# Patient Record
Sex: FEMALE | Race: WHITE | NOT HISPANIC OR LATINO | Employment: OTHER | URBAN - METROPOLITAN AREA
[De-identification: names, ages, dates, MRNs, and addresses within clinical notes are randomized per-mention and may not be internally consistent; named-entity substitution may affect disease eponyms.]

---

## 2017-04-03 ENCOUNTER — HOSPITAL ENCOUNTER (OUTPATIENT)
Dept: LAB | Facility: MEDICAL CENTER | Age: 77
End: 2017-04-03
Attending: INTERNAL MEDICINE
Payer: MEDICARE

## 2017-04-03 LAB
ALBUMIN SERPL BCP-MCNC: 4.5 G/DL (ref 3.2–4.9)
ALBUMIN/GLOB SERPL: 1.5 G/DL
ALP SERPL-CCNC: 82 U/L (ref 30–99)
ALT SERPL-CCNC: 19 U/L (ref 2–50)
ANION GAP SERPL CALC-SCNC: 9 MMOL/L (ref 0–11.9)
AST SERPL-CCNC: 16 U/L (ref 12–45)
BASOPHILS # BLD AUTO: 1.1 % (ref 0–1.8)
BASOPHILS # BLD: 0.1 K/UL (ref 0–0.12)
BILIRUB SERPL-MCNC: 0.7 MG/DL (ref 0.1–1.5)
BUN SERPL-MCNC: 19 MG/DL (ref 8–22)
CALCIUM SERPL-MCNC: 10 MG/DL (ref 8.5–10.5)
CHLORIDE SERPL-SCNC: 102 MMOL/L (ref 96–112)
CHOLEST SERPL-MCNC: 205 MG/DL (ref 100–199)
CO2 SERPL-SCNC: 27 MMOL/L (ref 20–33)
CREAT SERPL-MCNC: 0.81 MG/DL (ref 0.5–1.4)
CREAT UR-MCNC: 194.6 MG/DL
EOSINOPHIL # BLD AUTO: 0.38 K/UL (ref 0–0.51)
EOSINOPHIL NFR BLD: 4.2 % (ref 0–6.9)
ERYTHROCYTE [DISTWIDTH] IN BLOOD BY AUTOMATED COUNT: 44.4 FL (ref 35.9–50)
EST. AVERAGE GLUCOSE BLD GHB EST-MCNC: 148 MG/DL
GFR SERPL CREATININE-BSD FRML MDRD: >60 ML/MIN/1.73 M 2
GLOBULIN SER CALC-MCNC: 3.1 G/DL (ref 1.9–3.5)
GLUCOSE SERPL-MCNC: 139 MG/DL (ref 65–99)
HBA1C MFR BLD: 6.8 % (ref 0–5.6)
HCT VFR BLD AUTO: 48 % (ref 37–47)
HDLC SERPL-MCNC: 54 MG/DL
HGB BLD-MCNC: 15.4 G/DL (ref 12–16)
IMM GRANULOCYTES # BLD AUTO: 0.05 K/UL (ref 0–0.11)
IMM GRANULOCYTES NFR BLD AUTO: 0.6 % (ref 0–0.9)
LDLC SERPL CALC-MCNC: 104 MG/DL
LYMPHOCYTES # BLD AUTO: 1.9 K/UL (ref 1–4.8)
LYMPHOCYTES NFR BLD: 21.1 % (ref 22–41)
MCH RBC QN AUTO: 27.4 PG (ref 27–33)
MCHC RBC AUTO-ENTMCNC: 32.1 G/DL (ref 33.6–35)
MCV RBC AUTO: 85.4 FL (ref 81.4–97.8)
MICROALBUMIN UR-MCNC: 5.7 MG/DL
MICROALBUMIN/CREAT UR: 29 MG/G (ref 0–30)
MONOCYTES # BLD AUTO: 0.71 K/UL (ref 0–0.85)
MONOCYTES NFR BLD AUTO: 7.9 % (ref 0–13.4)
NEUTROPHILS # BLD AUTO: 5.85 K/UL (ref 2–7.15)
NEUTROPHILS NFR BLD: 65.1 % (ref 44–72)
NRBC # BLD AUTO: 0 K/UL
NRBC BLD AUTO-RTO: 0 /100 WBC
PLATELET # BLD AUTO: 328 K/UL (ref 164–446)
PMV BLD AUTO: 11.2 FL (ref 9–12.9)
POTASSIUM SERPL-SCNC: 4.6 MMOL/L (ref 3.6–5.5)
PROT SERPL-MCNC: 7.6 G/DL (ref 6–8.2)
RBC # BLD AUTO: 5.62 M/UL (ref 4.2–5.4)
SODIUM SERPL-SCNC: 138 MMOL/L (ref 135–145)
TRIGL SERPL-MCNC: 235 MG/DL (ref 0–149)
WBC # BLD AUTO: 9 K/UL (ref 4.8–10.8)

## 2017-04-03 PROCEDURE — 85025 COMPLETE CBC W/AUTO DIFF WBC: CPT

## 2017-04-03 PROCEDURE — 82043 UR ALBUMIN QUANTITATIVE: CPT

## 2017-04-03 PROCEDURE — 83036 HEMOGLOBIN GLYCOSYLATED A1C: CPT

## 2017-04-03 PROCEDURE — 80053 COMPREHEN METABOLIC PANEL: CPT

## 2017-04-03 PROCEDURE — 36415 COLL VENOUS BLD VENIPUNCTURE: CPT

## 2017-04-03 PROCEDURE — 82570 ASSAY OF URINE CREATININE: CPT

## 2017-04-03 PROCEDURE — 80061 LIPID PANEL: CPT

## 2017-04-06 ENCOUNTER — OFFICE VISIT (OUTPATIENT)
Dept: MEDICAL GROUP | Age: 77
End: 2017-04-06
Payer: MEDICARE

## 2017-04-06 VITALS
TEMPERATURE: 97.2 F | HEIGHT: 67 IN | SYSTOLIC BLOOD PRESSURE: 138 MMHG | DIASTOLIC BLOOD PRESSURE: 92 MMHG | WEIGHT: 178 LBS | HEART RATE: 77 BPM | BODY MASS INDEX: 27.94 KG/M2 | OXYGEN SATURATION: 94 %

## 2017-04-06 DIAGNOSIS — I48.20 CHRONIC ATRIAL FIBRILLATION (HCC): ICD-10-CM

## 2017-04-06 DIAGNOSIS — J45.40 ASTHMA, MODERATE PERSISTENT, WELL-CONTROLLED: ICD-10-CM

## 2017-04-06 DIAGNOSIS — E78.2 MIXED HYPERLIPIDEMIA: ICD-10-CM

## 2017-04-06 PROCEDURE — 99215 OFFICE O/P EST HI 40 MIN: CPT | Performed by: INTERNAL MEDICINE

## 2017-04-06 RX ORDER — ATORVASTATIN CALCIUM 20 MG/1
20 TABLET, FILM COATED ORAL DAILY
Qty: 90 TAB | Refills: 4 | Status: SHIPPED | OUTPATIENT
Start: 2017-04-06 | End: 2017-09-13

## 2017-04-06 ASSESSMENT — ENCOUNTER SYMPTOMS
EYES NEGATIVE: 1
GASTROINTESTINAL NEGATIVE: 1
CARDIOVASCULAR NEGATIVE: 1
CONSTITUTIONAL NEGATIVE: 1
MUSCULOSKELETAL NEGATIVE: 1
NEUROLOGICAL NEGATIVE: 1
PSYCHIATRIC NEGATIVE: 1
RESPIRATORY NEGATIVE: 1

## 2017-04-06 NOTE — MR AVS SNAPSHOT
"        Jessica Broussardin   2017 8:20 AM   Office Visit   MRN: [id number]    Department:  [address] [place] [address]   Dept Phone:  [phone]    Description:  Female : 1940   Provider:  Avery Cruz M.D.           Reason for Visit     Hyperlipidemia Lab Review      Allergies as of 2017     No Known Allergies      You were diagnosed with     Uncontrolled type 2 diabetes mellitus with complication, without long-term current use of insulin (CMS-HCC)   [4019744]       Mixed hyperlipidemia   [272.2.ICD-9-CM]       Chronic atrial fibrillation (CMS-HCC)   [122792]       Asthma, moderate persistent, well-controlled   [166953]         Vital Signs     Blood Pressure Pulse Temperature Height Weight Body Mass Index    138/92 mmHg 77 36.2 °C (97.2 °F) 1.695 m (5' 6.73\") 80.74 kg (178 lb) 28.10 kg/m2    Oxygen Saturation Smoking Status                94% Never Smoker           Basic Information     Date Of Birth Sex Race Ethnicity Preferred Language    1940 Female White Non- English      Your appointments     Sep 06, 2017  8:20 AM   Established Patient with Avery Cruz M.D.   [address] [address] [phone]           You will be receiving a confirmation call a few days before your appointment from our automated call confirmation system.              Problem List              ICD-10-CM Priority Class Noted - Resolved    MVP (mitral valve prolapse) I34.1   2013 - Present    Chronic atrial fibrillation- dr dawson I48.2   2015 - Present    Asthma, moderate persistent, well-controlled J45.40   2015 - Present    Uncontrolled type 2 diabetes mellitus with complication, without long-term current use of insulin (CMS-HCC) E11.8, E11.65   12/15/2016 - Present    Anticoagulant long-term use- dr eunice collado; for a fib Z79.01   12/15/2016 - Present    Mammogram reminder not needed forever Z53.8   12/15/2016 - Present    Mixed " hyperlipidemia E78.2   12/15/2016 - Present      Health Maintenance        Date Due Completion Dates    IMM DTaP/Tdap/Td Vaccine (1 - Tdap) 6/8/1958 ---    PAP SMEAR 6/8/1960 ---    IMM ZOSTER VACCINE 6/8/1999 ---    A1C SCREENING 10/3/2017 4/3/2017, 12/12/2016, 5/18/2007    IMM PNEUMOCOCCAL 65+ (ADULT) LOW/MEDIUM RISK SERIES (2 of 2 - PPSV23) 12/15/2017 12/15/2016    DIABETES MONOFILAMENT / LE EXAM 12/15/2017 12/15/2016    RETINAL SCREENING 12/29/2017 12/29/2016    FASTING LIPID PROFILE 4/3/2018 4/3/2017, 12/12/2016, 8/22/2016, 6/14/2016, 6/5/2015, 4/22/2013, 3/23/2011, 5/6/2010, 5/18/2007    URINE ACR / MICROALBUMIN 4/3/2018 4/3/2017, 12/15/2016    SERUM CREATININE 4/3/2018 4/3/2017, 12/12/2016, 8/22/2016, 8/21/2016, 6/14/2016, 6/5/2015, 4/22/2013, 5/23/2007, 5/20/2007, 5/19/2007, 5/17/2007    BONE DENSITY 4/23/2019 4/23/2014 (Declined)    Override on 4/23/2014: Patient Declined            Current Immunizations     13-VALENT PCV PREVNAR 12/15/2016    Influenza Vaccine Adult HD 12/15/2016      Below and/or attached are the medications your provider expects you to take. Review all of your home medications and newly ordered medications with your provider and/or pharmacist. Follow medication instructions as directed by your provider and/or pharmacist. Please keep your medication list with you and share with your provider. Update the information when medications are discontinued, doses are changed, or new medications (including over-the-counter products) are added; and carry medication information at all times in the event of emergency situations     Allergies:  No Known Allergies          Medications  Valid as of: April 06, 2017 -  9:00 AM    Generic Name Brand Name Tablet Size Instructions for use    Albuterol Sulfate (Aero Soln) albuterol 108 (90 BASE) MCG/ACT Inhale 1-2 Puffs by mouth every four hours as needed for Shortness of Breath.        Apixaban (Tab) ELIQUIS 5mg Take 5 mg by mouth 2 Times a Day.         Ascorbic Acid (Tab) VITAMIN C 1000 MG Take 1,000 mg by mouth every day.        Atorvastatin Calcium (Tab) LIPITOR 20 MG Take 1 Tab by mouth every day.        Digoxin (Tab) LANOXIN 250 MCG Take 250 mcg by mouth every day.        DiltiaZEM HCl Coated Beads (CAPSULE SR 24 HR) CARDIZEM  MG Take 1 Cap by mouth 2 Times a Day.        Fluticasone-Salmeterol (AEROSOL POWDER, BREATH ACTIVATED) ADVAIR 100-50 MCG/DOSE Inhale 1 Puff by mouth 2 times a day.        Multiple Vitamin (Tab) THERAGRAN  Take 1 Tab by mouth every day.        Mupirocin Calcium (Cream) BACTROBAN 2 % Apply 1 Application to affected area(s) 2 times a day.        Probiotic Product   Take 1 Tab by mouth every day.        .                 Medicines prescribed today were sent to:     [facility] - [place], [place] - [address]    [address] [place] [address]    Phone: [phone] Fax: [phone]    Open 24 Hours?: No      Medication refill instructions:       If your prescription bottle indicates you have medication refills left, it is not necessary to call your provider’s office. Please contact your pharmacy and they will refill your medication.    If your prescription bottle indicates you do not have any refills left, you may request refills at any time through one of the following ways: The online [employer] system (except Urgent Care), by calling your provider’s office, or by asking your pharmacy to contact your provider’s office with a refill request. Medication refills are processed only during regular business hours and may not be available until the next business day. Your provider may request additional information or to have a follow-up visit with you prior to refilling your medication.   *Please Note: Medication refills are assigned a new Rx number when refilled electronically. Your pharmacy may indicate that no refills were authorized even though a new prescription for the same medication is available at the pharmacy. Please request the  medicine by name with the pharmacy before contacting your provider for a refill.        Your To Do List     Future Labs/Procedures Complete By Expires    CBC WITH DIFFERENTIAL  As directed 4/6/2018    COMP METABOLIC PANEL  As directed 4/6/2018    HEMOGLOBIN A1C  As directed 4/6/2018    LIPID PROFILE  As directed 4/6/2018    MICROALBUMIN CREAT RATIO URINE  As directed 4/6/2018    TSH  As directed 4/6/2018         MyChart Status: Patient Declined

## 2017-04-07 NOTE — PROGRESS NOTES
Subjective:      Jessica Black is a 76 y.o. female who presents with Hyperlipidemia  The patient is here for followup of chronic medical problems listed below. The patient is compliant with medications and having no side effects from them. Denies chest pain, abdominal pain, dyspnea, myalgias, or cough.   No Known Allergies  Outpatient Prescriptions Prior to Visit   Medication Sig Dispense Refill   • fluticasone-salmeterol (ADVAIR) 100-50 MCG/DOSE AEROSOL POWDER, BREATH ACTIVATED Inhale 1 Puff by mouth 2 times a day. 3 Inhaler 4   • diltiazem CD (DILTIAZEM CD) 180 MG CAPSULE SR 24 HR Take 1 Cap by mouth 2 Times a Day. 60 Cap 1   • digoxin (LANOXIN) 250 MCG Tab Take 250 mcg by mouth every day.     • multivitamin (THERAGRAN) Tab Take 1 Tab by mouth every day.     • ascorbic acid (C 1000) 1000 MG tablet Take 1,000 mg by mouth every day.     • Probiotic Product (PROBIOTIC DAILY PO) Take 1 Tab by mouth every day.     • apixaban (ELIQUIS) 5mg Tab Take 5 mg by mouth 2 Times a Day.     • mupirocin calcium (BACTROBAN) 2 % Cream Apply 1 Application to affected area(s) 2 times a day. 1 Tube 1   • amoxicillin-clavulanate (AUGMENTIN) 875-125 MG Tab Take 1 Tab by mouth 2 times a day. 30 Tab 1   • atorvastatin (LIPITOR) 10 MG Tab Take 1 Tab by mouth every day. 90 Tab 4   • albuterol (PROAIR HFA) 108 (90 BASE) MCG/ACT Aero Soln inhalation aerosol Inhale 1-2 Puffs by mouth every four hours as needed for Shortness of Breath. 1 Inhaler 3     No facility-administered medications prior to visit.     .          HPI    Review of Systems   Constitutional: Negative.    HENT: Negative.    Eyes: Negative.    Respiratory: Negative.    Cardiovascular: Negative.    Gastrointestinal: Negative.    Genitourinary: Negative.    Musculoskeletal: Negative.    Skin: Negative.    Neurological: Negative.    Endo/Heme/Allergies: Negative.    Psychiatric/Behavioral: Negative.           Objective:     /92 mmHg  Pulse 77  Temp(Src) 36.2 °C (97.2 °F)   "Ht 1.695 m (5' 6.73\")  Wt 80.74 kg (178 lb)  BMI 28.10 kg/m2  SpO2 94%     Physical Exam   Constitutional: She is oriented to person, place, and time. She appears well-developed and well-nourished.   HENT:   Head: Normocephalic and atraumatic.   Right Ear: External ear normal.   Left Ear: External ear normal.   Nose: Nose normal.   Mouth/Throat: Oropharynx is clear and moist.   Eyes: Conjunctivae and EOM are normal. Pupils are equal, round, and reactive to light. Right eye exhibits no discharge. Left eye exhibits no discharge. No scleral icterus.   Neck: Normal range of motion. Neck supple. No JVD present. No tracheal deviation present. No thyromegaly present.   Cardiovascular: Normal rate, regular rhythm, normal heart sounds and intact distal pulses.  Exam reveals no gallop and no friction rub.    Pulmonary/Chest: Effort normal and breath sounds normal. No stridor. No respiratory distress. She has no wheezes. She has no rales. She exhibits no tenderness.   Abdominal: Soft. Bowel sounds are normal. She exhibits no distension and no mass. There is no tenderness. There is no rebound and no guarding. No hernia.   Musculoskeletal: Normal range of motion. She exhibits no edema or tenderness.   Lymphadenopathy:     She has no cervical adenopathy.   Neurological: She is alert and oriented to person, place, and time. She has normal reflexes. She displays normal reflexes. No cranial nerve deficit. Coordination normal.   Skin: Skin is warm and dry. No rash noted. No erythema. No pallor.   Psychiatric: She has a normal mood and affect. Her behavior is normal. Judgment and thought content normal.   Nursing note and vitals reviewed.    Hospital Outpatient Visit on 04/03/2017   Component Date Value   • Sodium 04/03/2017 138    • Potassium 04/03/2017 4.6    • Chloride 04/03/2017 102    • Co2 04/03/2017 27    • Anion Gap 04/03/2017 9.0    • Glucose 04/03/2017 139*   • Bun 04/03/2017 19    • Creatinine 04/03/2017 0.81    • " Calcium 04/03/2017 10.0    • AST(SGOT) 04/03/2017 16    • ALT(SGPT) 04/03/2017 19    • Alkaline Phosphatase 04/03/2017 82    • Total Bilirubin 04/03/2017 0.7    • Albumin 04/03/2017 4.5    • Total Protein 04/03/2017 7.6    • Globulin 04/03/2017 3.1    • A-G Ratio 04/03/2017 1.5    • Cholesterol,Tot 04/03/2017 205*   • Triglycerides 04/03/2017 235*   • HDL 04/03/2017 54    • LDL 04/03/2017 104*   • WBC 04/03/2017 9.0    • RBC 04/03/2017 5.62*   • Hemoglobin 04/03/2017 15.4    • Hematocrit 04/03/2017 48.0*   • MCV 04/03/2017 85.4    • MCH 04/03/2017 27.4    • MCHC 04/03/2017 32.1*   • RDW 04/03/2017 44.4    • Platelet Count 04/03/2017 328    • MPV 04/03/2017 11.2    • Neutrophils-Polys 04/03/2017 65.10    • Lymphocytes 04/03/2017 21.10*   • Monocytes 04/03/2017 7.90    • Eosinophils 04/03/2017 4.20    • Basophils 04/03/2017 1.10    • Immature Granulocytes 04/03/2017 0.60    • Nucleated RBC 04/03/2017 0.00    • Neutrophils (Absolute) 04/03/2017 5.85    • Lymphs (Absolute) 04/03/2017 1.90    • Monos (Absolute) 04/03/2017 0.71    • Eos (Absolute) 04/03/2017 0.38    • Baso (Absolute) 04/03/2017 0.10    • Immature Granulocytes (a* 04/03/2017 0.05    • NRBC (Absolute) 04/03/2017 0.00    • Glycohemoglobin 04/03/2017 6.8*   • Est Avg Glucose 04/03/2017 148    • Creatinine, Urine 04/03/2017 194.60    • Microalbumin, Urine Rand* 04/03/2017 5.7    • Micro Alb Creat Ratio 04/03/2017 29    • GFR If  04/03/2017 >60    • GFR If Non  Ameri* 04/03/2017 >60       Lab Results   Component Value Date/Time    GLYCOHEMOGLOBIN 6.8* 04/03/2017 10:01 AM    GLYCOHEMOGLOBIN 6.8* 12/12/2016 09:50 AM     Lab Results   Component Value Date/Time    SODIUM 138 04/03/2017 10:01 AM    POTASSIUM 4.6 04/03/2017 10:01 AM    CHLORIDE 102 04/03/2017 10:01 AM    CO2 27 04/03/2017 10:01 AM    GLUCOSE 139* 04/03/2017 10:01 AM    BUN 19 04/03/2017 10:01 AM    CREATININE 0.81 04/03/2017 10:01 AM    BUN-CREATININE RATIO 13 03/23/2011 12:00  AM    GLOM FILT RATE, EST 59* 03/23/2011 12:00 AM    ALKALINE PHOSPHATASE 82 04/03/2017 10:01 AM    AST(SGOT) 16 04/03/2017 10:01 AM    ALT(SGPT) 19 04/03/2017 10:01 AM    TOTAL BILIRUBIN 0.7 04/03/2017 10:01 AM     Lab Results   Component Value Date/Time    INR 1.33* 08/21/2016 12:14 PM    INR 1.84* 05/23/2007 03:42 AM    INR 1.53* 05/22/2007 05:25 AM     Lab Results   Component Value Date/Time    CHOLESTEROL,* 04/03/2017 10:01 AM    * 04/03/2017 10:01 AM    HDL 54 04/03/2017 10:01 AM    TRIGLYCERIDES 235* 04/03/2017 10:01 AM       No results found for: TESTOSTERONE  Lab Results   Component Value Date/Time    TSH 2.310 03/23/2011 12:00 AM    TSH 1.790 05/06/2010 09:46 AM     No results found for: FREET4  No results found for: URICACID  No components found for: VITB12  Lab Results   Component Value Date/Time    25-HYDROXY   VITAMIN D 25 26* 12/12/2016 09:50 AM                  Assessment/Plan:     1. Uncontrolled type 2 diabetes mellitus with complication, without long-term current use of insulin (CMS-HCC)  Refuses meds; cont diet and exercise      - COMP METABOLIC PANEL; Future  - LIPID PROFILE; Future  - HEMOGLOBIN A1C; Future  - MICROALBUMIN CREAT RATIO URINE; Future    2. Mixed hyperlipidemiaUnder good control. Continue same regimen.     - atorvastatin (LIPITOR) 20 MG Tab; Take 1 Tab by mouth every day.  Dispense: 90 Tab; Refill: 4  - COMP METABOLIC PANEL; Future  - LIPID PROFILE; Future  - CBC WITH DIFFERENTIAL; Future  - TSH; Future    3. Chronic atrial fibrillation- dr Nixon good control. Continue same regimen.       4. Asthma, moderate persistent, well-controlledUnder good control. Continue same regimen.       40 minute face-to-face encounter took place today.  More than half of this time was spent in the coordination of care of the above problems, as well as counseling.

## 2017-06-08 ENCOUNTER — PATIENT OUTREACH (OUTPATIENT)
Dept: HEALTH INFORMATION MANAGEMENT | Facility: OTHER | Age: 77
End: 2017-06-08

## 2017-06-08 NOTE — PROGRESS NOTES
6/8/17  -  Outcome:No answer    WebIZ Checked & Epic Updated:  yes    [facility] Verified: yes    Attempt # 1

## 2017-06-26 NOTE — PROGRESS NOTES
Attempt #:4    WebIZ Checked & Epic Updated: yes  [facility] Verified: yes  Verify PCP: yes    Communication Preference Obtained: yes     Review Care Team: yes    Annual Wellness Visit Scheduling  1. Scheduling Status:Not Scheduled. Patient states they are not interested     Care Gap Scheduling      Health Maintenance Due   Topic Date Due   • IMM DTaP/Tdap/Td Vaccine (1 - Tdap) DECLINED SCHEDULING AT THIS TIME, HM NOT CHANGED   • PAP SMEAR  MESSAGE SENT TO PROVIDER   • IMM ZOSTER VACCINE  DECLINED SCHEDULING AT THIS TIME, HM NOT CHANGED   • Annual Wellness Visit  DECLINED, POSTPONED ON HM         MyChart Activation: declined  MyChart Papito: no  Virtual Visits: no  Opt In to Text Messages: no

## 2017-07-21 ENCOUNTER — APPOINTMENT (OUTPATIENT)
Dept: RADIOLOGY | Facility: IMAGING CENTER | Age: 77
End: 2017-07-21
Attending: FAMILY MEDICINE
Payer: MEDICARE

## 2017-07-21 ENCOUNTER — OFFICE VISIT (OUTPATIENT)
Dept: URGENT CARE | Facility: CLINIC | Age: 77
End: 2017-07-21
Payer: MEDICARE

## 2017-07-21 VITALS
HEIGHT: 67 IN | DIASTOLIC BLOOD PRESSURE: 84 MMHG | TEMPERATURE: 97.9 F | HEART RATE: 86 BPM | SYSTOLIC BLOOD PRESSURE: 132 MMHG | RESPIRATION RATE: 18 BRPM | WEIGHT: 174.2 LBS | OXYGEN SATURATION: 91 % | BODY MASS INDEX: 27.34 KG/M2

## 2017-07-21 DIAGNOSIS — S49.91XA ARM INJURIES, RIGHT, INITIAL ENCOUNTER: ICD-10-CM

## 2017-07-21 DIAGNOSIS — S52.124A CLOSED NONDISPLACED FRACTURE OF HEAD OF RIGHT RADIUS, INITIAL ENCOUNTER: ICD-10-CM

## 2017-07-21 PROCEDURE — 73130 X-RAY EXAM OF HAND: CPT | Mod: TC,RT | Performed by: FAMILY MEDICINE

## 2017-07-21 PROCEDURE — 73090 X-RAY EXAM OF FOREARM: CPT | Mod: TC,RT | Performed by: FAMILY MEDICINE

## 2017-07-21 PROCEDURE — 99999 PR NO CHARGE: CPT | Performed by: FAMILY MEDICINE

## 2017-07-21 ASSESSMENT — ENCOUNTER SYMPTOMS
NAUSEA: 0
TINGLING: 0
NUMBNESS: 0
VOMITING: 0

## 2017-07-21 NOTE — MR AVS SNAPSHOT
"        Jessica Black   2017 12:45 PM   Office Visit   MRN: [id number]    Department:  [facility]   Dept Phone:  [phone]    Description:  Female : 1940   Provider:  Andrew Snider M.D.           Reason for Visit     Fall x yesterday had a fall and hit her R hand and R arm, bruising and severe swelling in R hand, no pain, arm pain       Allergies as of 2017     No Known Allergies      You were diagnosed with     Arm injuries, right, initial encounter   [[id number]]         Vital Signs     Blood Pressure Pulse Temperature Respirations Height Weight    132/84 mmHg 86 36.6 °C (97.9 °F) 18 1.702 m (5' 7\") 79.017 kg (174 lb 3.2 oz)    Body Mass Index Oxygen Saturation Smoking Status             27.28 kg/m2 91% Never Smoker          Basic Information     Date Of Birth Sex Race Ethnicity Preferred Language    1940 Female White Non- English      Your appointments     2017  9:00 AM   NEW TO YOU with Romulo Davila M.D.   [facility] ([facility])    [address] [id number] [phone]            Sep 06, 2017  8:20 AM   Established Patient with Avery Cruz M.D.   [address]    [address] [facility] [id number] [phone]           You will be receiving a confirmation call a few days before your appointment from our automated call confirmation system.              Problem List              ICD-10-CM Priority Class Noted - Resolved    MVP (mitral valve prolapse) I34.1   2013 - Present    Chronic atrial fibrillation- dr dawson I48.2   2015 - Present    Asthma, moderate persistent, well-controlled J45.40   2015 - Present    Uncontrolled type 2 diabetes mellitus with complication, without long-term current use of insulin (CMS-HCC) E11.8, E11.65   12/15/2016 - Present    Anticoagulant long-term use- dr eunice collado; for a fib Z79.01   12/15/2016 - Present    Mammogram reminder " not needed forever Z53.8   12/15/2016 - Present    Mixed hyperlipidemia E78.2   12/15/2016 - Present      Health Maintenance        Date Due Completion Dates    IMM DTaP/Tdap/Td Vaccine (1 - Tdap) 6/8/1958 ---    PAP SMEAR 6/8/1960 ---    IMM ZOSTER VACCINE 6/8/1999 ---    IMM INFLUENZA (1) 9/1/2017 12/15/2016    A1C SCREENING 10/3/2017 4/3/2017, 12/12/2016, 5/18/2007    IMM PNEUMOCOCCAL 65+ (ADULT) LOW/MEDIUM RISK SERIES (2 of 2 - PPSV23) 12/15/2017 12/15/2016    DIABETES MONOFILAMENT / LE EXAM 12/15/2017 12/15/2016    RETINAL SCREENING 12/29/2017 12/29/2016    FASTING LIPID PROFILE 4/3/2018 4/3/2017, 12/12/2016, 8/22/2016, 6/14/2016, 6/5/2015, 4/22/2013, 3/23/2011, 5/6/2010, 5/18/2007    URINE ACR / MICROALBUMIN 4/3/2018 4/3/2017, 12/15/2016    SERUM CREATININE 4/3/2018 4/3/2017, 12/12/2016, 8/22/2016, 8/21/2016, 6/14/2016, 6/5/2015, 4/22/2013, 5/23/2007, 5/20/2007, 5/19/2007, 5/17/2007    BONE DENSITY 4/23/2019 4/23/2014 (Declined)    Override on 4/23/2014: Patient Declined            Current Immunizations     13-VALENT PCV PREVNAR 12/15/2016    Influenza Vaccine Adult HD 12/15/2016      Below and/or attached are the medications your provider expects you to take. Review all of your home medications and newly ordered medications with your provider and/or pharmacist. Follow medication instructions as directed by your provider and/or pharmacist. Please keep your medication list with you and share with your provider. Update the information when medications are discontinued, doses are changed, or new medications (including over-the-counter products) are added; and carry medication information at all times in the event of emergency situations     Allergies:  No Known Allergies          Medications  Valid as of: July 21, 2017 -  1:42 PM    Generic Name Brand Name Tablet Size Instructions for use    Albuterol Sulfate (Aero Soln) albuterol 108 (90 BASE) MCG/ACT Inhale 1-2 Puffs by mouth every four hours as needed for  Shortness of Breath.        Apixaban (Tab) ELIQUIS 5mg Take 5 mg by mouth 2 Times a Day.        Ascorbic Acid (Tab) VITAMIN C 1000 MG Take 1,000 mg by mouth every day.        Atorvastatin Calcium (Tab) LIPITOR 20 MG Take 1 Tab by mouth every day.        Digoxin (Tab) LANOXIN 250 MCG Take 250 mcg by mouth every day.        DilTIAZem HCl Coated Beads (CAPSULE SR 24 HR) CARDIZEM  MG Take 1 Cap by mouth 2 Times a Day.        Fluticasone-Salmeterol (AEROSOL POWDER, BREATH ACTIVATED) ADVAIR 100-50 MCG/DOSE Inhale 1 Puff by mouth 2 times a day.        Multiple Vitamin (Tab) THERAGRAN  Take 1 Tab by mouth every day.        Mupirocin Calcium (Cream) BACTROBAN 2 % Apply 1 Application to affected area(s) 2 times a day.        Probiotic Product   Take 1 Tab by mouth every day.        .                 Medicines prescribed today were sent to:     [facility] - [place], [place] - [address]    [facility] [place] [address]    Phone: [phone] Fax: [phone]    Open 24 Hours?: No      Medication refill instructions:       If your prescription bottle indicates you have medication refills left, it is not necessary to call your provider’s office. Please contact your pharmacy and they will refill your medication.    If your prescription bottle indicates you do not have any refills left, you may request refills at any time through one of the following ways: The online [employer] system (except Urgent Care), by calling your provider’s office, or by asking your pharmacy to contact your provider’s office with a refill request. Medication refills are processed only during regular business hours and may not be available until the next business day. Your provider may request additional information or to have a follow-up visit with you prior to refilling your medication.   *Please Note: Medication refills are assigned a new Rx number when refilled electronically. Your pharmacy may indicate that no refills were authorized even though a  new prescription for the same medication is available at the pharmacy. Please request the medicine by name with the pharmacy before contacting your provider for a refill.        Your To Do List     Future Labs/Procedures Complete By Expires    DX-FOREARM RIGHT  As directed 7/21/2018    DX-HAND 3+ RIGHT  As directed 7/21/2018         MyChart Status: Patient Declined

## 2017-07-22 NOTE — PROGRESS NOTES
"Subjective:      Jessica Black is a 77 y.o. female who presents with Fall            Fall  The accident occurred 12 to 24 hours ago. The fall occurred while walking. She landed on carpet. There was no blood loss. The pain is present in the right wrist. The pain is moderate. Pertinent negatives include no nausea, numbness, tingling or vomiting.       Review of Systems   Gastrointestinal: Negative for nausea and vomiting.   Neurological: Negative for tingling and numbness.     No Known Allergies      Objective:     /84 mmHg  Pulse 86  Temp(Src) 36.6 °C (97.9 °F)  Resp 18  Ht 1.702 m (5' 7\")  Wt 79.017 kg (174 lb 3.2 oz)  BMI 27.28 kg/m2  SpO2 91%     Physical Exam   Constitutional: She is oriented to person, place, and time. She appears well-developed and well-nourished. No distress.   HENT:   Head: Normocephalic and atraumatic.   Eyes: Conjunctivae and EOM are normal. Pupils are equal, round, and reactive to light.   Cardiovascular: Normal rate and regular rhythm.    No murmur heard.  Pulmonary/Chest: Effort normal and breath sounds normal. No respiratory distress.   Abdominal: Soft. She exhibits no distension. There is no tenderness.   Musculoskeletal:        Right wrist: She exhibits decreased range of motion, tenderness and swelling.   Neurological: She is alert and oriented to person, place, and time. She has normal reflexes. No sensory deficit.   Skin: Skin is warm and dry.   Psychiatric: She has a normal mood and affect.               Assessment/Plan:     1. Arm injuries, right, initial encounter  Acute radial fracture on my read.   - DX-FOREARM RIGHT; Future  - DX-HAND 3+ RIGHT; Future        "

## 2017-07-25 ENCOUNTER — OFFICE VISIT (OUTPATIENT)
Dept: MEDICAL GROUP | Facility: CLINIC | Age: 77
End: 2017-07-25
Payer: MEDICARE

## 2017-07-25 VITALS
HEART RATE: 88 BPM | WEIGHT: 174 LBS | TEMPERATURE: 98.2 F | OXYGEN SATURATION: 92 % | HEIGHT: 67 IN | RESPIRATION RATE: 18 BRPM | BODY MASS INDEX: 27.31 KG/M2 | DIASTOLIC BLOOD PRESSURE: 76 MMHG | SYSTOLIC BLOOD PRESSURE: 122 MMHG

## 2017-07-25 DIAGNOSIS — S52.514A CLOSED NONDISPLACED FRACTURE OF STYLOID PROCESS OF RIGHT RADIUS, INITIAL ENCOUNTER: ICD-10-CM

## 2017-07-25 PROCEDURE — 99202 OFFICE O/P NEW SF 15 MIN: CPT | Mod: 25 | Performed by: FAMILY MEDICINE

## 2017-07-25 PROCEDURE — 25600 CLTX DST RDL FX/EPHYS SEP WO: CPT | Mod: RT | Performed by: FAMILY MEDICINE

## 2017-07-25 ASSESSMENT — ENCOUNTER SYMPTOMS
NAUSEA: 0
CHILLS: 0
FEVER: 0
VOMITING: 0
SHORTNESS OF BREATH: 0

## 2017-07-25 NOTE — PROGRESS NOTES
Subjective:      Jessica Black is a 77 y.o. female who presents with Hand Injury      Referred by Andrew Snider M.D. for evaluation of right wrist pain after fall    Hand Injury  This is a new problem. Pertinent negatives include no chest pain, chills, fever, nausea or vomiting.   DOI  7/20/17, fall at home  RIGHT WRIST pain   Lifting couch to look under  Putting back down and lost balance hitting forearm on another piece furniture with forearm on the way down landing on her right side  Achy pain with rotating and movement of there wrist  Radial side, no radiation  improved with rest  Swelling has gone down some  Mild tingling of the finger tips due to swelling  Has taken aleve with some improvement, but has not needed any more since immobilization at   No prior injuries or issues (right handed)    Review of Systems   Constitutional: Negative for fever and chills.   Respiratory: Negative for shortness of breath.    Cardiovascular: Negative for chest pain.   Gastrointestinal: Negative for nausea and vomiting.     PMH:  has a past medical history of A-fib (CMS-HCC) (2008); Asthma; Hypertension; and Hyperlipidemia.  MEDS:   Current outpatient prescriptions:   •  atorvastatin (LIPITOR) 20 MG Tab, Take 1 Tab by mouth every day., Disp: 90 Tab, Rfl: 4  •  mupirocin calcium (BACTROBAN) 2 % Cream, Apply 1 Application to affected area(s) 2 times a day., Disp: 1 Tube, Rfl: 1  •  fluticasone-salmeterol (ADVAIR) 100-50 MCG/DOSE AEROSOL POWDER, BREATH ACTIVATED, Inhale 1 Puff by mouth 2 times a day., Disp: 3 Inhaler, Rfl: 4  •  diltiazem CD (DILTIAZEM CD) 180 MG CAPSULE SR 24 HR, Take 1 Cap by mouth 2 Times a Day., Disp: 60 Cap, Rfl: 1  •  digoxin (LANOXIN) 250 MCG Tab, Take 250 mcg by mouth every day., Disp: , Rfl:   •  multivitamin (THERAGRAN) Tab, Take 1 Tab by mouth every day., Disp: , Rfl:   •  ascorbic acid (C 1000) 1000 MG tablet, Take 1,000 mg by mouth every day., Disp: , Rfl:   •  Probiotic Product (PROBIOTIC DAILY  "PO), Take 1 Tab by mouth every day., Disp: , Rfl:   •  apixaban (ELIQUIS) 5mg Tab, Take 5 mg by mouth 2 Times a Day., Disp: , Rfl:   •  albuterol (PROAIR HFA) 108 (90 BASE) MCG/ACT Aero Soln inhalation aerosol, Inhale 1-2 Puffs by mouth every four hours as needed for Shortness of Breath., Disp: 1 Inhaler, Rfl: 3  ALLERGIES: No Known Allergies  SURGHX: History reviewed. No pertinent past surgical history.  SOCHX:  reports that she has never smoked. She has never used smokeless tobacco. She reports that she does not drink alcohol or use illicit drugs.  FH: Family history was reviewed, no pertinent findings to report       Objective:     /76 mmHg  Pulse 88  Temp(Src) 36.8 °C (98.2 °F)  Resp 18  Ht 1.702 m (5' 7\")  Wt 78.926 kg (174 lb)  BMI 27.25 kg/m2  SpO2 92%     Physical Exam      NAD  Alert and oriented    BILATERAL ELBOW exam  Range of motion intact  No swelling  No tenderness the medial or lateral epicondyle  Melara test NEGATIVE    BILATERAL WRIST exam  POSITIVE swelling in the right compared to the left   Range of motion LIMITED on the right compared to left  No tenderness along scaphoid, TFCC insertion or distal ulna  POSITIVE tenderness at the distal radius on the radial side on the right compared to left  Tinel's testing is NEGATIVE  The hands are otherwise neurovascularly intact         Assessment/Plan:     1. Closed nondisplaced fracture of styloid process of right radius, initial encounter       Fracture stabilizedin a short arm cast in the office today  Return in about 2 weeks (around 8/8/2017).  and we will transition her at that time into a removable wrist splint for an additional 2 weeks for immobilization until on or after (August 22, 2017)                Results for orders placed in visit on 07/21/17   DX-FOREARM RIGHT    Impression Distal right radial fracture.        Results for orders placed in visit on 07/21/17   DX-HAND 3+ RIGHT    Impression 1.  Intra-articular fracture of the " distal RIGHT radius, involving the radial styloid.  2.  Marked dorsal soft tissue swelling of wrist and hand.  3.  Osteoarthritis.                                                            Films interpreted in the office today with the patient  Fractures of the distal radius with intra-articular involvement that is less than 20% of the articular surface, also minimally displaced    Thank you Andrew Snider M.D. for allowing me to participate in caring for your patient.

## 2017-07-25 NOTE — MR AVS SNAPSHOT
"        Jessica Sofia   2017 9:00 AM   Office Visit   MRN: [id number]    Department:  [facility]   Dept Phone:  [phone]    Description:  Female : 1940   Provider:  Romulo Davila M.D.           Reason for Visit     Hand Injury Referral from / R hand fracture       Allergies as of 2017     No Known Allergies      You were diagnosed with     Closed nondisplaced fracture of styloid process of right radius, initial encounter   [[id number]]         Vital Signs     Blood Pressure Pulse Temperature Respirations Height Weight    122/76 mmHg 88 36.8 °C (98.2 °F) 18 1.702 m (5' 7\") 78.926 kg (174 lb)    Body Mass Index Oxygen Saturation Smoking Status             27.25 kg/m2 92% Never Smoker          Basic Information     Date Of Birth Sex Race Ethnicity Preferred Language    1940 Female White Non- English      Your appointments     Aug 08, 2017  9:00 AM   Established Patient with Romulo Davila M.D.   [facility] ([facility]    [address] [id number] [phone]           You will be receiving a confirmation call a few days before your appointment from our automated call confirmation system.            Sep 06, 2017  8:20 AM   Established Patient with Avery Cruz M.D.   [address]    [address] [facility] [id number] [phone]           You will be receiving a confirmation call a few days before your appointment from our automated call confirmation system.              Problem List              ICD-10-CM Priority Class Noted - Resolved    MVP (mitral valve prolapse) I34.1   2013 - Present    Chronic atrial fibrillation- dr dawson I48.2   2015 - Present    Asthma, moderate persistent, well-controlled J45.40   2015 - Present    Uncontrolled type 2 diabetes mellitus with complication, without long-term current use of insulin (CMS-HCC) E11.8, E11.65   12/15/2016 - Present   " Anticoagulant long-term use- dr eunice collado; for a fib Z79.01   12/15/2016 - Present    Mammogram reminder not needed forever Z53.8   12/15/2016 - Present    Mixed hyperlipidemia E78.2   12/15/2016 - Present      Health Maintenance        Date Due Completion Dates    IMM DTaP/Tdap/Td Vaccine (1 - Tdap) 6/8/1958 ---    PAP SMEAR 6/8/1960 ---    IMM ZOSTER VACCINE 6/8/1999 ---    IMM INFLUENZA (1) 9/1/2017 12/15/2016    A1C SCREENING 10/3/2017 4/3/2017, 12/12/2016, 5/18/2007    IMM PNEUMOCOCCAL 65+ (ADULT) LOW/MEDIUM RISK SERIES (2 of 2 - PPSV23) 12/15/2017 12/15/2016    DIABETES MONOFILAMENT / LE EXAM 12/15/2017 12/15/2016    RETINAL SCREENING 12/29/2017 12/29/2016    FASTING LIPID PROFILE 4/3/2018 4/3/2017, 12/12/2016, 8/22/2016, 6/14/2016, 6/5/2015, 4/22/2013, 3/23/2011, 5/6/2010, 5/18/2007    URINE ACR / MICROALBUMIN 4/3/2018 4/3/2017, 12/15/2016    SERUM CREATININE 4/3/2018 4/3/2017, 12/12/2016, 8/22/2016, 8/21/2016, 6/14/2016, 6/5/2015, 4/22/2013, 5/23/2007, 5/20/2007, 5/19/2007, 5/17/2007    BONE DENSITY 4/23/2019 4/23/2014 (Declined)    Override on 4/23/2014: Patient Declined            Current Immunizations     13-VALENT PCV PREVNAR 12/15/2016    Influenza Vaccine Adult HD 12/15/2016      Below and/or attached are the medications your provider expects you to take. Review all of your home medications and newly ordered medications with your provider and/or pharmacist. Follow medication instructions as directed by your provider and/or pharmacist. Please keep your medication list with you and share with your provider. Update the information when medications are discontinued, doses are changed, or new medications (including over-the-counter products) are added; and carry medication information at all times in the event of emergency situations     Allergies:  No Known Allergies          Medications  Valid as of: July 25, 2017 - 10:36 AM    Generic Name Brand Name Tablet Size Instructions for use    Albuterol  Sulfate (Aero Soln) albuterol 108 (90 BASE) MCG/ACT Inhale 1-2 Puffs by mouth every four hours as needed for Shortness of Breath.        Apixaban (Tab) ELIQUIS 5mg Take 5 mg by mouth 2 Times a Day.        Ascorbic Acid (Tab) VITAMIN C 1000 MG Take 1,000 mg by mouth every day.        Atorvastatin Calcium (Tab) LIPITOR 20 MG Take 1 Tab by mouth every day.        Digoxin (Tab) LANOXIN 250 MCG Take 250 mcg by mouth every day.        DilTIAZem HCl Coated Beads (CAPSULE SR 24 HR) CARDIZEM  MG Take 1 Cap by mouth 2 Times a Day.        Fluticasone-Salmeterol (AEROSOL POWDER, BREATH ACTIVATED) ADVAIR 100-50 MCG/DOSE Inhale 1 Puff by mouth 2 times a day.        Multiple Vitamin (Tab) THERAGRAN  Take 1 Tab by mouth every day.        Mupirocin Calcium (Cream) BACTROBAN 2 % Apply 1 Application to affected area(s) 2 times a day.        Probiotic Product   Take 1 Tab by mouth every day.        .                 Medicines prescribed today were sent to:     [facility] [place] [address]    [facility] [place] [address]    Phone: [phone] Fax: [phone]    Open 24 Hours?: No      Medication refill instructions:       If your prescription bottle indicates you have medication refills left, it is not necessary to call your provider’s office. Please contact your pharmacy and they will refill your medication.    If your prescription bottle indicates you do not have any refills left, you may request refills at any time through one of the following ways: The online [employer] system (except Urgent Care), by calling your provider’s office, or by asking your pharmacy to contact your provider’s office with a refill request. Medication refills are processed only during regular business hours and may not be available until the next business day. Your provider may request additional information or to have a follow-up visit with you prior to refilling your medication.   *Please Note: Medication refills are assigned a new Rx  number when refilled electronically. Your pharmacy may indicate that no refills were authorized even though a new prescription for the same medication is available at the pharmacy. Please request the medicine by name with the pharmacy before contacting your provider for a refill.           MyChart Status: Patient Declined

## 2017-08-08 ENCOUNTER — OFFICE VISIT (OUTPATIENT)
Dept: MEDICAL GROUP | Facility: CLINIC | Age: 77
End: 2017-08-08
Payer: MEDICARE

## 2017-08-08 VITALS
HEART RATE: 81 BPM | DIASTOLIC BLOOD PRESSURE: 74 MMHG | RESPIRATION RATE: 18 BRPM | SYSTOLIC BLOOD PRESSURE: 116 MMHG | HEIGHT: 67 IN | BODY MASS INDEX: 27.31 KG/M2 | WEIGHT: 174 LBS | TEMPERATURE: 97.5 F | OXYGEN SATURATION: 93 %

## 2017-08-08 DIAGNOSIS — S52.514A CLOSED NONDISPLACED FRACTURE OF STYLOID PROCESS OF RIGHT RADIUS, INITIAL ENCOUNTER: ICD-10-CM

## 2017-08-08 NOTE — PROGRESS NOTES
"Subjective:      Jessica Black is a 77 y.o. female who presents with Follow-Up      Referred by Andrew Snider M.D. for evaluation of right wrist pain after fall    Hand Injury  This is a new problem. Pertinent negatives include no chest pain, chills, fever, nausea or vomiting.   DOI  7/20/17, fall at home  RIGHT WRIST pain   Lifting couch to look under  Putting back down and lost balance hitting forearm on another piece furniture with forearm on the way down landing on her right side  Achy pain with rotating and movement of there wrist  Radial side, no radiation  improved with rest  Swelling has gone down some  Mild tingling of the finger tips due to swelling  Has taken aleve with some improvement, but has not needed any more since immobilization at   No prior injuries or issues (right handed)     Objective:     /74 mmHg  Pulse 81  Temp(Src) 36.4 °C (97.5 °F)  Resp 18  Ht 1.702 m (5' 7\")  Wt 78.926 kg (174 lb)  BMI 27.25 kg/m2  SpO2 93%     Physical Exam      NAD  Alert and oriented    BILATERAL WRIST exam  POSITIVE swelling in the right compared to the left   Range of motion LIMITED on the right compared to left  No tenderness along scaphoid, TFCC insertion or distal ulna  NO tenderness at the distal radius on the radial side on the right compared to left  Tinel's testing is NEGATIVE  The hands are otherwise neurovascularly intact     Assessment/Plan:     1. Closed nondisplaced fracture of styloid process of right radius, initial encounter  REFERRAL TO OCCUPATIONAL THERAPY Reason for Therapy: Eval/Treat/Report     Fracture stabilizedin a short arm cast in the office today    Return in about 2 weeks (around 8/22/2017).      OT order placed (schedule OT to start in 1-2 weeks)  Cast removed today  transitioned for stabilization into a removable wrist splint for an additional 2 weeks (until August 22, 2017)                Results for orders placed in visit on 07/21/17   DX-FOREARM RIGHT    Impression " Distal right radial fracture.        Results for orders placed in visit on 07/21/17   DX-HAND 3+ RIGHT    Impression 1.  Intra-articular fracture of the distal RIGHT radius, involving the radial styloid.  2.  Marked dorsal soft tissue swelling of wrist and hand.  3.  Osteoarthritis.                                                            Fractures of the distal radius with intra-articular involvement that is less than 20% of the articular surface, also minimally displaced    Thank you Andrew Snider M.D. for allowing me to participate in caring for your patient.

## 2017-08-08 NOTE — MR AVS SNAPSHOT
"        Jessicarosangela Black   2017 9:00 AM   Office Visit   MRN: [id number]    Department:  [facility]   Dept Phone:  [phone]    Description:  Female : 1940   Provider:  Romulo Davila M.D.           Reason for Visit     Follow-Up F/V Cast removal       Allergies as of 2017     No Known Allergies      You were diagnosed with     Closed nondisplaced fracture of styloid process of right radius, initial encounter   [[id number]]         Vital Signs     Blood Pressure Pulse Temperature Respirations Height Weight    116/74 mmHg 81 36.4 °C (97.5 °F) 18 1.702 m (5' 7\") 78.926 kg (174 lb)    Body Mass Index Oxygen Saturation Smoking Status             27.25 kg/m2 93% Never Smoker          Basic Information     Date Of Birth Sex Race Ethnicity Preferred Language    1940 Female White Non- English      Your appointments     Aug 24, 2017  3:00 PM   Established Patient with Romulo Davila M.D.   [facility]note [facility] ([facility])    [address] [id number] [phone]           You will be receiving a confirmation call a few days before your appointment from our automated call confirmation system.            Sep 06, 2017  8:20 AM   Established Patient with Avery Cruz M.D.   [address]    [address] [facility] [id number] [phone]           You will be receiving a confirmation call a few days before your appointment from our automated call confirmation system.              Problem List              ICD-10-CM Priority Class Noted - Resolved    MVP (mitral valve prolapse) I34.1   2013 - Present    Chronic atrial fibrillation- dr dawson I48.2   2015 - Present    Asthma, moderate persistent, well-controlled J45.40   2015 - Present    Uncontrolled type 2 diabetes mellitus with complication, without long-term current use of insulin (CMS-HCC) E11.8, E11.65   12/15/2016 - Present    Anticoagulant long-term " use- dr eunice collado; for a fib Z79.01   12/15/2016 - Present    Mammogram reminder not needed forever Z53.8   12/15/2016 - Present    Mixed hyperlipidemia E78.2   12/15/2016 - Present      Health Maintenance        Date Due Completion Dates    IMM DTaP/Tdap/Td Vaccine (1 - Tdap) 6/8/1958 ---    PAP SMEAR 6/8/1960 ---    IMM ZOSTER VACCINE 6/8/1999 ---    IMM INFLUENZA (1) 9/1/2017 12/15/2016    A1C SCREENING 10/3/2017 4/3/2017, 12/12/2016, 5/18/2007    IMM PNEUMOCOCCAL 65+ (ADULT) LOW/MEDIUM RISK SERIES (2 of 2 - PPSV23) 12/15/2017 12/15/2016    DIABETES MONOFILAMENT / LE EXAM 12/15/2017 12/15/2016    RETINAL SCREENING 12/29/2017 12/29/2016    FASTING LIPID PROFILE 4/3/2018 4/3/2017, 12/12/2016, 8/22/2016, 6/14/2016, 6/5/2015, 4/22/2013, 3/23/2011, 5/6/2010, 5/18/2007    URINE ACR / MICROALBUMIN 4/3/2018 4/3/2017, 12/15/2016    SERUM CREATININE 4/3/2018 4/3/2017, 12/12/2016, 8/22/2016, 8/21/2016, 6/14/2016, 6/5/2015, 4/22/2013, 5/23/2007, 5/20/2007, 5/19/2007, 5/17/2007    BONE DENSITY 4/23/2019 4/23/2014 (Declined)    Override on 4/23/2014: Patient Declined            Current Immunizations     13-VALENT PCV PREVNAR 12/15/2016    Influenza Vaccine Adult HD 12/15/2016      Below and/or attached are the medications your provider expects you to take. Review all of your home medications and newly ordered medications with your provider and/or pharmacist. Follow medication instructions as directed by your provider and/or pharmacist. Please keep your medication list with you and share with your provider. Update the information when medications are discontinued, doses are changed, or new medications (including over-the-counter products) are added; and carry medication information at all times in the event of emergency situations     Allergies:  No Known Allergies          Medications  Valid as of: August 08, 2017 -  9:25 AM    Generic Name Brand Name Tablet Size Instructions for use    Albuterol Sulfate (Aero Soln)  albuterol 108 (90 BASE) MCG/ACT Inhale 1-2 Puffs by mouth every four hours as needed for Shortness of Breath.        Apixaban (Tab) ELIQUIS 5mg Take 5 mg by mouth 2 Times a Day.        Ascorbic Acid (Tab) VITAMIN C 1000 MG Take 1,000 mg by mouth every day.        Atorvastatin Calcium (Tab) LIPITOR 20 MG Take 1 Tab by mouth every day.        Digoxin (Tab) LANOXIN 250 MCG Take 250 mcg by mouth every day.        DilTIAZem HCl Coated Beads (CAPSULE SR 24 HR) CARDIZEM  MG Take 1 Cap by mouth 2 Times a Day.        Fluticasone-Salmeterol (AEROSOL POWDER, BREATH ACTIVATED) ADVAIR 100-50 MCG/DOSE Inhale 1 Puff by mouth 2 times a day.        Multiple Vitamin (Tab) THERAGRAN  Take 1 Tab by mouth every day.        Mupirocin Calcium (Cream) BACTROBAN 2 % Apply 1 Application to affected area(s) 2 times a day.        Probiotic Product   Take 1 Tab by mouth every day.        .                 Medicines prescribed today were sent to:     [facility] - [address]    [facility] [place] [address]    Phone: [phone] Fax: [phone]    Open 24 Hours?: No      Medication refill instructions:       If your prescription bottle indicates you have medication refills left, it is not necessary to call your provider’s office. Please contact your pharmacy and they will refill your medication.    If your prescription bottle indicates you do not have any refills left, you may request refills at any time through one of the following ways: The online [employer] system (except Urgent Care), by calling your provider’s office, or by asking your pharmacy to contact your provider’s office with a refill request. Medication refills are processed only during regular business hours and may not be available until the next business day. Your provider may request additional information or to have a follow-up visit with you prior to refilling your medication.   *Please Note: Medication refills are assigned a new Rx number when refilled  electronically. Your pharmacy may indicate that no refills were authorized even though a new prescription for the same medication is available at the pharmacy. Please request the medicine by name with the pharmacy before contacting your provider for a refill.        Referral     A referral request has been sent to our patient care coordination department. Please allow 3-5 business days for us to process this request and contact you either by phone or mail. If you do not hear from us by the 5th business day, please call us at [phone].           MyChart Status: Patient Declined

## 2017-08-24 ENCOUNTER — OFFICE VISIT (OUTPATIENT)
Dept: MEDICAL GROUP | Facility: CLINIC | Age: 77
End: 2017-08-24
Payer: MEDICARE

## 2017-08-24 VITALS
SYSTOLIC BLOOD PRESSURE: 118 MMHG | HEIGHT: 67 IN | HEART RATE: 73 BPM | RESPIRATION RATE: 16 BRPM | OXYGEN SATURATION: 93 % | TEMPERATURE: 98.6 F | BODY MASS INDEX: 27.31 KG/M2 | WEIGHT: 174 LBS | DIASTOLIC BLOOD PRESSURE: 76 MMHG

## 2017-08-24 DIAGNOSIS — S52.514A CLOSED NONDISPLACED FRACTURE OF STYLOID PROCESS OF RIGHT RADIUS, INITIAL ENCOUNTER: ICD-10-CM

## 2017-08-24 NOTE — PROGRESS NOTES
"Subjective:      Jessica Black is a 77 y.o. female who presents with Follow-Up      Follow up for right wrist fracture after fall    Hand Injury  DOI  7/20/17, fall at home  NO pain   Putting back down and lost balance hitting forearm on another piece furniture with forearm on the way down landing on her right side  Swelling has gone down,  Some mild swelling  No prior injuries or issues (right handed)     Objective:     /76 mmHg  Pulse 73  Temp(Src) 37 °C (98.6 °F)  Resp 16  Ht 1.702 m (5' 7\")  Wt 78.926 kg (174 lb)  BMI 27.25 kg/m2  SpO2 93%     Physical Exam      NAD  Alert and oriented    RIGHT WRIST exam  POSITIVE minimal swelling  Range of motion LIMITED on the right compared to left  No tenderness along scaphoid, TFCC insertion or distal ulna  NO tenderness at the distal radius on the radial side on the right compared to left  The hands are otherwise neurovascularly intact     Assessment/Plan:     1. Closed nondisplaced fracture of styloid process of right radius, initial encounter  DS-BONE DENSITY STUDY (DEXA)     D/c wrist splint  Starts OT tomorrow  DEXA scan ordered today    Return in about 4 weeks (around 9/21/2017).                Results for orders placed in visit on 07/21/17   DX-FOREARM RIGHT    Impression Distal right radial fracture.        Results for orders placed in visit on 07/21/17   DX-HAND 3+ RIGHT    Impression 1.  Intra-articular fracture of the distal RIGHT radius, involving the radial styloid.  2.  Marked dorsal soft tissue swelling of wrist and hand.  3.  Osteoarthritis.                                                            Fractures of the distal radius with intra-articular involvement that is less than 20% of the articular surface, also minimally displaced    Thank you Andrew Snider M.D. for allowing me to participate in caring for your patient.   "

## 2017-08-24 NOTE — MR AVS SNAPSHOT
"        Jessica Broussardin   2017 3:00 PM   Office Visit   MRN: [id number]    Department:  [facility]   Dept Phone:  [phone]    Description:  Female : 1940   Provider:  Romulo Davila M.D.           Reason for Visit     Follow-Up F/V Wrist injury       Allergies as of 2017     No Known Allergies      You were diagnosed with     Closed nondisplaced fracture of styloid process of right radius, initial encounter   [[id number]]         Vital Signs     Blood Pressure Pulse Temperature Respirations Height Weight    118/76 mmHg 73 37 °C (98.6 °F) 16 1.702 m (5' 7\") 78.926 kg (174 lb)    Body Mass Index Oxygen Saturation Smoking Status             27.25 kg/m2 93% Never Smoker          Basic Information     Date Of Birth Sex Race Ethnicity Preferred Language    1940 Female White Non- English      Your appointments     Sep 13, 2017  2:00 PM   Established Patient with Avery Cruz M.D.   [address]    [address] [facility] [id number] [phone]           You will be receiving a confirmation call a few days before your appointment from our automated call confirmation system.            Sep 28, 2017  3:00 PM   Established Patient with Romulo Davila M.D.   [facility] ([facility])    [address] [facility] [address] [phone]           You will be receiving a confirmation call a few days before your appointment from our automated call confirmation system.              Problem List              ICD-10-CM Priority Class Noted - Resolved    MVP (mitral valve prolapse) I34.1   2013 - Present    Chronic atrial fibrillation- dr dawson I48.2   2015 - Present    Asthma, moderate persistent, well-controlled J45.40   2015 - Present    Uncontrolled type 2 diabetes mellitus with complication, without long-term current use of insulin (CMS-HCC) E11.8, E11.65   12/15/2016 - Present    Anticoagulant long-term " use- dr eunice collado; for a fib Z79.01   12/15/2016 - Present    Mammogram reminder not needed forever Z53.8   12/15/2016 - Present    Mixed hyperlipidemia E78.2   12/15/2016 - Present      Health Maintenance        Date Due Completion Dates    IMM DTaP/Tdap/Td Vaccine (1 - Tdap) 6/8/1958 ---    PAP SMEAR 6/8/1960 ---    IMM ZOSTER VACCINE 6/8/1999 ---    IMM INFLUENZA (1) 9/1/2017 12/15/2016    A1C SCREENING 10/3/2017 4/3/2017, 12/12/2016, 5/18/2007    IMM PNEUMOCOCCAL 65+ (ADULT) LOW/MEDIUM RISK SERIES (2 of 2 - PPSV23) 12/15/2017 12/15/2016    DIABETES MONOFILAMENT / LE EXAM 12/15/2017 12/15/2016    RETINAL SCREENING 12/29/2017 12/29/2016    FASTING LIPID PROFILE 4/3/2018 4/3/2017, 12/12/2016, 8/22/2016, 6/14/2016, 6/5/2015, 4/22/2013, 3/23/2011, 5/6/2010, 5/18/2007    URINE ACR / MICROALBUMIN 4/3/2018 4/3/2017, 12/15/2016    SERUM CREATININE 4/3/2018 4/3/2017, 12/12/2016, 8/22/2016, 8/21/2016, 6/14/2016, 6/5/2015, 4/22/2013, 5/23/2007, 5/20/2007, 5/19/2007, 5/17/2007    BONE DENSITY 4/23/2019 4/23/2014 (Declined)    Override on 4/23/2014: Patient Declined            Current Immunizations     13-VALENT PCV PREVNAR 12/15/2016    Influenza Vaccine Adult HD 12/15/2016      Below and/or attached are the medications your provider expects you to take. Review all of your home medications and newly ordered medications with your provider and/or pharmacist. Follow medication instructions as directed by your provider and/or pharmacist. Please keep your medication list with you and share with your provider. Update the information when medications are discontinued, doses are changed, or new medications (including over-the-counter products) are added; and carry medication information at all times in the event of emergency situations     Allergies:  No Known Allergies          Medications  Valid as of: August 24, 2017 -  3:39 PM    Generic Name Brand Name Tablet Size Instructions for use    Albuterol Sulfate (Aero Soln)  albuterol 108 (90 Base) MCG/ACT Inhale 1-2 Puffs by mouth every four hours as needed for Shortness of Breath.        Apixaban (Tab) ELIQUIS 5mg Take 5 mg by mouth 2 Times a Day.        Ascorbic Acid (Tab) VITAMIN C 1000 MG Take 1,000 mg by mouth every day.        Atorvastatin Calcium (Tab) LIPITOR 20 MG Take 1 Tab by mouth every day.        Digoxin (Tab) LANOXIN 250 MCG Take 250 mcg by mouth every day.        DilTIAZem HCl Coated Beads (CAPSULE SR 24 HR) CARDIZEM  MG Take 1 Cap by mouth 2 Times a Day.        Fluticasone-Salmeterol (AEROSOL POWDER, BREATH ACTIVATED) ADVAIR 100-50 MCG/DOSE Inhale 1 Puff by mouth 2 times a day.        Multiple Vitamin (Tab) THERAGRAN  Take 1 Tab by mouth every day.        Mupirocin Calcium (Cream) BACTROBAN 2 % Apply 1 Application to affected area(s) 2 times a day.        Probiotic Product   Take 1 Tab by mouth every day.        .                 Medicines prescribed today were sent to:     [facility] - [id number]    [facility] [place] [address]    Phone: [phone] Fax: [phone]    Open 24 Hours?: No      Medication refill instructions:       If your prescription bottle indicates you have medication refills left, it is not necessary to call your provider’s office. Please contact your pharmacy and they will refill your medication.    If your prescription bottle indicates you do not have any refills left, you may request refills at any time through one of the following ways: The online [employer] system (except Urgent Care), by calling your provider’s office, or by asking your pharmacy to contact your provider’s office with a refill request. Medication refills are processed only during regular business hours and may not be available until the next business day. Your provider may request additional information or to have a follow-up visit with you prior to refilling your medication.   *Please Note: Medication refills are assigned a new Rx number when refilled  electronically. Your pharmacy may indicate that no refills were authorized even though a new prescription for the same medication is available at the pharmacy. Please request the medicine by name with the pharmacy before contacting your provider for a refill.        Your To Do List     Future Labs/Procedures Complete By Expires    DS-BONE DENSITY STUDY (DEXA)  As directed 8/24/2018         MyChart Status: Patient Declined

## 2017-09-11 ENCOUNTER — HOSPITAL ENCOUNTER (OUTPATIENT)
Dept: LAB | Facility: MEDICAL CENTER | Age: 77
End: 2017-09-11
Attending: INTERNAL MEDICINE
Payer: MEDICARE

## 2017-09-11 DIAGNOSIS — E78.2 MIXED HYPERLIPIDEMIA: ICD-10-CM

## 2017-09-11 LAB
BASOPHILS # BLD AUTO: 1 % (ref 0–1.8)
BASOPHILS # BLD: 0.08 K/UL (ref 0–0.12)
CREAT UR-MCNC: 147 MG/DL
EOSINOPHIL # BLD AUTO: 0.31 K/UL (ref 0–0.51)
EOSINOPHIL NFR BLD: 3.8 % (ref 0–6.9)
ERYTHROCYTE [DISTWIDTH] IN BLOOD BY AUTOMATED COUNT: 42.8 FL (ref 35.9–50)
EST. AVERAGE GLUCOSE BLD GHB EST-MCNC: 151 MG/DL
HBA1C MFR BLD: 6.9 % (ref 0–5.6)
HCT VFR BLD AUTO: 48.3 % (ref 37–47)
HGB BLD-MCNC: 15.6 G/DL (ref 12–16)
IMM GRANULOCYTES # BLD AUTO: 0.05 K/UL (ref 0–0.11)
IMM GRANULOCYTES NFR BLD AUTO: 0.6 % (ref 0–0.9)
LYMPHOCYTES # BLD AUTO: 1.69 K/UL (ref 1–4.8)
LYMPHOCYTES NFR BLD: 20.6 % (ref 22–41)
MCH RBC QN AUTO: 27.3 PG (ref 27–33)
MCHC RBC AUTO-ENTMCNC: 32.3 G/DL (ref 33.6–35)
MCV RBC AUTO: 84.6 FL (ref 81.4–97.8)
MICROALBUMIN UR-MCNC: 3.5 MG/DL
MICROALBUMIN/CREAT UR: 24 MG/G (ref 0–30)
MONOCYTES # BLD AUTO: 0.67 K/UL (ref 0–0.85)
MONOCYTES NFR BLD AUTO: 8.2 % (ref 0–13.4)
NEUTROPHILS # BLD AUTO: 5.39 K/UL (ref 2–7.15)
NEUTROPHILS NFR BLD: 65.8 % (ref 44–72)
NRBC # BLD AUTO: 0 K/UL
NRBC BLD AUTO-RTO: 0 /100 WBC
PLATELET # BLD AUTO: 318 K/UL (ref 164–446)
PMV BLD AUTO: 12 FL (ref 9–12.9)
RBC # BLD AUTO: 5.71 M/UL (ref 4.2–5.4)
TSH SERPL DL<=0.005 MIU/L-ACNC: 1.53 UIU/ML (ref 0.3–3.7)
WBC # BLD AUTO: 8.2 K/UL (ref 4.8–10.8)

## 2017-09-11 PROCEDURE — 85025 COMPLETE CBC W/AUTO DIFF WBC: CPT

## 2017-09-11 PROCEDURE — 80053 COMPREHEN METABOLIC PANEL: CPT

## 2017-09-11 PROCEDURE — 84443 ASSAY THYROID STIM HORMONE: CPT

## 2017-09-11 PROCEDURE — 83036 HEMOGLOBIN GLYCOSYLATED A1C: CPT

## 2017-09-11 PROCEDURE — 36415 COLL VENOUS BLD VENIPUNCTURE: CPT

## 2017-09-11 PROCEDURE — 82043 UR ALBUMIN QUANTITATIVE: CPT

## 2017-09-11 PROCEDURE — 82570 ASSAY OF URINE CREATININE: CPT

## 2017-09-11 PROCEDURE — 80061 LIPID PANEL: CPT

## 2017-09-12 ENCOUNTER — TELEPHONE (OUTPATIENT)
Dept: MEDICAL GROUP | Age: 77
End: 2017-09-12

## 2017-09-12 LAB
ALBUMIN SERPL BCP-MCNC: 4.3 G/DL (ref 3.2–4.9)
ALBUMIN/GLOB SERPL: 1.5 G/DL
ALP SERPL-CCNC: 70 U/L (ref 30–99)
ALT SERPL-CCNC: 20 U/L (ref 2–50)
ANION GAP SERPL CALC-SCNC: 11 MMOL/L (ref 0–11.9)
AST SERPL-CCNC: 18 U/L (ref 12–45)
BILIRUB SERPL-MCNC: 0.8 MG/DL (ref 0.1–1.5)
BUN SERPL-MCNC: 20 MG/DL (ref 8–22)
CALCIUM SERPL-MCNC: 10.1 MG/DL (ref 8.5–10.5)
CHLORIDE SERPL-SCNC: 102 MMOL/L (ref 96–112)
CHOLEST SERPL-MCNC: 179 MG/DL (ref 100–199)
CO2 SERPL-SCNC: 27 MMOL/L (ref 20–33)
CREAT SERPL-MCNC: 0.69 MG/DL (ref 0.5–1.4)
GFR SERPL CREATININE-BSD FRML MDRD: >60 ML/MIN/1.73 M 2
GLOBULIN SER CALC-MCNC: 2.8 G/DL (ref 1.9–3.5)
GLUCOSE SERPL-MCNC: 110 MG/DL (ref 65–99)
HDLC SERPL-MCNC: 49 MG/DL
LDLC SERPL CALC-MCNC: 98 MG/DL
POTASSIUM SERPL-SCNC: 4.5 MMOL/L (ref 3.6–5.5)
PROT SERPL-MCNC: 7.1 G/DL (ref 6–8.2)
SODIUM SERPL-SCNC: 140 MMOL/L (ref 135–145)
TRIGL SERPL-MCNC: 159 MG/DL (ref 0–149)

## 2017-09-12 NOTE — TELEPHONE ENCOUNTER
ESTABLISHED PATIENT PRE-VISIT PLANNING     Note: Patient will not be contacted if there is no indication to call.     1.  Reviewed notes from the last few office visits within the medical group: Yes    2.  If any orders were placed at last visit or intended to be done for this visit (i.e. 6 mos follow-up), do we have Results/Consult Notes?        •  Labs - Labs ordered, completed and results are in chart.       •  Imaging - Imaging was not ordered at last office visit.       •  Referrals - No referrals were ordered at last office visit.    3. Is this appointment scheduled as a Hospital Follow-Up? No    4.  Immunizations were updated in [employer] using WebIZ?: Yes       •  Web [employer] Recommendations: FLU, TDAP and ZOSTAVAX (Shingles)    5.  Patient is due for the following Health Maintenance Topics:   Health Maintenance Due   Topic Date Due   • IMM DTaP/Tdap/Td Vaccine (1 - Tdap) 06/08/1958   • PAP SMEAR  06/08/1960   • IMM ZOSTER VACCINE  06/08/1999   • IMM INFLUENZA (1) 09/01/2017       - Patient has completed PREVNAR (PCV13)  Immunization(s) per WebIZ. Chart has been updated.      6.  Patient was NOT informed to arrive 15 min prior to their scheduled appointment and bring in their medication bottles.

## 2017-09-13 ENCOUNTER — OFFICE VISIT (OUTPATIENT)
Dept: MEDICAL GROUP | Age: 77
End: 2017-09-13
Payer: MEDICARE

## 2017-09-13 VITALS
HEART RATE: 86 BPM | HEIGHT: 67 IN | OXYGEN SATURATION: 92 % | WEIGHT: 172 LBS | DIASTOLIC BLOOD PRESSURE: 62 MMHG | SYSTOLIC BLOOD PRESSURE: 120 MMHG | BODY MASS INDEX: 27 KG/M2 | TEMPERATURE: 97.9 F

## 2017-09-13 DIAGNOSIS — Z79.01 ANTICOAGULANT LONG-TERM USE: ICD-10-CM

## 2017-09-13 DIAGNOSIS — M81.0 MENOPAUSAL OSTEOPOROSIS: ICD-10-CM

## 2017-09-13 DIAGNOSIS — I48.20 CHRONIC ATRIAL FIBRILLATION (HCC): ICD-10-CM

## 2017-09-13 DIAGNOSIS — J45.40 ASTHMA, MODERATE PERSISTENT, WELL-CONTROLLED: ICD-10-CM

## 2017-09-13 DIAGNOSIS — E78.2 MIXED HYPERLIPIDEMIA: ICD-10-CM

## 2017-09-13 PROCEDURE — 99214 OFFICE O/P EST MOD 30 MIN: CPT | Performed by: INTERNAL MEDICINE

## 2017-09-13 RX ORDER — ATORVASTATIN CALCIUM 10 MG/1
10 TABLET, FILM COATED ORAL DAILY
Qty: 90 TAB | Refills: 4 | Status: SHIPPED | OUTPATIENT
Start: 2017-09-13 | End: 2018-12-04 | Stop reason: SDUPTHER

## 2017-09-13 ASSESSMENT — ENCOUNTER SYMPTOMS
NEUROLOGICAL NEGATIVE: 1
RESPIRATORY NEGATIVE: 1
CARDIOVASCULAR NEGATIVE: 1
EYES NEGATIVE: 1
GASTROINTESTINAL NEGATIVE: 1
MUSCULOSKELETAL NEGATIVE: 1
CONSTITUTIONAL NEGATIVE: 1
PSYCHIATRIC NEGATIVE: 1

## 2017-09-13 ASSESSMENT — PATIENT HEALTH QUESTIONNAIRE - PHQ9: CLINICAL INTERPRETATION OF PHQ2 SCORE: 0

## 2017-09-14 NOTE — PROGRESS NOTES
Subjective:      Jessica Black is a 77 y.o. female who presents with Hyperlipidemia (lab review)  The patient is here for followup of chronic medical problems listed below. The patient is compliant with medications and having no side effects from them. Denies chest pain, abdominal pain, dyspnea, myalgias, or cough.     Since the patient's last visit, she never went up to the 20 mg of Lipitor. She instead work on diet and exercise weight reduction and has had a good response to this with a drop in her LDL and triglycerides that were quite significant. Her LDL is dropped to 98 and triglycerides to 159. She refuses to go to the 20 mg dose.      Patient Active Problem List    Diagnosis Date Noted   • Menopausal osteoporosis 09/13/2017   • Uncontrolled type 2 diabetes mellitus with complication, without long-term current use of insulin (CMS-formerly [facility]) 12/15/2016   • Anticoagulant long-term use- dr eunice collado; for a fib 12/15/2016   • Mammogram reminder not needed forever 12/15/2016   • Mixed hyperlipidemia 12/15/2016   • Chronic atrial fibrillation- dr dawson 11/18/2015   • Asthma, moderate persistent, well-controlled 11/18/2015   • MVP (mitral valve prolapse) 04/22/2013       No Known Allergies  Outpatient Medications Prior to Visit   Medication Sig Dispense Refill   • fluticasone-salmeterol (ADVAIR) 100-50 MCG/DOSE AEROSOL POWDER, BREATH ACTIVATED Inhale 1 Puff by mouth 2 times a day. 3 Inhaler 4   • diltiazem CD (DILTIAZEM CD) 180 MG CAPSULE SR 24 HR Take 1 Cap by mouth 2 Times a Day. 60 Cap 1   • digoxin (LANOXIN) 250 MCG Tab Take 250 mcg by mouth every day.     • multivitamin (THERAGRAN) Tab Take 1 Tab by mouth every day.     • ascorbic acid (C 1000) 1000 MG tablet Take 1,000 mg by mouth every day.     • Probiotic Product (PROBIOTIC DAILY PO) Take 1 Tab by mouth every day.     • apixaban (ELIQUIS) 5mg Tab Take 5 mg by mouth 2 Times a Day.     • atorvastatin (LIPITOR) 20 MG Tab Take 1 Tab by mouth every day. 90 Tab 4   •  "mupirocin calcium (BACTROBAN) 2 % Cream Apply 1 Application to affected area(s) 2 times a day. 1 Tube 1   • albuterol (PROAIR HFA) 108 (90 BASE) MCG/ACT Aero Soln inhalation aerosol Inhale 1-2 Puffs by mouth every four hours as needed for Shortness of Breath. 1 Inhaler 3     No facility-administered medications prior to visit.        /a          HPI    Review of Systems   Constitutional: Negative.    HENT: Negative.    Eyes: Negative.    Respiratory: Negative.    Cardiovascular: Negative.    Gastrointestinal: Negative.    Genitourinary: Negative.    Musculoskeletal: Negative.    Skin: Negative.    Neurological: Negative.    Endo/Heme/Allergies: Negative.    Psychiatric/Behavioral: Negative.           Objective:     /62   Pulse 86   Temp 36.6 °C (97.9 °F)   Ht 1.702 m (5' 7\")   Wt 78 kg (172 lb)   SpO2 92%   BMI 26.94 kg/m²      Physical Exam   Constitutional: She is oriented to person, place, and time. She appears well-developed and well-nourished. No distress.   HENT:   Head: Normocephalic and atraumatic.   Right Ear: External ear normal.   Left Ear: External ear normal.   Nose: Nose normal.   Mouth/Throat: Oropharynx is clear and moist. No oropharyngeal exudate.   Eyes: Conjunctivae and EOM are normal. Pupils are equal, round, and reactive to light. Right eye exhibits no discharge. Left eye exhibits no discharge. No scleral icterus.   Neck: Normal range of motion. Neck supple. No JVD present. No tracheal deviation present. No thyromegaly present.   Cardiovascular: Normal rate, regular rhythm, normal heart sounds and intact distal pulses.  Exam reveals no gallop and no friction rub.    No murmur heard.  Pulmonary/Chest: Effort normal and breath sounds normal. No stridor. No respiratory distress. She has no wheezes. She has no rales. She exhibits no tenderness.   Abdominal: Soft. Bowel sounds are normal. She exhibits no distension and no mass. There is no tenderness. There is no rebound and no guarding. " No hernia.   Musculoskeletal: Normal range of motion. She exhibits no edema or tenderness.   Lymphadenopathy:     She has no cervical adenopathy.   Neurological: She is alert and oriented to person, place, and time. She has normal reflexes. She displays normal reflexes. No cranial nerve deficit. She exhibits normal muscle tone. Coordination normal.   Skin: Skin is warm and dry. No rash noted. She is not diaphoretic. No erythema. No pallor.   Psychiatric: She has a normal mood and affect. Her behavior is normal. Judgment and thought content normal.   Nursing note and vitals reviewed.    Hospital Outpatient Visit on 09/11/2017   Component Date Value   • Sodium 09/12/2017 140    • Potassium 09/12/2017 4.5    • Chloride 09/12/2017 102    • Co2 09/12/2017 27    • Anion Gap 09/12/2017 11.0    • Glucose 09/12/2017 110*   • Bun 09/12/2017 20    • Creatinine 09/12/2017 0.69    • Calcium 09/12/2017 10.1    • AST(SGOT) 09/12/2017 18    • ALT(SGPT) 09/12/2017 20    • Alkaline Phosphatase 09/12/2017 70    • Total Bilirubin 09/12/2017 0.8    • Albumin 09/12/2017 4.3    • Total Protein 09/12/2017 7.1    • Globulin 09/12/2017 2.8    • A-G Ratio 09/12/2017 1.5    • Cholesterol,Tot 09/12/2017 179    • Triglycerides 09/12/2017 159*   • HDL 09/12/2017 49    • LDL 09/12/2017 98    • WBC 09/11/2017 8.2    • RBC 09/11/2017 5.71*   • Hemoglobin 09/11/2017 15.6    • Hematocrit 09/11/2017 48.3*   • MCV 09/11/2017 84.6    • MCH 09/11/2017 27.3    • MCHC 09/11/2017 32.3*   • RDW 09/11/2017 42.8    • Platelet Count 09/11/2017 318    • MPV 09/11/2017 12.0    • Neutrophils-Polys 09/11/2017 65.80    • Lymphocytes 09/11/2017 20.60*   • Monocytes 09/11/2017 8.20    • Eosinophils 09/11/2017 3.80    • Basophils 09/11/2017 1.00    • Immature Granulocytes 09/11/2017 0.60    • Nucleated RBC 09/11/2017 0.00    • Neutrophils (Absolute) 09/11/2017 5.39    • Lymphs (Absolute) 09/11/2017 1.69    • Monos (Absolute) 09/11/2017 0.67    • Eos (Absolute)  09/11/2017 0.31    • Baso (Absolute) 09/11/2017 0.08    • Immature Granulocytes (a* 09/11/2017 0.05    • NRBC (Absolute) 09/11/2017 0.00    • Glycohemoglobin 09/11/2017 6.9*   • Est Avg Glucose 09/11/2017 151    • Creatinine, Urine 09/11/2017 147.00    • Microalbumin, Urine Rand* 09/11/2017 3.5    • Micro Alb Creat Ratio 09/11/2017 24    • TSH 09/11/2017 1.530    • GFR If  09/12/2017 >60    • GFR If Non  Ameri* 09/12/2017 >60       Lab Results   Component Value Date/Time    HBA1C 6.9 (H) 09/11/2017 09:56 AM    HBA1C 6.8 (H) 04/03/2017 10:01 AM     Lab Results   Component Value Date/Time    SODIUM 140 09/11/2017 09:56 AM    POTASSIUM 4.5 09/11/2017 09:56 AM    CHLORIDE 102 09/11/2017 09:56 AM    CO2 27 09/11/2017 09:56 AM    GLUCOSE 110 (H) 09/11/2017 09:56 AM    BUN 20 09/11/2017 09:56 AM    CREATININE 0.69 09/11/2017 09:56 AM    CREATININE 0.94 03/23/2011 12:00 AM    BUNCREATRAT 13 03/23/2011 12:00 AM    GLOMRATE 59 (L) 03/23/2011 12:00 AM    ALKPHOSPHAT 70 09/11/2017 09:56 AM    ASTSGOT 18 09/11/2017 09:56 AM    ALTSGPT 20 09/11/2017 09:56 AM    TBILIRUBIN 0.8 09/11/2017 09:56 AM     Lab Results   Component Value Date/Time    INR 1.33 (H) 08/21/2016 12:14 PM    INR 1.84 (H) 05/23/2007 03:42 AM    INR 1.53 (H) 05/22/2007 05:25 AM     Lab Results   Component Value Date/Time    CHOLSTRLTOT 179 09/11/2017 09:56 AM    LDL 98 09/11/2017 09:56 AM    HDL 49 09/11/2017 09:56 AM    TRIGLYCERIDE 159 (H) 09/11/2017 09:56 AM       No results found for: TESTOSTERONE  Lab Results   Component Value Date/Time    TSH 2.310 03/23/2011 12:00 AM    TSH 1.790 05/06/2010 09:46 AM     No results found for: FREET4  No results found for: URICACID  No components found for: VITB12  Lab Results   Component Value Date/Time    25HYDROXY 26 (L) 12/12/2016 09:50 AM                 Assessment/Plan:     1. Menopausal osteoporosis     - DS-BONE DENSITY STUDY (DEXA); Future    2. Uncontrolled type 2 diabetes mellitus with  complication, without long-term current use of insulin (CMS-HCC)   Under good control. Continue same regimen.  - COMP METABOLIC PANEL; Future  - LIPID PROFILE; Future  - CBC WITH DIFFERENTIAL; Future  - HEMOGLOBIN A1C; Future  - MICROALBUMIN CREAT RATIO URINE; Future    3. Mixed hyperlipidemia Under good control. Continue same regimen.    - atorvastatin (LIPITOR) 10 MG Tab; Take 1 Tab by mouth every day.  Dispense: 90 Tab; Refill: 4          4. Asthma, moderate persistent, well-controlled Under good control. Continue same regimen.       5. Chronic atrial fibrillation- dr dawson Under good control. Continue same regimen.     6. Anticoagulant long-term use- eliquis, dr ho; for a fib Under good control. Continue same regimen.             40 minute face-to-face encounter took place today.  More than half of this time was spent in the coordination of care of the above problems, as well as counseling.

## 2017-09-15 ENCOUNTER — APPOINTMENT (OUTPATIENT)
Dept: RADIOLOGY | Facility: MEDICAL CENTER | Age: 77
End: 2017-09-15
Attending: INTERNAL MEDICINE
Payer: MEDICARE

## 2017-09-28 ENCOUNTER — OFFICE VISIT (OUTPATIENT)
Dept: MEDICAL GROUP | Facility: CLINIC | Age: 77
End: 2017-09-28

## 2017-09-28 VITALS
OXYGEN SATURATION: 92 % | DIASTOLIC BLOOD PRESSURE: 76 MMHG | TEMPERATURE: 97.7 F | SYSTOLIC BLOOD PRESSURE: 118 MMHG | RESPIRATION RATE: 16 BRPM | HEART RATE: 84 BPM | BODY MASS INDEX: 27 KG/M2 | WEIGHT: 172 LBS | HEIGHT: 67 IN

## 2017-09-28 DIAGNOSIS — S52.514A CLOSED NONDISPLACED FRACTURE OF STYLOID PROCESS OF RIGHT RADIUS, INITIAL ENCOUNTER: ICD-10-CM

## 2017-09-28 PROCEDURE — 99024 POSTOP FOLLOW-UP VISIT: CPT | Performed by: FAMILY MEDICINE

## 2017-09-28 NOTE — PROGRESS NOTES
"Subjective:      Jessica Black is a 77 y.o. female who presents with Follow-Up (F/V R wrist injury )      Follow up for right wrist fracture after fall    Hand Injury  DOI  7/20/17, fall at home  NO pain, markedly improved ROM  NO Swelling   Attending OT     Objective:     /76   Pulse 84   Temp 36.5 °C (97.7 °F)   Resp 16   Ht 1.702 m (5' 7\")   Wt 78 kg (172 lb)   SpO2 92%   BMI 26.94 kg/m²      Physical Exam      NAD  Alert and oriented    RIGHT WRIST exam  NO swelling  Range of motion 90% in all planes on the right compared to left  No tenderness along scaphoid, TFCC insertion or distal ulna  NO tenderness at the distal radius on the radial side on the right compared to left  The hands are otherwise neurovascularly intact     Assessment/Plan:     1. Closed nondisplaced fracture of styloid process of right radius, initial encounter         D/c wrist splint  Starts OT tomorrow  DEXA scan ordered today    No Follow-up on file.                Results for orders placed in visit on 07/21/17   DX-FOREARM RIGHT    Impression Distal right radial fracture.        Results for orders placed in visit on 07/21/17   DX-HAND 3+ RIGHT    Impression 1.  Intra-articular fracture of the distal RIGHT radius, involving the radial styloid.  2.  Marked dorsal soft tissue swelling of wrist and hand.  3.  Osteoarthritis.                                                            Fractures of the distal radius with intra-articular involvement that is less than 20% of the articular surface, also minimally displaced    Thank you Andrew Sniedr M.D. for allowing me to participate in caring for your patient.   "

## 2017-12-04 ENCOUNTER — HOSPITAL ENCOUNTER (OUTPATIENT)
Dept: LAB | Facility: MEDICAL CENTER | Age: 77
End: 2017-12-04
Attending: INTERNAL MEDICINE
Payer: MEDICARE

## 2017-12-04 LAB
ALBUMIN SERPL BCP-MCNC: 4.2 G/DL (ref 3.2–4.9)
ALBUMIN/GLOB SERPL: 1.6 G/DL
ALP SERPL-CCNC: 69 U/L (ref 30–99)
ALT SERPL-CCNC: 20 U/L (ref 2–50)
ANION GAP SERPL CALC-SCNC: 7 MMOL/L (ref 0–11.9)
AST SERPL-CCNC: 15 U/L (ref 12–45)
BASOPHILS # BLD AUTO: 1.4 % (ref 0–1.8)
BASOPHILS # BLD: 0.11 K/UL (ref 0–0.12)
BILIRUB SERPL-MCNC: 0.8 MG/DL (ref 0.1–1.5)
BUN SERPL-MCNC: 19 MG/DL (ref 8–22)
CALCIUM SERPL-MCNC: 10.1 MG/DL (ref 8.5–10.5)
CHLORIDE SERPL-SCNC: 101 MMOL/L (ref 96–112)
CHOLEST SERPL-MCNC: 176 MG/DL (ref 100–199)
CO2 SERPL-SCNC: 30 MMOL/L (ref 20–33)
CREAT SERPL-MCNC: 0.72 MG/DL (ref 0.5–1.4)
CREAT UR-MCNC: 38.7 MG/DL
EOSINOPHIL # BLD AUTO: 0.42 K/UL (ref 0–0.51)
EOSINOPHIL NFR BLD: 5.4 % (ref 0–6.9)
ERYTHROCYTE [DISTWIDTH] IN BLOOD BY AUTOMATED COUNT: 45.3 FL (ref 35.9–50)
GFR SERPL CREATININE-BSD FRML MDRD: >60 ML/MIN/1.73 M 2
GLOBULIN SER CALC-MCNC: 2.7 G/DL (ref 1.9–3.5)
GLUCOSE SERPL-MCNC: 101 MG/DL (ref 65–99)
HCT VFR BLD AUTO: 47.7 % (ref 37–47)
HDLC SERPL-MCNC: 58 MG/DL
HGB BLD-MCNC: 15.3 G/DL (ref 12–16)
IMM GRANULOCYTES # BLD AUTO: 0.03 K/UL (ref 0–0.11)
IMM GRANULOCYTES NFR BLD AUTO: 0.4 % (ref 0–0.9)
LDLC SERPL CALC-MCNC: 89 MG/DL
LYMPHOCYTES # BLD AUTO: 1.55 K/UL (ref 1–4.8)
LYMPHOCYTES NFR BLD: 19.9 % (ref 22–41)
MCH RBC QN AUTO: 27.4 PG (ref 27–33)
MCHC RBC AUTO-ENTMCNC: 32.1 G/DL (ref 33.6–35)
MCV RBC AUTO: 85.3 FL (ref 81.4–97.8)
MICROALBUMIN UR-MCNC: 1.6 MG/DL
MICROALBUMIN/CREAT UR: 41 MG/G (ref 0–30)
MONOCYTES # BLD AUTO: 0.66 K/UL (ref 0–0.85)
MONOCYTES NFR BLD AUTO: 8.5 % (ref 0–13.4)
NEUTROPHILS # BLD AUTO: 5.02 K/UL (ref 2–7.15)
NEUTROPHILS NFR BLD: 64.4 % (ref 44–72)
NRBC # BLD AUTO: 0 K/UL
NRBC BLD AUTO-RTO: 0 /100 WBC
PLATELET # BLD AUTO: 265 K/UL (ref 164–446)
PMV BLD AUTO: 11.7 FL (ref 9–12.9)
POTASSIUM SERPL-SCNC: 4.3 MMOL/L (ref 3.6–5.5)
PROT SERPL-MCNC: 6.9 G/DL (ref 6–8.2)
RBC # BLD AUTO: 5.59 M/UL (ref 4.2–5.4)
SODIUM SERPL-SCNC: 138 MMOL/L (ref 135–145)
TRIGL SERPL-MCNC: 145 MG/DL (ref 0–149)
WBC # BLD AUTO: 7.8 K/UL (ref 4.8–10.8)

## 2017-12-04 PROCEDURE — 82570 ASSAY OF URINE CREATININE: CPT

## 2017-12-04 PROCEDURE — 80053 COMPREHEN METABOLIC PANEL: CPT

## 2017-12-04 PROCEDURE — 85025 COMPLETE CBC W/AUTO DIFF WBC: CPT

## 2017-12-04 PROCEDURE — 83036 HEMOGLOBIN GLYCOSYLATED A1C: CPT

## 2017-12-04 PROCEDURE — 36415 COLL VENOUS BLD VENIPUNCTURE: CPT

## 2017-12-04 PROCEDURE — 80061 LIPID PANEL: CPT

## 2017-12-04 PROCEDURE — 82043 UR ALBUMIN QUANTITATIVE: CPT

## 2017-12-05 LAB
EST. AVERAGE GLUCOSE BLD GHB EST-MCNC: 128 MG/DL
HBA1C MFR BLD: 6.1 % (ref 0–5.6)

## 2017-12-14 ENCOUNTER — OFFICE VISIT (OUTPATIENT)
Dept: MEDICAL GROUP | Age: 77
End: 2017-12-14
Payer: MEDICARE

## 2017-12-14 VITALS
BODY MASS INDEX: 25.58 KG/M2 | SYSTOLIC BLOOD PRESSURE: 130 MMHG | WEIGHT: 163 LBS | HEART RATE: 82 BPM | OXYGEN SATURATION: 95 % | DIASTOLIC BLOOD PRESSURE: 70 MMHG | TEMPERATURE: 97.7 F | HEIGHT: 67 IN

## 2017-12-14 DIAGNOSIS — I48.20 CHRONIC ATRIAL FIBRILLATION (HCC): ICD-10-CM

## 2017-12-14 DIAGNOSIS — Z23 NEED FOR INFLUENZA VACCINATION: ICD-10-CM

## 2017-12-14 DIAGNOSIS — M81.0 MENOPAUSAL OSTEOPOROSIS: ICD-10-CM

## 2017-12-14 DIAGNOSIS — I34.1 MVP (MITRAL VALVE PROLAPSE): ICD-10-CM

## 2017-12-14 DIAGNOSIS — J45.40 ASTHMA, MODERATE PERSISTENT, WELL-CONTROLLED: ICD-10-CM

## 2017-12-14 DIAGNOSIS — E78.2 MIXED HYPERLIPIDEMIA: ICD-10-CM

## 2017-12-14 DIAGNOSIS — R73.01 IFG (IMPAIRED FASTING GLUCOSE): ICD-10-CM

## 2017-12-14 DIAGNOSIS — Z79.01 ANTICOAGULANT LONG-TERM USE: ICD-10-CM

## 2017-12-14 PROCEDURE — G0008 ADMIN INFLUENZA VIRUS VAC: HCPCS | Performed by: INTERNAL MEDICINE

## 2017-12-14 PROCEDURE — 90662 IIV NO PRSV INCREASED AG IM: CPT | Performed by: INTERNAL MEDICINE

## 2017-12-14 PROCEDURE — 99214 OFFICE O/P EST MOD 30 MIN: CPT | Mod: 25 | Performed by: INTERNAL MEDICINE

## 2017-12-14 RX ORDER — DIGOXIN 250 MCG
125 TABLET ORAL DAILY
Qty: 45 TAB | Refills: 4 | Status: SHIPPED | DISCHARGE
Start: 2017-12-14 | End: 2019-07-22

## 2017-12-14 ASSESSMENT — ENCOUNTER SYMPTOMS
CONSTITUTIONAL NEGATIVE: 1
CARDIOVASCULAR NEGATIVE: 1
NEUROLOGICAL NEGATIVE: 1
PSYCHIATRIC NEGATIVE: 1
GASTROINTESTINAL NEGATIVE: 1
MUSCULOSKELETAL NEGATIVE: 1
EYES NEGATIVE: 1
RESPIRATORY NEGATIVE: 1

## 2017-12-14 NOTE — PROGRESS NOTES
Subjective:      Jessica Black is a 77 y.o. female who presents with Hyperlipidemia (evaluation on blood work results)  The patient is here for followup of chronic medical problems listed below. The patient is compliant with medications and having no side effects from them. Denies chest pain, abdominal pain, dyspnea, myalgias, or cough.   Patient Active Problem List    Diagnosis Date Noted   • IFG (impaired fasting glucose) 12/14/2017   • Menopausal osteoporosis 09/13/2017   • Anticoagulant long-term use- dr eunice collado; for a fib 12/15/2016   • Mammogram reminder not needed forever- pt decision 12/15/2016   • Mixed hyperlipidemia 12/15/2016   • Chronic atrial fibrillation- dr dawson 11/18/2015   • Asthma, moderate persistent, well-controlled 11/18/2015   • MVP (mitral valve prolapse) 04/22/2013     No Known Allergies  Outpatient Medications Prior to Visit   Medication Sig Dispense Refill   • atorvastatin (LIPITOR) 10 MG Tab Take 1 Tab by mouth every day. 90 Tab 4   • fluticasone-salmeterol (ADVAIR) 100-50 MCG/DOSE AEROSOL POWDER, BREATH ACTIVATED Inhale 1 Puff by mouth 2 times a day. 3 Inhaler 4   • diltiazem CD (DILTIAZEM CD) 180 MG CAPSULE SR 24 HR Take 1 Cap by mouth 2 Times a Day. 60 Cap 1   • multivitamin (THERAGRAN) Tab Take 1 Tab by mouth every day.     • ascorbic acid (C 1000) 1000 MG tablet Take 1,000 mg by mouth every day.     • Probiotic Product (PROBIOTIC DAILY PO) Take 1 Tab by mouth every day.     • apixaban (ELIQUIS) 5mg Tab Take 5 mg by mouth 2 Times a Day.     • albuterol (PROAIR HFA) 108 (90 BASE) MCG/ACT Aero Soln inhalation aerosol Inhale 1-2 Puffs by mouth every four hours as needed for Shortness of Breath. 1 Inhaler 3   • mupirocin calcium (BACTROBAN) 2 % Cream Apply 1 Application to affected area(s) 2 times a day. (Patient not taking: Reported on 12/14/2017) 1 Tube 1   • digoxin (LANOXIN) 250 MCG Tab Take 250 mcg by mouth every day.       No facility-administered medications prior to visit.   "              HPI    Review of Systems   Constitutional: Negative.    HENT: Negative.    Eyes: Negative.    Respiratory: Negative.    Cardiovascular: Negative.    Gastrointestinal: Negative.    Genitourinary: Negative.    Musculoskeletal: Negative.    Skin: Negative.    Neurological: Negative.    Endo/Heme/Allergies: Negative.    Psychiatric/Behavioral: Negative.           Objective:     /70   Pulse 82   Temp 36.5 °C (97.7 °F)   Ht 1.702 m (5' 7.01\")   Wt 73.9 kg (163 lb)   SpO2 95%   BMI 25.52 kg/m²      Physical Exam   Constitutional: She is oriented to person, place, and time. She appears well-developed and well-nourished. No distress.   HENT:   Head: Normocephalic and atraumatic.   Right Ear: External ear normal.   Left Ear: External ear normal.   Nose: Nose normal.   Mouth/Throat: Oropharynx is clear and moist. No oropharyngeal exudate.   Eyes: Conjunctivae and EOM are normal. Pupils are equal, round, and reactive to light. Right eye exhibits no discharge. Left eye exhibits no discharge. No scleral icterus.   Neck: Normal range of motion. Neck supple. No JVD present. No tracheal deviation present. No thyromegaly present.   Cardiovascular: Normal rate, regular rhythm, normal heart sounds and intact distal pulses.  Exam reveals no gallop and no friction rub.    No murmur heard.  Pulmonary/Chest: Effort normal and breath sounds normal. No stridor. No respiratory distress. She has no wheezes. She has no rales. She exhibits no tenderness.   Abdominal: Soft. Bowel sounds are normal. She exhibits no distension and no mass. There is no tenderness. There is no rebound and no guarding.   Musculoskeletal: Normal range of motion. She exhibits no edema or tenderness.   Lymphadenopathy:     She has no cervical adenopathy.   Neurological: She is alert and oriented to person, place, and time. She has normal reflexes. She displays normal reflexes. No cranial nerve deficit. She exhibits normal muscle tone. " Coordination normal.   Skin: Skin is warm and dry. No rash noted. She is not diaphoretic. No erythema. No pallor.   Psychiatric: She has a normal mood and affect. Her behavior is normal. Judgment and thought content normal.   Vitals reviewed.    Hospital Outpatient Visit on 12/04/2017   Component Date Value   • Sodium 12/04/2017 138    • Potassium 12/04/2017 4.3    • Chloride 12/04/2017 101    • Co2 12/04/2017 30    • Anion Gap 12/04/2017 7.0    • Glucose 12/04/2017 101*   • Bun 12/04/2017 19    • Creatinine 12/04/2017 0.72    • Calcium 12/04/2017 10.1    • AST(SGOT) 12/04/2017 15    • ALT(SGPT) 12/04/2017 20    • Alkaline Phosphatase 12/04/2017 69    • Total Bilirubin 12/04/2017 0.8    • Albumin 12/04/2017 4.2    • Total Protein 12/04/2017 6.9    • Globulin 12/04/2017 2.7    • A-G Ratio 12/04/2017 1.6    • Cholesterol,Tot 12/04/2017 176    • Triglycerides 12/04/2017 145    • HDL 12/04/2017 58    • LDL 12/04/2017 89    • WBC 12/04/2017 7.8    • RBC 12/04/2017 5.59*   • Hemoglobin 12/04/2017 15.3    • Hematocrit 12/04/2017 47.7*   • MCV 12/04/2017 85.3    • MCH 12/04/2017 27.4    • MCHC 12/04/2017 32.1*   • RDW 12/04/2017 45.3    • Platelet Count 12/04/2017 265    • MPV 12/04/2017 11.7    • Neutrophils-Polys 12/04/2017 64.40    • Lymphocytes 12/04/2017 19.90*   • Monocytes 12/04/2017 8.50    • Eosinophils 12/04/2017 5.40    • Basophils 12/04/2017 1.40    • Immature Granulocytes 12/04/2017 0.40    • Nucleated RBC 12/04/2017 0.00    • Neutrophils (Absolute) 12/04/2017 5.02    • Lymphs (Absolute) 12/04/2017 1.55    • Monos (Absolute) 12/04/2017 0.66    • Eos (Absolute) 12/04/2017 0.42    • Baso (Absolute) 12/04/2017 0.11    • Immature Granulocytes (a* 12/04/2017 0.03    • NRBC (Absolute) 12/04/2017 0.00    • Glycohemoglobin 12/05/2017 6.1*   • Est Avg Glucose 12/05/2017 128    • Creatinine, Urine 12/04/2017 38.70    • Microalbumin, Urine Rand* 12/04/2017 1.6    • Micro Alb Creat Ratio 12/04/2017 41*   • GFR If   American 12/04/2017 >60    • GFR If Non  Ameri* 12/04/2017 >60       Lab Results   Component Value Date/Time    HBA1C 6.1 (H) 12/04/2017 10:19 AM    HBA1C 6.9 (H) 09/11/2017 09:56 AM     Lab Results   Component Value Date/Time    SODIUM 138 12/04/2017 10:19 AM    POTASSIUM 4.3 12/04/2017 10:19 AM    CHLORIDE 101 12/04/2017 10:19 AM    CO2 30 12/04/2017 10:19 AM    GLUCOSE 101 (H) 12/04/2017 10:19 AM    BUN 19 12/04/2017 10:19 AM    CREATININE 0.72 12/04/2017 10:19 AM    CREATININE 0.94 03/23/2011 12:00 AM    BUNCREATRAT 13 03/23/2011 12:00 AM    GLOMRATE 59 (L) 03/23/2011 12:00 AM    ALKPHOSPHAT 69 12/04/2017 10:19 AM    ASTSGOT 15 12/04/2017 10:19 AM    ALTSGPT 20 12/04/2017 10:19 AM    TBILIRUBIN 0.8 12/04/2017 10:19 AM     Lab Results   Component Value Date/Time    INR 1.33 (H) 08/21/2016 12:14 PM    INR 1.84 (H) 05/23/2007 03:42 AM    INR 1.53 (H) 05/22/2007 05:25 AM     Lab Results   Component Value Date/Time    CHOLSTRLTOT 176 12/04/2017 10:19 AM    LDL 89 12/04/2017 10:19 AM    HDL 58 12/04/2017 10:19 AM    TRIGLYCERIDE 145 12/04/2017 10:19 AM       No results found for: TESTOSTERONE  Lab Results   Component Value Date/Time    TSH 2.310 03/23/2011 12:00 AM    TSH 1.790 05/06/2010 09:46 AM     No results found for: FREET4  No results found for: URICACID  No components found for: VITB12  Lab Results   Component Value Date/Time    25HYDROXY 26 (L) 12/12/2016 09:50 AM                 Assessment/Plan:     1. Need for influenza vaccination       - INFLUENZA VACCINE, HIGH DOSE (65+ ONLY)    2. Chronic atrial fibrillation- dr dawson    Under good control. Continue same regimen.    - digoxin (LANOXIN) 250 MCG Tab; Take 0.5 Tabs by mouth every day.  Dispense: 45 Tab; Refill: 4    3. IFG (impaired fasting glucose)     Under good control. Continue same regimen.  - HEMOGLOBIN A1C; Future    4. Mixed hyperlipidemia     Under good control. Continue same regimen.- COMP METABOLIC PANEL; Future  - LIPID PROFILE; Future  -  CBC WITH DIFFERENTIAL; Future    5. Asthma, moderate persistent, well-controlled     Under good control. Continue same regimen.    6. Anticoagulant long-term use- eliquis, dr ho; for a fib     Under good control. Continue same regimen.    7. Menopausal osteoporosis     Under good control. Continue same regimen.    8. MVP (mitral valve prolapse)     Under good control. Continue same regimen.        30 minute face-to-face encounter took place today.  More than half of this time was spent in the coordination of care of the above problems, as well as counseling.

## 2018-01-08 DIAGNOSIS — M80.00XD AGE-RELATED OSTEOPOROSIS WITH CURRENT PATHOLOGICAL FRACTURE WITH ROUTINE HEALING: ICD-10-CM

## 2018-02-07 DIAGNOSIS — J45.40 MODERATE PERSISTENT ASTHMA WITHOUT COMPLICATION: ICD-10-CM

## 2018-04-25 ENCOUNTER — HOSPITAL ENCOUNTER (OUTPATIENT)
Dept: LAB | Facility: MEDICAL CENTER | Age: 78
End: 2018-04-25
Attending: PODIATRIST
Payer: MEDICARE

## 2018-04-25 LAB
BASOPHILS # BLD AUTO: 1.2 % (ref 0–1.8)
BASOPHILS # BLD: 0.09 K/UL (ref 0–0.12)
CRP SERPL HS-MCNC: 0.83 MG/DL (ref 0–0.75)
EOSINOPHIL # BLD AUTO: 0.43 K/UL (ref 0–0.51)
EOSINOPHIL NFR BLD: 5.7 % (ref 0–6.9)
ERYTHROCYTE [DISTWIDTH] IN BLOOD BY AUTOMATED COUNT: 48 FL (ref 35.9–50)
ERYTHROCYTE [SEDIMENTATION RATE] IN BLOOD BY WESTERGREN METHOD: 21 MM/HOUR (ref 0–30)
HCT VFR BLD AUTO: 45.6 % (ref 37–47)
HGB BLD-MCNC: 14.5 G/DL (ref 12–16)
IMM GRANULOCYTES # BLD AUTO: 0.04 K/UL (ref 0–0.11)
IMM GRANULOCYTES NFR BLD AUTO: 0.5 % (ref 0–0.9)
LYMPHOCYTES # BLD AUTO: 1.61 K/UL (ref 1–4.8)
LYMPHOCYTES NFR BLD: 21.5 % (ref 22–41)
MCH RBC QN AUTO: 27.4 PG (ref 27–33)
MCHC RBC AUTO-ENTMCNC: 31.8 G/DL (ref 33.6–35)
MCV RBC AUTO: 86.2 FL (ref 81.4–97.8)
MONOCYTES # BLD AUTO: 0.64 K/UL (ref 0–0.85)
MONOCYTES NFR BLD AUTO: 8.5 % (ref 0–13.4)
NEUTROPHILS # BLD AUTO: 4.68 K/UL (ref 2–7.15)
NEUTROPHILS NFR BLD: 62.6 % (ref 44–72)
NRBC # BLD AUTO: 0 K/UL
NRBC BLD-RTO: 0 /100 WBC
PLATELET # BLD AUTO: 266 K/UL (ref 164–446)
PMV BLD AUTO: 11.7 FL (ref 9–12.9)
RBC # BLD AUTO: 5.29 M/UL (ref 4.2–5.4)
WBC # BLD AUTO: 7.5 K/UL (ref 4.8–10.8)

## 2018-04-25 PROCEDURE — 85652 RBC SED RATE AUTOMATED: CPT

## 2018-04-25 PROCEDURE — 86140 C-REACTIVE PROTEIN: CPT

## 2018-04-25 PROCEDURE — 85025 COMPLETE CBC W/AUTO DIFF WBC: CPT

## 2018-04-25 PROCEDURE — 36415 COLL VENOUS BLD VENIPUNCTURE: CPT

## 2018-05-17 ENCOUNTER — PATIENT OUTREACH (OUTPATIENT)
Dept: HEALTH INFORMATION MANAGEMENT | Facility: OTHER | Age: 78
End: 2018-05-17

## 2018-05-17 NOTE — PROGRESS NOTES
Outcome: Not available    Please transfer to Patient Outreach Team at [phone] when patient returns call.    [facility] Verified: yes    Attempt # 1

## 2018-06-08 ENCOUNTER — OFFICE VISIT (OUTPATIENT)
Dept: URGENT CARE | Facility: CLINIC | Age: 78
End: 2018-06-08
Payer: MEDICARE

## 2018-06-08 VITALS
SYSTOLIC BLOOD PRESSURE: 140 MMHG | RESPIRATION RATE: 16 BRPM | OXYGEN SATURATION: 91 % | WEIGHT: 182.1 LBS | HEIGHT: 67 IN | TEMPERATURE: 98 F | DIASTOLIC BLOOD PRESSURE: 86 MMHG | HEART RATE: 100 BPM | BODY MASS INDEX: 28.58 KG/M2

## 2018-06-08 DIAGNOSIS — J22 LRTI (LOWER RESPIRATORY TRACT INFECTION): ICD-10-CM

## 2018-06-08 PROCEDURE — 99214 OFFICE O/P EST MOD 30 MIN: CPT | Performed by: FAMILY MEDICINE

## 2018-06-08 RX ORDER — ALBUTEROL SULFATE 90 UG/1
2 AEROSOL, METERED RESPIRATORY (INHALATION) EVERY 6 HOURS PRN
Qty: 1 INHALER | Refills: 0 | Status: SHIPPED | OUTPATIENT
Start: 2018-06-08 | End: 2020-03-01 | Stop reason: SDUPTHER

## 2018-06-08 RX ORDER — AMOXICILLIN AND CLAVULANATE POTASSIUM 875; 125 MG/1; MG/1
1 TABLET, FILM COATED ORAL 2 TIMES DAILY
Qty: 14 TAB | Refills: 0 | Status: SHIPPED | OUTPATIENT
Start: 2018-06-08 | End: 2018-06-15

## 2018-06-08 RX ORDER — BENZONATATE 200 MG/1
200 CAPSULE ORAL 3 TIMES DAILY PRN
Qty: 45 CAP | Refills: 0 | Status: SHIPPED | OUTPATIENT
Start: 2018-06-08 | End: 2019-07-22

## 2018-06-09 NOTE — PROGRESS NOTES
Chief Complaint   Patient presents with   • Cough     x 1 wk, productive cough,wheezing and little runny nose              Cough  This is a new problem. The current episode started 1 week ago. The problem has been gradually worsening. The problem occurs constantly. The cough is productive of yellow sputum. Associated symptoms include wheezing,  and nasal congestion. Pertinent negatives include no fever, headaches, sweats, weight loss. Nothing aggravates the symptoms.  Patient has tried nothing for the symptoms.      Social History   Substance Use Topics   • Smoking status: Never Smoker   • Smokeless tobacco: Never Used   • Alcohol use No         Current Outpatient Prescriptions on File Prior to Visit   Medication Sig Dispense Refill   • digoxin (LANOXIN) 250 MCG Tab Take 0.5 Tabs by mouth every day. 45 Tab 4   • atorvastatin (LIPITOR) 10 MG Tab Take 1 Tab by mouth every day. 90 Tab 4   • diltiazem CD (DILTIAZEM CD) 180 MG CAPSULE SR 24 HR Take 1 Cap by mouth 2 Times a Day. 60 Cap 1   • apixaban (ELIQUIS) 5mg Tab Take 5 mg by mouth 2 Times a Day.     • fluticasone-salmeterol (ADVAIR) 100-50 MCG/DOSE AEROSOL POWDER, BREATH ACTIVATED Inhale 1 Puff by mouth 2 times a day. 3 Inhaler 4   • multivitamin (THERAGRAN) Tab Take 1 Tab by mouth every day.     • ascorbic acid (C 1000) 1000 MG tablet Take 1,000 mg by mouth every day.     • Probiotic Product (PROBIOTIC DAILY PO) Take 1 Tab by mouth every day.     • albuterol (PROAIR HFA) 108 (90 BASE) MCG/ACT Aero Soln inhalation aerosol Inhale 1-2 Puffs by mouth every four hours as needed for Shortness of Breath. 1 Inhaler 3     No current facility-administered medications on file prior to visit.             Past Medical History:   Diagnosis Date   • A-fib ([facility]) 2008   • Asthma    • Hyperlipidemia    • Hypertension            Review of Systems   Constitutional: Negative for fever and weight loss.   HENT: negative for ear pain.    Cardiovascular - denies chest pain or  "dyspnea  Respiratory: Positive for cough.  Cough is productive.    Neurological: Negative for headaches.   GI - denies nausea, vomiting or diarrhea  Neuro - denies numbness or tingling.            Objective:     Blood pressure 140/86, pulse 100, temperature 36.7 °C (98 °F), resp. rate 16, height 1.702 m (5' 7.01\"), weight 82.6 kg (182 lb 1.6 oz), SpO2 91 %.    Physical Exam   Constitutional: patient is oriented to person, place, and time. Patient appears well-developed and well-nourished. No distress.   HENT:   Head: Normocephalic and atraumatic.   Right Ear: External ear normal.   Left Ear: External ear normal.   Nose: Mucosal edema and rhinorrhea present. Right sinus exhibits no maxillary sinus tenderness. Left sinus exhibits no maxillary sinus tenderness.   Mouth/Throat: Mucous membranes are normal. No oral lesions. Posterior oropharyngeal erythema present. No oropharyngeal exudate or posterior oropharyngeal edema.   Eyes: Conjunctivae and EOM are normal. Pupils are equal, round, and reactive to light. Right eye exhibits no discharge. Left eye exhibits no discharge. No scleral icterus.   Neck: Normal range of motion. Neck supple. No tracheal deviation present.   Cardiovascular: Normal rate, regular rhythm and normal heart sounds.  Exam reveals no friction rub.    Pulmonary/Chest: Effort normal. No respiratory distress. Patient has no wheezes. Patient has rhonchi. Patient has no rales.    Musculoskeletal:  exhibits no edema.   Lymphadenopathy: no cervical LAD  Neurological: patient is alert and oriented to person, place, and time.   Skin: Skin is warm and dry. No rash noted. No erythema.   Psychiatric: patient  has a normal mood and affect.  behavior is normal.   Nursing note and vitals reviewed.              Assessment/Plan:         1. LRTI (lower respiratory tract infection)    -O2 91%, but this is her baseline    - amoxicillin-clavulanate (AUGMENTIN) 875-125 MG Tab; Take 1 Tab by mouth 2 times a day for 7 days. "  Dispense: 14 Tab; Refill: 0  - benzonatate (TESSALON) 200 MG capsule; Take 1 Cap by mouth 3 times a day as needed for Cough.  Dispense: 45 Cap; Refill: 0  - albuterol 108 (90 Base) MCG/ACT Aero Soln inhalation aerosol; Inhale 2 Puffs by mouth every 6 hours as needed for Shortness of Breath.  Dispense: 1 Inhaler; Refill: 0      Supportive care, differential diagnoses, and indications for immediate follow-up discussed with patient.   Pathogenesis of diagnosis discussed including typical length and natural progression.   Instructed to return to clinic or nearest emergency department for any change in condition, further concerns, or worsening of symptoms.  Patient states understanding of the plan of care and discharge instructions.

## 2018-06-12 NOTE — PROGRESS NOTES
Attempt #Final  Verify PCP: yes  Annual Wellness Visit Scheduling  1. Scheduling Status:Not scheduled/ Pt prefers to schedule appt later on, because for now is too busy.      Care Gap Scheduling (Attempt to Schedule EACH Overdue Care Gap!)  Health Maintenance Due   Topic Date Due   • IMM DTaP/Tdap/Td Vaccine (1 - Tdap) 06/08/1959   • IMM PNEUMOCOCCAL 65+ (ADULT) LOW/MEDIUM RISK SERIES (2 of 2 - PPSV23) 12/15/2017     MyChart Activation: Not able to address/ pt was in a rush.

## 2018-08-15 ENCOUNTER — PATIENT OUTREACH (OUTPATIENT)
Dept: HEALTH INFORMATION MANAGEMENT | Facility: OTHER | Age: 78
End: 2018-08-15

## 2018-08-15 NOTE — PROGRESS NOTES
Outcome: Not available    Please transfer to Patient Outreach Team at [phone] when patient returns call.    WebIZ Checked & Epic Updated:  yes    [facility] Verified: yes    Attempt # 1

## 2018-08-28 ENCOUNTER — HOSPITAL ENCOUNTER (OUTPATIENT)
Dept: LAB | Facility: MEDICAL CENTER | Age: 78
End: 2018-08-28
Attending: INTERNAL MEDICINE
Payer: MEDICARE

## 2018-08-28 DIAGNOSIS — R73.01 IFG (IMPAIRED FASTING GLUCOSE): ICD-10-CM

## 2018-08-28 DIAGNOSIS — E78.2 MIXED HYPERLIPIDEMIA: ICD-10-CM

## 2018-08-28 LAB
ALBUMIN SERPL BCP-MCNC: 4.6 G/DL (ref 3.2–4.9)
ALBUMIN/GLOB SERPL: 1.6 G/DL
ALP SERPL-CCNC: 80 U/L (ref 30–99)
ALT SERPL-CCNC: 18 U/L (ref 2–50)
ANION GAP SERPL CALC-SCNC: 9 MMOL/L (ref 0–11.9)
AST SERPL-CCNC: 15 U/L (ref 12–45)
BASOPHILS # BLD AUTO: 1.1 % (ref 0–1.8)
BASOPHILS # BLD: 0.08 K/UL (ref 0–0.12)
BILIRUB SERPL-MCNC: 0.8 MG/DL (ref 0.1–1.5)
BUN SERPL-MCNC: 19 MG/DL (ref 8–22)
CALCIUM SERPL-MCNC: 10.2 MG/DL (ref 8.5–10.5)
CHLORIDE SERPL-SCNC: 102 MMOL/L (ref 96–112)
CHOLEST SERPL-MCNC: 232 MG/DL (ref 100–199)
CO2 SERPL-SCNC: 30 MMOL/L (ref 20–33)
CREAT SERPL-MCNC: 0.89 MG/DL (ref 0.5–1.4)
EOSINOPHIL # BLD AUTO: 0.46 K/UL (ref 0–0.51)
EOSINOPHIL NFR BLD: 6.3 % (ref 0–6.9)
ERYTHROCYTE [DISTWIDTH] IN BLOOD BY AUTOMATED COUNT: 45.3 FL (ref 35.9–50)
GLOBULIN SER CALC-MCNC: 2.9 G/DL (ref 1.9–3.5)
GLUCOSE SERPL-MCNC: 120 MG/DL (ref 65–99)
HCT VFR BLD AUTO: 47.8 % (ref 37–47)
HDLC SERPL-MCNC: 73 MG/DL
HGB BLD-MCNC: 15.5 G/DL (ref 12–16)
IMM GRANULOCYTES # BLD AUTO: 0.04 K/UL (ref 0–0.11)
IMM GRANULOCYTES NFR BLD AUTO: 0.5 % (ref 0–0.9)
LDLC SERPL CALC-MCNC: 121 MG/DL
LYMPHOCYTES # BLD AUTO: 1.35 K/UL (ref 1–4.8)
LYMPHOCYTES NFR BLD: 18.4 % (ref 22–41)
MCH RBC QN AUTO: 27.3 PG (ref 27–33)
MCHC RBC AUTO-ENTMCNC: 32.4 G/DL (ref 33.6–35)
MCV RBC AUTO: 84.2 FL (ref 81.4–97.8)
MONOCYTES # BLD AUTO: 0.56 K/UL (ref 0–0.85)
MONOCYTES NFR BLD AUTO: 7.7 % (ref 0–13.4)
NEUTROPHILS # BLD AUTO: 4.83 K/UL (ref 2–7.15)
NEUTROPHILS NFR BLD: 66 % (ref 44–72)
NRBC # BLD AUTO: 0 K/UL
NRBC BLD-RTO: 0 /100 WBC
PLATELET # BLD AUTO: 281 K/UL (ref 164–446)
PMV BLD AUTO: 11.5 FL (ref 9–12.9)
POTASSIUM SERPL-SCNC: 4.6 MMOL/L (ref 3.6–5.5)
PROT SERPL-MCNC: 7.5 G/DL (ref 6–8.2)
RBC # BLD AUTO: 5.68 M/UL (ref 4.2–5.4)
SODIUM SERPL-SCNC: 141 MMOL/L (ref 135–145)
TRIGL SERPL-MCNC: 191 MG/DL (ref 0–149)
WBC # BLD AUTO: 7.3 K/UL (ref 4.8–10.8)

## 2018-08-28 PROCEDURE — 83036 HEMOGLOBIN GLYCOSYLATED A1C: CPT

## 2018-08-28 PROCEDURE — 80061 LIPID PANEL: CPT

## 2018-08-28 PROCEDURE — 80053 COMPREHEN METABOLIC PANEL: CPT

## 2018-08-28 PROCEDURE — 85025 COMPLETE CBC W/AUTO DIFF WBC: CPT

## 2018-08-28 PROCEDURE — 36415 COLL VENOUS BLD VENIPUNCTURE: CPT

## 2018-08-29 LAB
EST. AVERAGE GLUCOSE BLD GHB EST-MCNC: 140 MG/DL
HBA1C MFR BLD: 6.5 % (ref 0–5.6)

## 2018-09-04 ENCOUNTER — OFFICE VISIT (OUTPATIENT)
Dept: MEDICAL GROUP | Age: 78
End: 2018-09-04
Payer: MEDICARE

## 2018-09-04 ENCOUNTER — TELEPHONE (OUTPATIENT)
Dept: MEDICAL GROUP | Age: 78
End: 2018-09-04

## 2018-09-04 VITALS
TEMPERATURE: 97.7 F | HEIGHT: 67 IN | OXYGEN SATURATION: 98 % | DIASTOLIC BLOOD PRESSURE: 78 MMHG | SYSTOLIC BLOOD PRESSURE: 118 MMHG | BODY MASS INDEX: 28.41 KG/M2 | HEART RATE: 92 BPM | WEIGHT: 181 LBS

## 2018-09-04 DIAGNOSIS — J45.40 ASTHMA, MODERATE PERSISTENT, WELL-CONTROLLED: ICD-10-CM

## 2018-09-04 DIAGNOSIS — Z23 IMMUNIZATION DUE: ICD-10-CM

## 2018-09-04 DIAGNOSIS — I27.29 PULMONARY HYPERTENSION ASSOCIATED WITH UNCLEAR MULTI-FACTORIAL MECHANISMS (HCC): ICD-10-CM

## 2018-09-04 DIAGNOSIS — Z79.01 ANTICOAGULANT LONG-TERM USE: ICD-10-CM

## 2018-09-04 DIAGNOSIS — M80.00XD AGE-RELATED OSTEOPOROSIS WITH CURRENT PATHOLOGICAL FRACTURE WITH ROUTINE HEALING: ICD-10-CM

## 2018-09-04 DIAGNOSIS — M81.0 MENOPAUSAL OSTEOPOROSIS: ICD-10-CM

## 2018-09-04 DIAGNOSIS — R73.01 IFG (IMPAIRED FASTING GLUCOSE): ICD-10-CM

## 2018-09-04 DIAGNOSIS — I48.20 CHRONIC ATRIAL FIBRILLATION (HCC): ICD-10-CM

## 2018-09-04 DIAGNOSIS — E78.2 MIXED HYPERLIPIDEMIA: ICD-10-CM

## 2018-09-04 DIAGNOSIS — I34.1 MVP (MITRAL VALVE PROLAPSE): ICD-10-CM

## 2018-09-04 DIAGNOSIS — E78.5 HYPERLIPIDEMIA, UNSPECIFIED HYPERLIPIDEMIA TYPE: ICD-10-CM

## 2018-09-04 PROCEDURE — 99215 OFFICE O/P EST HI 40 MIN: CPT | Performed by: INTERNAL MEDICINE

## 2018-09-04 PROCEDURE — G0008 ADMIN INFLUENZA VIRUS VAC: HCPCS | Performed by: INTERNAL MEDICINE

## 2018-09-04 PROCEDURE — 90662 IIV NO PRSV INCREASED AG IM: CPT | Performed by: INTERNAL MEDICINE

## 2018-09-04 ASSESSMENT — ENCOUNTER SYMPTOMS
MUSCULOSKELETAL NEGATIVE: 1
GASTROINTESTINAL NEGATIVE: 1
PSYCHIATRIC NEGATIVE: 1
RESPIRATORY NEGATIVE: 1
EYES NEGATIVE: 1
CARDIOVASCULAR NEGATIVE: 1
CONSTITUTIONAL NEGATIVE: 1
NEUROLOGICAL NEGATIVE: 1

## 2018-09-04 ASSESSMENT — PATIENT HEALTH QUESTIONNAIRE - PHQ9: CLINICAL INTERPRETATION OF PHQ2 SCORE: 0

## 2018-09-04 NOTE — TELEPHONE ENCOUNTER
1. Caller Name: Jessica Black                                           Call Back Number: [phone] (home)       Patient approves a detailed voicemail message: yes    2. SPECIFIC Action To Be Taken: Orders pending, please sign.    3. Diagnosis/Clinical Reason for Request: Hyperlipidemia    4. Specialty & Provider Name/Lab/Imaging Location: [facility]    5. Is appointment scheduled for requested order/referral: yes - 3/5/19    Patient was informed they will receive a return phone call from the office ONLY if there are any questions before processing their request. Advised to call back if they haven't received a call from the referral department in 5 days.      Pt would like labs mailed to her home

## 2018-09-04 NOTE — PROGRESS NOTES
Subjective:      Jessica Black is a 78 y.o. female who presents with Follow-Up (lab review)  The patient is here for followup of chronic medical problems listed below. The patient is compliant with medications and having no side effects from them. Denies chest pain, abdominal pain, dyspnea, myalgias, or cough.     Patient is here for review of her chronic diagnoses for [facility] 4/2018 to verify or eliminate current list of diagnoses in her current medical problem list.  Patient Active Problem List    Diagnosis Date Noted   • Age-related osteoporosis with current pathological fracture with routine healing 01/08/2018   • IFG (impaired fasting glucose) 12/14/2017   • Menopausal osteoporosis 09/13/2017   • Anticoagulant long-term use- dr eunice collado [place]; for a fib 12/15/2016   • Pulmonary hypertension associated with unclear multi-factorial mechanisms (HCC)- mild RSVP=45 mmHg ECHO 2016; (asthma, valvular disease, diastolic dysfunction, severe dilation LA and RV) 12/15/2016   • Chronic atrial fibrillation- dr dawson [place] 11/18/2015   • Asthma, moderate persistent, well-controlled 11/18/2015   • MVP (mitral valve prolapse) 04/22/2013     No Known Allergies  Outpatient Medications Prior to Visit   Medication Sig Dispense Refill   • benzonatate (TESSALON) 200 MG capsule Take 1 Cap by mouth 3 times a day as needed for Cough. 45 Cap 0   • fluticasone-salmeterol (ADVAIR) 100-50 MCG/DOSE AEROSOL POWDER, BREATH ACTIVATED Inhale 1 Puff by mouth 2 times a day. 3 Inhaler 4   • digoxin (LANOXIN) 250 MCG Tab Take 0.5 Tabs by mouth every day. (Patient taking differently: Take 125 mcg by mouth every day.) 45 Tab 4   • atorvastatin (LIPITOR) 10 MG Tab Take 1 Tab by mouth every day. 90 Tab 4   • diltiazem CD (DILTIAZEM CD) 180 MG CAPSULE SR 24 HR Take 1 Cap by mouth 2 Times a Day. 60 Cap 1   • multivitamin (THERAGRAN) Tab Take 1 Tab by mouth every day.     • ascorbic acid (C 1000) 1000 MG tablet Take 1,000 mg by mouth every day.  "    • Probiotic Product (PROBIOTIC DAILY PO) Take 1 Tab by mouth every day.     • apixaban (ELIQUIS) 5mg Tab Take 5 mg by mouth 2 Times a Day.     • albuterol 108 (90 Base) MCG/ACT Aero Soln inhalation aerosol Inhale 2 Puffs by mouth every 6 hours as needed for Shortness of Breath. 1 Inhaler 0   • albuterol (PROAIR HFA) 108 (90 BASE) MCG/ACT Aero Soln inhalation aerosol Inhale 1-2 Puffs by mouth every four hours as needed for Shortness of Breath. 1 Inhaler 3     No facility-administered medications prior to visit.                HPI    Review of Systems   Constitutional: Negative.    HENT: Negative.    Eyes: Negative.    Respiratory: Negative.    Cardiovascular: Negative.    Gastrointestinal: Negative.    Genitourinary: Negative.    Musculoskeletal: Negative.    Skin: Negative.    Neurological: Negative.    Endo/Heme/Allergies: Negative.    Psychiatric/Behavioral: Negative.      No Known Allergies       Objective:     /78   Pulse 92   Temp 36.5 °C (97.7 °F)   Ht 1.702 m (5' 7.01\")   Wt 82.1 kg (181 lb)   SpO2 98%   BMI 28.34 kg/m²      Physical Exam   Constitutional: She is oriented to person, place, and time. She appears well-developed and well-nourished. No distress.   HENT:   Head: Normocephalic and atraumatic.   Right Ear: External ear normal.   Left Ear: External ear normal.   Nose: Nose normal.   Mouth/Throat: Oropharynx is clear and moist. No oropharyngeal exudate.   Eyes: Pupils are equal, round, and reactive to light. Conjunctivae and EOM are normal. Right eye exhibits no discharge. Left eye exhibits no discharge. No scleral icterus.   Neck: Normal range of motion. Neck supple. No JVD present. No tracheal deviation present. No thyromegaly present.   Cardiovascular: Normal rate, regular rhythm, normal heart sounds and intact distal pulses.  Exam reveals no gallop and no friction rub.    No murmur heard.  Pulmonary/Chest: Effort normal and breath sounds normal. No stridor. No respiratory distress. " She has no wheezes. She has no rales. She exhibits no tenderness.   Abdominal: Soft. Bowel sounds are normal. She exhibits no distension and no mass. There is no tenderness. There is no rebound and no guarding.   Musculoskeletal: Normal range of motion. She exhibits no edema or tenderness.   Lymphadenopathy:     She has no cervical adenopathy.   Neurological: She is alert and oriented to person, place, and time. She has normal reflexes. She displays normal reflexes. No cranial nerve deficit. She exhibits normal muscle tone. Coordination normal.   Skin: Skin is warm and dry. No rash noted. She is not diaphoretic. No erythema. No pallor.   Psychiatric: She has a normal mood and affect. Her behavior is normal. Judgment and thought content normal.   Vitals reviewed.         Hospital Outpatient Visit on 08/28/2018   Component Date Value   • Sodium 08/28/2018 141    • Potassium 08/28/2018 4.6    • Chloride 08/28/2018 102    • Co2 08/28/2018 30    • Anion Gap 08/28/2018 9.0    • Glucose 08/28/2018 120*   • Bun 08/28/2018 19    • Creatinine 08/28/2018 0.89    • Calcium 08/28/2018 10.2    • AST(SGOT) 08/28/2018 15    • ALT(SGPT) 08/28/2018 18    • Alkaline Phosphatase 08/28/2018 80    • Total Bilirubin 08/28/2018 0.8    • Albumin 08/28/2018 4.6    • Total Protein 08/28/2018 7.5    • Globulin 08/28/2018 2.9    • A-G Ratio 08/28/2018 1.6    • Cholesterol,Tot 08/28/2018 232*   • Triglycerides 08/28/2018 191*   • HDL 08/28/2018 73    • LDL 08/28/2018 121*   • WBC 08/28/2018 7.3    • RBC 08/28/2018 5.68*   • Hemoglobin 08/28/2018 15.5    • Hematocrit 08/28/2018 47.8*   • MCV 08/28/2018 84.2    • MCH 08/28/2018 27.3    • MCHC 08/28/2018 32.4*   • RDW 08/28/2018 45.3    • Platelet Count 08/28/2018 281    • MPV 08/28/2018 11.5    • Neutrophils-Polys 08/28/2018 66.00    • Lymphocytes 08/28/2018 18.40*   • Monocytes 08/28/2018 7.70    • Eosinophils 08/28/2018 6.30    • Basophils 08/28/2018 1.10    • Immature Granulocytes 08/28/2018  0.50    • Nucleated RBC 08/28/2018 0.00    • Neutrophils (Absolute) 08/28/2018 4.83    • Lymphs (Absolute) 08/28/2018 1.35    • Monos (Absolute) 08/28/2018 0.56    • Eos (Absolute) 08/28/2018 0.46    • Baso (Absolute) 08/28/2018 0.08    • Immature Granulocytes (a* 08/28/2018 0.04    • NRBC (Absolute) 08/28/2018 0.00    • Glycohemoglobin 08/28/2018 6.5*   • Est Avg Glucose 08/28/2018 140    • GFR If  08/28/2018 >60    • GFR If Non  [employer]* 08/28/2018 >60       Lab Results   Component Value Date/Time    HBA1C 6.5 (H) 08/28/2018 09:45 AM    HBA1C 6.1 (H) 12/04/2017 10:19 AM     Lab Results   Component Value Date/Time    SODIUM 141 08/28/2018 09:45 AM    POTASSIUM 4.6 08/28/2018 09:45 AM    CHLORIDE 102 08/28/2018 09:45 AM    CO2 30 08/28/2018 09:45 AM    GLUCOSE 120 (H) 08/28/2018 09:45 AM    BUN 19 08/28/2018 09:45 AM    CREATININE 0.89 08/28/2018 09:45 AM    CREATININE 0.94 03/23/2011 12:00 AM    BUNCREATRAT 13 03/23/2011 12:00 AM    GLOMRATE 59 (L) 03/23/2011 12:00 AM    ALKPHOSPHAT 80 08/28/2018 09:45 AM    ASTSGOT 15 08/28/2018 09:45 AM    ALTSGPT 18 08/28/2018 09:45 AM    TBILIRUBIN 0.8 08/28/2018 09:45 AM     Lab Results   Component Value Date/Time    INR 1.33 (H) 08/21/2016 12:14 PM    INR 1.84 (H) 05/23/2007 03:42 AM    INR 1.53 (H) 05/22/2007 05:25 AM     Lab Results   Component Value Date/Time    CHOLSTRLTOT 232 (H) 08/28/2018 09:45 AM     (H) 08/28/2018 09:45 AM    HDL 73 08/28/2018 09:45 AM    TRIGLYCERIDE 191 (H) 08/28/2018 09:45 AM       No results found for: TESTOSTERONE  Lab Results   Component Value Date/Time    TSH 2.310 03/23/2011 12:00 AM    TSH 1.790 05/06/2010 09:46 AM     No results found for: FREET4  No results found for: URICACID  No components found for: VITB12  Lab Results   Component Value Date/Time    25HYDROXY 26 (L) 12/12/2016 09:50 AM           Assessment/Plan:     1. Immunization due     - INFLUENZA VACCINE, HIGH DOSE (65+ ONLY)    2. Chronic atrial  fibrillation- dr dawson   Under good control. Continue same regimen.    3. Asthma, moderate persistent, well-controlled    Under good control. Continue same regimen.    4. Anticoagulant long-term use- eliquis, dr ho; for a fib      Under good control. Continue same regimen.    5. Mixed hyperlipidemia    Under good control. Continue same regimen.    6. Menopausal osteoporosis    Under good control. Continue same regimen.    7. IFG (impaired fasting glucose)    Under good control. Continue same regimen.     8. Age-related osteoporosis with current pathological fracture with routine healing    Under good control. Continue same regimen.    9. MVP (mitral valve prolapse)          Under good control. Continue same regimen.    10. Pulmonary hypertension associated with unclear multi-factorial mechanisms (HCC)- mild RSVP=45 mmHg ECHO 2016; (asthma, valvular disease, diastolic dysfunction, severe dilation LA and RV)-    After extensive review of the chart it appears that patient's pulmonary hypertension is not idiopathic or primary, but rather secondary   multifactorial, and presumably due to   those diagnoses as listed above, which are now well controlled and stable.  Her pulmonary hypertension is estimated to be mild at best.  And it has not progressed.  It does not appear to be primary.  However this will be left up to her cardiologist for monitoring to make sure it does not progress into a more severe form of point hypertension.    Therefore the ICD 10 code is been changed to reflect the secondary nature of her pulmonary hypertension, rather than being considered primary.      11.  Diastolic dysfunction    Under good control. Continue same regimen.      12.  Valvular heart disease  Under good control. Continue same regimen.             13.  The diagnosis of chronic ulcer of the skin of the right foot (L97.511) has resolved and has been removed from problem list.      40 minute face-to-face encounter took place today.  More  than half of this time was spent in the coordination of care of the above problems, as well as counseling.

## 2019-02-11 DIAGNOSIS — J45.40 MODERATE PERSISTENT ASTHMA WITHOUT COMPLICATION: ICD-10-CM

## 2019-02-15 ENCOUNTER — TELEPHONE (OUTPATIENT)
Dept: MEDICAL GROUP | Age: 79
End: 2019-02-15

## 2019-02-15 NOTE — TELEPHONE ENCOUNTER
ESTABLISHED PATIENT PRE-VISIT PLANNING     Patient was NOT contacted to complete PVP.     Note: Patient will not be contacted if there is no indication to call.     1.  Reviewed notes from the last few office visits within the medical group: Yes    2.  If any orders were placed at last visit or intended to be done for this visit (i.e. 6 mos follow-up), do we have Results/Consult Notes?        •  Labs - Labs ordered, but not to be completed until 3/5/19.   Note: If patient appointment is for lab review and patient did not complete labs, check with provider if OK to reschedule patient until labs completed.       •  Imaging - Imaging was not ordered at last office visit.       •  Referrals - No referrals were ordered at last office visit.    3. Is this appointment scheduled as a Hospital Follow-Up? No    4.  Immunizations were updated in Epic using WebIZ?: Epic matches WebIZ       •  Web Iz Recommendations: PNEUMOVAX (PPSV23), TDAP and SHINGRIX (Shingles)    5.  Patient is due for the following Health Maintenance Topics:   Health Maintenance Due   Topic Date Due   • IMM DTaP/Tdap/Td Vaccine (1 - Tdap) 06/08/1959   • IMM ZOSTER VACCINES (1 of 2) 06/08/1989   • Annual Wellness Visit  04/23/2014   • IMM PNEUMOCOCCAL 65+ (ADULT) LOW/MEDIUM RISK SERIES (2 of 2 - PPSV23) 12/15/2017       - Patient is up-to-date on all Health Maintenance topics. No records have been requested at this time.    6. Orders for overdue Health Maintenance topics pended in Pre-Charting? NO    7.  AHA (MDX) form printed for Provider? Patient has appt for 3/5/19 for AHA    8.  Patient was NOT informed to arrive 15 min prior to their scheduled appointment and bring in their medication bottles.

## 2019-02-19 ENCOUNTER — APPOINTMENT (OUTPATIENT)
Dept: MEDICAL GROUP | Age: 79
End: 2019-02-19
Payer: MEDICARE

## 2019-04-29 ENCOUNTER — TELEPHONE (OUTPATIENT)
Dept: MEDICAL GROUP | Age: 79
End: 2019-04-29

## 2019-04-29 NOTE — TELEPHONE ENCOUNTER
Phone Number Called: [phone] (Mobile)       Message: LMOM to call back regarding up cpming appt with Dr. Cruz, labs were ordered last visit.    Left Message for patient to call back: yes

## 2019-05-06 ENCOUNTER — PATIENT OUTREACH (OUTPATIENT)
Dept: HEALTH INFORMATION MANAGEMENT | Facility: OTHER | Age: 79
End: 2019-05-06

## 2019-05-06 NOTE — PROGRESS NOTES
Outcome: no answer    Please transfer to [facility]  [phone] when patient returns call.     WebIZ Checked & Epic Updated:  yes     [facility] Verified: yes     Attempt # 1

## 2019-07-17 ENCOUNTER — TELEPHONE (OUTPATIENT)
Dept: MEDICAL GROUP | Age: 79
End: 2019-07-17

## 2019-07-17 NOTE — TELEPHONE ENCOUNTER
ESTABLISHED PATIENT PRE-VISIT PLANNING     Patient was contacted to complete PVP.     Note: Patient will not be contacted if there is no indication to call.     1.  Reviewed notes from the last few office visits within the medical group: Yes    2.  If any orders were placed at last visit or intended to be done for this visit (i.e. 6 mos follow-up), do we have Results/Consult Notes?        •  Labs - Labs ordered, NOT completed. Patient advised to complete prior to next appointment.   Note: If patient appointment is for lab review and patient did not complete labs, check with provider if OK to reschedule patient until labs completed.       •  Imaging - Imaging was not ordered at last office visit.       •  Referrals - No referrals were ordered at last office visit.    3. Is this appointment scheduled as a Hospital Follow-Up? No    4.  Immunizations were updated in Epic using WebIZ?: Epic matches WebIZ       •  Web Iz Recommendations: DTaP , PNEUMOVAX (PPSV23) and SHINGRIX (Shingles)    5.  Patient is due for the following Health Maintenance Topics:   Health Maintenance Due   Topic Date Due   • IMM DTaP/Tdap/Td Vaccine (1 - Tdap) [birth year]/1958   • IMM ZOSTER VACCINES (1 of 2) 06/08/1989   • Annual Wellness Visit  04/23/2014   • IMM PNEUMOCOCCAL 65+ (ADULT) LOW/MEDIUM RISK SERIES (2 of 2 - PPSV23) 12/15/2017   • BONE DENSITY  04/23/2019           6. Orders for overdue Health Maintenance topics pended in Pre-Charting? N\A    7.  AHA (MDX) form printed for Provider? YES    8.  Patient was informed to arrive 15 min prior to their scheduled appointment and bring in their medication bottles.

## 2019-07-19 ENCOUNTER — HOSPITAL ENCOUNTER (OUTPATIENT)
Dept: LAB | Facility: MEDICAL CENTER | Age: 79
End: 2019-07-19
Attending: INTERNAL MEDICINE
Payer: MEDICARE

## 2019-07-19 DIAGNOSIS — E78.5 HYPERLIPIDEMIA, UNSPECIFIED HYPERLIPIDEMIA TYPE: ICD-10-CM

## 2019-07-19 DIAGNOSIS — R73.01 IFG (IMPAIRED FASTING GLUCOSE): ICD-10-CM

## 2019-07-19 LAB
ALBUMIN SERPL BCP-MCNC: 4.3 G/DL (ref 3.2–4.9)
ALBUMIN/GLOB SERPL: 1.7 G/DL
ALP SERPL-CCNC: 80 U/L (ref 30–99)
ALT SERPL-CCNC: 24 U/L (ref 2–50)
ANION GAP SERPL CALC-SCNC: 8 MMOL/L (ref 0–11.9)
AST SERPL-CCNC: 21 U/L (ref 12–45)
BASOPHILS # BLD AUTO: 1 % (ref 0–1.8)
BASOPHILS # BLD: 0.08 K/UL (ref 0–0.12)
BILIRUB SERPL-MCNC: 0.7 MG/DL (ref 0.1–1.5)
BUN SERPL-MCNC: 24 MG/DL (ref 8–22)
CALCIUM SERPL-MCNC: 10 MG/DL (ref 8.5–10.5)
CHLORIDE SERPL-SCNC: 104 MMOL/L (ref 96–112)
CHOLEST SERPL-MCNC: 188 MG/DL (ref 100–199)
CO2 SERPL-SCNC: 28 MMOL/L (ref 20–33)
CREAT SERPL-MCNC: 0.85 MG/DL (ref 0.5–1.4)
EOSINOPHIL # BLD AUTO: 0.42 K/UL (ref 0–0.51)
EOSINOPHIL NFR BLD: 5.5 % (ref 0–6.9)
ERYTHROCYTE [DISTWIDTH] IN BLOOD BY AUTOMATED COUNT: 49 FL (ref 35.9–50)
EST. AVERAGE GLUCOSE BLD GHB EST-MCNC: 128 MG/DL
FASTING STATUS PATIENT QL REPORTED: NORMAL
GLOBULIN SER CALC-MCNC: 2.6 G/DL (ref 1.9–3.5)
GLUCOSE SERPL-MCNC: 108 MG/DL (ref 65–99)
HBA1C MFR BLD: 6.1 % (ref 0–5.6)
HCT VFR BLD AUTO: 47.3 % (ref 37–47)
HDLC SERPL-MCNC: 78 MG/DL
HGB BLD-MCNC: 14.7 G/DL (ref 12–16)
IMM GRANULOCYTES # BLD AUTO: 0.01 K/UL (ref 0–0.11)
IMM GRANULOCYTES NFR BLD AUTO: 0.1 % (ref 0–0.9)
LDLC SERPL CALC-MCNC: 87 MG/DL
LYMPHOCYTES # BLD AUTO: 1.39 K/UL (ref 1–4.8)
LYMPHOCYTES NFR BLD: 18.2 % (ref 22–41)
MCH RBC QN AUTO: 27.1 PG (ref 27–33)
MCHC RBC AUTO-ENTMCNC: 31.1 G/DL (ref 33.6–35)
MCV RBC AUTO: 87.1 FL (ref 81.4–97.8)
MONOCYTES # BLD AUTO: 0.6 K/UL (ref 0–0.85)
MONOCYTES NFR BLD AUTO: 7.8 % (ref 0–13.4)
NEUTROPHILS # BLD AUTO: 5.15 K/UL (ref 2–7.15)
NEUTROPHILS NFR BLD: 67.4 % (ref 44–72)
NRBC # BLD AUTO: 0 K/UL
NRBC BLD-RTO: 0 /100 WBC
PLATELET # BLD AUTO: 274 K/UL (ref 164–446)
PMV BLD AUTO: 12 FL (ref 9–12.9)
POTASSIUM SERPL-SCNC: 4.4 MMOL/L (ref 3.6–5.5)
PROT SERPL-MCNC: 6.9 G/DL (ref 6–8.2)
RBC # BLD AUTO: 5.43 M/UL (ref 4.2–5.4)
SODIUM SERPL-SCNC: 140 MMOL/L (ref 135–145)
TRIGL SERPL-MCNC: 114 MG/DL (ref 0–149)
WBC # BLD AUTO: 7.7 K/UL (ref 4.8–10.8)

## 2019-07-19 PROCEDURE — 85025 COMPLETE CBC W/AUTO DIFF WBC: CPT

## 2019-07-19 PROCEDURE — 80061 LIPID PANEL: CPT

## 2019-07-19 PROCEDURE — 36415 COLL VENOUS BLD VENIPUNCTURE: CPT

## 2019-07-19 PROCEDURE — 80053 COMPREHEN METABOLIC PANEL: CPT

## 2019-07-19 PROCEDURE — 83036 HEMOGLOBIN GLYCOSYLATED A1C: CPT

## 2019-07-22 ENCOUNTER — OFFICE VISIT (OUTPATIENT)
Dept: MEDICAL GROUP | Age: 79
End: 2019-07-22
Payer: MEDICARE

## 2019-07-22 VITALS
SYSTOLIC BLOOD PRESSURE: 112 MMHG | OXYGEN SATURATION: 92 % | WEIGHT: 190.2 LBS | HEART RATE: 86 BPM | HEIGHT: 67 IN | DIASTOLIC BLOOD PRESSURE: 74 MMHG | TEMPERATURE: 97.3 F | BODY MASS INDEX: 29.85 KG/M2

## 2019-07-22 DIAGNOSIS — I48.20 CHRONIC ATRIAL FIBRILLATION (HCC): ICD-10-CM

## 2019-07-22 DIAGNOSIS — R73.01 IFG (IMPAIRED FASTING GLUCOSE): ICD-10-CM

## 2019-07-22 DIAGNOSIS — E78.2 MIXED HYPERLIPIDEMIA: ICD-10-CM

## 2019-07-22 DIAGNOSIS — M80.00XD AGE-RELATED OSTEOPOROSIS WITH CURRENT PATHOLOGICAL FRACTURE WITH ROUTINE HEALING: ICD-10-CM

## 2019-07-22 DIAGNOSIS — E55.9 VITAMIN D DEFICIENCY: ICD-10-CM

## 2019-07-22 DIAGNOSIS — I27.29 PULMONARY HYPERTENSION ASSOCIATED WITH UNCLEAR MULTI-FACTORIAL MECHANISMS (HCC): ICD-10-CM

## 2019-07-22 DIAGNOSIS — J45.40 ASTHMA, MODERATE PERSISTENT, WELL-CONTROLLED: ICD-10-CM

## 2019-07-22 DIAGNOSIS — Z79.01 ANTICOAGULANT LONG-TERM USE: ICD-10-CM

## 2019-07-22 PROCEDURE — 8041 PR SCP AHA: Performed by: INTERNAL MEDICINE

## 2019-07-22 PROCEDURE — 99214 OFFICE O/P EST MOD 30 MIN: CPT | Performed by: INTERNAL MEDICINE

## 2019-07-22 RX ORDER — ALBUTEROL SULFATE 90 UG/1
1-2 AEROSOL, METERED RESPIRATORY (INHALATION) EVERY 4 HOURS PRN
Qty: 1 INHALER | Refills: 3 | Status: SHIPPED | OUTPATIENT
Start: 2019-07-22 | End: 2020-03-03

## 2019-07-22 RX ORDER — MELATONIN
1000 DAILY
Qty: 100 TAB | Refills: 4 | Status: ON HOLD
Start: 2019-07-22 | End: 2021-06-10

## 2019-07-22 ASSESSMENT — ENCOUNTER SYMPTOMS
CONSTITUTIONAL NEGATIVE: 1
CARDIOVASCULAR NEGATIVE: 1
NEUROLOGICAL NEGATIVE: 1
RESPIRATORY NEGATIVE: 1
MUSCULOSKELETAL NEGATIVE: 1

## 2019-07-22 ASSESSMENT — PATIENT HEALTH QUESTIONNAIRE - PHQ9: CLINICAL INTERPRETATION OF PHQ2 SCORE: 0

## 2019-07-22 NOTE — PROGRESS NOTES
Subjective:      Jessica Black is a 79 y.o. female who presents with Follow-Up      HPI  The patient is here for followup of chronic medical problems listed below. The patient is compliant with medications and having no side effects from them. Denies chest pain, abdominal pain, dyspnea, myalgias, or cough.    Asthma, moderate persistent, well-controlled  Patient has a chronic history of asthma, currently treated with Advair at night as needed. Patient also using Proair HFA but does not require proair regularly. Patient is asymptomatic today.     Chronic atrial fibrillation- dr dawson, [place]  Anticoagulant long-term use- eliquis, dr dawson, [place]; for a fib  Chronic history of atrial fibrillation, currently taking Eliquis 5 mg twice daily. Patient is asymptomatic today. She is followed by Dr. Dawson in [place]. Patient denies any side effects to Eliquis. She denies any heart palpitations, chest pain, shortness of breath, or leg swelling.      IFG (impaired fasting glucose)  Patient has a history of impaired fasting glucose. Hemoglobin A1c on 7/19/19 was 6.1, improved from previous level of 6.5 on 8/28/18. Patient currently managing condition through self efforts. She states she is watching her diet somewhat. Patient exercises regularly.     Age-related osteoporosis with current pathological fracture with routine healing  Patient has a history of age related osteoporosis, currently managing condition through self efforts.     Vitamin D deficiency  History of low vitamin D. Patient not currently taking any Vitamin D supplements.     Mixed hyperlipidemia  Most recent lipid panel on 7/19/19 shows cholesterol is within normal limits. Patient is taking Atorvastatin 10 mg daily. She denies any side effects.     Pulmonary hypertension associated with unclear multi-factorial mechanisms (HCC)- mild RSVP=45 mmHg ECHO 2016; (asthma, valvular disease, diastolic dysfunction, severe dilation LA and RV)- dr dawosn  Patient has a  history of pulmonary hypertension, followed by Dr. Dawson. Patient denies any chest pain, shortness of breath, or leg swelling.       Patient Active Problem List   Diagnosis   • MVP (mitral valve prolapse)   • Mixed hyperlipidemia   • Chronic atrial fibrillation- dr dawson [place]   • Asthma, moderate persistent, well-controlled   • Anticoagulant long-term use- dr eunice collado [place]; for a fib   • Pulmonary hypertension associated with unclear multi-factorial mechanisms (HCC)- mild RSVP=45 mmHg ECHO 2016; (asthma, valvular disease, diastolic dysfunction, severe dilation LA and RV)- dr dawson   • Menopausal osteoporosis   • IFG (impaired fasting glucose)   • Age-related osteoporosis with current pathological fracture with routine healing   • Vitamin D deficiency       Outpatient Medications Prior to Visit   Medication Sig Dispense Refill   • fluticasone-salmeterol (ADVAIR) 100-50 MCG/DOSE AEROSOL POWDER, BREATH ACTIVATED Inhale 1 Puff by mouth 2 times a day. 3 Inhaler 4   • atorvastatin (LIPITOR) 10 MG Tab TAKE ONE TABLET BY MOUTH ONE TIME DAILY  90 Tab 4   • diltiazem CD (DILTIAZEM CD) 180 MG CAPSULE SR 24 HR Take 1 Cap by mouth 2 Times a Day. 60 Cap 1   • multivitamin (THERAGRAN) Tab Take 1 Tab by mouth every day.     • ascorbic acid (C 1000) 1000 MG tablet Take 1,000 mg by mouth every day.     • Probiotic Product (PROBIOTIC DAILY PO) Take 1 Tab by mouth every day.     • apixaban (ELIQUIS) 5mg Tab Take 5 mg by mouth 2 Times a Day.     • benzonatate (TESSALON) 200 MG capsule Take 1 Cap by mouth 3 times a day as needed for Cough. (Patient not taking: Reported on 7/22/2019) 45 Cap 0   • albuterol 108 (90 Base) MCG/ACT Aero Soln inhalation aerosol Inhale 2 Puffs by mouth every 6 hours as needed for Shortness of Breath. 1 Inhaler 0   • digoxin (LANOXIN) 250 MCG Tab Take 0.5 Tabs by mouth every day. (Patient not taking: Reported on 7/22/2019) 45 Tab 4   • albuterol (PROAIR HFA) 108 (90 BASE) MCG/ACT Aero Soln inhalation  "aerosol Inhale 1-2 Puffs by mouth every four hours as needed for Shortness of Breath. 1 Inhaler 3     No facility-administered medications prior to visit.         No Known Allergies    Review of Systems   Constitutional: Negative.    HENT: Negative.    Respiratory: Negative.    Cardiovascular: Negative.    Genitourinary: Negative.    Musculoskeletal: Negative.    Neurological: Negative.    All other systems reviewed and are negative.         Objective:     /74 (BP Location: Left arm, Patient Position: Sitting, BP Cuff Size: Adult)   Pulse 86   Temp 36.3 °C (97.3 °F) (Temporal)   Ht 1.702 m (5' 7\")   Wt 86.3 kg (190 lb 3.2 oz)   SpO2 92%   BMI 29.79 kg/m²     Physical Exam   Constitutional: Oriented to person, place, and time. Appears well-developed and well-nourished. No distress.   Head: Normocephalic and atraumatic.   Right Ear: External ear normal.   Left Ear: External ear normal.   Nose: Nose normal.   Mouth/Throat: Oropharynx is clear and moist. No oropharyngeal exudate.   Eyes: Pupils are equal, round, and reactive to light. Conjunctivae and EOM are normal. Right eye exhibits no discharge. Left eye exhibits no discharge. No scleral icterus.   Neck: Normal range of motion. Neck supple. No JVD present. No tracheal deviation present. No thyromegaly present.   Cardiovascular: Irregularly irregular rate and rhythm varying between 80-90 bpm. Normal heart sounds and intact distal pulses.  Exam reveals no gallop and no friction rub.    No murmur heard.  Pulmonary/Chest: Effort normal. No stridor. No respiratory distress. No wheezing or rales. No tenderness.   Abdominal: Soft. Bowel sounds are normal. No distension and no mass. There is no tenderness. There is no rebound and no guarding. No hernia.   Musculoskeletal: Normal range of motion No edema or tenderness.   Lymphadenopathy: No cervical adenopathy.   Neurological: Alert and oriented to person, place, and time. Normal reflexes. Normal reflexes. No " cranial nerve deficit. Normal muscle tone. Coordination normal.   Skin: Skin is warm and dry. No rash noted. Not diaphoretic. No erythema. No pallor.   Psychiatric: Normal mood and affect. Behavior is normal. Judgment and thought content normal.   Nursing note and vitals reviewed.      Lab Results   Component Value Date/Time    HBA1C 6.1 (H) 07/19/2019 09:13 AM    HBA1C 6.5 (H) 08/28/2018 09:45 AM     Lab Results   Component Value Date/Time    SODIUM 140 07/19/2019 09:13 AM    POTASSIUM 4.4 07/19/2019 09:13 AM    CHLORIDE 104 07/19/2019 09:13 AM    CO2 28 07/19/2019 09:13 AM    GLUCOSE 108 (H) 07/19/2019 09:13 AM    BUN 24 (H) 07/19/2019 09:13 AM    CREATININE 0.85 07/19/2019 09:13 AM    CREATININE 0.94 03/23/2011 12:00 AM    BUNCREATRAT 13 03/23/2011 12:00 AM    GLOMRATE 59 (L) 03/23/2011 12:00 AM    ALKPHOSPHAT 80 07/19/2019 09:13 AM    ASTSGOT 21 07/19/2019 09:13 AM    ALTSGPT 24 07/19/2019 09:13 AM    TBILIRUBIN 0.7 07/19/2019 09:13 AM     Lab Results   Component Value Date/Time    INR 1.33 (H) 08/21/2016 12:14 PM    INR 1.84 (H) 05/23/2007 03:42 AM    INR 1.53 (H) 05/22/2007 05:25 AM     Lab Results   Component Value Date/Time    CHOLSTRLTOT 188 07/19/2019 09:13 AM    LDL 87 07/19/2019 09:13 AM    HDL 78 07/19/2019 09:13 AM    TRIGLYCERIDE 114 07/19/2019 09:13 AM       No results found for: TESTOSTERONE  Lab Results   Component Value Date/Time    TSH 2.310 03/23/2011 12:00 AM    TSH 1.790 05/06/2010 09:46 AM     No results found for: FREET4  No results found for: URICACID  No components found for: VITB12  Lab Results   Component Value Date/Time    25HYDROXY 26 (L) 12/12/2016 09:50 AM          Assessment/Plan:         1. Asthma, moderate persistent, well-controlled  Chronic problem, under good control with Advair inhaler at night, Proair as needed. Continue with current regimen.   - albuterol (PROAIR HFA) 108 (90 Base) MCG/ACT Aero Soln inhalation aerosol; Inhale 1-2 Puffs by mouth every four hours as needed for  Shortness of Breath.  Dispense: 1 Inhaler; Refill: 3    2. Anticoagulant long-term use- eliquis, dr dawson chac[facility]; for a fib  Chronic, stable. Patient currenty on Eliquis 5 mg twice daily. Continue to follow up with Dr. Dawson.     3. IFG (impaired fasting glucose)  Chronic problem, under good control through self efforts. Hemoglobin A1c on 7/19/19 was 6.1, improved from previous level of 6.5 on 8/28/18. Continue with current regimen.   - diet/exercise/lose 15 lbs.; patient counseled   - HEMOGLOBIN A1C; Future    4. Age-related osteoporosis with current pathological fracture with routine healing  Chronic, Under good control. Patient currently managing condition through self efforts. Continue same regimen.     5. Chronic atrial fibrillation- chacorta velasquez [facility]  As above in #2.     6. Vitamin D deficiency  Patient was prescribed Cholecalciferol 1000 units daily for history of low vitamin D level.   - vitamin D3, cholecalciferol, 1000 UNIT Tab; Take 1 Tab by mouth every day.  Dispense: 100 Tab; Refill: 4    7. Mixed hyperlipidemia  Chronic, under good control with Atorvastatin 10 mg daily. Most recent lipid panel was within normal limits. Continue with current regimen.   - Comp Metabolic Panel; Future  - Lipid Profile; Future  - CBC WITH DIFFERENTIAL; Future  - VITAMIN D,25 HYDROXY; Future    8. Pulmonary hypertension associated with unclear multi-factorial mechanisms (HCC)- mild RSVP=45 mmHg ECHO 2016; (asthma, valvular disease, diastolic dysfunction, severe dilation LA and RV)- dr dawson  Chronic problem. Stable. Continue to follow up with Dr. Dawson.            40 minute face-to-face encounter took place today.  More than half of this time was spent in the coordination of care of the above problems, as well as counseling.     Yousuf GARRISON (Scribe), am scribing for, and in the presence of, Avery Cruz M.D..    Electronically signed by: Yousuf Brar (Sameera), 7/22/2019    Avrey GARRISON M.D., personally performed the  services described in this documentation, as scribed by Yousuf Brar in my presence, and it is both accurate and complete.

## 2019-11-19 ENCOUNTER — OFFICE VISIT (OUTPATIENT)
Dept: URGENT CARE | Facility: CLINIC | Age: 79
End: 2019-11-19
Payer: MEDICARE

## 2019-11-19 VITALS
HEART RATE: 95 BPM | OXYGEN SATURATION: 93 % | HEIGHT: 67 IN | RESPIRATION RATE: 14 BRPM | WEIGHT: 194.8 LBS | TEMPERATURE: 98.9 F | DIASTOLIC BLOOD PRESSURE: 86 MMHG | SYSTOLIC BLOOD PRESSURE: 140 MMHG | BODY MASS INDEX: 30.57 KG/M2

## 2019-11-19 DIAGNOSIS — J06.9 UPPER RESPIRATORY TRACT INFECTION, UNSPECIFIED TYPE: ICD-10-CM

## 2019-11-19 PROCEDURE — 99214 OFFICE O/P EST MOD 30 MIN: CPT | Performed by: PHYSICIAN ASSISTANT

## 2019-11-19 RX ORDER — ALBUTEROL SULFATE 90 UG/1
2 AEROSOL, METERED RESPIRATORY (INHALATION) EVERY 6 HOURS PRN
Qty: 8.5 G | Refills: 0 | Status: SHIPPED | OUTPATIENT
Start: 2019-11-19 | End: 2020-03-03

## 2019-11-19 RX ORDER — AZITHROMYCIN 250 MG/1
TABLET, FILM COATED ORAL
Qty: 6 TAB | Refills: 0 | Status: SHIPPED | OUTPATIENT
Start: 2019-11-19 | End: 2020-06-14

## 2019-11-19 ASSESSMENT — ENCOUNTER SYMPTOMS
SPUTUM PRODUCTION: 1
EYE PAIN: 0
FEVER: 0
SHORTNESS OF BREATH: 1
COUGH: 1
SINUS PAIN: 0
SORE THROAT: 0
EYE REDNESS: 0
ABDOMINAL PAIN: 0
EYE DISCHARGE: 0
CHILLS: 0
WHEEZING: 1

## 2019-11-19 NOTE — PROGRESS NOTES
Subjective:   Jessica Black is a 79 y.o. female who presents today with   Chief Complaint   Patient presents with   • Shortness of Breath     x3 days feels like she has a cold, ears plugged       Shortness of Breath   This is a new problem. Episode onset: 3 days. The problem occurs intermittently. The problem has been gradually improving. Associated symptoms include sputum production and wheezing. Pertinent negatives include no abdominal pain, chest pain, fever or sore throat. The patient has no known risk factors for DVT/PE. She has tried beta agonist inhalers for the symptoms. The treatment provided mild relief. Her past medical history is significant for asthma.     Patient reports that her shortness of breath has gotten better but she is still has congestion and sensation of her ears being plugged.  Patient is also asking for refill of her albuterol at this time.  PMH:  has a past medical history of A-fib ([facility]) (2008), Asthma, Hyperlipidemia, and Hypertension.  MEDS:   Current Outpatient Medications:   •  azithromycin (ZITHROMAX) 250 MG Tab, Take 2 tablets by mouth on day one. Take one tablet by mouth the remaining days until gone, Disp: 6 Tab, Rfl: 0  •  albuterol 108 (90 Base) MCG/ACT Aero Soln inhalation aerosol, Inhale 2 Puffs by mouth every 6 hours as needed for Shortness of Breath., Disp: 8.5 g, Rfl: 0  •  albuterol (PROAIR HFA) 108 (90 Base) MCG/ACT Aero Soln inhalation aerosol, Inhale 1-2 Puffs by mouth every four hours as needed for Shortness of Breath., Disp: 1 Inhaler, Rfl: 3  •  fluticasone-salmeterol (ADVAIR) 100-50 MCG/DOSE AEROSOL POWDER, BREATH ACTIVATED, Inhale 1 Puff by mouth 2 times a day., Disp: 3 Inhaler, Rfl: 4  •  atorvastatin (LIPITOR) 10 MG Tab, TAKE ONE TABLET BY MOUTH ONE TIME DAILY , Disp: 90 Tab, Rfl: 4  •  albuterol 108 (90 Base) MCG/ACT Aero Soln inhalation aerosol, Inhale 2 Puffs by mouth every 6 hours as needed for Shortness of Breath., Disp: 1 Inhaler, Rfl: 0  •  diltiazem CD  "(DILTIAZEM CD) 180 MG CAPSULE SR 24 HR, Take 1 Cap by mouth 2 Times a Day., Disp: 60 Cap, Rfl: 1  •  ascorbic acid (C 1000) 1000 MG tablet, Take 1,000 mg by mouth every day., Disp: , Rfl:   •  apixaban (ELIQUIS) 5mg Tab, Take 5 mg by mouth 2 Times a Day., Disp: , Rfl:   •  vitamin D3, cholecalciferol, 1000 UNIT Tab, Take 1 Tab by mouth every day., Disp: 100 Tab, Rfl: 4  •  multivitamin (THERAGRAN) Tab, Take 1 Tab by mouth every day., Disp: , Rfl:   •  Probiotic Product (PROBIOTIC DAILY PO), Take 1 Tab by mouth every day., Disp: , Rfl:   ALLERGIES: No Known Allergies  SURGHX: No past surgical history on file.  SOCHX:  reports that she has never smoked. She has never used smokeless tobacco. She reports that she does not drink alcohol or use drugs.  FH: Reviewed with patient, not pertinent to this visit.       Review of Systems   Constitutional: Negative for chills and fever.   HENT: Positive for congestion. Negative for sinus pain and sore throat.    Eyes: Negative for pain, discharge and redness.   Respiratory: Positive for cough, sputum production, shortness of breath and wheezing.    Cardiovascular: Negative for chest pain.   Gastrointestinal: Negative for abdominal pain.   All other systems reviewed and are negative.       Objective:   /86 (BP Location: Left arm, Patient Position: Sitting, BP Cuff Size: Adult)   Pulse 95   Temp 37.2 °C (98.9 °F)   Resp 14   Ht 1.702 m (5' 7\")   Wt 88.4 kg (194 lb 12.8 oz)   SpO2 93%   BMI 30.51 kg/m²   Physical Exam  Vitals signs and nursing note reviewed.   Constitutional:       General: She is not in acute distress.     Appearance: She is well-developed.   HENT:      Head: Normocephalic and atraumatic.      Right Ear: Hearing normal.      Left Ear: Hearing normal.      Ears:      Comments: Bilateral cerumen impaction.  Eyes:      Pupils: Pupils are equal, round, and reactive to light.   Cardiovascular:      Rate and Rhythm: Normal rate and regular rhythm.      Heart " sounds: Normal heart sounds.   Pulmonary:      Effort: Pulmonary effort is normal.      Breath sounds: Wheezing (diffuse inspiratory) present.   Musculoskeletal:      Comments: Normal movement in all 4 extremities   Skin:     General: Skin is warm and dry.   Neurological:      Mental Status: She is alert.      Coordination: Coordination normal.   Psychiatric:         Mood and Affect: Mood normal.     Following ear lavage patient had mild fluid noted behind her left TM.  No signs of infection.  Right ear canal did have some fluid left behind after ear lavage.  Assessment/Plan:   Assessment    1. Upper respiratory tract infection, unspecified type  - azithromycin (ZITHROMAX) 250 MG Tab; Take 2 tablets by mouth on day one. Take one tablet by mouth the remaining days until gone  Dispense: 6 Tab; Refill: 0  - albuterol 108 (90 Base) MCG/ACT Aero Soln inhalation aerosol; Inhale 2 Puffs by mouth every 6 hours as needed for Shortness of Breath.  Dispense: 8.5 g; Refill: 0  Patient will continue using Advair and albuterol as prescribed.  Given patient's history of asthma with developing symptoms we will treat her with an antibiotic at this time.  Differential diagnosis, natural history, supportive care, and indications for immediate follow-up discussed.   Patient given instructions and understanding of medications and treatment.    If not improving in 3-5 days, F/U with PCP or return to  if symptoms worsen.    Patient agreeable to plan.    Please note that this dictation was created using voice recognition software. I have made every reasonable attempt to correct obvious errors, but I expect that there are errors of grammar and possibly content that I did not discover before finalizing the note.    Eliu Hand PA-C

## 2020-01-20 DIAGNOSIS — E78.2 MIXED HYPERLIPIDEMIA: ICD-10-CM

## 2020-01-21 RX ORDER — ATORVASTATIN CALCIUM 10 MG/1
TABLET, FILM COATED ORAL
Qty: 90 TAB | Refills: 4 | Status: SHIPPED | OUTPATIENT
Start: 2020-01-21 | End: 2021-02-02

## 2020-01-23 ENCOUNTER — TELEPHONE (OUTPATIENT)
Dept: MEDICAL GROUP | Age: 80
End: 2020-01-23

## 2020-01-23 NOTE — TELEPHONE ENCOUNTER
ESTABLISHED PATIENT PRE-VISIT PLANNING     Patient was contacted to complete PVP.     Note: Patient will not be contacted if there is no indication to call.     1.  Reviewed notes from the last few office visits within the medical group: Yes    2.  If any orders were placed at last visit or intended to be done for this visit (i.e. 6 mos follow-up), do we have Results/Consult Notes?        •  Labs - Labs ordered, but not to be completed until may 2020 .   Note: If patient appointment is for lab review and patient did not complete labs, check with provider if OK to reschedule patient until labs completed.       •  Imaging - Imaging was not ordered at last office visit.       •  Referrals - No referrals were ordered at last office visit.    3. Is this appointment scheduled as a Hospital Follow-Up? No    4.  Immunizations were updated in Epic using WebIZ?: Epic matches WebIZ       •  Web Iz Recommendations: FLU, PNEUMOVAX (PPSV23), TDAP and SHINGRIX (Shingles)    5.  Patient is due for the following Health Maintenance Topics:   Health Maintenance Due   Topic Date Due   • IMM DTaP/Tdap/Td Vaccine (1 - Tdap) 06/08/1951   • IMM ZOSTER VACCINES (1 of 2) 06/08/1989   • Annual Wellness Visit  04/23/2014   • IMM PNEUMOCOCCAL VACCINE: 65+ Years (2 of 2 - PPSV23) 12/15/2017   • BONE DENSITY  04/23/2019   • IMM INFLUENZA (1) 09/01/2019           6. Orders for overdue Health Maintenance topics pended in Pre-Charting? N\A    7.  AHA (MDX) form printed for Provider? YES    8.  Patient was informed to arrive 15 min prior to their scheduled appointment and bring in their medication bottles.    Per patient she sees Dr. Cruz once a year and wanted appt changed until after April. Changed to 5/1/2020 at 8:00

## 2020-03-01 ENCOUNTER — OFFICE VISIT (OUTPATIENT)
Dept: URGENT CARE | Facility: CLINIC | Age: 80
End: 2020-03-01
Payer: MEDICARE

## 2020-03-01 ENCOUNTER — APPOINTMENT (OUTPATIENT)
Dept: RADIOLOGY | Facility: IMAGING CENTER | Age: 80
End: 2020-03-01
Attending: FAMILY MEDICINE
Payer: MEDICARE

## 2020-03-01 VITALS
HEIGHT: 67 IN | BODY MASS INDEX: 30.61 KG/M2 | RESPIRATION RATE: 20 BRPM | TEMPERATURE: 97.2 F | DIASTOLIC BLOOD PRESSURE: 70 MMHG | HEART RATE: 120 BPM | WEIGHT: 195 LBS | SYSTOLIC BLOOD PRESSURE: 122 MMHG | OXYGEN SATURATION: 90 %

## 2020-03-01 DIAGNOSIS — R06.00 DYSPNEA, UNSPECIFIED TYPE: ICD-10-CM

## 2020-03-01 DIAGNOSIS — J45.41 MODERATE PERSISTENT ASTHMA WITH EXACERBATION: ICD-10-CM

## 2020-03-01 DIAGNOSIS — J22 LRTI (LOWER RESPIRATORY TRACT INFECTION): ICD-10-CM

## 2020-03-01 DIAGNOSIS — J22 ACUTE RESPIRATORY INFECTION: ICD-10-CM

## 2020-03-01 DIAGNOSIS — J98.01 BRONCHOSPASM: ICD-10-CM

## 2020-03-01 DIAGNOSIS — R05.9 COUGH: ICD-10-CM

## 2020-03-01 LAB
FLUAV+FLUBV AG SPEC QL IA: NEGATIVE
INT CON NEG: NORMAL
INT CON POS: NORMAL

## 2020-03-01 PROCEDURE — 99214 OFFICE O/P EST MOD 30 MIN: CPT | Mod: 25 | Performed by: FAMILY MEDICINE

## 2020-03-01 PROCEDURE — 94640 AIRWAY INHALATION TREATMENT: CPT | Performed by: FAMILY MEDICINE

## 2020-03-01 PROCEDURE — 71046 X-RAY EXAM CHEST 2 VIEWS: CPT | Mod: TC | Performed by: FAMILY MEDICINE

## 2020-03-01 PROCEDURE — 87804 INFLUENZA ASSAY W/OPTIC: CPT | Performed by: FAMILY MEDICINE

## 2020-03-01 RX ORDER — ALBUTEROL SULFATE 2.5 MG/3ML
2.5 SOLUTION RESPIRATORY (INHALATION) ONCE
Status: COMPLETED | OUTPATIENT
Start: 2020-03-01 | End: 2020-03-01

## 2020-03-01 RX ORDER — DOXYCYCLINE HYCLATE 100 MG
100 TABLET ORAL 2 TIMES DAILY
Qty: 14 TAB | Refills: 0 | Status: SHIPPED | OUTPATIENT
Start: 2020-03-01 | End: 2020-03-08

## 2020-03-01 RX ORDER — PREDNISONE 10 MG/1
40 TABLET ORAL DAILY
Qty: 20 TAB | Refills: 0 | Status: SHIPPED | OUTPATIENT
Start: 2020-03-01 | End: 2020-03-06

## 2020-03-01 RX ORDER — ALBUTEROL SULFATE 90 UG/1
2 AEROSOL, METERED RESPIRATORY (INHALATION) EVERY 6 HOURS PRN
Qty: 1 INHALER | Refills: 0 | Status: SHIPPED | OUTPATIENT
Start: 2020-03-01 | End: 2020-06-17

## 2020-03-01 RX ADMIN — ALBUTEROL SULFATE 2.5 MG: 2.5 SOLUTION RESPIRATORY (INHALATION) at 10:48

## 2020-03-01 ASSESSMENT — ENCOUNTER SYMPTOMS
EYE REDNESS: 0
SPUTUM PRODUCTION: 1
NAUSEA: 0
SHORTNESS OF BREATH: 1
FEVER: 0
COUGH: 1
VOMITING: 0
WHEEZING: 1
CHILLS: 0
DIZZINESS: 0
SORE THROAT: 0
MYALGIAS: 0

## 2020-03-01 ASSESSMENT — FIBROSIS 4 INDEX: FIB4 SCORE: 1.25

## 2020-03-01 NOTE — PATIENT INSTRUCTIONS
Bronchitis  Bronchitis is a problem of the air tubes leading to your lungs. This problem makes it hard for air to get in and out of the lungs. You may cough a lot because your air tubes are narrow. Going without care can cause lasting (chronic) bronchitis.  HOME CARE   · Drink enough fluids to keep your pee (urine) clear or pale yellow.  · Use a cool mist humidifier.  · Quit smoking if you smoke. If you keep smoking, the bronchitis might not get better.  · Only take medicine as told by your doctor.  GET HELP RIGHT AWAY IF:   · Coughing keeps you awake.  · You start to wheeze.  · You become more sick or weak.  · You have a hard time breathing or get short of breath.  · You cough up blood.  · Coughing lasts more than 2 weeks.  · You have a fever.  · Your baby is older than 3 months with a rectal temperature of 102° F (38.9° C) or higher.  · Your baby is 3 months old or younger with a rectal temperature of 100.4° F (38° C) or higher.  MAKE SURE YOU:  · Understand these instructions.  · Will watch your condition.  · Will get help right away if you are not doing well or get worse.  Document Released: 06/05/2009 Document Revised: 03/11/2013 Document Reviewed: 11/19/2010  [employer]® Patient Information ©2014 [employer].

## 2020-03-01 NOTE — PROGRESS NOTES
Subjective:   Jessica Black is a 79 y.o. female who presents for Cough (x3days, cough, chest congestion)        79-year-old female with a history of asthma, pulmonary hypertension, atrial fibrillation presents to urgent care with a chief complaint of cough over the past 3 days.  Patient complains of intermittent dyspnea associated with the cough.  The patient has been compliant taking the prescribed fluticasone and salmeterol twice a day and has not recently taking albuterol despite intermittent wheezing and dyspnea.  Patient denies specific fevers chills or sweats.  The patient did not receive the seasonal influenza vaccination.    Cough   This is a new problem. Episode onset: 3 days. The cough is productive of sputum. Associated symptoms include shortness of breath and wheezing. Pertinent negatives include no chest pain, chills, eye redness, fever, myalgias, rash or sore throat.     PMH:  has a past medical history of A-fib ([facility]) (2008), Asthma, Hyperlipidemia, and Hypertension.  MEDS:   Current Outpatient Medications:   •  doxycycline (VIBRAMYCIN) 100 MG Tab, Take 1 Tab by mouth 2 times a day for 7 days., Disp: 14 Tab, Rfl: 0  •  predniSONE (DELTASONE) 10 MG Tab, Take 4 Tabs by mouth every day for 5 days., Disp: 20 Tab, Rfl: 0  •  atorvastatin (LIPITOR) 10 MG Tab, TAKE ONE TABLET BY MOUTH ONE TIME DAILY , Disp: 90 Tab, Rfl: 4  •  albuterol 108 (90 Base) MCG/ACT Aero Soln inhalation aerosol, Inhale 2 Puffs by mouth every 6 hours as needed for Shortness of Breath., Disp: 8.5 g, Rfl: 0  •  vitamin D3, cholecalciferol, 1000 UNIT Tab, Take 1 Tab by mouth every day., Disp: 100 Tab, Rfl: 4  •  albuterol (PROAIR HFA) 108 (90 Base) MCG/ACT Aero Soln inhalation aerosol, Inhale 1-2 Puffs by mouth every four hours as needed for Shortness of Breath., Disp: 1 Inhaler, Rfl: 3  •  fluticasone-salmeterol (ADVAIR) 100-50 MCG/DOSE AEROSOL POWDER, BREATH ACTIVATED, Inhale 1 Puff by mouth 2 times a day., Disp: 3 Inhaler, Rfl: 4  •   "albuterol 108 (90 Base) MCG/ACT Aero Soln inhalation aerosol, Inhale 2 Puffs by mouth every 6 hours as needed for Shortness of Breath., Disp: 1 Inhaler, Rfl: 0  •  diltiazem CD (DILTIAZEM CD) 180 MG CAPSULE SR 24 HR, Take 1 Cap by mouth 2 Times a Day., Disp: 60 Cap, Rfl: 1  •  multivitamin (THERAGRAN) Tab, Take 1 Tab by mouth every day., Disp: , Rfl:   •  ascorbic acid (C 1000) 1000 MG tablet, Take 1,000 mg by mouth every day., Disp: , Rfl:   •  Probiotic Product (PROBIOTIC DAILY PO), Take 1 Tab by mouth every day., Disp: , Rfl:   •  apixaban (ELIQUIS) 5mg Tab, Take 5 mg by mouth 2 Times a Day., Disp: , Rfl:   •  azithromycin (ZITHROMAX) 250 MG Tab, Take 2 tablets by mouth on day one. Take one tablet by mouth the remaining days until gone (Patient not taking: Reported on 3/1/2020), Disp: 6 Tab, Rfl: 0  ALLERGIES: No Known Allergies  SURGHX: No past surgical history on file.  SOCHX:  reports that she has never smoked. She has never used smokeless tobacco. She reports that she does not drink alcohol or use drugs.  FH: Family history was reviewed  Review of Systems   Constitutional: Negative for chills, fever and malaise/fatigue.   HENT: Negative for sore throat.    Eyes: Negative for redness.   Respiratory: Positive for cough, sputum production, shortness of breath and wheezing.    Cardiovascular: Negative for chest pain.   Gastrointestinal: Negative for nausea and vomiting.   Genitourinary: Negative for dysuria.   Musculoskeletal: Negative for myalgias.   Skin: Negative for rash.   Neurological: Negative for dizziness.        Objective:   /70 (BP Location: Left arm, Patient Position: Sitting, BP Cuff Size: Adult)   Pulse (!) 120   Temp 36.2 °C (97.2 °F) (Temporal)   Resp 20   Ht 1.702 m (5' 7\")   Wt 88.5 kg (195 lb)   SpO2 90%   BMI 30.54 kg/m²   Physical Exam  Vitals signs and nursing note reviewed.   Constitutional:       General: She is not in acute distress.     Appearance: She is well-developed.   "   HENT:      Head: Normocephalic and atraumatic.      Right Ear: External ear normal.      Left Ear: Tympanic membrane and external ear normal.      Nose: Nose normal.      Mouth/Throat:      Mouth: Mucous membranes are moist.   Eyes:      Conjunctiva/sclera: Conjunctivae normal.      Pupils: Pupils are equal, round, and reactive to light.   Cardiovascular:      Rate and Rhythm: Normal rate. Rhythm irregular.      Heart sounds: No murmur.   Pulmonary:      Effort: Pulmonary effort is normal. No respiratory distress.      Breath sounds: Wheezing and rhonchi present.   Abdominal:      General: There is no distension.      Palpations: Abdomen is soft.      Tenderness: There is no abdominal tenderness.   Musculoskeletal: Normal range of motion.   Skin:     General: Skin is warm and dry.   Neurological:      General: No focal deficit present.      Mental Status: She is alert and oriented to person, place, and time. Mental status is at baseline.      Gait: Gait (gait at baseline) normal.   Psychiatric:         Judgment: Judgment normal.     POCT influenza: negative     CXR:   DX-CHEST-2 VIEWS   Order: [id number]   Status:  Final result   Visible to patient:  No (Not Released) Next appt:  05/01/2020 at 08:00 AM in [facility] (Avery Cruz M.D.) Dx:  Cough   Details     Reading Physician Reading Date Result Priority   Prateek Oliveira M.D.  [phone] 3/1/2020 Urgent Care      Narrative & Impression        3/1/2020 10:31 AM     HISTORY/REASON FOR EXAM:  Cough        TECHNIQUE/EXAM DESCRIPTION AND NUMBER OF VIEWS:  Two views of the chest.     COMPARISON:  8/21/2016.     FINDINGS:     Mild diffuse interstitial prominence.  No pleural effusions, no pneumothorax are appreciated.  Stable enlarged cardiopericardial silhouette.     IMPRESSION:        Mild diffuse interstitial prominence could relate to mild edema.     Stable cardiomegaly.             Last Resulted: 03/01/20 10:44 AM           Medical  decision-making/course: The patient remained afebrile, hemodynamically and neurologically stable with no evidence of respiratory compromise throughout the urgent care course.  The patient confirms a previous history of atrial fibrillation which is consistent with the physical exam and heart rate.  The patient was given albuterol via hand-held nebulizer with decreased subjective dyspnea and improvement of the oxygen saturation to 95% on room air.  There was no immediate clinical indication for the necessity of emergency department evaluation or inpatient admission and the patient requested a trial of outpatient management.          Assessment/Plan:   1. Acute respiratory infection  - doxycycline (VIBRAMYCIN) 100 MG Tab; Take 1 Tab by mouth 2 times a day for 7 days.  Dispense: 14 Tab; Refill: 0    2. Moderate persistent asthma with exacerbation  - predniSONE (DELTASONE) 10 MG Tab; Take 4 Tabs by mouth every day for 5 days.  Dispense: 20 Tab; Refill: 0    3. Bronchospasm  - albuterol (PROVENTIL) 2.5mg/3ml nebulizer solution 2.5 mg  - predniSONE (DELTASONE) 10 MG Tab; Take 4 Tabs by mouth every day for 5 days.  Dispense: 20 Tab; Refill: 0    4. Dyspnea, unspecified type  - predniSONE (DELTASONE) 10 MG Tab; Take 4 Tabs by mouth every day for 5 days.  Dispense: 20 Tab; Refill: 0    Other orders  - POCT Influenza A/B  - DX-CHEST-2 VIEWS; Future      Discussed close monitoring, return precautions, and supportive measures including maintaining adequate fluid hydration and caloric intake, relative rest and OTC symptom management including acetaminophen as needed for pain and/or fever.    Differential diagnosis, natural history, supportive care, and indications for immediate follow-up discussed.     Advised the patient to follow-up with the primary care physician for recheck, reevaluation, and consideration of further management.

## 2020-03-03 ENCOUNTER — TELEPHONE (OUTPATIENT)
Dept: MEDICAL GROUP | Age: 80
End: 2020-03-03

## 2020-03-03 DIAGNOSIS — J45.901 ASTHMA EXACERBATION: ICD-10-CM

## 2020-03-03 RX ORDER — ALBUTEROL SULFATE 2.5 MG/3ML
2.5 SOLUTION RESPIRATORY (INHALATION) EVERY 4 HOURS PRN
Qty: 75 ML | Refills: 11 | Status: ON HOLD | OUTPATIENT
Start: 2020-03-03 | End: 2021-05-18

## 2020-03-03 NOTE — TELEPHONE ENCOUNTER
1. Caller Name: Jessica Black                        Call Back Number: [phone] (home)         How would the patient prefer to be contacted with a response: Phone call do NOT leave a detailed message    Patient called requesting albuterol for her nebulizer. Unable to pend med.     Please advise

## 2020-03-06 NOTE — TELEPHONE ENCOUNTER
Phone Number Called: [phone] (home)     Call outcome: Did not leave a detailed message. Requested patient to call back.    Message: LVM and told patient to call back.

## 2020-03-07 NOTE — TELEPHONE ENCOUNTER
Phone Number Called: [phone] (home)       Call outcome: Left detailed message for patient. Informed to call back with any additional questions.    Message: Left vm informing pt that rx for nebulizer was sent to the pharmacy.

## 2020-03-19 DIAGNOSIS — J45.40 MODERATE PERSISTENT ASTHMA WITHOUT COMPLICATION: ICD-10-CM

## 2020-06-14 ENCOUNTER — OFFICE VISIT (OUTPATIENT)
Dept: URGENT CARE | Facility: CLINIC | Age: 80
End: 2020-06-14
Payer: MEDICARE

## 2020-06-14 ENCOUNTER — APPOINTMENT (OUTPATIENT)
Dept: RADIOLOGY | Facility: IMAGING CENTER | Age: 80
End: 2020-06-14
Attending: FAMILY MEDICINE
Payer: MEDICARE

## 2020-06-14 VITALS
BODY MASS INDEX: 31.55 KG/M2 | HEART RATE: 89 BPM | WEIGHT: 201 LBS | RESPIRATION RATE: 20 BRPM | TEMPERATURE: 97.8 F | DIASTOLIC BLOOD PRESSURE: 78 MMHG | OXYGEN SATURATION: 95 % | SYSTOLIC BLOOD PRESSURE: 128 MMHG | HEIGHT: 67 IN

## 2020-06-14 DIAGNOSIS — J45.901 MODERATE ASTHMA WITH EXACERBATION, UNSPECIFIED WHETHER PERSISTENT: Primary | ICD-10-CM

## 2020-06-14 DIAGNOSIS — R06.02 SOB (SHORTNESS OF BREATH): ICD-10-CM

## 2020-06-14 PROCEDURE — 71046 X-RAY EXAM CHEST 2 VIEWS: CPT | Mod: TC | Performed by: FAMILY MEDICINE

## 2020-06-14 PROCEDURE — 99214 OFFICE O/P EST MOD 30 MIN: CPT | Performed by: FAMILY MEDICINE

## 2020-06-14 RX ORDER — AZITHROMYCIN 250 MG/1
TABLET, FILM COATED ORAL
Qty: 6 TAB | Refills: 0 | Status: SHIPPED | OUTPATIENT
Start: 2020-06-14 | End: 2020-06-17

## 2020-06-14 RX ORDER — PREDNISONE 20 MG/1
TABLET ORAL
Qty: 10 TAB | Refills: 0 | Status: ON HOLD | OUTPATIENT
Start: 2020-06-14 | End: 2021-05-18

## 2020-06-14 RX ORDER — ALBUTEROL SULFATE 90 UG/1
8 AEROSOL, METERED RESPIRATORY (INHALATION) ONCE
Status: COMPLETED | OUTPATIENT
Start: 2020-06-14 | End: 2020-06-14

## 2020-06-14 RX ADMIN — ALBUTEROL SULFATE 8 PUFF: 90 AEROSOL, METERED RESPIRATORY (INHALATION) at 13:37

## 2020-06-14 ASSESSMENT — FIBROSIS 4 INDEX: FIB4 SCORE: 1.27

## 2020-06-14 NOTE — PATIENT INSTRUCTIONS
Start oral antibiotic daily as directed for 5 days  Oral steroid daily for 5 days to help decrease inflammation in the small airways  Continue using her Advair  Use your bronchodilator, albuterol inhaler with the spacer I gave you 2 puffs every 4 hours as needed for wheezing  Close follow-up recommended early this week with your primary care doctor in the next couple of days  You have any sudden worsening progressive worsening please go to the nearest emergency department

## 2020-06-14 NOTE — PROGRESS NOTES
"Subjective:      Jessica Black is a 80 y.o. female who presents with Shortness of Breath (x 3 weeks - history of asthma)            This is a new problem.  80-year-old presenting for evaluation of increasing wheezing over the course of the past 3 weeks.  She does have history of asthma and usually on Advair but takes her albuterol only infrequently as needed.  Last use was yesterday.  No fever chills or any recent URI symptoms or travel history reported.  Denies any chest pain, dizziness or lightheadedness.  She is here with family member.  She does have history of A. fib      Review of Systems   All other systems reviewed and are negative.         Objective:     /78 (BP Location: Left arm, Patient Position: Sitting, BP Cuff Size: Adult)   Pulse 89   Temp 36.6 °C (97.8 °F) (Temporal)   Resp 20   Ht 1.702 m (5' 7\")   Wt 91.2 kg (201 lb)   SpO2 95%   BMI 31.48 kg/m²      Physical Exam  Constitutional:       General: She is not in acute distress.     Appearance: She is not ill-appearing or diaphoretic.   HENT:      Head: Normocephalic and atraumatic.      Right Ear: External ear normal.      Left Ear: External ear normal.      Nose: Nose normal.      Mouth/Throat:      Mouth: Mucous membranes are moist.      Pharynx: Oropharynx is clear. No oropharyngeal exudate or posterior oropharyngeal erythema.   Eyes:      Conjunctiva/sclera: Conjunctivae normal.   Neck:      Musculoskeletal: Neck supple.   Cardiovascular:      Rate and Rhythm: Normal rate. Rhythm irregular.      Heart sounds: No murmur. No friction rub. No gallop.    Pulmonary:      Effort: Pulmonary effort is normal. No respiratory distress.      Breath sounds: No stridor. Wheezing present. No rhonchi or rales.      Comments: Diffuse wheezing heard which significantly improved after albuterol MDI  Skin:     Coloration: Skin is not jaundiced or pale.      Findings: No erythema or rash.   Neurological:      Mental Status: She is alert.   Psychiatric: "         Mood and Affect: Mood normal.            Chest x-ray does not show any acute abnormalities, no infiltrate     Assessment/Plan:       1. Moderate asthma with exacerbation, unspecified whether persistent  - DX-CHEST-2 VIEWS; Future  - albuterol inhaler 8 Puff  - predniSONE (DELTASONE) 20 MG Tab; Take 40mg daily x 5 days  Dispense: 10 Tab; Refill: 0  - azithromycin (ZITHROMAX) 250 MG Tab; 500mg on day One, then 250mg daily until finished  Dispense: 6 Tab; Refill: 0  - Spacer/Aero-Holding Chambers (E-Z SPACER) Device; Use as directed  Dispense: 1 Device; Refill: 3    Significantly improved after albuterol MDI, will discuss frequent use of albuterol if needed but shortly using the spacer.  Oral steroid daily as directed for 5 days and close follow-up in the next few days, sooner if any worsening of symptoms  Plan per orders and instructions  .Warning signs reviewed

## 2020-06-16 ENCOUNTER — TELEPHONE (OUTPATIENT)
Dept: MEDICAL GROUP | Age: 80
End: 2020-06-16

## 2020-06-16 NOTE — TELEPHONE ENCOUNTER
ESTABLISHED PATIENT PRE-VISIT PLANNING     Patient was NOT contacted to complete PVP.     Note: Patient will not be contacted if there is no indication to call.     1.  Reviewed notes from the last few office visits within the medical group: Yes    2.  If any orders were placed at last visit or intended to be done for this visit (i.e. 6 mos follow-up), do we have Results/Consult Notes?        •  Labs - Labs ordered, but not to be completed until future.   Note: If patient appointment is for lab review and patient did not complete labs, check with provider if OK to reschedule patient until labs completed.       •  Imaging - Imaging was not ordered at last office visit.       •  Referrals - No referrals were ordered at last office visit.    3. Is this appointment scheduled as a Hospital Follow-Up? No    4.  Immunizations were updated in Epic using WebIZ?: Epic matches WebIZ       •  Web Iz Recommendations: PNEUMOVAX (PPSV23), TDAP and SHINGRIX (Shingles)    5.  Patient is due for the following Health Maintenance Topics:   Health Maintenance Due   Topic Date Due   • IMM DTaP/Tdap/Td Vaccine (1 - Tdap) 06/08/1958   • IMM ZOSTER VACCINES (1 of 2) 06/08/1989   • Annual Wellness Visit  04/23/2014   • IMM PNEUMOCOCCAL VACCINE: 65+ Years (2 of 2 - PPSV23) 12/15/2017   • BONE DENSITY  04/23/2019           6. Orders for overdue Health Maintenance topics pended in Pre-Charting? N\A    7.  AHA (MDX) form printed for Provider? YES    8.  Patient was NOT informed to arrive 15 min prior to their scheduled appointment and bring in their medication bottles.

## 2020-06-17 ENCOUNTER — OFFICE VISIT (OUTPATIENT)
Dept: MEDICAL GROUP | Age: 80
End: 2020-06-17
Payer: MEDICARE

## 2020-06-17 VITALS
TEMPERATURE: 98.1 F | SYSTOLIC BLOOD PRESSURE: 128 MMHG | HEIGHT: 67 IN | OXYGEN SATURATION: 95 % | HEART RATE: 90 BPM | DIASTOLIC BLOOD PRESSURE: 74 MMHG | BODY MASS INDEX: 31.83 KG/M2 | WEIGHT: 202.8 LBS

## 2020-06-17 DIAGNOSIS — J45.901 ACUTE EXACERBATION OF EXTRINSIC ASTHMA: ICD-10-CM

## 2020-06-17 DIAGNOSIS — M81.0 MENOPAUSAL OSTEOPOROSIS: ICD-10-CM

## 2020-06-17 DIAGNOSIS — I48.20 CHRONIC ATRIAL FIBRILLATION (HCC): ICD-10-CM

## 2020-06-17 DIAGNOSIS — E78.2 MIXED HYPERLIPIDEMIA: ICD-10-CM

## 2020-06-17 DIAGNOSIS — I27.29 PULMONARY HYPERTENSION ASSOCIATED WITH UNCLEAR MULTI-FACTORIAL MECHANISMS (HCC): ICD-10-CM

## 2020-06-17 PROCEDURE — 99214 OFFICE O/P EST MOD 30 MIN: CPT | Performed by: INTERNAL MEDICINE

## 2020-06-17 PROCEDURE — 8041 PR SCP AHA: Performed by: INTERNAL MEDICINE

## 2020-06-17 ASSESSMENT — ENCOUNTER SYMPTOMS
CONSTITUTIONAL NEGATIVE: 1
MUSCULOSKELETAL NEGATIVE: 1
CARDIOVASCULAR NEGATIVE: 1
EYES NEGATIVE: 1
PSYCHIATRIC NEGATIVE: 1
RESPIRATORY NEGATIVE: 1
NEUROLOGICAL NEGATIVE: 1
GASTROINTESTINAL NEGATIVE: 1

## 2020-06-17 ASSESSMENT — FIBROSIS 4 INDEX: FIB4 SCORE: 1.27

## 2020-06-17 ASSESSMENT — PATIENT HEALTH QUESTIONNAIRE - PHQ9: CLINICAL INTERPRETATION OF PHQ2 SCORE: 0

## 2020-06-17 NOTE — PROGRESS NOTES
Subjective:      Jessica Black is a 80 y.o. female who presents with Follow-Up (Urgent care 6/14/2020 )  The patient is here for followup of chronic medical problems listed below. The patient is compliant with medications and having no side effects from them. Denies chest pain, abdominal pain, dyspnea, myalgias, or cough.   Patient Active Problem List    Diagnosis Date Noted   • Vitamin D deficiency 07/22/2019   • Age-related osteoporosis with current pathological fracture with routine healing 01/08/2018   • IFG (impaired fasting glucose) 12/14/2017   • Menopausal osteoporosis 09/13/2017   • Anticoagulant long-term use- dr eunice collado, [place]; for a fib 12/15/2016   • Pulmonary hypertension associated with unclear multi-factorial mechanisms (HCC)- mild RSVP=45 mmHg ECHO 2016; (asthma, valvular disease, diastolic dysfunction, severe dilation LA and RV)- dr dawson 12/15/2016   • Chronic atrial fibrillation- dr dawson [place] 11/18/2015   • Asthma, moderate persistent, well-controlled 11/18/2015   • MVP (mitral valve prolapse) 04/22/2013   • Mixed hyperlipidemia 04/22/2013     No Known Allergies  Outpatient Medications Prior to Visit   Medication Sig Dispense Refill   • predniSONE (DELTASONE) 20 MG Tab Take 40mg daily x 5 days 10 Tab 0   • Spacer/Aero-Holding Chambers (E-Z SPACER) Device Use as directed 1 Device 3   • fluticasone-salmeterol (ADVAIR) 100-50 MCG/DOSE AEROSOL POWDER, BREATH ACTIVATED Inhale 1 Puff by mouth 2 times a day. 3 Inhaler 4   • albuterol (PROVENTIL) 2.5mg/3ml Nebu Soln solution for nebulization 3 mL by Nebulization route every four hours as needed for Shortness of Breath. 75 mL 11   • atorvastatin (LIPITOR) 10 MG Tab TAKE ONE TABLET BY MOUTH ONE TIME DAILY  90 Tab 4   • vitamin D3, cholecalciferol, 1000 UNIT Tab Take 1 Tab by mouth every day. 100 Tab 4   • diltiazem CD (DILTIAZEM CD) 180 MG CAPSULE SR 24 HR Take 1 Cap by mouth 2 Times a Day. 60 Cap 1   • multivitamin (THERAGRAN) Tab Take 1 Tab  by mouth every day.     • ascorbic acid (C 1000) 1000 MG tablet Take 1,000 mg by mouth every day.     • Probiotic Product (PROBIOTIC DAILY PO) Take 1 Tab by mouth every day.     • apixaban (ELIQUIS) 5mg Tab Take 5 mg by mouth 2 Times a Day.     • azithromycin (ZITHROMAX) 250 MG Tab 500mg on day One, then 250mg daily until finished (Patient not taking: Reported on 6/17/2020) 6 Tab 0   • albuterol 108 (90 Base) MCG/ACT Aero Soln inhalation aerosol Inhale 2 Puffs by mouth every 6 hours as needed for Shortness of Breath. 1 Inhaler 0     No facility-administered medications prior to visit.      No visits with results within 1 Month(s) from this visit.   Latest known visit with results is:   Office Visit on 03/01/2020   Component Date Value   • Rapid Influenza A-B 03/01/2020 Negative    • Internal Control Positive 03/01/2020 Valid    • Internal Control Negative 03/01/2020 Valid       Lab Results   Component Value Date/Time    HBA1C 6.1 (H) 07/19/2019 09:13 AM    HBA1C 6.5 (H) 08/28/2018 09:45 AM     Lab Results   Component Value Date/Time    SODIUM 140 07/19/2019 09:13 AM    POTASSIUM 4.4 07/19/2019 09:13 AM    CHLORIDE 104 07/19/2019 09:13 AM    CO2 28 07/19/2019 09:13 AM    GLUCOSE 108 (H) 07/19/2019 09:13 AM    BUN 24 (H) 07/19/2019 09:13 AM    CREATININE 0.85 07/19/2019 09:13 AM    CREATININE 0.94 03/23/2011 12:00 AM    BUNCREATRAT 13 03/23/2011 12:00 AM    GLOMRATE 59 (L) 03/23/2011 12:00 AM    ALKPHOSPHAT 80 07/19/2019 09:13 AM    ASTSGOT 21 07/19/2019 09:13 AM    ALTSGPT 24 07/19/2019 09:13 AM    TBILIRUBIN 0.7 07/19/2019 09:13 AM     Lab Results   Component Value Date/Time    INR 1.33 (H) 08/21/2016 12:14 PM    INR 1.84 (H) 05/23/2007 03:42 AM    INR 1.53 (H) 05/22/2007 05:25 AM     Lab Results   Component Value Date/Time    CHOLSTRLTOT 188 07/19/2019 09:13 AM    LDL 87 07/19/2019 09:13 AM    HDL 78 07/19/2019 09:13 AM    TRIGLYCERIDE 114 07/19/2019 09:13 AM       No results found for: TESTOSTERONE  Lab Results  "  Component Value Date/Time    TSH 2.310 03/23/2011 12:00 AM    TSH 1.790 05/06/2010 09:46 AM     No results found for: FREET4  No results found for: URICACID  No components found for: VITB12  Lab Results   Component Value Date/Time    25HYDROXY 26 (L) 12/12/2016 09:50 AM     '          HPI    Review of Systems   Constitutional: Negative.    HENT: Negative.    Eyes: Negative.    Respiratory: Negative.    Cardiovascular: Negative.    Gastrointestinal: Negative.    Genitourinary: Negative.    Musculoskeletal: Negative.    Skin: Negative.    Neurological: Negative.    Endo/Heme/Allergies: Negative.    Psychiatric/Behavioral: Negative.           Objective:     /74 (BP Location: Left arm, Patient Position: Sitting, BP Cuff Size: Adult)   Pulse 90   Temp 36.7 °C (98.1 °F) (Temporal)   Ht 1.702 m (5' 7\")   Wt 92 kg (202 lb 12.8 oz)   SpO2 95%   BMI 31.76 kg/m²      Physical Exam  Vitals signs reviewed.   Constitutional:       General: She is not in acute distress.     Appearance: She is well-developed. She is not diaphoretic.   HENT:      Head: Normocephalic and atraumatic.      Right Ear: External ear normal.      Left Ear: External ear normal.      Nose: Nose normal.      Mouth/Throat:      Pharynx: No oropharyngeal exudate.   Eyes:      General: No scleral icterus.        Right eye: No discharge.         Left eye: No discharge.      Conjunctiva/sclera: Conjunctivae normal.      Pupils: Pupils are equal, round, and reactive to light.   Neck:      Musculoskeletal: Normal range of motion and neck supple.      Thyroid: No thyromegaly.      Vascular: No JVD.      Trachea: No tracheal deviation.   Cardiovascular:      Rate and Rhythm: Normal rate and regular rhythm.      Heart sounds: Normal heart sounds. No murmur. No friction rub. No gallop.    Pulmonary:      Effort: Pulmonary effort is normal. No respiratory distress.      Breath sounds: Normal breath sounds. No stridor. No wheezing or rales.   Chest:      " Chest wall: No tenderness.   Abdominal:      General: Bowel sounds are normal. There is no distension.      Palpations: Abdomen is soft. There is no mass.      Tenderness: There is no abdominal tenderness. There is no guarding or rebound.   Musculoskeletal: Normal range of motion.         General: No tenderness.   Lymphadenopathy:      Cervical: No cervical adenopathy.   Skin:     General: Skin is warm and dry.      Coloration: Skin is not pale.      Findings: No erythema or rash.   Neurological:      Mental Status: She is alert and oriented to person, place, and time.      Cranial Nerves: No cranial nerve deficit.      Motor: No abnormal muscle tone.      Coordination: Coordination normal.      Deep Tendon Reflexes: Reflexes are normal and symmetric. Reflexes normal.   Psychiatric:         Behavior: Behavior normal.         Thought Content: Thought content normal.         Judgment: Judgment normal.                 Assessment/Plan:     1.  Acute exacerbation of asthma COPD.  Doing well on Advair 100/50, 1 inhalation twice daily, as well as albuterol inhaler every 2 hours as needed for rescue relief and NPPB with albuterol for same reason.  Doing much better now.  Only has faint wheezes.  Will recheck again in 2 to 4 weeks to see how she is doing with this.  If she still has wheeze at that time we will increase her Advair to 250/50 twice daily.    2 dyslipidemia-under good control.  Continue same regimen.       3.  Vitamin D deficiency under good control.  Continue same regimen.     4.  Osteoporosis-good control.  Good control.  Continue current treatment      5.  Chronic atrial fibrillation has good rate control on diltiazem.  Continue unchanged.  Follow-up with cardiology.  Continue anticoagulation.    6.Pulmonary hypertension associated with unclear multi-factorial mechanisms (HCC)- mild RSVP=45 mmHg ECHO 2016; (asthma, valvular disease, diastolic dysfunction, severe dilation LA and RV)- dr dawson    Under good  control.  Continue same regimen.

## 2020-06-23 ENCOUNTER — HOSPITAL ENCOUNTER (OUTPATIENT)
Dept: LAB | Facility: MEDICAL CENTER | Age: 80
End: 2020-06-23
Attending: INTERNAL MEDICINE
Payer: MEDICARE

## 2020-06-23 DIAGNOSIS — R73.01 IFG (IMPAIRED FASTING GLUCOSE): ICD-10-CM

## 2020-06-23 DIAGNOSIS — E78.2 MIXED HYPERLIPIDEMIA: ICD-10-CM

## 2020-06-23 LAB
25(OH)D3 SERPL-MCNC: 49 NG/ML (ref 30–100)
ALBUMIN SERPL BCP-MCNC: 4.5 G/DL (ref 3.2–4.9)
ALBUMIN/GLOB SERPL: 2 G/DL
ALP SERPL-CCNC: 88 U/L (ref 30–99)
ALT SERPL-CCNC: 24 U/L (ref 2–50)
ANION GAP SERPL CALC-SCNC: 13 MMOL/L (ref 7–16)
AST SERPL-CCNC: 16 U/L (ref 12–45)
BASOPHILS # BLD AUTO: 0.6 % (ref 0–1.8)
BASOPHILS # BLD: 0.05 K/UL (ref 0–0.12)
BILIRUB SERPL-MCNC: 0.7 MG/DL (ref 0.1–1.5)
BUN SERPL-MCNC: 18 MG/DL (ref 8–22)
CALCIUM SERPL-MCNC: 10 MG/DL (ref 8.5–10.5)
CHLORIDE SERPL-SCNC: 101 MMOL/L (ref 96–112)
CHOLEST SERPL-MCNC: 206 MG/DL (ref 100–199)
CO2 SERPL-SCNC: 27 MMOL/L (ref 20–33)
CREAT SERPL-MCNC: 0.69 MG/DL (ref 0.5–1.4)
EOSINOPHIL # BLD AUTO: 0.56 K/UL (ref 0–0.51)
EOSINOPHIL NFR BLD: 7 % (ref 0–6.9)
ERYTHROCYTE [DISTWIDTH] IN BLOOD BY AUTOMATED COUNT: 48.2 FL (ref 35.9–50)
EST. AVERAGE GLUCOSE BLD GHB EST-MCNC: 123 MG/DL
FASTING STATUS PATIENT QL REPORTED: NORMAL
GLOBULIN SER CALC-MCNC: 2.3 G/DL (ref 1.9–3.5)
GLUCOSE SERPL-MCNC: 100 MG/DL (ref 65–99)
HBA1C MFR BLD: 5.9 % (ref 0–5.6)
HCT VFR BLD AUTO: 47.1 % (ref 37–47)
HDLC SERPL-MCNC: 104 MG/DL
HGB BLD-MCNC: 15.5 G/DL (ref 12–16)
IMM GRANULOCYTES # BLD AUTO: 0.04 K/UL (ref 0–0.11)
IMM GRANULOCYTES NFR BLD AUTO: 0.5 % (ref 0–0.9)
LDLC SERPL CALC-MCNC: 72 MG/DL
LYMPHOCYTES # BLD AUTO: 1.49 K/UL (ref 1–4.8)
LYMPHOCYTES NFR BLD: 18.6 % (ref 22–41)
MCH RBC QN AUTO: 28.4 PG (ref 27–33)
MCHC RBC AUTO-ENTMCNC: 32.9 G/DL (ref 33.6–35)
MCV RBC AUTO: 86.3 FL (ref 81.4–97.8)
MONOCYTES # BLD AUTO: 0.65 K/UL (ref 0–0.85)
MONOCYTES NFR BLD AUTO: 8.1 % (ref 0–13.4)
NEUTROPHILS # BLD AUTO: 5.24 K/UL (ref 2–7.15)
NEUTROPHILS NFR BLD: 65.2 % (ref 44–72)
NRBC # BLD AUTO: 0 K/UL
NRBC BLD-RTO: 0 /100 WBC
PLATELET # BLD AUTO: 261 K/UL (ref 164–446)
PMV BLD AUTO: 10.5 FL (ref 9–12.9)
POTASSIUM SERPL-SCNC: 4.4 MMOL/L (ref 3.6–5.5)
PROT SERPL-MCNC: 6.8 G/DL (ref 6–8.2)
RBC # BLD AUTO: 5.46 M/UL (ref 4.2–5.4)
SODIUM SERPL-SCNC: 141 MMOL/L (ref 135–145)
TRIGL SERPL-MCNC: 151 MG/DL (ref 0–149)
WBC # BLD AUTO: 8 K/UL (ref 4.8–10.8)

## 2020-06-23 PROCEDURE — 85025 COMPLETE CBC W/AUTO DIFF WBC: CPT

## 2020-06-23 PROCEDURE — 82306 VITAMIN D 25 HYDROXY: CPT

## 2020-06-23 PROCEDURE — 80053 COMPREHEN METABOLIC PANEL: CPT

## 2020-06-23 PROCEDURE — 83036 HEMOGLOBIN GLYCOSYLATED A1C: CPT

## 2020-06-23 PROCEDURE — 36415 COLL VENOUS BLD VENIPUNCTURE: CPT

## 2020-06-23 PROCEDURE — 80061 LIPID PANEL: CPT

## 2020-06-30 ENCOUNTER — TELEPHONE (OUTPATIENT)
Dept: MEDICAL GROUP | Age: 80
End: 2020-06-30

## 2020-06-30 NOTE — TELEPHONE ENCOUNTER
ESTABLISHED PATIENT PRE-VISIT PLANNING     Patient was NOT contacted to complete PVP.     Note: Patient will not be contacted if there is no indication to call.     1.  Reviewed notes from the last few office visits within the medical group: Yes    2.  If any orders were placed at last visit or intended to be done for this visit (i.e. 6 mos follow-up), do we have Results/Consult Notes?        •  Labs - Labs ordered, completed on 6/23/2020 and results are in chart.   Note: If patient appointment is for lab review and patient did not complete labs, check with provider if OK to reschedule patient until labs completed.       •  Imaging - Imaging ordered, completed and results are in chart.       •  Referrals - No referrals were ordered at last office visit.    3. Is this appointment scheduled as a Hospital Follow-Up? No    4.  Immunizations were updated in Epic using WebIZ?: Epic matches WebIZ       •  Web Iz Recommendations: PNEUMOVAX (PPSV23), TDAP and SHINGRIX (Shingles)    5.  Patient is due for the following Health Maintenance Topics:   Health Maintenance Due   Topic Date Due   • IMM DTaP/Tdap/Td Vaccine (1 - Tdap) 06/08/1959   • IMM ZOSTER VACCINES (1 of 2) 06/08/1989   • Annual Wellness Visit  04/23/2014   • IMM PNEUMOCOCCAL VACCINE: 65+ Years (2 of 2 - PPSV23) 12/15/2017   • BONE DENSITY  04/23/2019           6. Orders for overdue Health Maintenance topics pended in Pre-Charting? N\A    7.  AHA (MDX) form printed for Provider? No, already completed    8.  Patient was NOT informed to arrive 15 min prior to their scheduled appointment and bring in their medication bottles.

## 2020-07-06 ENCOUNTER — OFFICE VISIT (OUTPATIENT)
Dept: MEDICAL GROUP | Age: 80
End: 2020-07-06
Payer: MEDICARE

## 2020-07-06 VITALS
HEART RATE: 68 BPM | SYSTOLIC BLOOD PRESSURE: 146 MMHG | WEIGHT: 207.4 LBS | BODY MASS INDEX: 32.55 KG/M2 | HEIGHT: 67 IN | TEMPERATURE: 97.8 F | DIASTOLIC BLOOD PRESSURE: 88 MMHG | OXYGEN SATURATION: 94 %

## 2020-07-06 DIAGNOSIS — R29.898 MUSCULAR DECONDITIONING: ICD-10-CM

## 2020-07-06 DIAGNOSIS — I48.20 CHRONIC ATRIAL FIBRILLATION (HCC): ICD-10-CM

## 2020-07-06 DIAGNOSIS — E78.2 MIXED HYPERLIPIDEMIA: ICD-10-CM

## 2020-07-06 DIAGNOSIS — E55.9 VITAMIN D DEFICIENCY: ICD-10-CM

## 2020-07-06 DIAGNOSIS — E53.8 VITAMIN B 12 DEFICIENCY: ICD-10-CM

## 2020-07-06 DIAGNOSIS — R73.01 IMPAIRED FASTING GLUCOSE: ICD-10-CM

## 2020-07-06 DIAGNOSIS — J45.40 MODERATE PERSISTENT ASTHMA WITHOUT COMPLICATION: ICD-10-CM

## 2020-07-06 DIAGNOSIS — Z91.81 RISK FOR FALLS: ICD-10-CM

## 2020-07-06 PROBLEM — J45.901 ACUTE EXACERBATION OF EXTRINSIC ASTHMA: Status: RESOLVED | Noted: 2020-06-17 | Resolved: 2020-07-06

## 2020-07-06 PROCEDURE — 99214 OFFICE O/P EST MOD 30 MIN: CPT | Performed by: INTERNAL MEDICINE

## 2020-07-06 RX ORDER — ALBUTEROL SULFATE 90 UG/1
2 AEROSOL, METERED RESPIRATORY (INHALATION) EVERY 6 HOURS PRN
Qty: 1 INHALER | Refills: 0 | Status: SHIPPED | OUTPATIENT
Start: 2020-07-06 | End: 2020-10-01

## 2020-07-06 ASSESSMENT — ENCOUNTER SYMPTOMS
HEMOPTYSIS: 0
NEUROLOGICAL NEGATIVE: 1
SPUTUM PRODUCTION: 0
COUGH: 0
WHEEZING: 1
PSYCHIATRIC NEGATIVE: 1
CARDIOVASCULAR NEGATIVE: 1
MUSCULOSKELETAL NEGATIVE: 1
SHORTNESS OF BREATH: 1
EYES NEGATIVE: 1
CONSTITUTIONAL NEGATIVE: 1
GASTROINTESTINAL NEGATIVE: 1

## 2020-07-06 ASSESSMENT — FIBROSIS 4 INDEX: FIB4 SCORE: 1.01

## 2020-07-06 NOTE — PROGRESS NOTES
Subjective:      Jessica Black is a 80 y.o. female who presents with Follow-Up and Lab Results  The patient is here with her concerned son for follow-up of her recent exacerbation of COPD and asthma she was steroids antibiotics, as well as for her son's concern about possible needing see a specialist with regard to her lung such as an allergist or pulmonologist, and her sons concerned about the patient's lack of mobility and steadiness of gait and risk for falling.    Otherwise she is here for lab review and medication management and follow-up of her impaired fasting glucose dyslipidemia and vitamin B12 deficiencies.    Since last visit she has been breathing a lot better and only has slight wheezing when she does mild exertion.  She wonders if she should not increase her Advair dosage as we discussed at last visit.    The patient feels she is walking just fine but she admits that she is a little bit slower with her gait and needs to work on her strengthening and deconditioning and weight control.    She is followed up with her cardiologist in [place] for her chronic atrial fib control with diltiazem and anticoagulated with Eliquis.  She is been following a high-fiber high-protein low-carb diet, and her LDL cholesterol has improved from 121 down to 72 and fasting glucose from 120 down to 100.  Her glycohemoglobin likewise improved from 6.5 (2 years ago) down to 6.1 (a year ago) and now down to 5.9.     .encd  Outpatient Medications Prior to Visit   Medication Sig Dispense Refill   • albuterol (PROVENTIL) 2.5mg/3ml Nebu Soln solution for nebulization 3 mL by Nebulization route every four hours as needed for Shortness of Breath. 75 mL 11   • atorvastatin (LIPITOR) 10 MG Tab TAKE ONE TABLET BY MOUTH ONE TIME DAILY  90 Tab 4   • vitamin D3, cholecalciferol, 1000 UNIT Tab Take 1 Tab by mouth every day. 100 Tab 4   • diltiazem CD (DILTIAZEM CD) 180 MG CAPSULE SR 24 HR Take 1 Cap by mouth 2 Times a Day. 60 Cap 1   •  multivitamin (THERAGRAN) Tab Take 1 Tab by mouth every day.     • ascorbic acid (C 1000) 1000 MG tablet Take 1,000 mg by mouth every day.     • Probiotic Product (PROBIOTIC DAILY PO) Take 1 Tab by mouth every day.     • apixaban (ELIQUIS) 5mg Tab Take 5 mg by mouth 2 Times a Day.     • predniSONE (DELTASONE) 20 MG Tab Take 40mg daily x 5 days (Patient not taking: Reported on 7/6/2020) 10 Tab 0   • Spacer/Aero-Holding Chambers (E-Z SPACER) Device Use as directed 1 Device 3   • fluticasone-salmeterol (ADVAIR) 100-50 MCG/DOSE AEROSOL POWDER, BREATH ACTIVATED Inhale 1 Puff by mouth 2 times a day. 3 Inhaler 4     No facility-administered medications prior to visit.      No Known Allergies  Hospital Outpatient Visit on 06/23/2020   Component Date Value   • Glycohemoglobin 06/23/2020 5.9*   • Est Avg Glucose 06/23/2020 123    • 25-Hydroxy   Vitamin D 25 06/23/2020 49    • WBC 06/23/2020 8.0    • RBC 06/23/2020 5.46*   • Hemoglobin 06/23/2020 15.5    • Hematocrit 06/23/2020 47.1*   • MCV 06/23/2020 86.3    • MCH 06/23/2020 28.4    • MCHC 06/23/2020 32.9*   • RDW 06/23/2020 48.2    • Platelet Count 06/23/2020 261    • MPV 06/23/2020 10.5    • Neutrophils-Polys 06/23/2020 65.20    • Lymphocytes 06/23/2020 18.60*   • Monocytes 06/23/2020 8.10    • Eosinophils 06/23/2020 7.00*   • Basophils 06/23/2020 0.60    • Immature Granulocytes 06/23/2020 0.50    • Nucleated RBC 06/23/2020 0.00    • Neutrophils (Absolute) 06/23/2020 5.24    • Lymphs (Absolute) 06/23/2020 1.49    • Monos (Absolute) 06/23/2020 0.65    • Eos (Absolute) 06/23/2020 0.56*   • Baso (Absolute) 06/23/2020 0.05    • Immature Granulocytes (a* 06/23/2020 0.04    • NRBC (Absolute) 06/23/2020 0.00    • Cholesterol,Tot 06/23/2020 206*   • Triglycerides 06/23/2020 151*   • HDL 06/23/2020 104    • LDL 06/23/2020 72    • Sodium 06/23/2020 141    • Potassium 06/23/2020 4.4    • Chloride 06/23/2020 101    • Co2 06/23/2020 27    • Anion Gap 06/23/2020 13.0    • Glucose  06/23/2020 100*   • Bun 06/23/2020 18    • Creatinine 06/23/2020 0.69    • Calcium 06/23/2020 10.0    • AST(SGOT) 06/23/2020 16    • ALT(SGPT) 06/23/2020 24    • Alkaline Phosphatase 06/23/2020 88    • Total Bilirubin 06/23/2020 0.7    • Albumin 06/23/2020 4.5    • Total Protein 06/23/2020 6.8    • Globulin 06/23/2020 2.3    • A-G Ratio 06/23/2020 2.0    • Fasting Status 06/23/2020 Fasting    • GFR If  06/23/2020 >60    • GFR If Non  [employer]* 06/23/2020 >60       Lab Results   Component Value Date/Time    HBA1C 5.9 (H) 06/23/2020 09:21 AM    HBA1C 6.1 (H) 07/19/2019 09:13 AM     Lab Results   Component Value Date/Time    SODIUM 141 06/23/2020 09:21 AM    POTASSIUM 4.4 06/23/2020 09:21 AM    CHLORIDE 101 06/23/2020 09:21 AM    CO2 27 06/23/2020 09:21 AM    GLUCOSE 100 (H) 06/23/2020 09:21 AM    BUN 18 06/23/2020 09:21 AM    CREATININE 0.69 06/23/2020 09:21 AM    CREATININE 0.94 03/23/2011 12:00 AM    BUNCREATRAT 13 03/23/2011 12:00 AM    GLOMRATE 59 (L) 03/23/2011 12:00 AM    ALKPHOSPHAT 88 06/23/2020 09:21 AM    ASTSGOT 16 06/23/2020 09:21 AM    ALTSGPT 24 06/23/2020 09:21 AM    TBILIRUBIN 0.7 06/23/2020 09:21 AM     Lab Results   Component Value Date/Time    INR 1.33 (H) 08/21/2016 12:14 PM    INR 1.84 (H) 05/23/2007 03:42 AM    INR 1.53 (H) 05/22/2007 05:25 AM     Lab Results   Component Value Date/Time    CHOLSTRLTOT 206 (H) 06/23/2020 09:21 AM    LDL 72 06/23/2020 09:21 AM     06/23/2020 09:21 AM    TRIGLYCERIDE 151 (H) 06/23/2020 09:21 AM       No results found for: TESTOSTERONE  Lab Results   Component Value Date/Time    TSH 2.310 03/23/2011 12:00 AM    TSH 1.790 05/06/2010 09:46 AM     No results found for: FREET4  No results found for: URICACID  No components found for: VITB12  Lab Results   Component Value Date/Time    25HYDROXY 49 06/23/2020 09:21 AM    25HYDROXY 26 (L) 12/12/2016 09:50 AM               HPI    Review of Systems   Constitutional: Negative.    HENT: Negative.   "  Eyes: Negative.    Respiratory: Positive for shortness of breath and wheezing. Negative for cough, hemoptysis and sputum production.    Cardiovascular: Negative.  Negative for chest pain and leg swelling.   Gastrointestinal: Negative.    Genitourinary: Negative.    Musculoskeletal: Negative.    Skin: Negative.    Neurological: Negative.    Endo/Heme/Allergies: Negative.    Psychiatric/Behavioral: Negative.           Objective:     /88 (BP Location: Right arm, Patient Position: Sitting, BP Cuff Size: Adult)   Pulse 68   Temp 36.6 °C (97.8 °F) (Temporal)   Ht 1.702 m (5' 7\")   Wt 94.1 kg (207 lb 6.4 oz)   SpO2 94%   BMI 32.48 kg/m²      Physical Exam  Vitals signs reviewed.   Constitutional:       General: She is not in acute distress.     Appearance: She is well-developed. She is not diaphoretic.   HENT:      Head: Normocephalic and atraumatic.      Right Ear: External ear normal.      Left Ear: External ear normal.      Nose: Nose normal.      Mouth/Throat:      Pharynx: No oropharyngeal exudate.   Eyes:      General: No scleral icterus.        Right eye: No discharge.         Left eye: No discharge.      Conjunctiva/sclera: Conjunctivae normal.      Pupils: Pupils are equal, round, and reactive to light.   Neck:      Musculoskeletal: Normal range of motion and neck supple.      Thyroid: No thyromegaly.      Vascular: No JVD.      Trachea: No tracheal deviation.   Cardiovascular:      Rate and Rhythm: Normal rate and regular rhythm.      Heart sounds: Normal heart sounds. No murmur. No friction rub. No gallop.    Pulmonary:      Effort: Pulmonary effort is normal. No respiratory distress.      Breath sounds: No stridor. Wheezing present. No rales.      Comments: There are faint wheezes heard on forced expiration at the end of expiration, but much improved from her exam from a few weeks ago..  Chest:      Chest wall: No tenderness.   Abdominal:      General: Bowel sounds are normal. There is no " distension.      Palpations: Abdomen is soft. There is no mass.      Tenderness: There is no abdominal tenderness. There is no guarding or rebound.   Musculoskeletal: Normal range of motion.         General: No tenderness.   Lymphadenopathy:      Cervical: No cervical adenopathy.   Skin:     General: Skin is warm and dry.      Coloration: Skin is not pale.      Findings: No erythema or rash.   Neurological:      Mental Status: She is alert and oriented to person, place, and time.      Cranial Nerves: No cranial nerve deficit.      Motor: No abnormal muscle tone.      Coordination: Coordination normal.      Deep Tendon Reflexes: Reflexes are normal and symmetric. Reflexes normal.   Psychiatric:         Behavior: Behavior normal.         Thought Content: Thought content normal.         Judgment: Judgment normal.                        Assessment/Plan:       1. Moderate persistent asthma without complication-much improved.  But still has some persistent wheezing.  Increase Advair to 250/50 twice daily.  Refer to pulmonary.      - fluticasone-salmeterol (ADVAIR) 250-50 MCG/DOSE AEROSOL POWDER, BREATH ACTIVATED; Inhale 1 Puff by mouth every 12 hours.  Dispense: 3 Inhaler; Refill: 3  - Spacer/Aero-Holding Chambers (E-Z SPACER) Device; Use as directed  Dispense: 1 Device; Refill: 3  - albuterol 108 (90 Base) MCG/ACT Aero Soln inhalation aerosol; Inhale 2 Puffs by mouth every 6 hours as needed for Shortness of Breath.  Dispense: 1 Inhaler; Refill: 0  - REFERRAL TO PULMONARY AND SLEEP MEDICINE General Pulmonary    2. Chronic atrial fibrillation- dr dawson, [place]  Under good control.  Continue current regimen with diltiazem and anticoagulation with Eliquis.    3. IFG (impaired fasting glucose)  Much improved.  Continue diet and exercise.  Patient advised to follow a high-protein low-carb diet and lose 15 pounds.  Recheck in 6 months.  - HEMOGLOBIN A1C; Future    4. Mixed hyperlipidemia  Under good control but could be  better with lifestyle changes.  Diet and exercise.      - Comp Metabolic Panel; Future  - CBC WITH DIFFERENTIAL; Future  - TSH; Future    5. Vitamin D deficiency  Under good control.  Continue current supplements.  - VITAMIN D,25 HYDROXY; Future    6. Vitamin B 12 deficiency  -This needs to be checked to rule out neurological issues contributing to unsteady gait.  - VITAMIN B12; Future    7. Muscular deconditioning  Refer to physical therapy.  Her gait appears normal to me today.  But son witnesses unsteadiness of gait.  Feels it is a tremendous risk for falling..    8. Risk for falls         - REFERRAL TO PHYSICAL THERAPY Reason for Therapy: Eval/Treat/Report

## 2020-08-17 ENCOUNTER — OFFICE VISIT (OUTPATIENT)
Dept: PULMONOLOGY | Facility: HOSPICE | Age: 80
End: 2020-08-17
Payer: MEDICARE

## 2020-08-17 VITALS
WEIGHT: 204 LBS | SYSTOLIC BLOOD PRESSURE: 126 MMHG | BODY MASS INDEX: 32.02 KG/M2 | HEART RATE: 88 BPM | OXYGEN SATURATION: 95 % | RESPIRATION RATE: 14 BRPM | DIASTOLIC BLOOD PRESSURE: 78 MMHG | HEIGHT: 67 IN

## 2020-08-17 DIAGNOSIS — E66.9 CLASS 1 OBESITY WITH BODY MASS INDEX (BMI) OF 31.0 TO 31.9 IN ADULT, UNSPECIFIED OBESITY TYPE, UNSPECIFIED WHETHER SERIOUS COMORBIDITY PRESENT: ICD-10-CM

## 2020-08-17 DIAGNOSIS — R06.02 SOB (SHORTNESS OF BREATH): ICD-10-CM

## 2020-08-17 DIAGNOSIS — I48.11 LONGSTANDING PERSISTENT ATRIAL FIBRILLATION (HCC): ICD-10-CM

## 2020-08-17 DIAGNOSIS — J45.40 MODERATE PERSISTENT ASTHMA WITHOUT COMPLICATION: ICD-10-CM

## 2020-08-17 PROCEDURE — 99204 OFFICE O/P NEW MOD 45 MIN: CPT | Mod: 25 | Performed by: INTERNAL MEDICINE

## 2020-08-17 PROCEDURE — 94761 N-INVAS EAR/PLS OXIMETRY MLT: CPT | Performed by: INTERNAL MEDICINE

## 2020-08-17 RX ORDER — DILTIAZEM HYDROCHLORIDE 180 MG/1
CAPSULE, EXTENDED RELEASE ORAL
COMMUNITY
Start: 2020-08-10 | End: 2020-08-17

## 2020-08-17 ASSESSMENT — ENCOUNTER SYMPTOMS
SHORTNESS OF BREATH: 1
WEIGHT LOSS: 0
MYALGIAS: 0
SINUS PAIN: 0
CHILLS: 0
HEARTBURN: 0
DEPRESSION: 0
HEMOPTYSIS: 0
WHEEZING: 1
DIZZINESS: 0
TREMORS: 0
EYE DISCHARGE: 0
DIARRHEA: 0
COUGH: 0
NECK PAIN: 0
SPEECH CHANGE: 0
PHOTOPHOBIA: 0
FEVER: 0
ABDOMINAL PAIN: 0
EYE REDNESS: 0
DIAPHORESIS: 0
ORTHOPNEA: 0
BLURRED VISION: 0
PND: 0
SPUTUM PRODUCTION: 0
BACK PAIN: 0
FOCAL WEAKNESS: 0
VOMITING: 0
HEADACHES: 0
NAUSEA: 0
EYE PAIN: 0
STRIDOR: 0
DOUBLE VISION: 0
FALLS: 0
WEAKNESS: 0
SORE THROAT: 0
PALPITATIONS: 0
CLAUDICATION: 0
CONSTIPATION: 0

## 2020-08-17 ASSESSMENT — FIBROSIS 4 INDEX: FIB4 SCORE: 1.01

## 2020-08-17 NOTE — LETTER
Cheryl James M.D.  [facility]   [address] [place] [address]  Phone: [phone] - Fax: [phone]           Encounter Date: 8/17/2020  Provider: Cheryl James M.D.  Location of Care: [facility] PULMONARY MEDICINE  [id number]      Patient:   Jessica Black   MR Number: [id number]   YOB: 1940     PROGRESS NOTE:  Chief Complaint   Patient presents with   • Asthma     Ref Dr. Cruz       HPI: This patient is a 80 y.o. female presenting for evaluation of asthma.  The patient's past medical history significant for hypertension currently well controlled on medication, dyslipidemia, chronic atrial fibrillation on anticoagulation and asthma.  The patient is a lifelong non-smoker.  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  She has no pets at home, no history of mold damage to the home or active use.  No family history of atopic or pulmonary disease.  She presents today for evaluation of shortness of breath.  Patient reports being diagnosed with reactive airways disease following a severe episode of bronchitis versus pneumonia roughly 23 years ago.  She did not require hospitalization at that time however since that time has had some exertional dyspnea as well as treatment for bronchitis 1-2 times per year.  [facility] the past as recently as 2007 and was on Advair at some point but this was stopped due to control of symptoms.  Pulmonary function testing from 2006 shows FEV1 of 1.59 L and FVC of 2.21 L both of which were mildly reduced however the ratio was 72.  Total lung capacity was 102% predicted and diffusion capacity was elevated at 153% predicted.  There was no bronchodilator response.  This was read as moderate airflow obstruction.  At the time the plan was to remain off bronchodilators unless symptoms worsened.  She tells me she has been on Advair 104 sometime  since then and in July of this year had an increase from Advair discus 100 to Diskus 250 with some improvement in her symptoms.  Her symptoms include dyspnea and wheezing particularly with exertion.  Patient can walk on a flat surface without difficulty at a slow pace but any incline or increase speed causes her to become severely dyspneic.  She does report associated wheezing but no chest pain, no cough.  Symptoms resolved with rest and she infrequently uses her rescue inhaler although she does like to increase in Advair improved her symptoms somewhat.  She does have have a chest x-ray from June of this year that shows cardiomegaly but no abnormal findings in the lung fields.  Her last echocardiogram from 2016 was notable for mildly dilated right ventricle, severely dilated right atrium and severely dilated left atrium.  Diastolic function was not assessed due to atrial fibrillation but LVEF was estimated at 65%.  RVSP was estimated at 45 mmHg.  Her last clinic visit with cardiology in [place] was in June and there were no changes made to her medications.    Past Medical History:   Diagnosis Date   • A-fib ([facility]) 2008   • Asthma    • Atrial fibrillation ([facility])    • Hyperlipidemia    • Hypertension        Social History     Socioeconomic History   • Marital status:      Spouse name: Not on file   • Number of children: Not on file   • Years of education: Not on file   • Highest education level: Not on file   Occupational History   • Not on file   Social Needs   • Financial resource strain: Not on file   • Food insecurity     Worry: Not on file     Inability: Not on file   • Transportation needs     Medical: Not on file     Non-medical: Not on file   Tobacco Use   • Smoking status: Never Smoker   • Smokeless tobacco: Never Used   Substance and Sexual Activity   • Alcohol use: No   • Drug use: No   • Sexual activity: Not Currently     Partners: Male   Lifestyle   • Physical activity     Days per week: Not on file        Minutes per session: Not on file   • Stress: Not on file   Relationships   • Social connections     Talks on phone: Not on file     Gets together: Not on file     Attends Baptism service: Not on file     Active member of club or organization: Not on file     Attends meetings of clubs or organizations: Not on file     Relationship status: Not on file   • Intimate partner violence     Fear of current or ex partner: Not on file     Emotionally abused: Not on file     Physically abused: Not on file     Forced sexual activity: Not on file   Other Topics Concern   • Not on file   Social History Narrative   • Not on file       No family history on file.    Current Outpatient Medications on File Prior to Visit   Medication Sig Dispense Refill   • fluticasone-salmeterol (ADVAIR) 250-50 MCG/DOSE AEROSOL POWDER, BREATH ACTIVATED Inhale 1 Puff by mouth every 12 hours. 3 Inhaler 3   • Spacer/Aero-Holding Chambers (E-Z SPACER) Device Use as directed 1 Device 3   • albuterol 108 (90 Base) MCG/ACT Aero Soln inhalation aerosol Inhale 2 Puffs by mouth every 6 hours as needed for Shortness of Breath. 1 Inhaler 0   • albuterol (PROVENTIL) 2.5mg/3ml Nebu Soln solution for nebulization 3 mL by Nebulization route every four hours as needed for Shortness of Breath. 75 mL 11   • atorvastatin (LIPITOR) 10 MG Tab TAKE ONE TABLET BY MOUTH ONE TIME DAILY  90 Tab 4   • vitamin D3, cholecalciferol, 1000 UNIT Tab Take 1 Tab by mouth every day. 100 Tab 4   • diltiazem CD (DILTIAZEM CD) 180 MG CAPSULE SR 24 HR Take 1 Cap by mouth 2 Times a Day. 60 Cap 1   • multivitamin (THERAGRAN) Tab Take 1 Tab by mouth every day.     • ascorbic acid (C 1000) 1000 MG tablet Take 1,000 mg by mouth every day.     • Probiotic Product (PROBIOTIC DAILY PO) Take 1 Tab by mouth every day.     • apixaban (ELIQUIS) 5mg Tab Take 5 mg by mouth 2 Times a Day.     • predniSONE (DELTASONE) 20 MG Tab Take 40mg daily x 5 days (Patient not taking: Reported on 7/6/2020) 10  "Tab 0     No current facility-administered medications on file prior to visit.        Allergies: Patient has no known allergies.    ROS:   Review of Systems   Constitutional: Negative for chills, diaphoresis, fever, malaise/fatigue and weight loss.   HENT: Negative for congestion, ear discharge, ear pain, hearing loss, nosebleeds, sinus pain, sore throat and tinnitus.    Eyes: Negative for blurred vision, double vision, photophobia, pain, discharge and redness.   Respiratory: Positive for shortness of breath and wheezing. Negative for cough, hemoptysis, sputum production and stridor.    Cardiovascular: Negative for chest pain, palpitations, orthopnea, claudication, leg swelling and PND.   Gastrointestinal: Negative for abdominal pain, constipation, diarrhea, heartburn, nausea and vomiting.   Genitourinary: Negative for dysuria and urgency.   Musculoskeletal: Negative for back pain, falls, joint pain, myalgias and neck pain.   Skin: Negative for itching and rash.   Neurological: Negative for dizziness, tremors, speech change, focal weakness, weakness and headaches.   Endo/Heme/Allergies: Negative for environmental allergies.   Psychiatric/Behavioral: Negative for depression.       /78 (BP Location: Left arm, Patient Position: Sitting)   Pulse 88   Resp 14   Ht 1.702 m (5' 7\")   Wt 92.5 kg (204 lb)   SpO2 95%     Physical Exam:  Physical Exam  Constitutional:       General: She is not in acute distress.     Appearance: Normal appearance. She is well-developed. She is obese.   HENT:      Head: Normocephalic and atraumatic.      Right Ear: External ear normal.      Left Ear: External ear normal.      Nose: Nose normal. No congestion.      Mouth/Throat:      Mouth: Mucous membranes are moist.      Pharynx: Oropharynx is clear. No oropharyngeal exudate.   Eyes:      General: No scleral icterus.     Extraocular Movements: Extraocular movements intact.      Conjunctiva/sclera: Conjunctivae normal.      Pupils: " Pupils are equal, round, and reactive to light.   Neck:      Musculoskeletal: Normal range of motion and neck supple.      Vascular: No JVD.      Trachea: No tracheal deviation.   Cardiovascular:      Rate and Rhythm: Normal rate. Rhythm irregular.      Heart sounds: Normal heart sounds. No murmur. No friction rub. No gallop.    Pulmonary:      Effort: Pulmonary effort is normal. No accessory muscle usage or respiratory distress.      Breath sounds: Normal breath sounds. No wheezing or rales.   Abdominal:      General: There is no distension.      Palpations: Abdomen is soft.      Tenderness: There is no abdominal tenderness.   Musculoskeletal: Normal range of motion.         General: No tenderness or deformity.      Right lower leg: No edema.      Left lower leg: No edema.   Lymphadenopathy:      Cervical: No cervical adenopathy.   Skin:     General: Skin is warm and dry.      Findings: No rash.      Nails: There is no clubbing.     Neurological:      Mental Status: She is alert and oriented to person, place, and time.      Cranial Nerves: No cranial nerve deficit.      Gait: Gait normal.   Psychiatric:         Mood and Affect: Mood normal.         Behavior: Behavior normal.         PFTs as reviewed by me personally: as per HPI    Imaging as reviewed by me personally: as per HPI    Assessment:  1. SOB (shortness of breath)  PULMONARY FUNCTION TESTS -Test requested: Complete Pulmonary Function Test    Exercise Test for Bronchospasm / 6-Minute Walk    Multiple Oximetry    EC-ECHOCARDIOGRAM COMPLETE W/O CONT   2. Moderate persistent asthma without complication     3. Longstanding persistent atrial fibrillation (HCC)     4. Class 1 obesity with body mass index (BMI) of 31.0 to 31.9 in adult, unspecified obesity type, unspecified whether serious comorbidity present         Plan:  1.  I am not sure if her symptoms are related to asthma or cardiac, specifically atrial fibrillation.  She did see an improvement in symptoms  with increase in ICS dose which suggest this is reactive airways disease.  I am not making any medication changes at this point but would like to obtain pulmonary function testing and 6-minute walk and echocardiogram.  We did do multi-ox in clinic today during which the patient did not desaturate however her heart rate when ambulating just in clinic did go up to 139.  I will CC Dr. Tompkins with cardiology as some of her symptoms may be related to uncontrolled A. fib with activity.  We will see her back in 6 possible to review these.  2.  In the past this is triggered mainly by viral infections and more recently mainly physical exertion.  As per above I am not sure if her symptoms are mostly pulmonary or mostly cardiac but certainly she has both cardiac and pulmonary reasons to have shortness of breath.  See work-up as per above.  We will continue her current regimen of Advair 250 and short acting bronchodilators as needed.  We can update vaccines at follow-up.  3.  See discussion above.  The patient did have significant increase in heart rate was simply ambulating around clinic up to 139 and I suspect may go higher with more exertion such as climbing stairs or walking an incline.  I do feel that A. fib may be contributing to her symptoms.  We are ordering follow-up echocardiogram and I will CC her cardiologist on my clinic note today..  4.  Mild does put patient at increased risk for obesity related pulmonary complications.  Encouraged lifestyle habits.  Return in about 10 weeks (around 10/26/2020) for as soon as possible with Dr. James, echo, PFT/6 mwt.          Electronically signed by Cheryl James M.D.  on 08/17/20    Pedro Pablo Tompkins D.O.  [address] [facility] [address]  Via Fax: [phone]     Avery Cruz M.D.  [address]  [facility] [address]  Via In Basket

## 2020-08-17 NOTE — PROGRESS NOTES
Chief Complaint   Patient presents with   • Asthma     Ref Dr. Cruz       HPI: This patient is a 80 y.o. female presenting for evaluation of asthma.  The patient's past medical history significant for hypertension currently well controlled on medication, dyslipidemia, chronic atrial fibrillation on anticoagulation and asthma.  The patient is a lifelong non-smoker.  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  She has no pets at home, no history of mold damage to the home or active use.  No family history of atopic or pulmonary disease.  She presents today for evaluation of shortness of breath.  Patient reports being diagnosed with reactive airways disease following a severe episode of bronchitis versus pneumonia roughly 23 years ago.  She did not require hospitalization at that time however since that time has had some exertional dyspnea as well as treatment for bronchitis 1-2 times per year.  [facility] in the past as recently as 2007 and was on Advair at some point but this was stopped due to control of symptoms.  Pulmonary function testing from 2006 shows FEV1 of 1.59 L and FVC of 2.21 L both of which were mildly reduced however the ratio was 72.  Total lung capacity was 102% predicted and diffusion capacity was elevated at 153% predicted.  There was no bronchodilator response.  This was read as moderate airflow obstruction.  At the time the plan was to remain off bronchodilators unless symptoms worsened.  She tells me she has been on Advair 104 sometime since then and in July of this year had an increase from Advair discus 100 to Diskus 250 with some improvement in her symptoms.  Her symptoms include dyspnea and wheezing particularly with exertion.  Patient can walk on a flat surface without difficulty at a slow pace but any incline or increase speed causes her to become severely dyspneic.  She does report associated wheezing but no chest pain, no  cough.  Symptoms resolved with rest and she infrequently uses her rescue inhaler although she does like to increase in Advair improved her symptoms somewhat.  She does have have a chest x-ray from June of this year that shows cardiomegaly but no abnormal findings in the lung fields.  Her last echocardiogram from 2016 was notable for mildly dilated right ventricle, severely dilated right atrium and severely dilated left atrium.  Diastolic function was not assessed due to atrial fibrillation but LVEF was estimated at 65%.  RVSP was estimated at 45 mmHg.  Her last clinic visit with cardiology in [place] was in June and there were no changes made to her medications.    Past Medical History:   Diagnosis Date   • A-fib ([facility]) 2008   • Asthma    • Atrial fibrillation ([facility])    • Hyperlipidemia    • Hypertension        Social History     Socioeconomic History   • Marital status:      Spouse name: Not on file   • Number of children: Not on file   • Years of education: Not on file   • Highest education level: Not on file   Occupational History   • Not on file   Social Needs   • Financial resource strain: Not on file   • Food insecurity     Worry: Not on file     Inability: Not on file   • Transportation needs     Medical: Not on file     Non-medical: Not on file   Tobacco Use   • Smoking status: Never Smoker   • Smokeless tobacco: Never Used   Substance and Sexual Activity   • Alcohol use: No   • Drug use: No   • Sexual activity: Not Currently     Partners: Male   Lifestyle   • Physical activity     Days per week: Not on file     Minutes per session: Not on file   • Stress: Not on file   Relationships   • Social connections     Talks on phone: Not on file     Gets together: Not on file     Attends Gnosticism service: Not on file     Active member of club or organization: Not on file     Attends meetings of clubs or organizations: Not on file     Relationship status: Not on file   • Intimate partner violence     Fear of  current or ex partner: Not on file     Emotionally abused: Not on file     Physically abused: Not on file     Forced sexual activity: Not on file   Other Topics Concern   • Not on file   Social History Narrative   • Not on file       No family history on file.    Current Outpatient Medications on File Prior to Visit   Medication Sig Dispense Refill   • fluticasone-salmeterol (ADVAIR) 250-50 MCG/DOSE AEROSOL POWDER, BREATH ACTIVATED Inhale 1 Puff by mouth every 12 hours. 3 Inhaler 3   • Spacer/Aero-Holding Chambers (E-Z SPACER) Device Use as directed 1 Device 3   • albuterol 108 (90 Base) MCG/ACT Aero Soln inhalation aerosol Inhale 2 Puffs by mouth every 6 hours as needed for Shortness of Breath. 1 Inhaler 0   • albuterol (PROVENTIL) 2.5mg/3ml Nebu Soln solution for nebulization 3 mL by Nebulization route every four hours as needed for Shortness of Breath. 75 mL 11   • atorvastatin (LIPITOR) 10 MG Tab TAKE ONE TABLET BY MOUTH ONE TIME DAILY  90 Tab 4   • vitamin D3, cholecalciferol, 1000 UNIT Tab Take 1 Tab by mouth every day. 100 Tab 4   • diltiazem CD (DILTIAZEM CD) 180 MG CAPSULE SR 24 HR Take 1 Cap by mouth 2 Times a Day. 60 Cap 1   • multivitamin (THERAGRAN) Tab Take 1 Tab by mouth every day.     • ascorbic acid (C 1000) 1000 MG tablet Take 1,000 mg by mouth every day.     • Probiotic Product (PROBIOTIC DAILY PO) Take 1 Tab by mouth every day.     • apixaban (ELIQUIS) 5mg Tab Take 5 mg by mouth 2 Times a Day.     • predniSONE (DELTASONE) 20 MG Tab Take 40mg daily x 5 days (Patient not taking: Reported on 7/6/2020) 10 Tab 0     No current facility-administered medications on file prior to visit.        Allergies: Patient has no known allergies.    ROS:   Review of Systems   Constitutional: Negative for chills, diaphoresis, fever, malaise/fatigue and weight loss.   HENT: Negative for congestion, ear discharge, ear pain, hearing loss, nosebleeds, sinus pain, sore throat and tinnitus.    Eyes: Negative for blurred  "vision, double vision, photophobia, pain, discharge and redness.   Respiratory: Positive for shortness of breath and wheezing. Negative for cough, hemoptysis, sputum production and stridor.    Cardiovascular: Negative for chest pain, palpitations, orthopnea, claudication, leg swelling and PND.   Gastrointestinal: Negative for abdominal pain, constipation, diarrhea, heartburn, nausea and vomiting.   Genitourinary: Negative for dysuria and urgency.   Musculoskeletal: Negative for back pain, falls, joint pain, myalgias and neck pain.   Skin: Negative for itching and rash.   Neurological: Negative for dizziness, tremors, speech change, focal weakness, weakness and headaches.   Endo/Heme/Allergies: Negative for environmental allergies.   Psychiatric/Behavioral: Negative for depression.       /78 (BP Location: Left arm, Patient Position: Sitting)   Pulse 88   Resp 14   Ht 1.702 m (5' 7\")   Wt 92.5 kg (204 lb)   SpO2 95%     Physical Exam:  Physical Exam  Constitutional:       General: She is not in acute distress.     Appearance: Normal appearance. She is well-developed. She is obese.   HENT:      Head: Normocephalic and atraumatic.      Right Ear: External ear normal.      Left Ear: External ear normal.      Nose: Nose normal. No congestion.      Mouth/Throat:      Mouth: Mucous membranes are moist.      Pharynx: Oropharynx is clear. No oropharyngeal exudate.   Eyes:      General: No scleral icterus.     Extraocular Movements: Extraocular movements intact.      Conjunctiva/sclera: Conjunctivae normal.      Pupils: Pupils are equal, round, and reactive to light.   Neck:      Musculoskeletal: Normal range of motion and neck supple.      Vascular: No JVD.      Trachea: No tracheal deviation.   Cardiovascular:      Rate and Rhythm: Normal rate. Rhythm irregular.      Heart sounds: Normal heart sounds. No murmur. No friction rub. No gallop.    Pulmonary:      Effort: Pulmonary effort is normal. No accessory muscle " usage or respiratory distress.      Breath sounds: Normal breath sounds. No wheezing or rales.   Abdominal:      General: There is no distension.      Palpations: Abdomen is soft.      Tenderness: There is no abdominal tenderness.   Musculoskeletal: Normal range of motion.         General: No tenderness or deformity.      Right lower leg: No edema.      Left lower leg: No edema.   Lymphadenopathy:      Cervical: No cervical adenopathy.   Skin:     General: Skin is warm and dry.      Findings: No rash.      Nails: There is no clubbing.     Neurological:      Mental Status: She is alert and oriented to person, place, and time.      Cranial Nerves: No cranial nerve deficit.      Gait: Gait normal.   Psychiatric:         Mood and Affect: Mood normal.         Behavior: Behavior normal.         PFTs as reviewed by me personally: as per HPI    Imaging as reviewed by me personally: as per HPI    Assessment:  1. SOB (shortness of breath)  PULMONARY FUNCTION TESTS -Test requested: Complete Pulmonary Function Test    Exercise Test for Bronchospasm / 6-Minute Walk    Multiple Oximetry    EC-ECHOCARDIOGRAM COMPLETE W/O CONT   2. Moderate persistent asthma without complication     3. Longstanding persistent atrial fibrillation (HCC)     4. Class 1 obesity with body mass index (BMI) of 31.0 to 31.9 in adult, unspecified obesity type, unspecified whether serious comorbidity present         Plan:  1.  I am not sure if her symptoms are related to asthma or cardiac, specifically atrial fibrillation.  She did see an improvement in symptoms with increase in ICS dose which suggest this is reactive airways disease.  I am not making any medication changes at this point but would like to obtain pulmonary function testing and 6-minute walk and echocardiogram.  We did do multi-ox in clinic today during which the patient did not desaturate however her heart rate when ambulating just in clinic did go up to 139.  I will CC Dr. Tompkins with cardiology  as some of her symptoms may be related to uncontrolled A. fib with activity.  We will see her back in 6 possible to review these.  2.  In the past this is triggered mainly by viral infections and more recently mainly physical exertion.  As per above I am not sure if her symptoms are mostly pulmonary or mostly cardiac but certainly she has both cardiac and pulmonary reasons to have shortness of breath.  See work-up as per above.  We will continue her current regimen of Advair 250 and short acting bronchodilators as needed.  We can update vaccines at follow-up.  3.  See discussion above.  The patient did have significant increase in heart rate was simply ambulating around clinic up to 139 and I suspect may go higher with more exertion such as climbing stairs or walking an incline.  I do feel that A. fib may be contributing to her symptoms.  We are ordering follow-up echocardiogram and I will CC her cardiologist on my clinic note today..  4.  Mild does put patient at increased risk for obesity related pulmonary complications.  Encouraged lifestyle habits.  Return in about 10 weeks (around 10/26/2020) for as soon as possible with Dr. James, echo, PFT/6 mwt.

## 2020-08-17 NOTE — PROCEDURES
Multi-Ox Readings  Multi Ox #1 Room air   O2 sat % at rest 96   O2 sat % on exertion 89   O2 sat average on exertion 92   Multi Ox #2     O2 sat % at rest     O2 sat % on exertion     O2 sat average on exertion       Oxygen Use     Oxygen Frequency     Duration of need     Is the patient mobile within the home?     CPAP Use?     BIPAP Use?     Servo Titration

## 2020-08-18 ENCOUNTER — PHYSICAL THERAPY (OUTPATIENT)
Dept: PHYSICAL THERAPY | Facility: REHABILITATION | Age: 80
End: 2020-08-18
Attending: INTERNAL MEDICINE
Payer: MEDICARE

## 2020-08-18 DIAGNOSIS — Z91.81 RISK FOR FALLS: ICD-10-CM

## 2020-08-18 PROCEDURE — 97162 PT EVAL MOD COMPLEX 30 MIN: CPT

## 2020-08-18 PROCEDURE — 97110 THERAPEUTIC EXERCISES: CPT

## 2020-08-18 PROCEDURE — 97535 SELF CARE MNGMENT TRAINING: CPT

## 2020-08-18 ASSESSMENT — BALANCE ASSESSMENTS
STANDING UNSUPPORTED ONE FOOT IN FRONT: 4
SITTING UNSUPPORTED: 4
REACHING FORWARD WITH OUTSTRETCHED ARM WHILE STANDING: 4
PLACE ALTERNATE FOOT ON STEP OR STOOL WHILE STANDING UNSUPPORTED: 4
LONG VERSION TOTAL SCORE (MAX 56): 53
STANDING TO SITTING: 4
TURN 360 DEGREES: 4
PICK UP OBJECT FROM THE FLOOR FROM A STANDING POSITION: 4
STANDING ON ONE LEG: 2
SITTING TO STANDING: 3
STANDING UNSUPPORTED WITH EYES CLOSED: 4
LOOK OVER LEFT AND RIGHT SHOULDERS WHILE STANDING: 4
STANDING UNSUPPORTED: 4
TRANSFERS: 4
LONG VERSION TOTAL SCORE (MAX 56): 53
STANDING UNSUPPORTED WITH FEET TOGETHER: 4

## 2020-08-18 ASSESSMENT — ACTIVITIES OF DAILY LIVING (ADL)
CURB_LEVEL_OF_ASSITANCE: INDEPENDENT
AMBULATION_WITH_ASSISTIVE_DEVICE: INDEPENDENT

## 2020-08-18 ASSESSMENT — ENCOUNTER SYMPTOMS
PAIN SCALE AT HIGHEST: 0
PAIN SCALE AT LOWEST: 0
PAIN SCALE: 0

## 2020-08-18 NOTE — OP THERAPY EVALUATION
"  Outpatient Physical Therapy  INITIAL EVALUATION    [facility] [address] [facility], [address]  [place] [address]  Phone:  [phone]    Date of Evaluation: 2020    Patient: Jessica Black  YOB: 1940  MRN: [id number]     Referring Provider: [address]  [place] [address]   Referring Diagnosis Risk for falls [Z91.81]     Time Calculation    Start time: 1030  Stop time: 1125 Time Calculation (min): 55 minutes         Chief Complaint: No chief complaint on file.    Visit Diagnoses     ICD-10-CM   1. Risk for falls  Z91.81         Subjective:   History of Present Illness:     Mechanism of injury:  Denies having any recent falls within past year. Reporting a fall over a year ago with wrist fracture. Reporting some R. Knee dysfunction/\"pulled muscle\" lasting 4-5 months that is not an issue now but was bothersome one year ago. Denies any orthopedic surgery. Denies any pain or injured body. parts. Reporting difficulty breathing secondary to asthma. I have exertional asthma, SOB with going up and down stairs, longer walks, uphill walking. Having more difficulty with wearing mask. Denies noteworthy changes in breathing with recent poor air quality.   Pain:     Current pain ratin    At best pain ratin    At worst pain ratin    Progression:  Improving    Pain Comments::  Feeling stronger in my legs, no favoring a side (was limiting use of R. Knee).       Past Medical History:   Diagnosis Date   • A-fib (Formerly [facility])    • Asthma    • Atrial fibrillation (HCC)    • Hyperlipidemia    • Hypertension      No past surgical history on file.  Social History     Tobacco Use   • Smoking status: Never Smoker   • Smokeless tobacco: Never Used   Substance Use Topics   • Alcohol use: No     Family and Occupational History     Socioeconomic History   • Marital status:      Spouse name: Not on file   • Number of children: Not on file   • Years of " education: Not on file   • Highest education level: Not on file   Occupational History   • Not on file       Objective     Strength:      Left Hip   Planes of Motion   Flexion: 4-  Extension: 4-  Abduction: 3-  Adduction: 4-  External rotation: 4-  Internal rotation: 4-    Right Hip   Planes of Motion   Flexion: 4-  Extension: 4-  Abduction: 3-  Adduction: 4-  External rotation: 4-  Internal rotation: 4-  Ambulation     Ambulation: Level Surfaces   Ambulation with assistive device: independent    Ambulation: Stairs   Ascend stairs: independent  Pattern: reciprocal  Railings: without rails  Descend stairs: independent  Pattern: reciprocal  Railings: without rails  Curbs: independent    Observational Gait   Gait: within functional limits   Walking speed, stride length, left stance time, right stance time, left swing time, right swing time, left step length and right step length within functional limits. Stride width: WFL.    Left foot contact pattern: heel to toe  Right foot contact pattern: heel to toe  Left arm swing: within functional limits  Right arm swing: within functional limits        Therapeutic Exercises (CPT 02926):     1. Clams, 2 x 10 x 3'' hold    2. Rev clams, white 2 x 10 x 3''hold    3. Sit to stand    4. Standing hip flexion , hip instability and hip check with single leg stance      Time-based treatments/modalities:    Physical Therapy Timed Treatment Charges  Functional training, self care minutes (CPT 86127): 8 minutes  Neuromusc re-ed, balance, coor, post minutes (CPT 69815): 5 minutes  Therapeutic exercise minutes (CPT 20179): 10 minutes      Assessment, Response and Plan:   Assessment details:  Mrs. Black is attending PT with referring diagnosis of fall risk. In her balance assessment today she performed well and demonstrated a low fall risk. She had chief complaint of decreased functional strength and endurance. Patient can benefit from PT for functional leg strength and endurance, and  balance training.   Prognosis: good    Goals:   Short Term Goals:   Demonstrate ability to perform balance exercises independently and safely  4/5 hip strength  Short term goal time span:  2-4 weeks      Long Term Goals:    No further goals at this time    Plan:   Planned therapy interventions:  E Stim Unattended (CPT 03792), Functional Training, Self Care (CPT 04411), Hot or Cold Pack Therapy (CPT 28297), Manual Therapy (CPT 69453), Neuromuscular Re-education (CPT 69272), Self Care ADL Training (CPT 36755), Therapeutic Activities (CPT 62390) and Therapeutic Exercise (CPT 77874)  Frequency:  1x week  Duration in weeks:  4  Discussed with:  Patient      Functional Assessment Used  Sotomayor Balance Total Score (0-56): 53     Referring provider co-signature:  I have reviewed this plan of care and my co-signature certifies the need for services.    Certification Period: 08/18/2020 to  09/22/20    Physician Signature: ________________________________ Date: ______________

## 2020-08-25 ENCOUNTER — PHYSICAL THERAPY (OUTPATIENT)
Dept: PHYSICAL THERAPY | Facility: REHABILITATION | Age: 80
End: 2020-08-25
Attending: INTERNAL MEDICINE
Payer: MEDICARE

## 2020-08-25 DIAGNOSIS — Z91.81 RISK FOR FALLS: ICD-10-CM

## 2020-08-25 PROCEDURE — 97535 SELF CARE MNGMENT TRAINING: CPT

## 2020-08-25 PROCEDURE — 97110 THERAPEUTIC EXERCISES: CPT

## 2020-08-25 NOTE — OP THERAPY DAILY TREATMENT
Outpatient Physical Therapy  DAILY TREATMENT     [facility] [address], [address]  [place] [address]  Phone:  [phone]    Date: 08/25/2020    Patient: Jessica Black  YOB: 1940  MRN: [id number]     Time Calculation    Start time: 1110  Stop time: 1135 Time Calculation (min): 25 minutes         Chief Complaint: Difficulty Walking    Visit #: 2    SUBJECTIVE:  Pain with standing marching in posterior thigh and knee on R.     OBJECTIVE:  Current objective measures:   HS strength 5/5 no pain  Knee flexion stiff and reproduces posterior thigh pain            Therapeutic Exercises (CPT 81869):     1. Clams, 2 x 10 x 3'' hold    2. Rev clams, white 2 x 10 x 3''hold    3. Sit to stand, 3 x 4 reps , limited range of motion; poor form with knee/quad dominance    4. Standing hip flexion , modified to limit knee flexion     Therapeutic Treatments and Modalities:     1. Neuromuscular Re-education (CPT 29560), motor control for standing hip hinge without lumbar flexion or knee flexion     Time-based treatments/modalities:    Physical Therapy Timed Treatment Charges  Functional training, self care minutes (CPT 22043): 8 minutes  Neuromusc re-ed, balance, coor, post minutes (CPT 63566): 8 minutes  Therapeutic exercise minutes (CPT 78052): 9 minutes        ASSESSMENT:   Response to treatment: Patient's pain with standing marching was likely secondary to limitations In knee flexion AROM and PROM that is suggestive of knee OA. The exercise was modified and was well tolerated afterwards. She needed a lot of cuing for correct form with all exercises, her sit to stand was highly quad dominant and she made improvement within session but will need further review.     PLAN/RECOMMENDATIONS:   Plan for treatment: therapy treatment to continue next visit.  Planned interventions for next visit: continue with current treatment.

## 2020-08-31 ENCOUNTER — APPOINTMENT (OUTPATIENT)
Dept: RADIOLOGY | Facility: IMAGING CENTER | Age: 80
End: 2020-08-31
Attending: PHYSICIAN ASSISTANT
Payer: MEDICARE

## 2020-08-31 ENCOUNTER — NURSE TRIAGE (OUTPATIENT)
Dept: HEALTH INFORMATION MANAGEMENT | Facility: OTHER | Age: 80
End: 2020-08-31

## 2020-08-31 ENCOUNTER — OFFICE VISIT (OUTPATIENT)
Dept: URGENT CARE | Facility: CLINIC | Age: 80
End: 2020-08-31
Payer: MEDICARE

## 2020-08-31 VITALS
RESPIRATION RATE: 16 BRPM | TEMPERATURE: 97.7 F | DIASTOLIC BLOOD PRESSURE: 86 MMHG | SYSTOLIC BLOOD PRESSURE: 128 MMHG | WEIGHT: 199 LBS | OXYGEN SATURATION: 93 % | HEART RATE: 76 BPM | BODY MASS INDEX: 31.23 KG/M2 | HEIGHT: 67 IN

## 2020-08-31 DIAGNOSIS — R06.02 SHORTNESS OF BREATH: ICD-10-CM

## 2020-08-31 DIAGNOSIS — J45.901 ASTHMA EXACERBATION WITH COPD (CHRONIC OBSTRUCTIVE PULMONARY DISEASE) (HCC): ICD-10-CM

## 2020-08-31 DIAGNOSIS — J44.1 ASTHMA EXACERBATION WITH COPD (CHRONIC OBSTRUCTIVE PULMONARY DISEASE) (HCC): ICD-10-CM

## 2020-08-31 PROCEDURE — 99214 OFFICE O/P EST MOD 30 MIN: CPT | Performed by: PHYSICIAN ASSISTANT

## 2020-08-31 PROCEDURE — 71046 X-RAY EXAM CHEST 2 VIEWS: CPT | Mod: TC | Performed by: PHYSICIAN ASSISTANT

## 2020-08-31 RX ORDER — PREDNISONE 20 MG/1
40 TABLET ORAL DAILY
Qty: 10 TAB | Refills: 0 | Status: SHIPPED | OUTPATIENT
Start: 2020-08-31 | End: 2020-09-05

## 2020-08-31 RX ORDER — AZITHROMYCIN 250 MG/1
TABLET, FILM COATED ORAL
Qty: 6 TAB | Refills: 0 | Status: ON HOLD | OUTPATIENT
Start: 2020-08-31 | End: 2021-06-10

## 2020-08-31 ASSESSMENT — ENCOUNTER SYMPTOMS
ABDOMINAL PAIN: 0
COUGH: 0
EYE REDNESS: 0
EYE DISCHARGE: 0
SHORTNESS OF BREATH: 1
CHILLS: 0
FEVER: 0
DIZZINESS: 0
MYALGIAS: 0
EYE PAIN: 0
HEADACHES: 0
VOMITING: 0
SPUTUM PRODUCTION: 0
NAUSEA: 0
SORE THROAT: 0
SINUS PAIN: 0
DIARRHEA: 0
WHEEZING: 1
PALPITATIONS: 0

## 2020-08-31 ASSESSMENT — FIBROSIS 4 INDEX: FIB4 SCORE: 1.01

## 2020-08-31 NOTE — PROGRESS NOTES
Subjective:      Jessica Black is a 80 y.o. female who presents with Shortness of Breath (Difficulty with breathing, has a history of asthma. The patient does breathing treatments but they do not seem to last as long, like before. Last albuterol treatment at 0430am this morning. No chest tightness. Ongoing, on and off for a few months but worsening withing 1-2 weeks. )            HPI  80-year-old female with history of asthma and COPD presents to urgent care with new problem to provider of increasing shortness of breath over the last 1 to 2 weeks.   Patient is using her albuterol inhaler more frequently and Nebulizer treatment at home with good relief.   Patient has been seen multiple times over the last few months in urgent care and by pulmonology for similar symptoms.  In June she was prescribed azithromycin and prednisone course with good relief of symptoms.  Patient then had follow-up with pulmonary and had dosing of Advair increased.  Patient had normal chest x-ray done in June of this year.  Patient reports history of pneumonia.  She denies productive cough or fevers.  Patient denies known exposure to COVID-19. Denies other associated aggravating or alleviating factors.     Review of Systems   Constitutional: Negative for chills, fever and malaise/fatigue.   HENT: Negative for congestion, sinus pain and sore throat.    Eyes: Negative for pain, discharge and redness.   Respiratory: Positive for shortness of breath and wheezing. Negative for cough and sputum production.    Cardiovascular: Negative for chest pain and palpitations.   Gastrointestinal: Negative for abdominal pain, diarrhea, nausea and vomiting.   Musculoskeletal: Negative for myalgias.   Neurological: Negative for dizziness and headaches.   Endo/Heme/Allergies: Positive for environmental allergies.       Past Medical History:   Diagnosis Date   • A-fib (HCC) 2008   • Asthma    • Atrial fibrillation (HCC)    • Hyperlipidemia    • Hypertension   "    Medications and allergies reviewed in epic.  Social History     Tobacco Use   • Smoking status: Never Smoker   • Smokeless tobacco: Never Used   Substance Use Topics   • Alcohol use: No        Objective:     /86 (BP Location: Left arm, Patient Position: Sitting, BP Cuff Size: Adult)   Pulse 76   Temp 36.5 °C (97.7 °F) (Temporal)   Resp 16   Ht 1.702 m (5' 7\")   Wt 90.3 kg (199 lb)   SpO2 93%   BMI 31.17 kg/m²      Physical Exam  Vitals signs reviewed.   Constitutional:       General: She is not in acute distress.     Appearance: Normal appearance. She is well-developed. She is not ill-appearing.   HENT:      Head: Normocephalic and atraumatic.      Nose: Nose normal.      Mouth/Throat:      Mouth: Mucous membranes are moist.      Pharynx: Oropharynx is clear.   Eyes:      Conjunctiva/sclera: Conjunctivae normal.   Neck:      Musculoskeletal: Normal range of motion and neck supple.   Cardiovascular:      Rate and Rhythm: Normal rate and regular rhythm.      Heart sounds: Normal heart sounds.   Pulmonary:      Effort: Pulmonary effort is normal. No respiratory distress.      Breath sounds: Wheezing and rhonchi present. No rales.      Comments: Moderate wheezing and rhonchi throughout  Musculoskeletal: Normal range of motion.   Lymphadenopathy:      Cervical: No cervical adenopathy.   Skin:     General: Skin is warm and dry.   Neurological:      General: No focal deficit present.      Mental Status: She is alert and oriented to person, place, and time.   Psychiatric:         Mood and Affect: Mood normal.         Behavior: Behavior normal.         Thought Content: Thought content normal.         Judgment: Judgment normal.                 Assessment/Plan:     1. Asthma exacerbation with COPD (chronic obstructive pulmonary disease) (Formerly [facility])  DX-CHEST-2 VIEWS   2. Shortness of breath  DX-CHEST-2 VIEWS     TECHNIQUE/EXAM DESCRIPTION AND NUMBER OF VIEWS:  Two views of the chest.     COMPARISON:  " 6/14/2020     FINDINGS:     Cardiac silhouette is enlarged. Aortic calcified atherosclerotic plaque.     No focal consolidation, pleural effusion, pulmonary edema or pneumothorax.     Generalized osteopenia.     IMPRESSION:     1.  Stable cardiac silhouette enlargement.     2.  Atherosclerosis.     3.  No acute abnormality.    Recommend patient follow-up with primary care provider and pulmonology as needed.  Patient is scheduled for echocardiogram secondary to history of uncontrolled A. fib on 9/15/2020.  Patient also has scheduled appointment with pulmonology in early October.  Discussed with the patient reasons to return to urgent care or proceed to emergency department for further evaluation including but not limited to worsening shortness of breath/difficulty breathing, fevers, or chest pain. Patient verbalized understanding of treatment plan and has no further questions regarding care.

## 2020-08-31 NOTE — TELEPHONE ENCOUNTER
"1. Caller Name: Jessica                 Call Back Number: [phone]  [facility] PCP or Specialty Provider: Yes Avery Cruz        2.  In the last two weeks, has the patient had any new or worsening symptoms (not explained by alternative diagnosis)? No.     Asthma with SOB and wheezing.and using inhaler a little more.  She usually takes antibiotic with steroid.      3.  Does patient have any comoribidities? None Asthma     4.  Has the patient traveled in the last 14 days OR had any known contact with someone who is suspected or confirmed to have COVID-19?  No.    5. Disposition: Advised to go to   Instructed patient to go to ER.  She asked for UC visit instead and assured me she could get there quickly.    Note routed to [facility] Provider: [facility] only.     Reason for Disposition  • Patient sounds very sick or weak to the triager    Additional Information  • Negative: SEVERE difficulty breathing (e.g., struggling for each breath, speaks in single words)  • Negative: Bluish (or gray) lips or face  • Negative: Wheezing started suddenly after medicine, an allergic food, or bee sting  • Negative: Passed out (i.e., fainted, collapsed and was not responding)  • Negative: Sounds like a life-threatening emergency to the triager  • Negative: SEVERE asthma attack (e.g., very SOB at rest, speaks in single words, loud wheezes)  • Negative: Chest pain  • Negative: Hospitalized before with asthma; now feels same  • Negative: Peak flow rate less than 50% of baseline level (RED zone)  • Negative: SEVERE wheezing or coughing and doesn't have nebulizer or inhaler available    Answer Assessment - Initial Assessment Questions  1. RESPIRATORY STATUS: \"Describe your breathing?\" (e.g., wheezing, shortness of breath, unable to speak, severe coughing)       wheezing  2. ONSET: \"When did this asthma attack begin?\"       The last few days has increased inhaler usage  3. TRIGGER: \"What do you think triggered this attack?\" (e.g., URI, exposure to " "pollen or other allergen, tobacco smoke)       Smoke exposure  4. PEAK EXPIRATORY FLOW RATE (PEFR): \"Do you use a peak flow meter?\" If so, ask: \"What's the current peak flow? What's your personal best peak flow?\"       unkno  5. SEVERITY: \"How bad is this attack?\"     - MILD: No SOB at rest, mild SOB with walking, speaks normally in sentences, can lay down, no retractions, pulse < 100. (GREEN Zone: PEFR %)    - MODERATE: SOB at rest, SOB with minimal exertion and prefers to sit, cannot lie down flat, speaks in phrases, mild retractions, audible wheezing, pulse 100-120. (YELLOW Zone: PEFR 50-80%)     - SEVERE: Very SOB at rest, speaks in single words, struggling to breathe, sitting hunched forward, retractions, usually loud wheezing, sometimes minimal wheezing because of decreased air movement, pulse > 120. (RED Zone: PEFR < 50%).       Moderated  6. MEDICATIONS (Inhaler or nebs): \"What are your asthma medications?\" and \"What treatments have you given so far?\"     - Quick-relief: albuterol, metaproterenol, salbutamol, or other inhaled or nebulized beta-agonist medicines    - Long-term-control: steroids, cromolyn, or other anti-inflammatory medicines.      inhaler  7. OTHER SYMPTOMS: \"Do you have any other symptoms? (e.g., runny nose, chest pain, fever)      Wheezing only  8. PREGNANCY: \"Is there any chance you are pregnant?\" \"When was your last menstrual period?\"      no    Protocols used: ASTHMA ATTACK-A-OH      "

## 2020-08-31 NOTE — TELEPHONE ENCOUNTER
Regarding: advice  ----- Message from Amanda Merlino sent at 8/31/2020 10:33 AM PDT -----  Dx: astham - increased use of inhaler, wheezing, trouble breathing wants office visit

## 2020-09-01 ENCOUNTER — APPOINTMENT (OUTPATIENT)
Dept: PHYSICAL THERAPY | Facility: REHABILITATION | Age: 80
End: 2020-09-01
Attending: INTERNAL MEDICINE
Payer: MEDICARE

## 2020-09-01 ENCOUNTER — NURSE TRIAGE (OUTPATIENT)
Dept: HEALTH INFORMATION MANAGEMENT | Facility: OTHER | Age: 80
End: 2020-09-01

## 2020-09-01 NOTE — TELEPHONE ENCOUNTER
1. Caller Name: Jessica                 Call Back Number: [phone]  [facility] PCP or Specialty Provider: Yes  Avery Cruz        2.  In the last two weeks, has the patient had any new or worsening symptoms (not explained by alternative diagnosis)? No.    3.  Does patient have any comoribidities? None Asthma    4.  Has the patient traveled in the last 14 days OR had any known contact with someone who is suspected or confirmed to have COVID-19?  Yes, Son has been sick with Covid 19 and tested positive.    Patient has been exposed to Covid on 8/23/2020    5. POTENTIAL PUI (LOW): Advised to perform self care, monitor for worsening symptoms and to call back in 3 days if no improvement. Instructed to self isolate and contact [facility] at [phone]    Patient called to determine if prednisone, which was ordered yesterday, is ok to take since she has been exposed to Covid 19.  I reassured patient Prednisone is sometimes given to covid patients to help with respiratory status.    Note routed to [facility] Provider: [facility] only.            Reason for Disposition  • [1] COVID-19 EXPOSURE (Close Contact) AND [2] within last 14 days AND [3] NO cough, fever, or breathing difficulty    Additional Information  • Negative: SEVERE difficulty breathing (e.g., struggling for each breath, speak in single words, bluish lips)  • Negative: Sounds like a life-threatening emergency to the triager  • Negative: [1] Adult has symptoms of COVID-19 (fever, cough, or SOB) AND [2] lab test positive  • Negative: [1] Adult has symptoms of COVID-19 (fever, cough or SOB) AND [2] major community spread where patient lives AND [3] testing not being done for mild symptoms  • Negative: [1] Difficulty breathing (shortness of breath) occurs AND [2] onset > 14 days after COVID-19 EXPOSURE (Close Contact) AND [3] no major community spread  • Negative: [1] Cough occurs AND [2] onset > 14 days after COVID-19 EXPOSURE AND [3] no major community spread  • Negative: [1]  "Common cold symptoms AND [2] onset > 14 days after COVID-19 EXPOSURE AND [3] no major community spread  • Negative: [1] Difficulty breathing occurs AND [2] within 14 days of COVID-19 EXPOSURE (Close Contact)  • Negative: Patient sounds very sick or weak to the triager  • Negative: [1] Fever (or feeling feverish) OR cough AND [2] within 14 Days of COVID-19 EXPOSURE (Close Contact)  • Negative: [1] Fever (or feeling feverish) OR cough occurs AND [2] within 14 days of travel from another country (international travel)  • Negative: [1] Fever (or feeling feverish) OR cough occurs AND [2] within 14 days of travel from a city or area with major community spread  • Negative: [1] Fever (or feeling feverish) OR cough occurs AND [2] living in area with major community spread AND [3] testing being done in the community for symptoms  • Negative: [1] Mild body aches, chills, diarrhea, headache, runny nose, or sore throat AND [2] within 14 days of COVID-19 EXPOSURE  • Negative: [1] COVID-19 EXPOSURE within last 14 days AND [2] NO cough, fever, or breathing difficulty AND [3] exposed 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 CLOSE CONTACT: \"Who is the person with the confirmed or suspected COVID-19 infection that you were exposed to?\"      Son after [employer] on  8/23  2. PLACE of CONTACT: \"Where were you when you were exposed to COVID-19?\" (e.g., home, school, medical waiting room; which city?)      Home  3. TYPE of CONTACT: \"How much contact was there?\" (e.g., sitting next to, live in same house, work in same office, same building)      At dinner together  4. DURATION of CONTACT: \"How long were you in contact with the COVID-19 patient?\" (e.g., a few seconds, passed by person, a few minutes, live with the patient)       A few hours for dinner   5. DATE of CONTACT: " "\"When did you have contact with a COVID-19 patient?\" (e.g., how many days ago)      08/23/2020  6. TRAVEL: \"Have you traveled out of the country recently?\" If so, \"When and where?\"      * Also ask about out-of-state travel, since the [facility] has identified some high risk cities for community spread in the .      * Note: Travel becomes less relevant if there is widespread community transmission where the patient lives.      no  7. COMMUNITY SPREAD: \"Are there lots of cases of COVID-19 (community spread) where you live?\" (See public health department website, if unsure)    * MAJOR community spread: high number of cases; numbers of cases are increasing; many people hospitalized.    * MINOR community spread: low number of cases; not increasing; few or no people hospitalized      no  8. SYMPTOMS: \"Do you have any symptoms?\" (e.g., fever, cough, breathing difficulty)      Asthma exacerbation  9. PREGNANCY OR POSTPARTUM: \"Is there any chance you are pregnant?\" \"When was your last menstrual period?\" \"Did you deliver in the last 2 weeks?\"      no  10. HIGH RISK: \"Do you have any heart or lung problems? Do you have a weak immune system?\" (e.g., CHF, COPD, asthma, HIV positive, chemotherapy, renal failure, diabetes mellitus, sickle cell anemia)        Asthma    Protocols used: CORONAVIRUS (COVID-19) EXPOSURE-A-OH      "

## 2020-09-08 ENCOUNTER — APPOINTMENT (OUTPATIENT)
Dept: PHYSICAL THERAPY | Facility: REHABILITATION | Age: 80
End: 2020-09-08
Attending: INTERNAL MEDICINE
Payer: MEDICARE

## 2020-09-15 ENCOUNTER — HOSPITAL ENCOUNTER (OUTPATIENT)
Dept: CARDIOLOGY | Facility: MEDICAL CENTER | Age: 80
End: 2020-09-15
Attending: INTERNAL MEDICINE
Payer: MEDICARE

## 2020-09-15 DIAGNOSIS — R06.02 SOB (SHORTNESS OF BREATH): ICD-10-CM

## 2020-09-15 LAB
LV EJECT FRACT  99904: 60
LV EJECT FRACT MOD 2C 99903: 59.96
LV EJECT FRACT MOD 4C 99902: 61.45
LV EJECT FRACT MOD BP 99901: 63.47

## 2020-09-15 PROCEDURE — 93306 TTE W/DOPPLER COMPLETE: CPT

## 2020-09-15 PROCEDURE — 93306 TTE W/DOPPLER COMPLETE: CPT | Mod: 26 | Performed by: INTERNAL MEDICINE

## 2020-09-16 ENCOUNTER — OFFICE VISIT (OUTPATIENT)
Dept: PULMONOLOGY | Facility: HOSPICE | Age: 80
End: 2020-09-16
Payer: MEDICARE

## 2020-09-16 VITALS
WEIGHT: 200 LBS | SYSTOLIC BLOOD PRESSURE: 130 MMHG | HEIGHT: 67 IN | RESPIRATION RATE: 16 BRPM | BODY MASS INDEX: 31.39 KG/M2 | TEMPERATURE: 97.5 F | DIASTOLIC BLOOD PRESSURE: 80 MMHG | HEART RATE: 60 BPM | OXYGEN SATURATION: 93 %

## 2020-09-16 DIAGNOSIS — E66.9 CLASS 1 OBESITY WITH BODY MASS INDEX (BMI) OF 31.0 TO 31.9 IN ADULT, UNSPECIFIED OBESITY TYPE, UNSPECIFIED WHETHER SERIOUS COMORBIDITY PRESENT: ICD-10-CM

## 2020-09-16 DIAGNOSIS — J45.901 MODERATE ASTHMA WITH EXACERBATION, UNSPECIFIED WHETHER PERSISTENT: ICD-10-CM

## 2020-09-16 DIAGNOSIS — D72.10 EOSINOPHILIA: ICD-10-CM

## 2020-09-16 DIAGNOSIS — I48.11 LONGSTANDING PERSISTENT ATRIAL FIBRILLATION (HCC): ICD-10-CM

## 2020-09-16 PROCEDURE — 99214 OFFICE O/P EST MOD 30 MIN: CPT | Performed by: INTERNAL MEDICINE

## 2020-09-16 RX ORDER — PREDNISONE 10 MG/1
TABLET ORAL
Qty: 18 TAB | Refills: 0 | Status: ON HOLD | OUTPATIENT
Start: 2020-09-16 | End: 2021-05-18

## 2020-09-16 ASSESSMENT — ENCOUNTER SYMPTOMS
DIAPHORESIS: 0
SINUS PAIN: 0
VOMITING: 0
ORTHOPNEA: 0
STRIDOR: 0
PALPITATIONS: 0
DIARRHEA: 0
CHILLS: 0
CLAUDICATION: 0
HEADACHES: 0
DOUBLE VISION: 0
BACK PAIN: 0
WHEEZING: 1
SHORTNESS OF BREATH: 1
EYE PAIN: 0
HEARTBURN: 0
DEPRESSION: 0
TREMORS: 0
FOCAL WEAKNESS: 0
EYE REDNESS: 0
CONSTIPATION: 0
SORE THROAT: 0
HEMOPTYSIS: 0
PND: 0
WEIGHT LOSS: 0
EYE DISCHARGE: 0
NECK PAIN: 0
SPUTUM PRODUCTION: 0
DIZZINESS: 0
WEAKNESS: 0
FALLS: 0
MYALGIAS: 0
BLURRED VISION: 0
COUGH: 0
ABDOMINAL PAIN: 0
NAUSEA: 0
SPEECH CHANGE: 0
PHOTOPHOBIA: 0
FEVER: 0

## 2020-09-16 ASSESSMENT — FIBROSIS 4 INDEX: FIB4 SCORE: 1.01

## 2020-09-16 ASSESSMENT — PAIN SCALES - GENERAL: PAINLEVEL: NO PAIN

## 2020-09-16 NOTE — PROGRESS NOTES
Chief Complaint   Patient presents with   • Shortness of Breath     LAST SEEN 8/17/2020 SICK VISIT SOB    • Results     ECHO 9/15/2020          HPI: This patient is a 80 y.o. female whom is followed in our clinic for asthma last seen by me on 8/17/20. The patient's past medical history significant for hypertension currently well controlled on medication, dyslipidemia, chronic atrial fibrillation on anticoagulation and asthma.  The patient is a lifelong non-smoker.    The patient presented to me on August 17 for evaluation of reactive airways disease.  She reported a diagnosis of bronchitis versus pneumonia roughly 23 years ago after which she began to have intermittent episodes of reactive airways disease with treatment for acute bronchitis roughly 1-2 times per year.  [facility] and on Advair at some point but this was stopped due to symptoms being well controlled.  Pulmonary function testing from 2006 showed FEV1 of 1.59 L and FVC of 2.21 L both of which were mildly reduced however the ratio was 72.  Total lung capacity was 102% predicted and diffusion capacity was elevated at 153% predicted.  There was no bronchodilator response.    She had an increase in Advair Diskus 100-2 50 sometime in July due to persistent shortness of breath with associated wheezing with activity.  We did perform multi-ox in clinic during which she did not desaturate but did have elevation of heart rate to the 130s in the setting of chronic atrial fibrillation.  I plan to repeat echocardiogram and we ordered 6-minute walk test and pulmonary function testing.  The patient had no wheezing at our first clinic appointment and therefore I made no changes to her medical regimen.  Since our appointment she was seen in urgent care on August 31 and treated with prednisone and antibiotics.  Pulmonary function testing had to be rescheduled due to persistent symptoms of wheezing and shortness of breath.  She  presents today for follow-up.  She is audibly wheezing with a prolonged expiratory phase and upon questioning she has been using nebulized bronchodilators twice daily but not her Advair.  She was concerned that her Advair would interact with the bronchodilators and thought that the nebulizer could substitute for the Advair.  Also a CBC done in June showed peripheral eosinophilia and patient does recall perhaps being on montelukast in the past.  No fevers, chills, night sweats, weight loss.  Repeat echocardiogram shows normal RV and LV function with RVSP of 45 mmHg, no significant change from 2016.    Past Medical History:   Diagnosis Date   • A-fib ([facility]) 2008   • Asthma    • Atrial fibrillation ([facility])    • Hyperlipidemia    • Hypertension        Social History     Socioeconomic History   • Marital status:      Spouse name: Not on file   • Number of children: Not on file   • Years of education: Not on file   • Highest education level: Not on file   Occupational History   • Not on file   Social Needs   • Financial resource strain: Not on file   • Food insecurity     Worry: Not on file     Inability: Not on file   • Transportation needs     Medical: Not on file     Non-medical: Not on file   Tobacco Use   • Smoking status: Never Smoker   • Smokeless tobacco: Never Used   Substance and Sexual Activity   • Alcohol use: No   • Drug use: No   • Sexual activity: Not Currently     Partners: Male   Lifestyle   • Physical activity     Days per week: Not on file     Minutes per session: Not on file   • Stress: Not on file   Relationships   • Social connections     Talks on phone: Not on file     Gets together: Not on file     Attends Yarsanism service: Not on file     Active member of club or organization: Not on file     Attends meetings of clubs or organizations: Not on file     Relationship status: Not on file   • Intimate partner violence     Fear of current or ex partner: Not on file     Emotionally abused: Not on  file     Physically abused: Not on file     Forced sexual activity: Not on file   Other Topics Concern   • Not on file   Social History Narrative   • Not on file       History reviewed. No pertinent family history.    Current Outpatient Medications on File Prior to Visit   Medication Sig Dispense Refill   • albuterol 108 (90 Base) MCG/ACT Aero Soln inhalation aerosol Inhale 2 Puffs by mouth every 6 hours as needed for Shortness of Breath. 1 Inhaler 0   • albuterol (PROVENTIL) 2.5mg/3ml Nebu Soln solution for nebulization 3 mL by Nebulization route every four hours as needed for Shortness of Breath. 75 mL 11   • atorvastatin (LIPITOR) 10 MG Tab TAKE ONE TABLET BY MOUTH ONE TIME DAILY  90 Tab 4   • diltiazem CD (DILTIAZEM CD) 180 MG CAPSULE SR 24 HR Take 1 Cap by mouth 2 Times a Day. 60 Cap 1   • multivitamin (THERAGRAN) Tab Take 1 Tab by mouth every day.     • ascorbic acid (C 1000) 1000 MG tablet Take 1,000 mg by mouth every day.     • Probiotic Product (PROBIOTIC DAILY PO) Take 1 Tab by mouth every day.     • apixaban (ELIQUIS) 5mg Tab Take 5 mg by mouth 2 Times a Day.     • azithromycin (ZITHROMAX) 250 MG Tab Take 500mg on day 1, then take 250mg on day 2 through 5 6 Tab 0   • Spacer/Aero-Holding Chambers (E-Z SPACER) Device Use as directed 1 Device 3   • predniSONE (DELTASONE) 20 MG Tab Take 40mg daily x 5 days (Patient not taking: Reported on 7/6/2020) 10 Tab 0   • vitamin D3, cholecalciferol, 1000 UNIT Tab Take 1 Tab by mouth every day. 100 Tab 4     No current facility-administered medications on file prior to visit.        Patient has no known allergies.      ROS:   Review of Systems   Constitutional: Negative for chills, diaphoresis, fever, malaise/fatigue and weight loss.   HENT: Negative for congestion, ear discharge, ear pain, hearing loss, nosebleeds, sinus pain, sore throat and tinnitus.    Eyes: Negative for blurred vision, double vision, photophobia, pain, discharge and redness.   Respiratory: Positive  "for shortness of breath and wheezing. Negative for cough, hemoptysis, sputum production and stridor.    Cardiovascular: Negative for chest pain, palpitations, orthopnea, claudication, leg swelling and PND.   Gastrointestinal: Negative for abdominal pain, constipation, diarrhea, heartburn, nausea and vomiting.   Genitourinary: Negative for dysuria and urgency.   Musculoskeletal: Negative for back pain, falls, joint pain, myalgias and neck pain.   Skin: Negative for itching and rash.   Neurological: Negative for dizziness, tremors, speech change, focal weakness, weakness and headaches.   Endo/Heme/Allergies: Negative for environmental allergies.   Psychiatric/Behavioral: Negative for depression.       /80   Pulse 60   Temp 36.4 °C (97.5 °F) (Temporal)   Resp 16   Ht 1.702 m (5' 7\")   Wt 90.7 kg (200 lb)   SpO2 93%   Physical Exam  Constitutional:       General: She is not in acute distress.     Appearance: Normal appearance. She is well-developed. She is obese.   HENT:      Head: Normocephalic and atraumatic.      Right Ear: External ear normal.      Left Ear: External ear normal.      Nose: Nose normal. No congestion.      Mouth/Throat:      Mouth: Mucous membranes are moist.      Pharynx: Oropharynx is clear. No oropharyngeal exudate.   Eyes:      General: No scleral icterus.     Extraocular Movements: Extraocular movements intact.      Conjunctiva/sclera: Conjunctivae normal.      Pupils: Pupils are equal, round, and reactive to light.   Neck:      Musculoskeletal: Normal range of motion and neck supple.      Vascular: No JVD.      Trachea: No tracheal deviation.   Cardiovascular:      Rate and Rhythm: Normal rate and regular rhythm.      Heart sounds: Normal heart sounds. No murmur. No friction rub. No gallop.    Pulmonary:      Effort: Pulmonary effort is normal. No accessory muscle usage or respiratory distress.      Breath sounds: Wheezing present. No rales.      Comments: Diffuse b/l expiratory " wheezes with prolonged expiratory phase  Abdominal:      General: There is no distension.      Palpations: Abdomen is soft.      Tenderness: There is no abdominal tenderness.   Musculoskeletal: Normal range of motion.         General: No tenderness or deformity.      Right lower leg: No edema.      Left lower leg: No edema.   Lymphadenopathy:      Cervical: No cervical adenopathy.   Skin:     General: Skin is warm and dry.      Findings: No rash.      Nails: There is no clubbing.     Neurological:      Mental Status: She is alert and oriented to person, place, and time.      Cranial Nerves: No cranial nerve deficit.      Gait: Gait normal.   Psychiatric:         Mood and Affect: Mood normal.         Behavior: Behavior normal.         PFTs as reviewed by me personally: pending    Imaging as reviewed by me personally:  As per hPI    Assessment:  1. Moderate asthma with exacerbation, unspecified whether persistent  predniSONE (DELTASONE) 10 MG Tab    fluticasone-salmeterol (ADVAIR DISKUS) 500-50 MCG/DOSE AEROSOL POWDER, BREATH ACTIVATED   2. Longstanding persistent atrial fibrillation (HCC)     3. Class 1 obesity with body mass index (BMI) of 31.0 to 31.9 in adult, unspecified obesity type, unspecified whether serious comorbidity present     4. Eosinophilia         Plan:  1.  Patient presenting with asthma exacerbation.  She has not been using inhaled corticosteroids regularly and we discussed the importance of regular ICS use to suppress inflammation in the lungs as the bronchodilators will not respond as well.  I would like to give her another round of prednisone at this time with a taper and have her start Advair but higher dose now at 500.  This way by the time her prednisone taper is completed the Advair will have full effect.  Patient and family will contact me if symptoms do not remain well controlled after she tapers off prednisone on the higher dose of Advair at which point we can add montelukast.  2.  Rate is  well controlled in clinic today.  Echo unchanged.  Clearly her symptoms today are related to reactive airways disease and not atrial fibrillation.  3.  This does put patient at increased risk for obesity related pulmonary complications.  Encouraged healthy lifestyle habits.  4.  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  For a first time I would like to maximize her ICS dose and if symptoms do not well-controlled add montelukast.  If she remains symptomatic then we can consider biologic therapy.  Return in about 3 months (around 12/16/2020) for PFTs, 6 MWT.

## 2020-12-14 ENCOUNTER — APPOINTMENT (OUTPATIENT)
Dept: SLEEP MEDICINE | Facility: MEDICAL CENTER | Age: 80
End: 2020-12-14
Attending: INTERNAL MEDICINE
Payer: MEDICARE

## 2020-12-16 ENCOUNTER — TELEPHONE (OUTPATIENT)
Dept: SLEEP MEDICINE | Facility: MEDICAL CENTER | Age: 80
End: 2020-12-16

## 2020-12-16 NOTE — TELEPHONE ENCOUNTER
"Patient is scheduled to see Dr. James 12/17/2020. Pt has not completed pft and 6 mw as ordered. Patient was contacted would like to keep appt to \"consult\" with Dr. James about medication. Patient is holding off on completing testing having difficulty breathing.   "

## 2020-12-17 ENCOUNTER — APPOINTMENT (OUTPATIENT)
Dept: SLEEP MEDICINE | Facility: MEDICAL CENTER | Age: 80
End: 2020-12-17
Payer: MEDICARE

## 2020-12-17 ENCOUNTER — OFFICE VISIT (OUTPATIENT)
Dept: SLEEP MEDICINE | Facility: MEDICAL CENTER | Age: 80
End: 2020-12-17
Payer: MEDICARE

## 2020-12-17 VITALS
RESPIRATION RATE: 16 BRPM | HEART RATE: 95 BPM | WEIGHT: 201 LBS | OXYGEN SATURATION: 95 % | TEMPERATURE: 98.1 F | SYSTOLIC BLOOD PRESSURE: 140 MMHG | BODY MASS INDEX: 31.55 KG/M2 | DIASTOLIC BLOOD PRESSURE: 82 MMHG | HEIGHT: 67 IN

## 2020-12-17 DIAGNOSIS — J45.40 ASTHMA, MODERATE PERSISTENT, WELL-CONTROLLED: ICD-10-CM

## 2020-12-17 DIAGNOSIS — R06.09 DOE (DYSPNEA ON EXERTION): ICD-10-CM

## 2020-12-17 DIAGNOSIS — I48.21 PERMANENT ATRIAL FIBRILLATION (HCC): ICD-10-CM

## 2020-12-17 PROCEDURE — 99214 OFFICE O/P EST MOD 30 MIN: CPT | Performed by: INTERNAL MEDICINE

## 2020-12-17 RX ORDER — MONTELUKAST SODIUM 10 MG/1
10 TABLET ORAL EVERY EVENING
Qty: 30 TAB | Refills: 11 | Status: SHIPPED | OUTPATIENT
Start: 2020-12-17 | End: 2023-07-07 | Stop reason: CLARIF

## 2020-12-17 ASSESSMENT — ENCOUNTER SYMPTOMS
VOMITING: 0
SINUS PAIN: 0
EYE REDNESS: 0
SPUTUM PRODUCTION: 0
SHORTNESS OF BREATH: 1
ORTHOPNEA: 0
SPEECH CHANGE: 0
FOCAL WEAKNESS: 0
ABDOMINAL PAIN: 0
WEIGHT LOSS: 0
EYE DISCHARGE: 0
CLAUDICATION: 0
FEVER: 0
BACK PAIN: 0
PALPITATIONS: 0
SORE THROAT: 0
TREMORS: 0
COUGH: 0
DIZZINESS: 0
DIAPHORESIS: 0
PHOTOPHOBIA: 0
STRIDOR: 0
DIARRHEA: 0
CONSTIPATION: 0
DOUBLE VISION: 0
MYALGIAS: 0
HEMOPTYSIS: 0
EYE PAIN: 0
CHILLS: 0
DEPRESSION: 0
HEARTBURN: 0
NAUSEA: 0
HEADACHES: 0
WEAKNESS: 0
FALLS: 0
NECK PAIN: 0
BLURRED VISION: 0
PND: 0
WHEEZING: 0

## 2020-12-17 ASSESSMENT — FIBROSIS 4 INDEX: FIB4 SCORE: 1.01

## 2020-12-17 NOTE — PROGRESS NOTES
Chief Complaint   Patient presents with   • Asthma     last seen 9/16/2020, NO PFT, 6MW PER PT          HPI: This patient is a 80 y.o. female whom is followed in our clinic for ARZATE and asthma last seen by me on 9/16/20. The patient's past medical history significant for hypertension currently well controlled on medication, dyslipidemia, chronic atrial fibrillation on anticoagulation and asthma.  The patient is a lifelong non-smoker.    The patient presented to me on August 17 for evaluation of reactive airways disease.  [facility] and on Advair at some point but this was stopped due to symptoms being well controlled.  Pulmonary function testing from 2006 showed FEV1 of 1.59 L and FVC of 2.21 L both of which were mildly reduced however the ratio was 72.  Total lung capacity was 102% predicted and diffusion capacity was elevated at 153% predicted.  There was no bronchodilator response. She had been on advair 100 and at our first appointment was experiencing mainly ARZATE and HR was in 130s on multi-ox. I was concerned AF was more an issue with ARZATE and we repeated a TTE which was notable only for mildly elevated RVSP at 45 mmHg however at f/u on 9/16/20 with me she was having an asthma exacerbation which we tx with prednisone and maximized her ICS with Advair 500.  She also had mild peripheral eosinophilia for which we were considering adding montelukast if symptoms were not well controlled at follow-up today.  She did not schedule PFTs as she thought her breathing had to be better prior to obtaining them.  She does note complete elimination of wheezing but continues to have dyspnea on exertion for which she uses her rescue inhaler prior to activity.  No cough.  Heart rate is 95 at rest today.  Chest x-ray from August 31 showed no pulmonary edema or infiltrate.    Past Medical History:   Diagnosis Date   • A-fib ([facility]) 2008   • Asthma    • Atrial fibrillation ([facility])    •  Hyperlipidemia    • Hypertension        Social History     Socioeconomic History   • Marital status:      Spouse name: Not on file   • Number of children: Not on file   • Years of education: Not on file   • Highest education level: Not on file   Occupational History   • Not on file   Social Needs   • Financial resource strain: Not on file   • Food insecurity     Worry: Not on file     Inability: Not on file   • Transportation needs     Medical: Not on file     Non-medical: Not on file   Tobacco Use   • Smoking status: Never Smoker   • Smokeless tobacco: Never Used   Substance and Sexual Activity   • Alcohol use: No   • Drug use: No   • Sexual activity: Not Currently     Partners: Male   Lifestyle   • Physical activity     Days per week: Not on file     Minutes per session: Not on file   • Stress: Not on file   Relationships   • Social connections     Talks on phone: Not on file     Gets together: Not on file     Attends Gnosticist service: Not on file     Active member of club or organization: Not on file     Attends meetings of clubs or organizations: Not on file     Relationship status: Not on file   • Intimate partner violence     Fear of current or ex partner: Not on file     Emotionally abused: Not on file     Physically abused: Not on file     Forced sexual activity: Not on file   Other Topics Concern   • Not on file   Social History Narrative   • Not on file       History reviewed. No pertinent family history.    Current Outpatient Medications on File Prior to Visit   Medication Sig Dispense Refill   • albuterol 108 (90 Base) MCG/ACT Aero Soln inhalation aerosol INHALE TWO PUFFS BY MOUTH EVERY SIX HOURS AS NEEDED FOR SHORTNESS OF BREATH  8.5 g 12   • fluticasone-salmeterol (ADVAIR DISKUS) 500-50 MCG/DOSE AEROSOL POWDER, BREATH ACTIVATED Inhale 1 Puff by mouth 2 times a day. Rinse mouth after each use. 3 Each 3   • albuterol (PROVENTIL) 2.5mg/3ml Nebu Soln solution for nebulization 3 mL by Nebulization  route every four hours as needed for Shortness of Breath. 75 mL 11   • atorvastatin (LIPITOR) 10 MG Tab TAKE ONE TABLET BY MOUTH ONE TIME DAILY  90 Tab 4   • vitamin D3, cholecalciferol, 1000 UNIT Tab Take 1 Tab by mouth every day. 100 Tab 4   • diltiazem CD (DILTIAZEM CD) 180 MG CAPSULE SR 24 HR Take 1 Cap by mouth 2 Times a Day. 60 Cap 1   • ascorbic acid (C 1000) 1000 MG tablet Take 1,000 mg by mouth every day.     • Probiotic Product (PROBIOTIC DAILY PO) Take 1 Tab by mouth every day.     • apixaban (ELIQUIS) 5mg Tab Take 5 mg by mouth 2 Times a Day.     • predniSONE (DELTASONE) 10 MG Tab Take 30mg x 3 days, then take 20mg x 3 days, then take 10mg x 3 days, with food, then discontinue. (Patient not taking: Reported on 12/17/2020) 18 Tab 0   • azithromycin (ZITHROMAX) 250 MG Tab Take 500mg on day 1, then take 250mg on day 2 through 5 (Patient not taking: Reported on 12/17/2020) 6 Tab 0   • Spacer/Aero-Holding Chambers (E-Z SPACER) Device Use as directed 1 Device 3   • predniSONE (DELTASONE) 20 MG Tab Take 40mg daily x 5 days (Patient not taking: Reported on 7/6/2020) 10 Tab 0   • multivitamin (THERAGRAN) Tab Take 1 Tab by mouth every day.       No current facility-administered medications on file prior to visit.        Patient has no known allergies.      ROS:   Review of Systems   Constitutional: Negative for chills, diaphoresis, fever, malaise/fatigue and weight loss.   HENT: Negative for congestion, ear discharge, ear pain, hearing loss, nosebleeds, sinus pain, sore throat and tinnitus.    Eyes: Negative for blurred vision, double vision, photophobia, pain, discharge and redness.   Respiratory: Positive for shortness of breath. Negative for cough, hemoptysis, sputum production, wheezing and stridor.    Cardiovascular: Negative for chest pain, palpitations, orthopnea, claudication, leg swelling and PND.   Gastrointestinal: Negative for abdominal pain, constipation, diarrhea, heartburn, nausea and vomiting.  "  Genitourinary: Negative for dysuria and urgency.   Musculoskeletal: Negative for back pain, falls, joint pain, myalgias and neck pain.   Skin: Negative for itching and rash.   Neurological: Negative for dizziness, tremors, speech change, focal weakness, weakness and headaches.   Endo/Heme/Allergies: Negative for environmental allergies.   Psychiatric/Behavioral: Negative for depression.       /82 (BP Location: Right arm, Patient Position: Sitting, BP Cuff Size: Large adult)   Pulse 95   Temp 36.7 °C (98.1 °F) (Temporal)   Resp 16   Ht 1.702 m (5' 7\")   Wt 91.2 kg (201 lb)   SpO2 95%   Physical Exam  Vitals signs reviewed.   Constitutional:       General: She is not in acute distress.     Appearance: Normal appearance. She is well-developed. She is obese.   HENT:      Head: Normocephalic and atraumatic.      Right Ear: External ear normal.      Left Ear: External ear normal.      Nose: Nose normal. No congestion.      Mouth/Throat:      Mouth: Mucous membranes are moist.      Pharynx: Oropharynx is clear. No oropharyngeal exudate.   Eyes:      General: No scleral icterus.     Extraocular Movements: Extraocular movements intact.      Conjunctiva/sclera: Conjunctivae normal.      Pupils: Pupils are equal, round, and reactive to light.   Neck:      Musculoskeletal: Normal range of motion and neck supple.      Vascular: No JVD.      Trachea: No tracheal deviation.   Cardiovascular:      Rate and Rhythm: Normal rate. Rhythm irregular.      Heart sounds: Normal heart sounds. No murmur. No friction rub. No gallop.    Pulmonary:      Effort: Pulmonary effort is normal. No accessory muscle usage or respiratory distress.      Breath sounds: Normal breath sounds. No wheezing or rales.   Abdominal:      General: There is no distension.      Palpations: Abdomen is soft.      Tenderness: There is no abdominal tenderness.   Musculoskeletal: Normal range of motion.         General: No tenderness or deformity.      " Right lower leg: No edema.   Lymphadenopathy:      Cervical: No cervical adenopathy.   Skin:     General: Skin is warm and dry.      Findings: No rash.      Nails: There is no clubbing.     Neurological:      Mental Status: She is alert and oriented to person, place, and time.      Cranial Nerves: No cranial nerve deficit.      Gait: Gait normal.   Psychiatric:         Mood and Affect: Mood normal.         Behavior: Behavior normal.         PFTs as reviewed by me personally:pending    Imaging as reviewed by me personally:  As per hPI    Assessment:  1. Asthma, moderate persistent, well-controlled  DME Nebulizer    montelukast (SINGULAIR) 10 MG Tab   2. Permanent atrial fibrillation (HCC)     3. ARZATE (dyspnea on exertion)         Plan:  1.  This is chronic and was previously not well controlled although she seems to have responded well to maximize inhaled corticosteroid dose with no wheezing on exam today.  She continues to have dyspnea on exertion which I am not sure is her asthma but perhaps A. fib as was suspected initially.  I will maximize her asthma therapy and add montelukast today we will plan to see her back after pulmonary function testing in 3 months.  Continue Advair 500 and short acting bronchodilators as needed.  She is benefiting significantly from nebulized bronchodilators but is in need of supplies which I have written for.  2.  This is chronic and rate control has been borderline given resting heart rates in the 90s and at a previous appointment exertional heart rates in the 130s.  I do suspect that her A. fib may be contributing somewhat to the dyspnea as asthma appears to be better controlled however we will maximize therapy for asthma as per above and obtain pulmonary function testing.  Otherwise follow-up with cardiology.  3.  See discussion above.  Normal LV function on echo.  Arrhythmia could be contributing to dyspnea with exertion.    Return in about 3 months (around 3/17/2021) for asthma,  pfts.

## 2021-01-11 DIAGNOSIS — Z23 NEED FOR VACCINATION: ICD-10-CM

## 2021-01-29 ENCOUNTER — TELEPHONE (OUTPATIENT)
Dept: SLEEP MEDICINE | Facility: MEDICAL CENTER | Age: 81
End: 2021-01-29

## 2021-01-29 NOTE — TELEPHONE ENCOUNTER
"Allegra, claritin or zyrtec as they are non-drowsy. Avoid medications with \"D\" and store brand/generic versions are fine. "

## 2021-01-29 NOTE — TELEPHONE ENCOUNTER
Patient wants to know which antihistamine she can use for her allergy symptoms?    L/S 12/17/2020-Jacob

## 2021-02-02 DIAGNOSIS — E78.2 MIXED HYPERLIPIDEMIA: ICD-10-CM

## 2021-02-02 RX ORDER — ATORVASTATIN CALCIUM 10 MG/1
TABLET, FILM COATED ORAL
Qty: 100 TAB | Refills: 0 | Status: SHIPPED | OUTPATIENT
Start: 2021-02-02 | End: 2023-07-07 | Stop reason: CLARIF

## 2021-02-17 ENCOUNTER — APPOINTMENT (OUTPATIENT)
Dept: SLEEP MEDICINE | Facility: MEDICAL CENTER | Age: 81
End: 2021-02-17
Payer: MEDICARE

## 2021-03-17 ENCOUNTER — TELEPHONE (OUTPATIENT)
Dept: SLEEP MEDICINE | Facility: MEDICAL CENTER | Age: 81
End: 2021-03-17

## 2021-03-17 NOTE — TELEPHONE ENCOUNTER
No PFt and 6 MW. I spoke to patient [place]. Cancelled 3/18/21 Dr. James results appt. Patient will call back when she comes back home and schedule.

## 2021-03-18 ENCOUNTER — APPOINTMENT (OUTPATIENT)
Dept: SLEEP MEDICINE | Facility: MEDICAL CENTER | Age: 81
End: 2021-03-18
Payer: MEDICARE

## 2021-04-27 ENCOUNTER — PATIENT OUTREACH (OUTPATIENT)
Dept: HEALTH INFORMATION MANAGEMENT | Facility: OTHER | Age: 81
End: 2021-04-27

## 2021-04-27 NOTE — PROGRESS NOTES
Called patient to schedule Comprehensive Health Assessment. Patient declined not interested.  Attempt #1

## 2021-05-15 ENCOUNTER — APPOINTMENT (OUTPATIENT)
Dept: RADIOLOGY | Facility: MEDICAL CENTER | Age: 81
DRG: 871 | End: 2021-05-15
Attending: EMERGENCY MEDICINE
Payer: MEDICARE

## 2021-05-15 ENCOUNTER — HOSPITAL ENCOUNTER (INPATIENT)
Facility: MEDICAL CENTER | Age: 81
LOS: 3 days | DRG: 871 | End: 2021-05-18
Attending: EMERGENCY MEDICINE | Admitting: STUDENT IN AN ORGANIZED HEALTH CARE EDUCATION/TRAINING PROGRAM
Payer: MEDICARE

## 2021-05-15 DIAGNOSIS — J96.01 ACUTE HYPOXEMIC RESPIRATORY FAILURE DUE TO COVID-19 (HCC): ICD-10-CM

## 2021-05-15 DIAGNOSIS — U07.1 ACUTE HYPOXEMIC RESPIRATORY FAILURE DUE TO COVID-19 (HCC): ICD-10-CM

## 2021-05-15 DIAGNOSIS — U07.1 COVID-19: Primary | ICD-10-CM

## 2021-05-15 DIAGNOSIS — R09.02 HYPOXIA: ICD-10-CM

## 2021-05-15 DIAGNOSIS — U07.1 PNEUMONIA DUE TO COVID-19 VIRUS: ICD-10-CM

## 2021-05-15 DIAGNOSIS — J12.82 PNEUMONIA DUE TO COVID-19 VIRUS: ICD-10-CM

## 2021-05-15 PROBLEM — A41.9 SEPSIS (HCC): Status: ACTIVE | Noted: 2021-05-15

## 2021-05-15 PROBLEM — I10 HYPERTENSION: Status: ACTIVE | Noted: 2021-05-15

## 2021-05-15 LAB
ALBUMIN SERPL BCP-MCNC: 4.3 G/DL (ref 3.2–4.9)
ALBUMIN/GLOB SERPL: 1.7 G/DL
ALP SERPL-CCNC: 94 U/L (ref 30–99)
ALT SERPL-CCNC: 16 U/L (ref 2–50)
ANION GAP SERPL CALC-SCNC: 16 MMOL/L (ref 7–16)
APTT PPP: 34.3 SEC (ref 24.7–36)
AST SERPL-CCNC: 19 U/L (ref 12–45)
BASOPHILS # BLD AUTO: 0.3 % (ref 0–1.8)
BASOPHILS # BLD: 0.03 K/UL (ref 0–0.12)
BILIRUB SERPL-MCNC: 0.6 MG/DL (ref 0.1–1.5)
BUN SERPL-MCNC: 13 MG/DL (ref 8–22)
CALCIUM SERPL-MCNC: 9.5 MG/DL (ref 8.4–10.2)
CHLORIDE SERPL-SCNC: 96 MMOL/L (ref 96–112)
CO2 SERPL-SCNC: 24 MMOL/L (ref 20–33)
CREAT SERPL-MCNC: 0.78 MG/DL (ref 0.5–1.4)
CRP SERPL HS-MCNC: 1.2 MG/DL (ref 0–0.75)
D DIMER PPP IA.FEU-MCNC: 0.35 UG/ML (FEU) (ref 0–0.5)
EKG IMPRESSION: NORMAL
EOSINOPHIL # BLD AUTO: 0 K/UL (ref 0–0.51)
EOSINOPHIL NFR BLD: 0 % (ref 0–6.9)
ERYTHROCYTE [DISTWIDTH] IN BLOOD BY AUTOMATED COUNT: 49.2 FL (ref 35.9–50)
FLUAV RNA SPEC QL NAA+PROBE: NEGATIVE
FLUBV RNA SPEC QL NAA+PROBE: NEGATIVE
GLOBULIN SER CALC-MCNC: 2.6 G/DL (ref 1.9–3.5)
GLUCOSE SERPL-MCNC: 113 MG/DL (ref 65–99)
HCT VFR BLD AUTO: 44.9 % (ref 37–47)
HGB BLD-MCNC: 14.5 G/DL (ref 12–16)
IMM GRANULOCYTES # BLD AUTO: 0.03 K/UL (ref 0–0.11)
IMM GRANULOCYTES NFR BLD AUTO: 0.3 % (ref 0–0.9)
INR PPP: 1.51 (ref 0.87–1.13)
LACTATE BLD-SCNC: 1.8 MMOL/L (ref 0.5–2)
LYMPHOCYTES # BLD AUTO: 0.55 K/UL (ref 1–4.8)
LYMPHOCYTES NFR BLD: 6.3 % (ref 22–41)
MCH RBC QN AUTO: 27.7 PG (ref 27–33)
MCHC RBC AUTO-ENTMCNC: 32.3 G/DL (ref 33.6–35)
MCV RBC AUTO: 85.9 FL (ref 81.4–97.8)
MONOCYTES # BLD AUTO: 0.96 K/UL (ref 0–0.85)
MONOCYTES NFR BLD AUTO: 10.9 % (ref 0–13.4)
NEUTROPHILS # BLD AUTO: 7.23 K/UL (ref 2–7.15)
NEUTROPHILS NFR BLD: 82.2 % (ref 44–72)
NRBC # BLD AUTO: 0 K/UL
NRBC BLD-RTO: 0 /100 WBC
NT-PROBNP SERPL IA-MCNC: 2082 PG/ML (ref 0–125)
PLATELET # BLD AUTO: 203 K/UL (ref 164–446)
PMV BLD AUTO: 9.7 FL (ref 9–12.9)
POTASSIUM SERPL-SCNC: 4 MMOL/L (ref 3.6–5.5)
PROCALCITONIN SERPL-MCNC: 0.08 NG/ML
PROT SERPL-MCNC: 6.9 G/DL (ref 6–8.2)
PROTHROMBIN TIME: 17.8 SEC (ref 12–14.6)
RBC # BLD AUTO: 5.23 M/UL (ref 4.2–5.4)
RSV RNA SPEC QL NAA+PROBE: NEGATIVE
SARS-COV-2 RNA RESP QL NAA+PROBE: DETECTED
SODIUM SERPL-SCNC: 136 MMOL/L (ref 135–145)
SPECIMEN SOURCE: ABNORMAL
WBC # BLD AUTO: 8.8 K/UL (ref 4.8–10.8)

## 2021-05-15 PROCEDURE — 83880 ASSAY OF NATRIURETIC PEPTIDE: CPT

## 2021-05-15 PROCEDURE — 80053 COMPREHEN METABOLIC PANEL: CPT

## 2021-05-15 PROCEDURE — 86140 C-REACTIVE PROTEIN: CPT

## 2021-05-15 PROCEDURE — 84145 PROCALCITONIN (PCT): CPT

## 2021-05-15 PROCEDURE — 700111 HCHG RX REV CODE 636 W/ 250 OVERRIDE (IP): Performed by: EMERGENCY MEDICINE

## 2021-05-15 PROCEDURE — 700102 HCHG RX REV CODE 250 W/ 637 OVERRIDE(OP): Performed by: STUDENT IN AN ORGANIZED HEALTH CARE EDUCATION/TRAINING PROGRAM

## 2021-05-15 PROCEDURE — 87040 BLOOD CULTURE FOR BACTERIA: CPT | Mod: 91

## 2021-05-15 PROCEDURE — 83605 ASSAY OF LACTIC ACID: CPT

## 2021-05-15 PROCEDURE — 0241U HCHG SARS-COV-2 COVID-19 NFCT DS RESP RNA 4 TRGT MIC: CPT

## 2021-05-15 PROCEDURE — 96365 THER/PROPH/DIAG IV INF INIT: CPT

## 2021-05-15 PROCEDURE — A9270 NON-COVERED ITEM OR SERVICE: HCPCS | Performed by: STUDENT IN AN ORGANIZED HEALTH CARE EDUCATION/TRAINING PROGRAM

## 2021-05-15 PROCEDURE — 93005 ELECTROCARDIOGRAM TRACING: CPT

## 2021-05-15 PROCEDURE — 770020 HCHG ROOM/CARE - TELE (206)

## 2021-05-15 PROCEDURE — 85025 COMPLETE CBC W/AUTO DIFF WBC: CPT

## 2021-05-15 PROCEDURE — 85730 THROMBOPLASTIN TIME PARTIAL: CPT

## 2021-05-15 PROCEDURE — 99223 1ST HOSP IP/OBS HIGH 75: CPT | Performed by: STUDENT IN AN ORGANIZED HEALTH CARE EDUCATION/TRAINING PROGRAM

## 2021-05-15 PROCEDURE — C9803 HOPD COVID-19 SPEC COLLECT: HCPCS | Performed by: EMERGENCY MEDICINE

## 2021-05-15 PROCEDURE — 99285 EMERGENCY DEPT VISIT HI MDM: CPT

## 2021-05-15 PROCEDURE — 700105 HCHG RX REV CODE 258: Performed by: EMERGENCY MEDICINE

## 2021-05-15 PROCEDURE — 85379 FIBRIN DEGRADATION QUANT: CPT

## 2021-05-15 PROCEDURE — 94640 AIRWAY INHALATION TREATMENT: CPT

## 2021-05-15 PROCEDURE — 93005 ELECTROCARDIOGRAM TRACING: CPT | Performed by: EMERGENCY MEDICINE

## 2021-05-15 PROCEDURE — 71045 X-RAY EXAM CHEST 1 VIEW: CPT

## 2021-05-15 PROCEDURE — 85610 PROTHROMBIN TIME: CPT

## 2021-05-15 RX ORDER — AMOXICILLIN 250 MG
2 CAPSULE ORAL 2 TIMES DAILY
Status: DISCONTINUED | OUTPATIENT
Start: 2021-05-15 | End: 2021-05-18 | Stop reason: HOSPADM

## 2021-05-15 RX ORDER — GUAIFENESIN 600 MG/1
600 TABLET, EXTENDED RELEASE ORAL EVERY 12 HOURS
Status: DISCONTINUED | OUTPATIENT
Start: 2021-05-15 | End: 2021-05-16

## 2021-05-15 RX ORDER — SODIUM CHLORIDE, SODIUM LACTATE, POTASSIUM CHLORIDE, AND CALCIUM CHLORIDE .6; .31; .03; .02 G/100ML; G/100ML; G/100ML; G/100ML
30 INJECTION, SOLUTION INTRAVENOUS ONCE
Status: COMPLETED | OUTPATIENT
Start: 2021-05-15 | End: 2021-05-15

## 2021-05-15 RX ORDER — DEXAMETHASONE 4 MG/1
6 TABLET ORAL DAILY
Status: DISCONTINUED | OUTPATIENT
Start: 2021-05-15 | End: 2021-05-18 | Stop reason: HOSPADM

## 2021-05-15 RX ORDER — VITAMIN B COMPLEX
2000 TABLET ORAL DAILY
Refills: 4 | Status: DISCONTINUED | OUTPATIENT
Start: 2021-05-16 | End: 2021-05-18 | Stop reason: HOSPADM

## 2021-05-15 RX ORDER — BISACODYL 10 MG
10 SUPPOSITORY, RECTAL RECTAL
Status: DISCONTINUED | OUTPATIENT
Start: 2021-05-15 | End: 2021-05-18 | Stop reason: HOSPADM

## 2021-05-15 RX ORDER — POLYETHYLENE GLYCOL 3350 17 G/17G
1 POWDER, FOR SOLUTION ORAL
Status: DISCONTINUED | OUTPATIENT
Start: 2021-05-15 | End: 2021-05-18 | Stop reason: HOSPADM

## 2021-05-15 RX ORDER — ACETAMINOPHEN 325 MG/1
650 TABLET ORAL EVERY 6 HOURS PRN
Status: DISCONTINUED | OUTPATIENT
Start: 2021-05-15 | End: 2021-05-18 | Stop reason: HOSPADM

## 2021-05-15 RX ORDER — ASCORBIC ACID 500 MG
1000 TABLET ORAL DAILY
Status: DISCONTINUED | OUTPATIENT
Start: 2021-05-16 | End: 2021-05-18 | Stop reason: HOSPADM

## 2021-05-15 RX ORDER — ALBUTEROL SULFATE 90 UG/1
2 AEROSOL, METERED RESPIRATORY (INHALATION)
Status: DISCONTINUED | OUTPATIENT
Start: 2021-05-15 | End: 2021-05-18 | Stop reason: HOSPADM

## 2021-05-15 RX ORDER — BUDESONIDE AND FORMOTEROL FUMARATE DIHYDRATE 160; 4.5 UG/1; UG/1
2 AEROSOL RESPIRATORY (INHALATION)
Status: DISCONTINUED | OUTPATIENT
Start: 2021-05-15 | End: 2021-05-18 | Stop reason: HOSPADM

## 2021-05-15 RX ORDER — DILTIAZEM HYDROCHLORIDE 180 MG/1
180 CAPSULE, COATED, EXTENDED RELEASE ORAL 2 TIMES DAILY
Status: DISCONTINUED | OUTPATIENT
Start: 2021-05-15 | End: 2021-05-18 | Stop reason: HOSPADM

## 2021-05-15 RX ORDER — BENZONATATE 100 MG/1
100 CAPSULE ORAL 3 TIMES DAILY PRN
Status: DISCONTINUED | OUTPATIENT
Start: 2021-05-15 | End: 2021-05-18 | Stop reason: HOSPADM

## 2021-05-15 RX ORDER — ATORVASTATIN CALCIUM 10 MG/1
10 TABLET, FILM COATED ORAL DAILY
Status: DISCONTINUED | OUTPATIENT
Start: 2021-05-16 | End: 2021-05-18 | Stop reason: HOSPADM

## 2021-05-15 RX ORDER — MONTELUKAST SODIUM 10 MG/1
10 TABLET ORAL EVERY EVENING
Status: DISCONTINUED | OUTPATIENT
Start: 2021-05-15 | End: 2021-05-18 | Stop reason: HOSPADM

## 2021-05-15 RX ORDER — LABETALOL HYDROCHLORIDE 5 MG/ML
10 INJECTION, SOLUTION INTRAVENOUS EVERY 4 HOURS PRN
Status: DISCONTINUED | OUTPATIENT
Start: 2021-05-15 | End: 2021-05-18 | Stop reason: HOSPADM

## 2021-05-15 RX ORDER — ENALAPRILAT 1.25 MG/ML
1.25 INJECTION INTRAVENOUS EVERY 6 HOURS PRN
Status: DISCONTINUED | OUTPATIENT
Start: 2021-05-15 | End: 2021-05-18 | Stop reason: HOSPADM

## 2021-05-15 RX ORDER — DILTIAZEM HYDROCHLORIDE 5 MG/ML
10 INJECTION INTRAVENOUS EVERY 6 HOURS PRN
Status: DISCONTINUED | OUTPATIENT
Start: 2021-05-15 | End: 2021-05-18 | Stop reason: HOSPADM

## 2021-05-15 RX ADMIN — GUAIFENESIN 600 MG: 600 TABLET, EXTENDED RELEASE ORAL at 18:28

## 2021-05-15 RX ADMIN — DILTIAZEM HYDROCHLORIDE 180 MG: 180 CAPSULE, COATED, EXTENDED RELEASE ORAL at 18:28

## 2021-05-15 RX ADMIN — CEFTRIAXONE SODIUM 2 G: 2 INJECTION, POWDER, FOR SOLUTION INTRAMUSCULAR; INTRAVENOUS at 13:45

## 2021-05-15 RX ADMIN — BUDESONIDE AND FORMOTEROL FUMARATE DIHYDRATE 2 PUFF: 160; 4.5 AEROSOL RESPIRATORY (INHALATION) at 19:24

## 2021-05-15 RX ADMIN — ALBUTEROL SULFATE 2 PUFF: 90 AEROSOL, METERED RESPIRATORY (INHALATION) at 19:25

## 2021-05-15 RX ADMIN — MONTELUKAST 10 MG: 10 TABLET, FILM COATED ORAL at 18:28

## 2021-05-15 RX ADMIN — DEXAMETHASONE 6 MG: 4 TABLET ORAL at 15:30

## 2021-05-15 RX ADMIN — SODIUM CHLORIDE, POTASSIUM CHLORIDE, SODIUM LACTATE AND CALCIUM CHLORIDE 2736 ML: 600; 310; 30; 20 INJECTION, SOLUTION INTRAVENOUS at 13:30

## 2021-05-15 ASSESSMENT — COGNITIVE AND FUNCTIONAL STATUS - GENERAL
SUGGESTED CMS G CODE MODIFIER MOBILITY: CH
MOBILITY SCORE: 24
SUGGESTED CMS G CODE MODIFIER DAILY ACTIVITY: CH
DAILY ACTIVITIY SCORE: 24

## 2021-05-15 ASSESSMENT — LIFESTYLE VARIABLES
EVER FELT BAD OR GUILTY ABOUT YOUR DRINKING: NO
TOTAL SCORE: 0
AVERAGE NUMBER OF DAYS PER WEEK YOU HAVE A DRINK CONTAINING ALCOHOL: 0
EVER HAD A DRINK FIRST THING IN THE MORNING TO STEADY YOUR NERVES TO GET RID OF A HANGOVER: NO
ALCOHOL_USE: NO
HOW MANY TIMES IN THE PAST YEAR HAVE YOU HAD 5 OR MORE DRINKS IN A DAY: 0
CONSUMPTION TOTAL: NEGATIVE
ON A TYPICAL DAY WHEN YOU DRINK ALCOHOL HOW MANY DRINKS DO YOU HAVE: 0
HAVE PEOPLE ANNOYED YOU BY CRITICIZING YOUR DRINKING: NO
TOTAL SCORE: 0
TOTAL SCORE: 0
HAVE YOU EVER FELT YOU SHOULD CUT DOWN ON YOUR DRINKING: NO
SUBSTANCE_ABUSE: 0

## 2021-05-15 ASSESSMENT — ENCOUNTER SYMPTOMS
DIZZINESS: 0
COUGH: 1
BLURRED VISION: 0
SORE THROAT: 0
SHORTNESS OF BREATH: 1
FEVER: 0
CHILLS: 0
ABDOMINAL PAIN: 0
NAUSEA: 0
DEPRESSION: 0
DOUBLE VISION: 0
PALPITATIONS: 0
HEADACHES: 0
MEMORY LOSS: 0
VOMITING: 0
NERVOUS/ANXIOUS: 0
SPUTUM PRODUCTION: 0
MYALGIAS: 0

## 2021-05-15 ASSESSMENT — FIBROSIS 4 INDEX
FIB4 SCORE: 1.9
FIB4 SCORE: 1.01

## 2021-05-15 ASSESSMENT — PATIENT HEALTH QUESTIONNAIRE - PHQ9
2. FEELING DOWN, DEPRESSED, IRRITABLE, OR HOPELESS: NOT AT ALL
SUM OF ALL RESPONSES TO PHQ9 QUESTIONS 1 AND 2: 0
1. LITTLE INTEREST OR PLEASURE IN DOING THINGS: NOT AT ALL

## 2021-05-15 NOTE — ED NOTES
Pharmacy Medication Reconciliation      Medication reconciliation updated and complete per pt at bedside  Allergies have been verified  No oral ABX within the last 14 days  [facility]    No current facility-administered medications on file prior to encounter.     Current Outpatient Medications on File Prior to Encounter   Medication Sig Dispense Refill   • atorvastatin (LIPITOR) 10 MG Tab TAKE ONE TABLET BY MOUTH ONE TIME DAILY  100 Tab 0   • montelukast (SINGULAIR) 10 MG Tab Take 1 Tab by mouth every evening. 30 Tab 11   • albuterol 108 (90 Base) MCG/ACT Aero Soln inhalation aerosol INHALE TWO PUFFS BY MOUTH EVERY SIX HOURS AS NEEDED FOR SHORTNESS OF BREATH  8.5 g 12   • predniSONE (DELTASONE) 10 MG Tab Take 30mg x 3 days, then take 20mg x 3 days, then take 10mg x 3 days, with food, then discontinue. (Patient not taking: Reported on 12/17/2020) 18 Tab 0   • fluticasone-salmeterol (ADVAIR DISKUS) 500-50 MCG/DOSE AEROSOL POWDER, BREATH ACTIVATED Inhale 1 Puff by mouth 2 times a day. Rinse mouth after each use. 3 Each 3   • azithromycin (ZITHROMAX) 250 MG Tab Take 500mg on day 1, then take 250mg on day 2 through 5 (Patient not taking: Reported on 12/17/2020) 6 Tab 0   • Spacer/Aero-Holding Chambers (E-Z SPACER) Device Use as directed 1 Device 3   • predniSONE (DELTASONE) 20 MG Tab Take 40mg daily x 5 days (Patient not taking: Reported on 7/6/2020) 10 Tab 0   • albuterol (PROVENTIL) 2.5mg/3ml Nebu Soln solution for nebulization 3 mL by Nebulization route every four hours as needed for Shortness of Breath. 75 mL 11   • vitamin D3, cholecalciferol, 1000 UNIT Tab Take 1 Tab by mouth every day. (Patient taking differently: Take 2,000 Units by mouth every day.) 100 Tab 4   • diltiazem CD (DILTIAZEM CD) 180 MG CAPSULE SR 24 HR Take 1 Cap by mouth 2 Times a Day. 60 Cap 1   • multivitamin (THERAGRAN) Tab Take 1 Tab by mouth every day.     • ascorbic acid (C 1000) 1000 MG tablet Take 1,000 mg by  mouth every day.     • Probiotic Product (PROBIOTIC DAILY PO) Take 1 Tab by mouth every day.     • apixaban (ELIQUIS) 5mg Tab Take 5 mg by mouth 2 Times a Day.

## 2021-05-15 NOTE — ASSESSMENT & PLAN NOTE
Patient has a history of atrial fibrillation and is on Eliquis and diltiazem 180 mg twice daily   -was in rapid ventricular response with heart rates ranging from 120s to 140s on admission, now resolved   -Continue home diltiazem  - continue eliquis  -As needed IV diltiazem for sustained heart rate greater than 140  -We will monitor on telemetry

## 2021-05-15 NOTE — ASSESSMENT & PLAN NOTE
This is Sepsis Present on admission  SIRS criteria identified on my evaluation include: Fever, with temperature greater than 101 deg F, Tachycardia, with heart rate greater than 90 BPM and Tachypnea, with respirations greater than 20 per minute  Source is COVID-19 pneumonia  Suspected onset of infection (date and time)   Sepsis protocol initiated  Fluid resuscitation ordered per protocol  IV antibiotics as appropriate for source of sepsis  While organ dysfunction may be noted elsewhere in this problem list or in the chart, degree of organ dysfunction does not meet CMS criteria for severe sepsis    Patient presented with signs and symptoms of sepsis, initially given sepsis fluids and IV Rocephin  -Covid 19 did return positive  -Hold on further antibiotics given negative procalcitonin   -avoid further IVF at this time

## 2021-05-15 NOTE — ASSESSMENT & PLAN NOTE
Patient presents with acute hypoxemic respiratory failure saturating 84% on room air  -Chest x-ray shows bilateral pulmonary infiltrates consistent with COVID-19 pneumonia  -She is COVID-19 positive  -Continue Decadron 6 mg daily x10 days (D3)  -RT protocol  -O2 per protocol wean as able  -Encourage I-S and proning as tolerable  -Avoid aggressive hydration, will give daily Lasix  - Failed O2 sat profile but does not wish to discharge with O2, will continue diuresis and consider recheck on ambulation in the am

## 2021-05-15 NOTE — ASSESSMENT & PLAN NOTE
-Chest x-ray showing diffuse interstitial infiltrates consistent with COVID-19 pneumonia  -Procal normal, no need for antibiotics at this time   -Encourage I-S and proning as tolerable  -Wean oxygen as able   - d-dimer negative   - Does not currently qualify for Remdesevir  - Continue lasix to keep pt on the dry side - echo with increased RV pressure from previous and RV volume overload   - Continue IV Lasix today, rales are improved from yesterday

## 2021-05-15 NOTE — ED TRIAGE NOTES
"Presents accompanied by family.  Pt C?O fever, and exertional dyspnea recurring for the past 2 days.  She meets the sepsis R/O protocol, and therefore she is roomed immediately.  Chief Complaint   Patient presents with   • Shortness of Breath     /63   Pulse (!) 121   Temp (!) 38.2 °C (100.8 °F) (Oral)   Resp (!) 22   Ht 1.702 m (5' 7\")   SpO2 96%   BMI 31.48 kg/m²      "

## 2021-05-15 NOTE — H&P
Hospital Medicine History & Physical Note    Date of Service  5/15/2021    Primary Care Physician  Avery Cruz M.D.    Consultants  NA    Code Status  Full Code    Chief Complaint  Chief Complaint   Patient presents with   • Shortness of Breath   • Fever       History of Presenting Illness  80 y.o. female with a past medical history  of atrial fibrillation on chronic anticoagulation, history of asthma, hypertension and hyperlipidemia who presented 5/15/2021 with 2 to 3 days of increasing shortness of breath with dry cough.  Patient denies any other associated symptoms including headache, nausea or vomiting, abdominal pain, she denies fever, chills or body aches.  Patient has not been vaccinated against COVID-19.     On admission patient is febrile with a temperature of 100.8, she is tachycardic with a heart rate ranging from 120-130s, she is tachypneic with RR 20- 40s, /75.  She is saturating 84% on room air, she was placed on 2L NC with improvement to >90%.     Labs are significant for normal WBC with a left shift, electrolytes renal function and liver function are all within normal limits.  BNP is elevated at 2082 patient is Covid positive.     Chest x-ray was personally reviewed and shows bilateral interstitial infiltrates consistent with COVID-19 pneumonia.     Patient was given sepsis fluids and 1 dose of ceftriaxone for presumed sepsis on admission.       Review of Systems  Review of Systems   Constitutional: Negative for chills, fever and malaise/fatigue.   HENT: Negative for congestion and sore throat.    Eyes: Negative for blurred vision and double vision.   Respiratory: Positive for cough and shortness of breath. Negative for sputum production.    Cardiovascular: Positive for leg swelling. Negative for chest pain and palpitations.   Gastrointestinal: Negative for abdominal pain, nausea and vomiting.   Genitourinary: Negative for dysuria, frequency and urgency.   Musculoskeletal: Negative for  myalgias.   Skin: Negative for rash.   Neurological: Negative for dizziness and headaches.   Psychiatric/Behavioral: Negative for depression, memory loss and substance abuse. The patient is not nervous/anxious.        Past Medical History   has a past medical history of A-fib (HCC) (2008), Asthma, Atrial fibrillation (HCC), Hyperlipidemia, and Hypertension.    Surgical History   has no past surgical history on file.     Family History  family history includes No Known Problems in her father and mother.     Social History   reports that she has never smoked. She has never used smokeless tobacco. She reports that she does not drink alcohol and does not use drugs.    Allergies  No Known Allergies    Medications  Prior to Admission Medications   Prescriptions Last Dose Informant Patient Reported? Taking?   Probiotic Product (PROBIOTIC DAILY PO)  Patient Yes No   Sig: Take 1 Tab by mouth every day.   Spacer/Aero-Holding Chambers (E-Z SPACER) Device   No No   Sig: Use as directed   albuterol (PROVENTIL) 2.5mg/3ml Nebu Soln solution for nebulization   No No   Sig: 3 mL by Nebulization route every four hours as needed for Shortness of Breath.   albuterol 108 (90 Base) MCG/ACT Aero Soln inhalation aerosol   No No   Sig: INHALE TWO PUFFS BY MOUTH EVERY SIX HOURS AS NEEDED FOR SHORTNESS OF BREATH    apixaban (ELIQUIS) 5mg Tab  Patient Yes No   Sig: Take 5 mg by mouth 2 Times a Day.   ascorbic acid (C 1000) 1000 MG tablet  Patient Yes No   Sig: Take 1,000 mg by mouth every day.   atorvastatin (LIPITOR) 10 MG Tab   No No   Sig: TAKE ONE TABLET BY MOUTH ONE TIME DAILY    azithromycin (ZITHROMAX) 250 MG Tab   No No   Sig: Take 500mg on day 1, then take 250mg on day 2 through 5   Patient not taking: Reported on 12/17/2020   diltiazem CD (DILTIAZEM CD) 180 MG CAPSULE SR 24 HR   No No   Sig: Take 1 Cap by mouth 2 Times a Day.   fluticasone-salmeterol (ADVAIR DISKUS) 500-50 MCG/DOSE AEROSOL POWDER, BREATH ACTIVATED   No No   Sig:  Inhale 1 Puff by mouth 2 times a day. Rinse mouth after each use.   montelukast (SINGULAIR) 10 MG Tab   No No   Sig: Take 1 Tab by mouth every evening.   multivitamin (THERAGRAN) Tab  Patient Yes No   Sig: Take 1 Tab by mouth every day.   predniSONE (DELTASONE) 10 MG Tab   No No   Sig: Take 30mg x 3 days, then take 20mg x 3 days, then take 10mg x 3 days, with food, then discontinue.   Patient not taking: Reported on 12/17/2020   predniSONE (DELTASONE) 20 MG Tab   No No   Sig: Take 40mg daily x 5 days   Patient not taking: Reported on 7/6/2020   vitamin D3, cholecalciferol, 1000 UNIT Tab   No No   Sig: Take 1 Tab by mouth every day.      Facility-Administered Medications: None       Physical Exam  Temp:  [38.2 °C (100.8 °F)] 38.2 °C (100.8 °F)  Pulse:  [] 125  Resp:  [22-51] 37  BP: (117-159)/(63-83) 117/83  SpO2:  [84 %-96 %] 96 %    Physical Exam  Vitals and nursing note reviewed.   Constitutional:       General: She is not in acute distress.     Appearance: Normal appearance. She is obese. She is not ill-appearing or toxic-appearing.   HENT:      Head: Normocephalic and atraumatic.      Mouth/Throat:      Mouth: Mucous membranes are dry.      Pharynx: Oropharynx is clear.   Eyes:      Extraocular Movements: Extraocular movements intact.      Conjunctiva/sclera: Conjunctivae normal.   Cardiovascular:      Rate and Rhythm: Tachycardia present. Rhythm irregular.      Pulses: Normal pulses.      Heart sounds: No murmur heard.     Pulmonary:      Breath sounds: Rhonchi present.      Comments: Tachypneic, diffuse rhonchi bilaterally   Abdominal:      General: Bowel sounds are normal. There is distension.      Palpations: Abdomen is soft.      Tenderness: There is no abdominal tenderness. There is no guarding.      Hernia: A hernia is present.   Musculoskeletal:         General: Normal range of motion.      Cervical back: Normal range of motion and neck supple.      Right lower leg: Edema present.      Left lower  leg: Edema present.      Comments: 1+ pitting edema bilaterally    Skin:     General: Skin is warm and dry.      Capillary Refill: Capillary refill takes 2 to 3 seconds.      Comments: venous skin changes in BLE   Neurological:      General: No focal deficit present.      Mental Status: She is alert and oriented to person, place, and time. Mental status is at baseline.      Cranial Nerves: No cranial nerve deficit.   Psychiatric:         Mood and Affect: Mood normal.         Behavior: Behavior normal.         Thought Content: Thought content normal.         Judgment: Judgment normal.         Laboratory:  Recent Labs     05/15/21  1322   WBC 8.8   RBC 5.23   HEMOGLOBIN 14.5   HEMATOCRIT 44.9   MCV 85.9   MCH 27.7   MCHC 32.3*   RDW 49.2   PLATELETCT 203   MPV 9.7     Recent Labs     05/15/21  1322   SODIUM 136   POTASSIUM 4.0   CHLORIDE 96   CO2 24   GLUCOSE 113*   BUN 13   CREATININE 0.78   CALCIUM 9.5     Recent Labs     05/15/21  1322   ALTSGPT 16   ASTSGOT 19   ALKPHOSPHAT 94   TBILIRUBIN 0.6   GLUCOSE 113*     Recent Labs     05/15/21  1423   APTT 34.3   INR 1.51*     Recent Labs     05/15/21  1322   NTPROBNP 2082*         No results for input(s): TROPONINT in the last 72 hours.    Imaging:  DX-CHEST-PORTABLE (1 VIEW)   Final Result      Enlarged cardiac silhouette with interstitial prominence suggesting pulmonary edema.      Right hilar soft tissue density again noted could represent enlargement of the ascending aorta or pulmonary arteries.            Assessment/Plan:  I anticipate this patient will require at least two midnights for appropriate medical management, necessitating inpatient admission.    * Acute hypoxemic respiratory failure due to COVID-19 ([facility])- (present on admission)  Assessment & Plan  Patient presents with acute hypoxemic respiratory failure saturating 84% on room air  -Chest x-ray shows bilateral pulmonary infiltrates consistent with COVID-19 pneumonia  -She is COVID-19 positive  -We will  start patient on Decadron 6 mg daily x10 days  -RT protocol  -O2 per protocol wean as able  -Encourage I-S and proning as tolerable  -Avoid aggressive hydration     Sepsis (HCC)- (present on admission)  Assessment & Plan  This is Sepsis Present on admission  SIRS criteria identified on my evaluation include: Fever, with temperature greater than 101 deg F, Tachycardia, with heart rate greater than 90 BPM and Tachypnea, with respirations greater than 20 per minute  Source is COVID-19 pneumonia  Suspected onset of infection (date and time)   Sepsis protocol initiated  Fluid resuscitation ordered per protocol  IV antibiotics as appropriate for source of sepsis  While organ dysfunction may be noted elsewhere in this problem list or in the chart, degree of organ dysfunction does not meet CMS criteria for severe sepsis    Patient presented with signs and symptoms of sepsis, initially given sepsis fluids and IV Rocephin  -Covid 19 did return positive  -Hold on further antibiotics pending procalcitonin   -avoid further IVF at this time      Pneumonia due to COVID-19 virus  Assessment & Plan  Patient presents with COVID-19 pneumonia  -Chest x-ray showing diffuse interstitial infiltrates consistent with COVID-19 pneumonia  -Patient was given 1 dose of ceftriaxone in the ER  -We will hold further antibiotics unless procalcitonin returns positive  -RT protocol  - Supportive care with antitussives  -Encourage I-S and proning as tolerable  -Wean oxygen as able     Hypertension- (present on admission)  Assessment & Plan  History of hypertension, currently stable  Continue home p.o. diltiazem  IV labetalol and Vasotec as needed with parameters    Atrial fibrillation with RVR (HCC)- (present on admission)  Assessment & Plan  Patient has a history of atrial fibrillation and is on Eliquis and diltiazem 180 mg twice daily   -she is in rapid ventricular response with heart rates ranging from 120s to 140s, relatively asymptomatic  -Resume  home diltiazem  - continue eliquis  -As needed IV diltiazem for sustained heart rate greater than 140  -We will monitor on telemetry    Hyperlipidemia  Assessment & Plan  Chronic hyperlipidemia, continue home statin    Type 2 diabetes mellitus with hyperglycemia, without long-term current use of insulin (HCC)  Assessment & Plan  History of diabetes in the past, not currently on diabetic medications  - Most recent hemoglobin A1c is 5.9%   - will repeat

## 2021-05-15 NOTE — ASSESSMENT & PLAN NOTE
History of hypertension, currently stable  Continue home p.o. diltiazem  IV labetalol and Vasotec as needed with parameters

## 2021-05-15 NOTE — ASSESSMENT & PLAN NOTE
- History of diabetes in the past, not currently on diabetic medications  - A1c 5.9  - Will add fingersticks and SSI since she is now on steroids

## 2021-05-15 NOTE — ED PROVIDER NOTES
"ED Provider Note    CHIEF COMPLAINT  Chief Complaint   Patient presents with   • Shortness of Breath       HPI  Jessica lBack is a 80 y.o. female who presents with a past medical history seen for atrial fibrillation, asthma, hypertension, high cholesterol, she has had cough, fever, she reports the cough is clear mucus, she denies chest pain.  She also describes shortness of breath.  She describes her symptoms as moderate.  She denies being on home oxygen.    REVIEW OF SYSTEMS  See HPI for further details. All other systems are negative.     PAST MEDICAL HISTORY   has a past medical history of A-fib (HCC) (2008), Asthma, Atrial fibrillation (HCC), Hyperlipidemia, and Hypertension.    SOCIAL HISTORY  Social History     Tobacco Use   • Smoking status: Never Smoker   • Smokeless tobacco: Never Used   Vaping Use   • Vaping Use: Never used   Substance and Sexual Activity   • Alcohol use: No   • Drug use: No   • Sexual activity: Not Currently     Partners: Male       SURGICAL HISTORY  patient denies any surgical history    CURRENT MEDICATIONS  Home Medications    **Home medications have not yet been reviewed for this encounter**         ALLERGIES  No Known Allergies    PHYSICAL EXAM  VITAL SIGNS: /63   Pulse (!) 121   Temp (!) 38.2 °C (100.8 °F) (Oral)   Resp (!) 22   Ht 1.702 m (5' 7\")   SpO2 96%   BMI 31.48 kg/m²  @LINDSEY[[id number]::@   Pulse ox interpretation: I interpret this pulse ox as hypoxic on room air.  Constitutional: Alert.  HENT: No signs of trauma, Bilateral external ears normal, Nose normal.   Eyes: Pupils are equal and reactive, Conjunctiva normal, Non-icteric.   Neck: Normal range of motion, No tenderness, Supple, No stridor.   Lymphatic: No lymphadenopathy noted.   Cardiovascular: Regular rate and rhythm, no murmurs.   Thorax & Lungs: Normal breath sounds, No respiratory distress, No wheezing, No chest tenderness.   Abdomen: Bowel sounds normal, Soft, No tenderness, No masses, No pulsatile masses. " No peritoneal signs.  Skin: Warm, Dry, No erythema, No rash.   Back: No bony tenderness, No CVA tenderness.   Extremities: Intact distal pulses, No edema, No tenderness, No cyanosis.  Musculoskeletal: Good range of motion in all major joints. No tenderness to palpation or major deformities noted.   Neurologic: Alert , Normal motor function, Normal sensory function, No focal deficits noted.   Psychiatric: Affect normal, Judgment normal, Mood normal.       DIAGNOSTIC STUDIES / PROCEDURES    EKG  There is a 12-lead EKG interpretation by myself. It is sinus tachycardia at a rate of 128. There are multiple PVCs. The QRS intervals prolonged 135. There are nonspecific ST-T wave changes. Compared with EKG from 8/22/2016 there are nonspecific changes. My impression of this EKG, tachycardia, does not meet STEMI criteria at this time.    LABS  Labs Reviewed   CBC WITH DIFFERENTIAL - Abnormal; Notable for the following components:       Result Value    MCHC 32.3 (*)     Neutrophils-Polys 82.20 (*)     Lymphocytes 6.30 (*)     Neutrophils (Absolute) 7.23 (*)     Lymphs (Absolute) 0.55 (*)     Monos (Absolute) 0.96 (*)     All other components within normal limits    Narrative:     Indicate which anticoagulants the patient is on:->NONE   COMP METABOLIC PANEL - Abnormal; Notable for the following components:    Glucose 113 (*)     All other components within normal limits    Narrative:     Indicate which anticoagulants the patient is on:->NONE   COV-2, FLU A/B, AND RSV BY PCR - Abnormal; Notable for the following components:    SARS-CoV-2 by PCR DETECTED (*)     All other components within normal limits    Narrative:     Indication for culture:->Evaluation for sepsis without a  clear source of infection  Have you been in close contact with a person who is suspected  or known to be positive for COVID-19 within the last 30 days  (e.g. last seen that person < 30 days ago)->Unknown   PROBRAIN NATRIURETIC PEPTIDE, NT - Abnormal;  "Notable for the following components:    NT-proBNP 2082 (*)     All other components within normal limits    Narrative:     Indicate which anticoagulants the patient is on:->NONE   BLOOD CULTURE    Narrative:     Per Hospital Policy: Only change Specimen Src: to \"Line\" if  specified by physician order.   BLOOD CULTURE    Narrative:     Per Hospital Policy: Only change Specimen Src: to \"Line\" if  specified by physician order.   APTT    Narrative:     Indicate which anticoagulants the patient is on:->NONE   PROTHROMBIN TIME    Narrative:     Indicate which anticoagulants the patient is on:->NONE   LACTIC ACID    Narrative:     Indicate which anticoagulants the patient is on:->NONE   URINE CULTURE(NEW)   ESTIMATED GFR    Narrative:     Indicate which anticoagulants the patient is on:->NONE   LACTIC ACID   LACTIC ACID   URINALYSIS         RADIOLOGY  DX-CHEST-PORTABLE (1 VIEW)   Final Result      Enlarged cardiac silhouette with interstitial prominence suggesting pulmonary edema.      Right hilar soft tissue density again noted could represent enlargement of the ascending aorta or pulmonary arteries.              COURSE & MEDICAL DECISION MAKING  Pertinent Labs & Imaging studies reviewed. (See chart for details)    Differential diagnosis: COVID-19, pneumonia, sepsis    Initially the patient was given Rocephin IV and fluid bolus per sepsis protocol.    The patient's Covid test is positive, she is hypoxic.  She will be assessed for hospitalization by the [facility] hospitalist.        FINAL IMPRESSION  1. COVID-19     2. Hypoxia                Electronically signed by: Eddie Abarca M.D., 5/15/2021 1:08 PM    "

## 2021-05-15 NOTE — ED NOTES
Mariama from Lab called with critical result of covid + at 1428. Critical lab result read back to Mariama.   Dr. Perdomo notified of critical lab result at 1429.  Critical lab result read back by Dr. Perdomo.

## 2021-05-16 ENCOUNTER — APPOINTMENT (OUTPATIENT)
Dept: CARDIOLOGY | Facility: MEDICAL CENTER | Age: 81
DRG: 871 | End: 2021-05-16
Attending: FAMILY MEDICINE
Payer: MEDICARE

## 2021-05-16 LAB
ANION GAP SERPL CALC-SCNC: 12 MMOL/L (ref 7–16)
BASOPHILS # BLD AUTO: 0.3 % (ref 0–1.8)
BASOPHILS # BLD: 0.02 K/UL (ref 0–0.12)
BUN SERPL-MCNC: 14 MG/DL (ref 8–22)
CALCIUM SERPL-MCNC: 8.9 MG/DL (ref 8.4–10.2)
CHLORIDE SERPL-SCNC: 105 MMOL/L (ref 96–112)
CO2 SERPL-SCNC: 23 MMOL/L (ref 20–33)
CREAT SERPL-MCNC: 0.64 MG/DL (ref 0.5–1.4)
EOSINOPHIL # BLD AUTO: 0 K/UL (ref 0–0.51)
EOSINOPHIL NFR BLD: 0 % (ref 0–6.9)
ERYTHROCYTE [DISTWIDTH] IN BLOOD BY AUTOMATED COUNT: 50.9 FL (ref 35.9–50)
EST. AVERAGE GLUCOSE BLD GHB EST-MCNC: 123 MG/DL
GLUCOSE BLD-MCNC: 133 MG/DL (ref 65–99)
GLUCOSE BLD-MCNC: 175 MG/DL (ref 65–99)
GLUCOSE BLD-MCNC: 230 MG/DL (ref 65–99)
GLUCOSE SERPL-MCNC: 162 MG/DL (ref 65–99)
HBA1C MFR BLD: 5.9 % (ref 4–5.6)
HCT VFR BLD AUTO: 42.7 % (ref 37–47)
HGB BLD-MCNC: 13.5 G/DL (ref 12–16)
IMM GRANULOCYTES # BLD AUTO: 0.04 K/UL (ref 0–0.11)
IMM GRANULOCYTES NFR BLD AUTO: 0.6 % (ref 0–0.9)
LV EJECT FRACT  99904: 60
LV EJECT FRACT MOD 2C 99903: 56.83
LV EJECT FRACT MOD 4C 99902: 64.44
LV EJECT FRACT MOD BP 99901: 58.41
LYMPHOCYTES # BLD AUTO: 0.33 K/UL (ref 1–4.8)
LYMPHOCYTES NFR BLD: 5.1 % (ref 22–41)
MAGNESIUM SERPL-MCNC: 1.9 MG/DL (ref 1.5–2.5)
MCH RBC QN AUTO: 27.5 PG (ref 27–33)
MCHC RBC AUTO-ENTMCNC: 31.6 G/DL (ref 33.6–35)
MCV RBC AUTO: 87 FL (ref 81.4–97.8)
MONOCYTES # BLD AUTO: 0.25 K/UL (ref 0–0.85)
MONOCYTES NFR BLD AUTO: 3.9 % (ref 0–13.4)
NEUTROPHILS # BLD AUTO: 5.79 K/UL (ref 2–7.15)
NEUTROPHILS NFR BLD: 90.1 % (ref 44–72)
NRBC # BLD AUTO: 0 K/UL
NRBC BLD-RTO: 0 /100 WBC
PLATELET # BLD AUTO: 155 K/UL (ref 164–446)
PMV BLD AUTO: 10.1 FL (ref 9–12.9)
POTASSIUM SERPL-SCNC: 4 MMOL/L (ref 3.6–5.5)
RBC # BLD AUTO: 4.91 M/UL (ref 4.2–5.4)
SODIUM SERPL-SCNC: 140 MMOL/L (ref 135–145)
TROPONIN T SERPL-MCNC: 13 NG/L (ref 6–19)
TROPONIN T SERPL-MCNC: 15 NG/L (ref 6–19)
WBC # BLD AUTO: 6.4 K/UL (ref 4.8–10.8)

## 2021-05-16 PROCEDURE — 700102 HCHG RX REV CODE 250 W/ 637 OVERRIDE(OP): Performed by: FAMILY MEDICINE

## 2021-05-16 PROCEDURE — A9270 NON-COVERED ITEM OR SERVICE: HCPCS | Performed by: STUDENT IN AN ORGANIZED HEALTH CARE EDUCATION/TRAINING PROGRAM

## 2021-05-16 PROCEDURE — 700102 HCHG RX REV CODE 250 W/ 637 OVERRIDE(OP): Performed by: STUDENT IN AN ORGANIZED HEALTH CARE EDUCATION/TRAINING PROGRAM

## 2021-05-16 PROCEDURE — 93325 DOPPLER ECHO COLOR FLOW MAPG: CPT

## 2021-05-16 PROCEDURE — 700111 HCHG RX REV CODE 636 W/ 250 OVERRIDE (IP): Performed by: FAMILY MEDICINE

## 2021-05-16 PROCEDURE — 94640 AIRWAY INHALATION TREATMENT: CPT

## 2021-05-16 PROCEDURE — A9270 NON-COVERED ITEM OR SERVICE: HCPCS | Performed by: FAMILY MEDICINE

## 2021-05-16 PROCEDURE — 93321 DOPPLER ECHO F-UP/LMTD STD: CPT | Mod: 26 | Performed by: INTERNAL MEDICINE

## 2021-05-16 PROCEDURE — 83036 HEMOGLOBIN GLYCOSYLATED A1C: CPT

## 2021-05-16 PROCEDURE — 85025 COMPLETE CBC W/AUTO DIFF WBC: CPT

## 2021-05-16 PROCEDURE — 80048 BASIC METABOLIC PNL TOTAL CA: CPT

## 2021-05-16 PROCEDURE — 82962 GLUCOSE BLOOD TEST: CPT | Mod: 91

## 2021-05-16 PROCEDURE — 93308 TTE F-UP OR LMTD: CPT | Mod: 26 | Performed by: INTERNAL MEDICINE

## 2021-05-16 PROCEDURE — 99233 SBSQ HOSP IP/OBS HIGH 50: CPT | Performed by: FAMILY MEDICINE

## 2021-05-16 PROCEDURE — 93325 DOPPLER ECHO COLOR FLOW MAPG: CPT | Mod: 26 | Performed by: INTERNAL MEDICINE

## 2021-05-16 PROCEDURE — 84484 ASSAY OF TROPONIN QUANT: CPT | Mod: 91

## 2021-05-16 PROCEDURE — 770020 HCHG ROOM/CARE - TELE (206)

## 2021-05-16 PROCEDURE — 83735 ASSAY OF MAGNESIUM: CPT

## 2021-05-16 RX ORDER — FUROSEMIDE 10 MG/ML
40 INJECTION INTRAMUSCULAR; INTRAVENOUS
Status: DISCONTINUED | OUTPATIENT
Start: 2021-05-16 | End: 2021-05-18 | Stop reason: HOSPADM

## 2021-05-16 RX ORDER — DEXTROSE MONOHYDRATE 25 G/50ML
50 INJECTION, SOLUTION INTRAVENOUS
Status: DISCONTINUED | OUTPATIENT
Start: 2021-05-16 | End: 2021-05-18 | Stop reason: HOSPADM

## 2021-05-16 RX ORDER — GUAIFENESIN 600 MG/1
1200 TABLET, EXTENDED RELEASE ORAL EVERY 12 HOURS
Status: DISCONTINUED | OUTPATIENT
Start: 2021-05-16 | End: 2021-05-18 | Stop reason: HOSPADM

## 2021-05-16 RX ORDER — FUROSEMIDE 40 MG/1
40 TABLET ORAL
Status: CANCELLED | OUTPATIENT
Start: 2021-05-16

## 2021-05-16 RX ORDER — POTASSIUM CHLORIDE 20 MEQ/1
10 TABLET, EXTENDED RELEASE ORAL DAILY
Status: DISCONTINUED | OUTPATIENT
Start: 2021-05-16 | End: 2021-05-18 | Stop reason: HOSPADM

## 2021-05-16 RX ADMIN — DEXAMETHASONE 6 MG: 4 TABLET ORAL at 05:52

## 2021-05-16 RX ADMIN — Medication 2000 UNITS: at 05:52

## 2021-05-16 RX ADMIN — MONTELUKAST 10 MG: 10 TABLET, FILM COATED ORAL at 17:28

## 2021-05-16 RX ADMIN — INSULIN HUMAN 1 UNITS: 100 INJECTION, SOLUTION PARENTERAL at 11:17

## 2021-05-16 RX ADMIN — APIXABAN 5 MG: 5 TABLET, FILM COATED ORAL at 10:14

## 2021-05-16 RX ADMIN — INSULIN HUMAN 2 UNITS: 100 INJECTION, SOLUTION PARENTERAL at 20:38

## 2021-05-16 RX ADMIN — BUDESONIDE AND FORMOTEROL FUMARATE DIHYDRATE 2 PUFF: 160; 4.5 AEROSOL RESPIRATORY (INHALATION) at 10:04

## 2021-05-16 RX ADMIN — FUROSEMIDE 40 MG: 10 INJECTION, SOLUTION INTRAMUSCULAR; INTRAVENOUS at 11:52

## 2021-05-16 RX ADMIN — BUDESONIDE AND FORMOTEROL FUMARATE DIHYDRATE 2 PUFF: 160; 4.5 AEROSOL RESPIRATORY (INHALATION) at 20:09

## 2021-05-16 RX ADMIN — DILTIAZEM HYDROCHLORIDE 180 MG: 180 CAPSULE, COATED, EXTENDED RELEASE ORAL at 05:51

## 2021-05-16 RX ADMIN — ATORVASTATIN CALCIUM 10 MG: 10 TABLET, FILM COATED ORAL at 05:52

## 2021-05-16 RX ADMIN — GUAIFENESIN 1200 MG: 600 TABLET, EXTENDED RELEASE ORAL at 17:28

## 2021-05-16 RX ADMIN — ALBUTEROL SULFATE 2 PUFF: 90 AEROSOL, METERED RESPIRATORY (INHALATION) at 20:09

## 2021-05-16 RX ADMIN — THERA TABS 1 TABLET: TAB at 05:52

## 2021-05-16 RX ADMIN — DILTIAZEM HYDROCHLORIDE 180 MG: 180 CAPSULE, COATED, EXTENDED RELEASE ORAL at 17:28

## 2021-05-16 RX ADMIN — OXYCODONE HYDROCHLORIDE AND ACETAMINOPHEN 1000 MG: 500 TABLET ORAL at 05:52

## 2021-05-16 RX ADMIN — APIXABAN 5 MG: 5 TABLET, FILM COATED ORAL at 17:31

## 2021-05-16 RX ADMIN — POTASSIUM CHLORIDE 10 MEQ: 1500 TABLET, EXTENDED RELEASE ORAL at 11:51

## 2021-05-16 RX ADMIN — GUAIFENESIN 600 MG: 600 TABLET, EXTENDED RELEASE ORAL at 05:51

## 2021-05-16 ASSESSMENT — CHA2DS2 SCORE
AGE 65 TO 74: NO
HYPERTENSION: YES
CHA2DS2 VASC SCORE: 4
VASCULAR DISEASE: NO
CHF OR LEFT VENTRICULAR DYSFUNCTION: NO
PRIOR STROKE OR TIA OR THROMBOEMBOLISM: NO
SEX: FEMALE
AGE 75 OR GREATER: YES
DIABETES: NO

## 2021-05-16 ASSESSMENT — ENCOUNTER SYMPTOMS
SHORTNESS OF BREATH: 1
FEVER: 0
ABDOMINAL PAIN: 0
COUGH: 1
CHILLS: 0
NAUSEA: 0
VOMITING: 0

## 2021-05-16 ASSESSMENT — PAIN DESCRIPTION - PAIN TYPE: TYPE: ACUTE PAIN

## 2021-05-16 ASSESSMENT — FIBROSIS 4 INDEX: FIB4 SCORE: 2.48

## 2021-05-16 NOTE — PROGRESS NOTES
2 RN Skin Check    2 RN skin check completed with GABO Hammond.   Devices in place: SCDs, Nasal Cannula, Telemetry box, PIV x 1.  Skin assessed under devices: yes.  Confirmed pressure ulcers found on: N/A.  New potential pressure ulcers noted on N/A. Wound consult placed No.  The following interventions in place: pillows.    All bony prominences and skin folds inspected. Dark/discolored, intact skin noted on bilateral lower extremities.

## 2021-05-16 NOTE — PROGRESS NOTES
Report received from GABO Hammond. Assumed pt care. Pt is AOx4, denies any pain or other distress at this time. Discussed POC including supplemental oxygen administration, breathing treatments as needed, telemetry monitoring. Pt verbalized understanding. Hourly rounding in place. Fall precautions in place and call lights w/in reach.

## 2021-05-16 NOTE — PROGRESS NOTES
Pt arrived to unit via gurney. Ambulated from rWeldon to bed, standby assist. Tele monitor applied, vitals taken. Pt assessed. A&O x 4. Admit profile and med rec complete. Discussed POC with pt, including oxygen monitoring, isolation precautions and medications ordered. Welcome folder provided and discussed. Communication board filled out. Questions and concerns addressed, verbalized understanding. Fall precautions in place. Pt demonstrates ability to use call light appropriately. Pt left in lowest position. Bed locked and low.

## 2021-05-16 NOTE — PROGRESS NOTES
Hospital Medicine Daily Progress Note    Date of Service  5/16/2021    Chief Complaint  80 y.o. female admitted 5/15/2021 with COVID PNA    Hospital Course  80 y.o. female with a past medical history  of atrial fibrillation on chronic anticoagulation, history of asthma, hypertension and hyperlipidemia who presented 5/15/2021 with 2 to 3 days of increasing shortness of breath with dry cough.  Patient denies any other associated symptoms including headache, nausea or vomiting, abdominal pain, she denies fever, chills or body aches.  Patient has not been vaccinated against COVID-19.      On admission patient is febrile with a temperature of 100.8, she is tachycardic with a heart rate ranging from 120-130s, she is tachypneic with RR 20- 40s, /75.  She is saturating 84% on room air, she was placed on 2L NC with improvement to >90%.      Labs are significant for normal WBC with a left shift, electrolytes renal function and liver function are all within normal limits.  BNP is elevated at 2082 patient is Covid positive.      Chest x-ray was personally reviewed and shows bilateral interstitial infiltrates consistent with COVID-19 pneumonia.      Patient was given sepsis fluids and 1 dose of ceftriaxone for presumed sepsis on admission    Interval Problem Update  5/16 - Pt currently does not qualify for Remdesevir, is on D2 of Decadron. Requiring 3L this am, now down to 1L  - will add diuresis as echo in 2020 with elevated R heart pressures and BNP elevated here. Procal is normal, no abx needed. Pt does state that she has had increasing BLE edema, noticeable more when she travels.     Consultants/Specialty  None    Code Status  Full Code    Disposition  Continued hospitalization    Review of Systems  Review of Systems   Constitutional: Negative for chills and fever.   Respiratory: Positive for cough and shortness of breath.    Cardiovascular: Positive for leg swelling. Negative for chest pain.   Gastrointestinal:  Negative for abdominal pain, nausea and vomiting.   All other systems reviewed and are negative.       Physical Exam  Temp:  [36.3 °C (97.3 °F)-38.2 °C (100.8 °F)] 36.4 °C (97.5 °F)  Pulse:  [] 81  Resp:  [16-22] 18  BP: (115-159)/(53-88) 128/88  SpO2:  [84 %-97 %] 97 %    Physical Exam  Vitals and nursing note reviewed.   Constitutional:       Appearance: Normal appearance.   HENT:      Head: Normocephalic and atraumatic.      Mouth/Throat:      Mouth: Mucous membranes are moist.   Cardiovascular:      Rate and Rhythm: Normal rate. Rhythm irregular.   Pulmonary:      Effort: Pulmonary effort is normal. No respiratory distress.      Comments: Coarse breath sounds bilaterally with expiratory rhonchi    Abdominal:      General: Abdomen is flat. Bowel sounds are normal.      Palpations: Abdomen is soft.   Musculoskeletal:         General: Swelling (Trace BLE PE) present.   Skin:     Coloration: Skin is not pale.   Neurological:      General: No focal deficit present.      Mental Status: She is alert and oriented to person, place, and time.   Psychiatric:         Behavior: Behavior normal.         Fluids    Intake/Output Summary (Last 24 hours) at 5/16/2021 0939  Last data filed at 5/16/2021 0800  Gross per 24 hour   Intake 440 ml   Output --   Net 440 ml       Laboratory  Recent Labs     05/15/21  1322 05/16/21  0203   WBC 8.8 6.4   RBC 5.23 4.91   HEMOGLOBIN 14.5 13.5   HEMATOCRIT 44.9 42.7   MCV 85.9 87.0   MCH 27.7 27.5   MCHC 32.3* 31.6*   RDW 49.2 50.9*   PLATELETCT 203 155*   MPV 9.7 10.1     Recent Labs     05/15/21  1322 05/16/21  0203   SODIUM 136 140   POTASSIUM 4.0 4.0   CHLORIDE 96 105   CO2 24 23   GLUCOSE 113* 162*   BUN 13 14   CREATININE 0.78 0.64   CALCIUM 9.5 8.9     Recent Labs     05/15/21  1423   APTT 34.3   INR 1.51*               Imaging  DX-CHEST-PORTABLE (1 VIEW)   Final Result      Enlarged cardiac silhouette with interstitial prominence suggesting pulmonary edema.      Right hilar soft  tissue density again noted could represent enlargement of the ascending aorta or pulmonary arteries.      EC-ECHOCARDIOGRAM COMPLETE W/ CONT    (Results Pending)        Assessment/Plan  * Acute hypoxemic respiratory failure due to COVID-19 (HCC)- (present on admission)  Assessment & Plan  Patient presents with acute hypoxemic respiratory failure saturating 84% on room air  -Chest x-ray shows bilateral pulmonary infiltrates consistent with COVID-19 pneumonia  -She is COVID-19 positive  -Continue Decadron 6 mg daily x10 days (D2)  -RT protocol  -O2 per protocol wean as able  -Encourage I-S and proning as tolerable  -Avoid aggressive hydration, will give daily Lasix    Hypertension- (present on admission)  Assessment & Plan  History of hypertension, currently stable  Continue home p.o. diltiazem  IV labetalol and Vasotec as needed with parameters    Sepsis (HCC)- (present on admission)  Assessment & Plan  This is Sepsis Present on admission  SIRS criteria identified on my evaluation include: Fever, with temperature greater than 101 deg F, Tachycardia, with heart rate greater than 90 BPM and Tachypnea, with respirations greater than 20 per minute  Source is COVID-19 pneumonia  Suspected onset of infection (date and time)   Sepsis protocol initiated  Fluid resuscitation ordered per protocol  IV antibiotics as appropriate for source of sepsis  While organ dysfunction may be noted elsewhere in this problem list or in the chart, degree of organ dysfunction does not meet CMS criteria for severe sepsis    Patient presented with signs and symptoms of sepsis, initially given sepsis fluids and IV Rocephin  -Covid 19 did return positive  -Hold on further antibiotics given negative procalcitonin   -avoid further IVF at this time      Pneumonia due to COVID-19 virus  Assessment & Plan  Patient presents with COVID-19 pneumonia  -Chest x-ray showing diffuse interstitial infiltrates consistent with COVID-19 pneumonia  -Procal normal,  no need for antibiotics at this time   -RT protocol  - Supportive care with antitussives  -Encourage I-S and proning as tolerable  -Wean oxygen as able   - Recheck d dimer, CRP and ferritin in the am (q48hr)  - Does not currently qualify for Remdesevir  - Start lasix to keep pt on the dry side - echo in 2020 with elevated R heart pressures, BNP elevated here, CXR with vascular congestions. She has trace edema and significant rhonchi with occasional rales on exam.  - Recheck echo.    Type 2 diabetes mellitus with hyperglycemia, without long-term current use of insulin ([facility])  Assessment & Plan  History of diabetes in the past, not currently on diabetic medications  - Most recent hemoglobin A1c is 5.9%   - will repeat A1c  - Will add fingersticks and SSI since she is now on steroids      Atrial fibrillation with RVR ([facility])- (present on admission)  Assessment & Plan  Patient has a history of atrial fibrillation and is on Eliquis and diltiazem 180 mg twice daily   -was in rapid ventricular response with heart rates ranging from 120s to 140s on admission, now resolved   -Continue home diltiazem  - continue eliquis  -As needed IV diltiazem for sustained heart rate greater than 140  -We will monitor on telemetry  - Nursing reports frequent ectopic beats on telemetry, will check a mag     Hyperlipidemia  Assessment & Plan  Chronic hyperlipidemia, continue home statin       VTE prophylaxis: Eliquis

## 2021-05-16 NOTE — CARE PLAN
The patient is Watcher - Medium risk of patient condition declining or worsening    Shift Goals  Clinical Goals: Maintain adequate oxygenation  Patient Goals: Breathe easier    Progress made toward(s) clinical / shift goals:  Pt maintaining oxygenation on 3L NC.    Patient is not progressing towards the following goals:n/a      Problem: Knowledge Deficit - Standard  Goal: Patient and family/care givers will demonstrate understanding of plan of care, disease process/condition, diagnostic tests and medications  5/16/2021 0108 by Duc Ayala R.N.  Outcome: Progressing  Note: Discussed POC including supplemental oxygen and breathing treatments as needed. Pt verbalized understanding.  5/16/2021 0105 by Duc Ayala R.N.  Outcome: Progressing     Problem: Respiratory  Goal: Patient will achieve/maintain optimum respiratory ventilation and gas exchange  Outcome: Progressing  Note: Pt maintaining adequate oxygenation on supplemental oxygen 3L NC

## 2021-05-16 NOTE — PROGRESS NOTES
Telemetry Shift Summary    Rhythm Afib w/ BBB  HR Range 80s-110s  Ectopy fPVC, rCoup, rBig, rTrig  Measurements -/0.14/-        Normal Values  Rhythm SR  HR Range    Measurements 0.12-0.20 / 0.06-0.10  / 0.30-0.52

## 2021-05-16 NOTE — PROGRESS NOTES
Pt resting calmly in isolation room for COVID 19. Pt on 3 L NC, Pt denies pain, able to ambulate to BR with steady gait, tolerating breakfast, POC reviewed, WCM

## 2021-05-16 NOTE — PROGRESS NOTES
Report given to Brian AYON. Plan of care discussed. Patient resting comfortably in bed. Safety precautions in place.

## 2021-05-16 NOTE — PROGRESS NOTES
Tele Note::    Atrial Fibrillation, rate   Ectopy: Frequent PVC's, couplets and triplets  4 Beat runs of VTACH X 3 events    -/0.12/-    MD Dr Hanson was notified of Pt's ectopy. Pt remained asymptomatic, denies CP or SOB.

## 2021-05-17 LAB
ALBUMIN SERPL BCP-MCNC: 3.7 G/DL (ref 3.2–4.9)
ALBUMIN/GLOB SERPL: 1.5 G/DL
ALP SERPL-CCNC: 74 U/L (ref 30–99)
ALT SERPL-CCNC: 15 U/L (ref 2–50)
ANION GAP SERPL CALC-SCNC: 11 MMOL/L (ref 7–16)
AST SERPL-CCNC: 17 U/L (ref 12–45)
BASOPHILS # BLD AUTO: 0 % (ref 0–1.8)
BASOPHILS # BLD: 0 K/UL (ref 0–0.12)
BILIRUB SERPL-MCNC: 0.3 MG/DL (ref 0.1–1.5)
BUN SERPL-MCNC: 30 MG/DL (ref 8–22)
CALCIUM SERPL-MCNC: 9.3 MG/DL (ref 8.4–10.2)
CHLORIDE SERPL-SCNC: 103 MMOL/L (ref 96–112)
CO2 SERPL-SCNC: 24 MMOL/L (ref 20–33)
CREAT SERPL-MCNC: 0.85 MG/DL (ref 0.5–1.4)
CRP SERPL HS-MCNC: 2.17 MG/DL (ref 0–0.75)
D DIMER PPP IA.FEU-MCNC: <0.27 UG/ML (FEU) (ref 0–0.5)
EOSINOPHIL # BLD AUTO: 0 K/UL (ref 0–0.51)
EOSINOPHIL NFR BLD: 0 % (ref 0–6.9)
ERYTHROCYTE [DISTWIDTH] IN BLOOD BY AUTOMATED COUNT: 49.6 FL (ref 35.9–50)
FERRITIN SERPL-MCNC: 104 NG/ML (ref 10–291)
GLOBULIN SER CALC-MCNC: 2.4 G/DL (ref 1.9–3.5)
GLUCOSE BLD-MCNC: 129 MG/DL (ref 65–99)
GLUCOSE BLD-MCNC: 162 MG/DL (ref 65–99)
GLUCOSE BLD-MCNC: 180 MG/DL (ref 65–99)
GLUCOSE SERPL-MCNC: 141 MG/DL (ref 65–99)
HCT VFR BLD AUTO: 42.5 % (ref 37–47)
HGB BLD-MCNC: 13.2 G/DL (ref 12–16)
IMM GRANULOCYTES # BLD AUTO: 0.06 K/UL (ref 0–0.11)
IMM GRANULOCYTES NFR BLD AUTO: 0.5 % (ref 0–0.9)
LYMPHOCYTES # BLD AUTO: 0.46 K/UL (ref 1–4.8)
LYMPHOCYTES NFR BLD: 4.1 % (ref 22–41)
MAGNESIUM SERPL-MCNC: 2 MG/DL (ref 1.5–2.5)
MCH RBC QN AUTO: 26.6 PG (ref 27–33)
MCHC RBC AUTO-ENTMCNC: 31.1 G/DL (ref 33.6–35)
MCV RBC AUTO: 85.7 FL (ref 81.4–97.8)
MONOCYTES # BLD AUTO: 0.77 K/UL (ref 0–0.85)
MONOCYTES NFR BLD AUTO: 6.9 % (ref 0–13.4)
NEUTROPHILS # BLD AUTO: 9.92 K/UL (ref 2–7.15)
NEUTROPHILS NFR BLD: 88.5 % (ref 44–72)
NRBC # BLD AUTO: 0 K/UL
NRBC BLD-RTO: 0 /100 WBC
PLATELET # BLD AUTO: 182 K/UL (ref 164–446)
PMV BLD AUTO: 10.4 FL (ref 9–12.9)
POTASSIUM SERPL-SCNC: 4.3 MMOL/L (ref 3.6–5.5)
PROT SERPL-MCNC: 6.1 G/DL (ref 6–8.2)
RBC # BLD AUTO: 4.96 M/UL (ref 4.2–5.4)
SODIUM SERPL-SCNC: 138 MMOL/L (ref 135–145)
WBC # BLD AUTO: 11.2 K/UL (ref 4.8–10.8)

## 2021-05-17 PROCEDURE — 82728 ASSAY OF FERRITIN: CPT

## 2021-05-17 PROCEDURE — 85379 FIBRIN DEGRADATION QUANT: CPT

## 2021-05-17 PROCEDURE — 700111 HCHG RX REV CODE 636 W/ 250 OVERRIDE (IP): Performed by: FAMILY MEDICINE

## 2021-05-17 PROCEDURE — 700102 HCHG RX REV CODE 250 W/ 637 OVERRIDE(OP): Performed by: STUDENT IN AN ORGANIZED HEALTH CARE EDUCATION/TRAINING PROGRAM

## 2021-05-17 PROCEDURE — 85025 COMPLETE CBC W/AUTO DIFF WBC: CPT

## 2021-05-17 PROCEDURE — 94640 AIRWAY INHALATION TREATMENT: CPT

## 2021-05-17 PROCEDURE — 86140 C-REACTIVE PROTEIN: CPT

## 2021-05-17 PROCEDURE — 700102 HCHG RX REV CODE 250 W/ 637 OVERRIDE(OP): Performed by: FAMILY MEDICINE

## 2021-05-17 PROCEDURE — 99233 SBSQ HOSP IP/OBS HIGH 50: CPT | Performed by: FAMILY MEDICINE

## 2021-05-17 PROCEDURE — 83735 ASSAY OF MAGNESIUM: CPT

## 2021-05-17 PROCEDURE — 82962 GLUCOSE BLOOD TEST: CPT

## 2021-05-17 PROCEDURE — A9270 NON-COVERED ITEM OR SERVICE: HCPCS | Performed by: FAMILY MEDICINE

## 2021-05-17 PROCEDURE — A9270 NON-COVERED ITEM OR SERVICE: HCPCS | Performed by: STUDENT IN AN ORGANIZED HEALTH CARE EDUCATION/TRAINING PROGRAM

## 2021-05-17 PROCEDURE — 80053 COMPREHEN METABOLIC PANEL: CPT

## 2021-05-17 PROCEDURE — 770020 HCHG ROOM/CARE - TELE (206)

## 2021-05-17 RX ADMIN — OXYCODONE HYDROCHLORIDE AND ACETAMINOPHEN 1000 MG: 500 TABLET ORAL at 05:50

## 2021-05-17 RX ADMIN — INSULIN HUMAN 2 UNITS: 100 INJECTION, SOLUTION PARENTERAL at 20:16

## 2021-05-17 RX ADMIN — BENZONATATE 100 MG: 100 CAPSULE ORAL at 20:16

## 2021-05-17 RX ADMIN — DILTIAZEM HYDROCHLORIDE 180 MG: 180 CAPSULE, COATED, EXTENDED RELEASE ORAL at 05:50

## 2021-05-17 RX ADMIN — MONTELUKAST 10 MG: 10 TABLET, FILM COATED ORAL at 16:57

## 2021-05-17 RX ADMIN — INSULIN HUMAN 1 UNITS: 100 INJECTION, SOLUTION PARENTERAL at 11:24

## 2021-05-17 RX ADMIN — THERA TABS 1 TABLET: TAB at 05:50

## 2021-05-17 RX ADMIN — ATORVASTATIN CALCIUM 10 MG: 10 TABLET, FILM COATED ORAL at 05:50

## 2021-05-17 RX ADMIN — GUAIFENESIN 1200 MG: 600 TABLET, EXTENDED RELEASE ORAL at 16:57

## 2021-05-17 RX ADMIN — BUDESONIDE AND FORMOTEROL FUMARATE DIHYDRATE 2 PUFF: 160; 4.5 AEROSOL RESPIRATORY (INHALATION) at 19:53

## 2021-05-17 RX ADMIN — Medication 2000 UNITS: at 05:51

## 2021-05-17 RX ADMIN — FUROSEMIDE 40 MG: 10 INJECTION, SOLUTION INTRAMUSCULAR; INTRAVENOUS at 05:49

## 2021-05-17 RX ADMIN — APIXABAN 5 MG: 5 TABLET, FILM COATED ORAL at 16:57

## 2021-05-17 RX ADMIN — DEXAMETHASONE 6 MG: 4 TABLET ORAL at 05:51

## 2021-05-17 RX ADMIN — APIXABAN 5 MG: 5 TABLET, FILM COATED ORAL at 05:50

## 2021-05-17 RX ADMIN — POTASSIUM CHLORIDE 10 MEQ: 1500 TABLET, EXTENDED RELEASE ORAL at 05:50

## 2021-05-17 RX ADMIN — GUAIFENESIN 1200 MG: 600 TABLET, EXTENDED RELEASE ORAL at 05:50

## 2021-05-17 RX ADMIN — BUDESONIDE AND FORMOTEROL FUMARATE DIHYDRATE 2 PUFF: 160; 4.5 AEROSOL RESPIRATORY (INHALATION) at 06:45

## 2021-05-17 RX ADMIN — DILTIAZEM HYDROCHLORIDE 180 MG: 180 CAPSULE, COATED, EXTENDED RELEASE ORAL at 16:57

## 2021-05-17 RX ADMIN — INSULIN HUMAN 1 UNITS: 100 INJECTION, SOLUTION PARENTERAL at 16:46

## 2021-05-17 ASSESSMENT — ENCOUNTER SYMPTOMS
VOMITING: 0
ABDOMINAL PAIN: 0
CHILLS: 0
COUGH: 1
NAUSEA: 0
FEVER: 0
SHORTNESS OF BREATH: 1

## 2021-05-17 ASSESSMENT — PAIN DESCRIPTION - PAIN TYPE: TYPE: ACUTE PAIN

## 2021-05-17 NOTE — PROGRESS NOTES
Hospital Medicine Daily Progress Note    Date of Service  5/17/2021    Chief Complaint  80 y.o. female admitted 5/15/2021 with COVID PNA    Hospital Course  80 y.o. female with a past medical history  of atrial fibrillation on chronic anticoagulation, history of asthma, hypertension and hyperlipidemia who presented 5/15/2021 with 2 to 3 days of increasing shortness of breath with dry cough.  Patient denies any other associated symptoms including headache, nausea or vomiting, abdominal pain, she denies fever, chills or body aches.  Patient has not been vaccinated against COVID-19.      On admission patient is febrile with a temperature of 100.8, she is tachycardic with a heart rate ranging from 120-130s, she is tachypneic with RR 20- 40s, /75.  She is saturating 84% on room air, she was placed on 2L NC with improvement to >90%.      Labs are significant for normal WBC with a left shift, electrolytes renal function and liver function are all within normal limits.  BNP is elevated at 2082 patient is Covid positive.      Chest x-ray was personally reviewed and shows bilateral interstitial infiltrates consistent with COVID-19 pneumonia.      Patient was given sepsis fluids and 1 dose of ceftriaxone for presumed sepsis on admission    Interval Problem Update  5/16 - Pt currently does not qualify for Remdesevir, is on D2 of Decadron. Requiring 3L this am, now down to 1L  - will add diuresis as echo in 2020 with elevated R heart pressures and BNP elevated here. Procal is normal, no abx needed. Pt does state that she has had increasing BLE edema, noticeable more when she travels.     5/17 - Patient feeling better today, remains on 1L and desatted to 88% with ambulation. She still has rales on exam, but no longer has peripheral edema; likely just needs more diuresis and can probably dc home in the am if we can wean her O2. She does not wish to discharge with O2.  Bps remain stable.    Consultants/Specialty  None    Code  Status  Full Code    Disposition  Continued hospitalization    Review of Systems  Review of Systems   Constitutional: Negative for chills and fever.   Respiratory: Positive for cough and shortness of breath.    Cardiovascular: Negative for chest pain and leg swelling.   Gastrointestinal: Negative for abdominal pain, nausea and vomiting.   All other systems reviewed and are negative.       Physical Exam  Temp:  [36.3 °C (97.3 °F)-37 °C (98.6 °F)] 36.6 °C (97.8 °F)  Pulse:  [] 65  Resp:  [18] 18  BP: (115-127)/(53-79) 127/72  SpO2:  [92 %-97 %] 94 %    Physical Exam  Vitals and nursing note reviewed.   Constitutional:       Appearance: Normal appearance.   HENT:      Head: Normocephalic and atraumatic.      Mouth/Throat:      Mouth: Mucous membranes are moist.   Cardiovascular:      Rate and Rhythm: Normal rate. Rhythm irregular.   Pulmonary:      Effort: Pulmonary effort is normal. No respiratory distress.      Breath sounds: Rhonchi and rales (Improved) present.      Comments: Coarse breath sounds bilaterally with expiratory rhonchi    Abdominal:      General: Abdomen is flat. Bowel sounds are normal.      Palpations: Abdomen is soft.   Musculoskeletal:         General: No swelling (Trace BLE PE).   Skin:     Coloration: Skin is not pale.   Neurological:      General: No focal deficit present.      Mental Status: She is alert and oriented to person, place, and time.   Psychiatric:         Behavior: Behavior normal.         Fluids    Intake/Output Summary (Last 24 hours) at 5/17/2021 1338  Last data filed at 5/16/2021 1949  Gross per 24 hour   Intake 240 ml   Output --   Net 240 ml       Laboratory  Recent Labs     05/15/21  1322 05/16/21  0203 05/17/21  0426   WBC 8.8 6.4 11.2*   RBC 5.23 4.91 4.96   HEMOGLOBIN 14.5 13.5 13.2   HEMATOCRIT 44.9 42.7 42.5   MCV 85.9 87.0 85.7   MCH 27.7 27.5 26.6*   MCHC 32.3* 31.6* 31.1*   RDW 49.2 50.9* 49.6   PLATELETCT 203 155* 182   MPV 9.7 10.1 10.4     Recent Labs      05/15/21  1322 05/16/21  0203 05/17/21  0426   SODIUM 136 140 138   POTASSIUM 4.0 4.0 4.3   CHLORIDE 96 105 103   CO2 24 23 24   GLUCOSE 113* 162* 141*   BUN 13 14 30*   CREATININE 0.78 0.64 0.85   CALCIUM 9.5 8.9 9.3     Recent Labs     05/15/21  1423   APTT 34.3   INR 1.51*               Imaging  EC-ECHOCARDIOGRAM LTD W/O CONT   Final Result      DX-CHEST-PORTABLE (1 VIEW)   Final Result      Enlarged cardiac silhouette with interstitial prominence suggesting pulmonary edema.      Right hilar soft tissue density again noted could represent enlargement of the ascending aorta or pulmonary arteries.           Assessment/Plan  * Acute hypoxemic respiratory failure due to COVID-19 (HCC)- (present on admission)  Assessment & Plan  Patient presents with acute hypoxemic respiratory failure saturating 84% on room air  -Chest x-ray shows bilateral pulmonary infiltrates consistent with COVID-19 pneumonia  -She is COVID-19 positive  -Continue Decadron 6 mg daily x10 days (D3)  -RT protocol  -O2 per protocol wean as able  -Encourage I-S and proning as tolerable  -Avoid aggressive hydration, will give daily Lasix  - Failed O2 sat profile but does not wish to discharge with O2, will continue diuresis and consider recheck on ambulation in the am     Hypertension- (present on admission)  Assessment & Plan  History of hypertension, currently stable  Continue home p.o. diltiazem  IV labetalol and Vasotec as needed with parameters    Sepsis (HCC)- (present on admission)  Assessment & Plan  This is Sepsis Present on admission  SIRS criteria identified on my evaluation include: Fever, with temperature greater than 101 deg F, Tachycardia, with heart rate greater than 90 BPM and Tachypnea, with respirations greater than 20 per minute  Source is COVID-19 pneumonia  Suspected onset of infection (date and time)   Sepsis protocol initiated  Fluid resuscitation ordered per protocol  IV antibiotics as appropriate for source of sepsis  While  organ dysfunction may be noted elsewhere in this problem list or in the chart, degree of organ dysfunction does not meet CMS criteria for severe sepsis    Patient presented with signs and symptoms of sepsis, initially given sepsis fluids and IV Rocephin  -Covid 19 did return positive  -Hold on further antibiotics given negative procalcitonin   -avoid further IVF at this time      Pneumonia due to COVID-19 virus  Assessment & Plan  -Chest x-ray showing diffuse interstitial infiltrates consistent with COVID-19 pneumonia  -Procal normal, no need for antibiotics at this time   -Encourage I-S and proning as tolerable  -Wean oxygen as able   - d-dimer negative   - Does not currently qualify for Remdesevir  - Continue lasix to keep pt on the dry side - echo with increased RV pressure from previous and RV volume overload   - Continue IV Lasix today, rales are improved from yesterday    Type 2 diabetes mellitus with hyperglycemia, without long-term current use of insulin ([facility])  Assessment & Plan  - History of diabetes in the past, not currently on diabetic medications  - A1c 5.9  - Will add fingersticks and SSI since she is now on steroids      Atrial fibrillation with RVR ([facility])- (present on admission)  Assessment & Plan  Patient has a history of atrial fibrillation and is on Eliquis and diltiazem 180 mg twice daily   -was in rapid ventricular response with heart rates ranging from 120s to 140s on admission, now resolved   -Continue home diltiazem  - continue eliquis  -As needed IV diltiazem for sustained heart rate greater than 140  -We will monitor on telemetry      Hyperlipidemia  Assessment & Plan  Chronic hyperlipidemia, continue home statin       VTE prophylaxis: Eliquis

## 2021-05-17 NOTE — PROGRESS NOTES
Tele strip printed at 0216 shows A-fib with BBB HR: 82    Measurements: -/0.14/-    Telemetry Shift Summary     Rhythm A-fib with BBB  HR Range 60's-110's, up to 130's with ambulation  Ectopy Frequent PVC, Occasional couplets/bigeminy/trigeminy, Rare triplets     Telemetry monitoring strips placed in pt's chart.     Normal Values  Rhythm SR  HR Range    Measurements 0.12-0.20 / 0.06-0.10  / 0.30-0.52

## 2021-05-17 NOTE — RESPIRATORY CARE
"   COPD EDUCATION by COPD CLINICAL EDUCATOR  5/17/2021 at 7:28 AM by Padmaja Contreras, RRT     Patient reviewed by COPD education team. Patient does not have a history or diagnosis of COPD and is a lifelong non-smoker.  Therefore does not qualify for the COPD program.    COPD Screen  COPD Risk Screening  Do you have a history of COPD?: No    COPD Assessment  COPD Clinical Specialists ONLY  COPD Education Initiated: No--Quick Screen (Per Pulmonary Note 8/17/20 pt hx is Asthma/Cardiac, never smoked)  Is this a COPD exacerbation patient?: No    Meds to Beds  Would the patient like to opt in for Bedside Medication Delivery at Discharge?: No     MY COPD ACTION PLAN     It is recommended that patients and physicians /healthcare providers complete this action plan together. This plan should be discussed at each physician visit and updated as needed.    The green, yellow and red zones show groups of symptoms of COPD. This list of symptoms is not comprehensive, and you may experience other symptoms. In the \"Actions\" column, your healthcare provider has recommended actions for you to take based on your symptoms.    Patient Name: Jessica Black   YOB: 1940   Last Updated on:     Green Zone:  I am doing well today Actions   •  Usual activitiy and exercise level •  Take daily medications   •  Usual amounts of cough and phlegm/mucus •  Use oxygen as prescribed   •  Sleep well at night •  Continue regular exercise/diet plan   •  Appetite is good •  At all times avoid cigarette smoke, inhaled irritants     Daily Medications (these medications are taken every day):                Yellow Zone:  I am having a bad day or a COPD flare Actions   •  More breathless than usual •  Continue daily medications   •  I have less energy for my daily activities •  Use quick relief inhaler as ordered   •  Increased or thicker phlegm/mucus •  Use oxygen as prescribed   •  Using quick relief inhaler/nebulizer more often •  Get plenty " "of rest   •  Swelling of ankles more than usual •  Use pursed lip breathing   •  More coughing than usual •  At all times avoid cigarette smoke, inhaled irritants   •  I feel like I have a \"chest cold\"   •  Poor sleep and my symptoms woke me up   •  My appetite is not good   •  My medicine is not helping    •  Call provider immediately if symptoms don’t improve     Continue daily medications, add rescue medications:               Medications to be used during a flare up, (as Discussed with Provider):              Red Zone:  I need urgent medical care Actions   •  Severe shortness of breath even at rest •  Call 911 or seek medical care immediately   •  Not able to do any activity because of breathing    •  Fever or shaking chills    •  Feeling confused or very drowsy     •  Chest pains    •  Coughing up blood              "

## 2021-05-17 NOTE — DISCHARGE PLANNING
Care Transition Team Discharge Planning    Anticipated Discharge Disposition: d/c home w/ O2    Action: Per second rounds, pt to d/c w/ o2 and order/f2f will be completed by MD.    Lsw faxed choice for [facility] [employer] to [facility] as there is only one DME company to choose from, [employer].    Lsw sent text to [employer] to advise as above.    Barriers to Discharge: acceptance, INS AUTH, delivery to bedside of O2 portable tank     Plan: Lsw will continue to follow, and assist w/ d/c planning.

## 2021-05-17 NOTE — PROGRESS NOTES
Bedside report received from GABO Evans. Assumed pt care. Pt is AOx4, denies any pain or other distress at this time. Discussed POC including blood glucose monitoring, supplemental oxygen. Pt verbalized understanding. Hourly rounding in place. Fall precautions in place and call lights w/in reach.

## 2021-05-17 NOTE — CARE PLAN
The patient is Stable - Low risk of patient condition declining or worsening    Shift Goals  Clinical Goals: Maintain adequate oxygenation  Patient Goals: Breathe easier    Progress made toward(s) clinical / shift goals:  Discussed IS usage with patient and educated her on its benefits. Pt agrees to use IS in the AM.     Patient is not progressing towards the following goals: n/a      Problem: Knowledge Deficit - Standard  Goal: Patient and family/care givers will demonstrate understanding of plan of care, disease process/condition, diagnostic tests and medications  Outcome: Progressing  Note: Discussed with patient trying an IS to help expand her lungs and improve her respiratory function. Pt expresses interest in trying the IS in the morning.      Problem: Respiratory  Goal: Patient will achieve/maintain optimum respiratory ventilation and gas exchange  Outcome: Progressing  Note: Pt currently maintaining adequate oxygen saturation in the low to mid 90's on 1L NC. Patient reports feeling like her breathing has improved.

## 2021-05-17 NOTE — PROGRESS NOTES
Telemetry Shift Summary    Rhythm Afib  HR Range   Ectopy fPVC, o-f big, r-o trig, r-o coup, r trip  Measurement -/.12/-    Normal Values  Rhythm SR  HR Range   Measurement 0.12-.020 / 0.06-0.10 / 0.30-0.52

## 2021-05-17 NOTE — PROGRESS NOTES
Report received from Brian AYON. Plan of care discussed. Patient resting comfortably in bed. Safety precautions in place.

## 2021-05-17 NOTE — DISCHARGE PLANNING
Received Choice form at [phone]  Agency/Facility Name: [facility]  Referral sent per Choice form @ [id number]

## 2021-05-17 NOTE — FACE TO FACE
"Face to Face Note  -  Durable Medical Equipment    Samantha Hanson M.D. - NPI: [id number]  I certify that this patient is under my care and that they had a durable medical equipment(DME)face to face encounter by myself that meets the physician DME face-to-face encounter requirements with this patient on:    Date of encounter:   Patient:                    MRN:                       YOB: 2022  Jessica Black  [id number]  1940     The encounter with the patient was in whole, or in part, for the following medical condition, which is the primary reason for durable medical equipment:  Covid-19 Infection    I certify that, based on my findings, the following durable medical equipment is medically necessary:  Oxygen.    HOME O2 Saturation Measurements:(Values must be present for Home Oxygen orders)  Room air sat at rest: 92  Room air sat with amb: 88  With liters of O2: 1, O2 sat at rest with O2: 93  With Liters of O2: 1, O2 sat with amb with O2 : 89       My Clinical findings support the need for the above equipment due to:  Hypoxia    Supporting Symptoms: The patient requires supplemental oxygen, as the following interventions have been tried with limited or no improvement: \"Oral and/or IV steroids and \"Ambulation with oximetry    If patient feels more short of breath, they can go up to 6 liters per minute and contact healthcare provider.  "

## 2021-05-17 NOTE — PROGRESS NOTES
Provided IS to patient this morning and educated pt on its use and purpose. Pt verbalized understanding and demonstrated proper use of IS.

## 2021-05-18 ENCOUNTER — TELEPHONE (OUTPATIENT)
Dept: OTHER | Facility: MEDICAL CENTER | Age: 81
End: 2021-05-18

## 2021-05-18 ENCOUNTER — PATIENT OUTREACH (OUTPATIENT)
Dept: HEALTH INFORMATION MANAGEMENT | Facility: OTHER | Age: 81
End: 2021-05-18

## 2021-05-18 ENCOUNTER — TELEPHONE (OUTPATIENT)
Dept: MEDICAL GROUP | Age: 81
End: 2021-05-18

## 2021-05-18 VITALS
TEMPERATURE: 97.5 F | HEIGHT: 67 IN | WEIGHT: 212.74 LBS | BODY MASS INDEX: 33.39 KG/M2 | SYSTOLIC BLOOD PRESSURE: 139 MMHG | OXYGEN SATURATION: 93 % | HEART RATE: 90 BPM | DIASTOLIC BLOOD PRESSURE: 60 MMHG | RESPIRATION RATE: 18 BRPM

## 2021-05-18 VITALS — HEART RATE: 66 BPM | OXYGEN SATURATION: 91 % | RESPIRATION RATE: 11 BRPM

## 2021-05-18 PROBLEM — J12.82 PNEUMONIA DUE TO COVID-19 VIRUS: Status: RESOLVED | Noted: 2021-05-15 | Resolved: 2021-05-18

## 2021-05-18 PROBLEM — U07.1 PNEUMONIA DUE TO COVID-19 VIRUS: Status: RESOLVED | Noted: 2021-05-15 | Resolved: 2021-05-18

## 2021-05-18 PROBLEM — A41.9 SEPSIS (HCC): Status: RESOLVED | Noted: 2021-05-15 | Resolved: 2021-05-18

## 2021-05-18 LAB
ANION GAP SERPL CALC-SCNC: 13 MMOL/L (ref 7–16)
BASOPHILS # BLD AUTO: 0.1 % (ref 0–1.8)
BASOPHILS # BLD: 0.01 K/UL (ref 0–0.12)
BUN SERPL-MCNC: 31 MG/DL (ref 8–22)
CALCIUM SERPL-MCNC: 9.5 MG/DL (ref 8.4–10.2)
CHLORIDE SERPL-SCNC: 102 MMOL/L (ref 96–112)
CO2 SERPL-SCNC: 23 MMOL/L (ref 20–33)
CREAT SERPL-MCNC: 0.84 MG/DL (ref 0.5–1.4)
EOSINOPHIL # BLD AUTO: 0 K/UL (ref 0–0.51)
EOSINOPHIL NFR BLD: 0 % (ref 0–6.9)
ERYTHROCYTE [DISTWIDTH] IN BLOOD BY AUTOMATED COUNT: 49.4 FL (ref 35.9–50)
FERRITIN SERPL-MCNC: 117 NG/ML (ref 10–291)
FOLATE SERPL-MCNC: 25.8 NG/ML
GLUCOSE BLD-MCNC: 146 MG/DL (ref 65–99)
GLUCOSE BLD-MCNC: 210 MG/DL (ref 65–99)
GLUCOSE SERPL-MCNC: 137 MG/DL (ref 65–99)
HCT VFR BLD AUTO: 42.7 % (ref 37–47)
HGB BLD-MCNC: 13.6 G/DL (ref 12–16)
IMM GRANULOCYTES # BLD AUTO: 0.09 K/UL (ref 0–0.11)
IMM GRANULOCYTES NFR BLD AUTO: 0.7 % (ref 0–0.9)
IRON SATN MFR SERPL: 8 % (ref 15–55)
IRON SERPL-MCNC: 22 UG/DL (ref 40–170)
LYMPHOCYTES # BLD AUTO: 0.41 K/UL (ref 1–4.8)
LYMPHOCYTES NFR BLD: 3.4 % (ref 22–41)
MAGNESIUM SERPL-MCNC: 2 MG/DL (ref 1.5–2.5)
MCH RBC QN AUTO: 27.4 PG (ref 27–33)
MCHC RBC AUTO-ENTMCNC: 31.9 G/DL (ref 33.6–35)
MCV RBC AUTO: 85.9 FL (ref 81.4–97.8)
MONOCYTES # BLD AUTO: 0.6 K/UL (ref 0–0.85)
MONOCYTES NFR BLD AUTO: 4.9 % (ref 0–13.4)
NEUTROPHILS # BLD AUTO: 11.08 K/UL (ref 2–7.15)
NEUTROPHILS NFR BLD: 90.9 % (ref 44–72)
NRBC # BLD AUTO: 0 K/UL
NRBC BLD-RTO: 0 /100 WBC
PLATELET # BLD AUTO: 209 K/UL (ref 164–446)
PMV BLD AUTO: 10.2 FL (ref 9–12.9)
POTASSIUM SERPL-SCNC: 4 MMOL/L (ref 3.6–5.5)
RBC # BLD AUTO: 4.97 M/UL (ref 4.2–5.4)
SODIUM SERPL-SCNC: 138 MMOL/L (ref 135–145)
TIBC SERPL-MCNC: 288 UG/DL (ref 250–450)
UIBC SERPL-MCNC: 266 UG/DL (ref 110–370)
VIT B12 SERPL-MCNC: 3634 PG/ML (ref 211–911)
WBC # BLD AUTO: 12.2 K/UL (ref 4.8–10.8)

## 2021-05-18 PROCEDURE — 82728 ASSAY OF FERRITIN: CPT

## 2021-05-18 PROCEDURE — A9270 NON-COVERED ITEM OR SERVICE: HCPCS | Performed by: STUDENT IN AN ORGANIZED HEALTH CARE EDUCATION/TRAINING PROGRAM

## 2021-05-18 PROCEDURE — 83550 IRON BINDING TEST: CPT

## 2021-05-18 PROCEDURE — 94640 AIRWAY INHALATION TREATMENT: CPT

## 2021-05-18 PROCEDURE — 82746 ASSAY OF FOLIC ACID SERUM: CPT

## 2021-05-18 PROCEDURE — 99454 REM MNTR PHYSIOL PARAM 16-30: CPT

## 2021-05-18 PROCEDURE — 94760 N-INVAS EAR/PLS OXIMETRY 1: CPT

## 2021-05-18 PROCEDURE — 99453 REM MNTR PHYSIOL PARAM SETUP: CPT

## 2021-05-18 PROCEDURE — 82607 VITAMIN B-12: CPT

## 2021-05-18 PROCEDURE — 83540 ASSAY OF IRON: CPT

## 2021-05-18 PROCEDURE — 80048 BASIC METABOLIC PNL TOTAL CA: CPT

## 2021-05-18 PROCEDURE — 700111 HCHG RX REV CODE 636 W/ 250 OVERRIDE (IP): Performed by: FAMILY MEDICINE

## 2021-05-18 PROCEDURE — 83735 ASSAY OF MAGNESIUM: CPT

## 2021-05-18 PROCEDURE — 82962 GLUCOSE BLOOD TEST: CPT

## 2021-05-18 PROCEDURE — 700102 HCHG RX REV CODE 250 W/ 637 OVERRIDE(OP): Performed by: FAMILY MEDICINE

## 2021-05-18 PROCEDURE — 85025 COMPLETE CBC W/AUTO DIFF WBC: CPT

## 2021-05-18 PROCEDURE — 700102 HCHG RX REV CODE 250 W/ 637 OVERRIDE(OP): Performed by: STUDENT IN AN ORGANIZED HEALTH CARE EDUCATION/TRAINING PROGRAM

## 2021-05-18 PROCEDURE — A9270 NON-COVERED ITEM OR SERVICE: HCPCS | Performed by: FAMILY MEDICINE

## 2021-05-18 PROCEDURE — 99239 HOSP IP/OBS DSCHRG MGMT >30: CPT | Performed by: HOSPITALIST

## 2021-05-18 RX ORDER — POTASSIUM CHLORIDE 750 MG/1
10 TABLET, EXTENDED RELEASE ORAL DAILY
Qty: 30 TABLET | Refills: 1 | Status: ON HOLD | OUTPATIENT
Start: 2021-05-19 | End: 2021-06-10

## 2021-05-18 RX ORDER — FUROSEMIDE 20 MG/1
20 TABLET ORAL DAILY
Qty: 30 TABLET | Refills: 1 | Status: SHIPPED | OUTPATIENT
Start: 2021-05-18 | End: 2023-07-07 | Stop reason: CLARIF

## 2021-05-18 RX ORDER — DEXAMETHASONE 6 MG/1
6 TABLET ORAL DAILY
Qty: 6 TABLET | Refills: 0 | Status: ON HOLD | OUTPATIENT
Start: 2021-05-19 | End: 2021-06-10

## 2021-05-18 RX ADMIN — THERA TABS 1 TABLET: TAB at 06:03

## 2021-05-18 RX ADMIN — GUAIFENESIN 1200 MG: 600 TABLET, EXTENDED RELEASE ORAL at 06:00

## 2021-05-18 RX ADMIN — ATORVASTATIN CALCIUM 10 MG: 10 TABLET, FILM COATED ORAL at 06:03

## 2021-05-18 RX ADMIN — DILTIAZEM HYDROCHLORIDE 180 MG: 180 CAPSULE, COATED, EXTENDED RELEASE ORAL at 06:01

## 2021-05-18 RX ADMIN — DEXAMETHASONE 6 MG: 4 TABLET ORAL at 06:03

## 2021-05-18 RX ADMIN — Medication 2000 UNITS: at 06:00

## 2021-05-18 RX ADMIN — BUDESONIDE AND FORMOTEROL FUMARATE DIHYDRATE 2 PUFF: 160; 4.5 AEROSOL RESPIRATORY (INHALATION) at 06:36

## 2021-05-18 RX ADMIN — APIXABAN 5 MG: 5 TABLET, FILM COATED ORAL at 06:00

## 2021-05-18 RX ADMIN — FUROSEMIDE 40 MG: 10 INJECTION, SOLUTION INTRAMUSCULAR; INTRAVENOUS at 06:05

## 2021-05-18 RX ADMIN — POTASSIUM CHLORIDE 10 MEQ: 1500 TABLET, EXTENDED RELEASE ORAL at 06:03

## 2021-05-18 RX ADMIN — OXYCODONE HYDROCHLORIDE AND ACETAMINOPHEN 1000 MG: 500 TABLET ORAL at 06:02

## 2021-05-18 NOTE — PROGRESS NOTES
Telemetry Shift Summary     Rhythm: AFib  Rate: 77-99  Measurements: -/0.16/-  Ectopy (reported by Monitor Tech): F PVC, F big, O coup, R trip, R trig     Normal Values  Rhythm: Sinus  HR:   Measurements: 0.12-0.20/0.06-0.10/0.30-0.52

## 2021-05-18 NOTE — PROGRESS NOTES
Telemetry Shift Summary    Rhythm AFib with BBB  HR Range 70s-110s  Ectopy frequent PVC/bigeminy, occasional trigeminy/couplets  Measurements --/0.12/--        Normal Values  Rhythm SR  HR Range    Measurements 0.12-0.20 / 0.06-0.10  / 0.30-0.52

## 2021-05-18 NOTE — CARE PLAN
The patient is Stable - Low risk of patient condition declining or worsening    Shift Goals  Clinical Goals: Educate pt on IS usage  Patient Goals: Rest    Progress made toward(s) clinical / shift goals:  Home O2 delivery    Patient is not progressing towards the following goals:    Problem: Knowledge Deficit - Standard  Goal: Patient and family/care givers will demonstrate understanding of plan of care, disease process/condition, diagnostic tests and medications  Outcome: Progressing     Problem: Respiratory  Goal: Patient will achieve/maintain optimum respiratory ventilation and gas exchange  Outcome: Progressing

## 2021-05-18 NOTE — PROGRESS NOTES
Report given to Eliana AYON. Patient handed off in stable condition. Safety measures in place. Call light within reach.

## 2021-05-18 NOTE — RESPIRATORY CARE
REMOTE MONITORING PROGRAM by RESPIRATORY THERAPY  5/18/2021 at 10:57 AM by Padmaja Contreras, RRT     Patient interviewed by RT. Patient agrees to Remote Monitoring program. Device instruction performed with welcome packet, consent signed and placed at bedside chart. Patient instructed on how to use MyChart and Zoom. No questions at this time.     Flyer and education performed on remote monitoring with bedside RN complete.

## 2021-05-18 NOTE — CARE PLAN
The patient is Stable - Low risk of patient condition declining or worsening    Shift Goals  Clinical Goals: decrease cough, BG within limits, walk pt for O2 sats, discharge  Patient Goals: discharge    Progress made toward(s) clinical / shift goals:  Pt to discharge tomorrow AM pending walking O2 sats. PRN tessalon helped with cough symptoms.    Patient is not progressing towards the following goals: Pt needed blood sugar coverage with 2 units for a BG of 210.      Problem: Respiratory  Goal: Patient will achieve/maintain optimum respiratory ventilation and gas exchange  Outcome: Progressing  Pt currently on 1L of O2 and planning to go home with home O2. Pt satting in the high 90s for SpO2.     Problem: Knowledge Deficit - Standard  Goal: Patient and family/care givers will demonstrate understanding of plan of care, disease process/condition, diagnostic tests and medications  Outcome: Progressing  Pt understands diagnosis and discharge plan. Pt whiteboard updated. Pt educated at bedside. All questions answered.

## 2021-05-18 NOTE — FLOWSHEET NOTE
05/17/21 1953   Events/Summary/Plan   Events/Summary/Plan MDI Tx Given   Vital Signs   Pulse 89   Respiration 18   Pulse Oximetry 92 %   Respiratory Assessment   Respiratory Pattern Within Normal Limits   Level of Consciousness Alert   Chest Exam   Work Of Breathing / Effort Mild   Breath Sounds   RUL Breath Sounds Rhonchi   RML Breath Sounds Rhonchi   RLL Breath Sounds Diminished   JESSY Breath Sounds Rhonchi   LLL Breath Sounds Diminished   Secretions   Cough Moist;Non Productive   How Sputum Obtained Spontaneous   Sputum Amount Unable to Evaluate   Sputum Color Unable to Evaluate   Sputum Consistency Unable to Evaluate   Oxygen   O2 (LPM) 1   O2 Delivery Device Nasal Cannula

## 2021-05-18 NOTE — CARE PLAN
The patient is Stable - Low risk of patient condition declining or worsening    Shift Goals  Clinical Goals: Remain on 0-1L oxygen  Patient Goals: Discharge to home  Family Goals: Discuss POC with MD    Progress made toward(s) clinical / shift goals:  yes    Patient is not progressing towards the following goals: n/a      Problem: Knowledge Deficit - Standard  Goal: Patient and family/care givers will demonstrate understanding of plan of care, disease process/condition, diagnostic tests and medications  Outcome: Progressing  Note: Patient's knowledge of plan of care, diagnostics, and medications regularly assessed. RN answers patient questions appropriately, education provided. Patient verbalizes better understanding of their condition.       Problem: Respiratory  Goal: Patient will achieve/maintain optimum respiratory ventilation and gas exchange  Outcome: Progressing  Note: Patient will discharge on home oxygen this morning.

## 2021-05-18 NOTE — PROGRESS NOTES
Discharge instructions reviewed with patient and patient's son. New medications and plan of care upon discharge discussed. Padmaja with Respiratory Therapy in patient's room to help set up home oxygen monitoring system. IV site discontinued and telemetry monitoring removed.

## 2021-05-18 NOTE — DISCHARGE INSTRUCTIONS
Discharge Instructions    Discharged to home by car with relative. Discharged via wheelchair, hospital escort: Yes.  Special equipment needed: Oxygen    Be sure to schedule a follow-up appointment with your primary care doctor or any specialists as instructed.     Discharge Plan:   Diet Plan: Discussed  Activity Level: Discussed  Confirmed Follow up Appointment: Patient to Call and Schedule Appointment  Confirmed Symptoms Management: Discussed  Medication Reconciliation Updated: Yes    I understand that a diet low in cholesterol, fat, and sodium is recommended for good health. Unless I have been given specific instructions below for another diet, I accept this instruction as my diet prescription.   Other diet: Regular, monitor sugar intake and be cognizant of fluid intake    Special Instructions: None    · Is patient discharged on Warfarin / Coumadin?   No     INSTRUCTIONS FOR COVID-19 OR ANY OTHER INFECTIOUS RESPIRATORY ILLNESSES    The Centers for Disease Control and Prevention (CDC) states that early indications for COVID-19 include cough, shortness of breath, difficulty breathing, or at least two of the following symptoms: chills, shaking with chills, muscle pain, headache, sore throat, and loss of taste or smell. Symptoms can range from mild to severe and may appear up to two weeks after exposure to the virus.    The practice of self-isolation and quarantine helps protect the public and your family by  preventing exposure to people who have or may have a contagious disease. Please follow the prevention steps below as based on CDC guidelines:    WHEN TO STOP ISOLATION: Persons with COVID-19 or any other infectious respiratory illness who have symptoms and were advised to care for themselves at home may discontinue home isolation under the following conditions:  · At least 24 hours have passed since recovery defined as resolution of fever without the use of fever-reducing medications; AND,  · Improvement in  respiratory symptoms (e.g., cough, shortness of breath); AND,  · At least 10 days have passed since symptoms first appeared and have had no subsequent illness.    MONITOR YOUR SYMPTOMS: If your illness is worsening, seek prompt medical attention. If you have a medical emergency and need to call 911, notify the dispatch personnel that you have, or are being evaluated for confirmed or suspected COVID-19 or another infectious respiratory illness. Wear a facemask if possible.    ACTIVITY RESTRICTION: restrict activities outside your home, except for getting medical care. Do not go to work, school, or public areas. Avoid using public transportation, ride-sharing, or taxis.    SCHEDULED MEDICAL APPOINTMENTS: Notify your provider that you have, or are being evaluated for, confirmed or suspected COVID-19 or another infectious respiratory. This will help the healthcare provider’s office safely take care of you and keep other people from getting exposed or infected.    FACEMASKS, when to wear: Anytime you are away from your home or around other people or pets. If you are unable to wear one, maintain a minimum of 6 feet distancing from others.    LIVING ENVIRONMENT: Stay in a separate room from other people and pets. If possible, use a separate bathroom, have someone else care for your pets and avoid sharing household items. Any items used should be washed thoroughly with soap and water. Clean all “high-touch” surfaces every day. Use a household cleaning spray or wipe, according to the label instructions. High touch surfaces include (but are not limited to) counters, tabletops, doorknobs, bathroom fixtures, toilets, phones, keyboards, tablets, and bedside tables.     HAND WASHING: Frequently wash hands with soap and water for at least 20 seconds,  especially after blowing your nose, coughing, or sneezing; going to the bathroom; before and after interacting with pets; and before and after eating or preparing food. If hands are  visibly dirty use soap and water. If soap and water are not available, use an alcohol-based hand  with at least 60% alcohol. Avoid touching your eyes, nose, and mouth with unwashed hands. Cover your coughs and sneezes with a tissue. Throw used tissues in a lined trash can. Immediately wash your hands.    ACTIVE/FACILITATED SELF-MONITORING: Follow instructions provided by your local health department or health professionals, as appropriate. When working with your local health department check their available hours.    [facility]   Phone Number   [place] [phone]   [facility] [place] [phone]   [place] [address]   [place] [phone]     IF YOU HAVE CONFIRMED POSITIVE COVID-19:    Those who have completely recovered from COVID-19 may have immune-boosting antibodies in their plasma--called “convalescent plasma”--that could be used to treat critically ill COVID19 patients.    [facility] is excited to begin working with [facility] on collecting convalescent plasma from  people who have recovered from COVID-19 as part of a program to treat patients infected with the virus. This FDA-approved “emergency investigational new drug” is a special blood product containing antibodies that may give patients an extra boost to fight the virus.    To be eligible to donate convalescent plasma, you must have a prior COVID-19 diagnosis documented by a laboratory test (or a positive test result for SARS-CoV-2 antibodies) and meet additional eligibility requirements.    If you are interested in donating convalescent plasma or have any additional questions, please contact the [facility] Convalescent Plasma  at [phone] or via e-mail at [facility] [email or web address].    Dexamethasone tablets  What is this medicine?  DEXAMETHASONE (dex a METH a sone) is a corticosteroid. It is commonly used to treat inflammation of the skin, joints, lungs, and other organs. Common conditions treated include  asthma, allergies, and arthritis. It is also used for other conditions, such as blood disorders and diseases of the adrenal glands.  This medicine may be used for other purposes; ask your health care provider or pharmacist if you have questions.  COMMON BRAND NAME(S): CUSHINGS SYNDROME DIAGNOSTIC, Decadron, Dexabliss, DexPak Jr TaperPak, DexPak TaperPak, Dxevo, HiDex, TaperDex, Zema-Paul, ZoDex, ZonaCort 11 Day, ZonaCort 7 Day  What should I tell my health care provider before I take this medicine?  They need to know if you have any of these conditions:  · Cushing's syndrome  · diabetes  · glaucoma  · heart problems or disease  · high blood pressure  · infection like herpes, measles, tuberculosis, or chickenpox  · kidney disease  · liver disease  · mental problems  · myasthenia gravis  · osteoporosis  · previous heart attack  · seizures  · stomach, ulcer or intestine disease including colitis and diverticulitis  · thyroid problem  · an unusual or allergic reaction to dexamethasone, corticosteroids, other medicines, lactose, foods, dyes, or preservatives  · pregnant or trying to get pregnant  · breast-feeding  How should I use this medicine?  Take this medicine by mouth with a drink of water. Follow the directions on the prescription label. Take it with food or milk to avoid stomach upset. If you are taking this medicine once a day, take it in the morning. Do not take more medicine than you are told to take. Do not suddenly stop taking your medicine because you may develop a severe reaction. Your doctor will tell you how much medicine to take. If your doctor wants you to stop the medicine, the dose may be slowly lowered over time to avoid any side effects.  Talk to your pediatrician regarding the use of this medicine in children. Special care may be needed.  Patients over 65 years old may have a stronger reaction and need a smaller dose.  Overdosage: If you think you have taken too much of this medicine contact a  poison control center or emergency room at once.  NOTE: This medicine is only for you. Do not share this medicine with others.  What if I miss a dose?  If you miss a dose, take it as soon as you can. If it is almost time for your next dose, talk to your doctor or health care professional. You may need to miss a dose or take an extra dose. Do not take double or extra doses without advice.  What may interact with this medicine?  Do not take this medicine with any of the following medications:  · mifepristone, RU-486  · vaccines  This medicine may also interact with the following medications:  · amphotericin B  · antibiotics like clarithromycin, erythromycin, and troleandomycin  · aspirin and aspirin-like drugs  · barbiturates like phenobarbital  · carbamazepine  · cholestyramine  · cholinesterase inhibitors like donepezil, galantamine, rivastigmine, and tacrine  · cyclosporine  · digoxin  · diuretics  · ephedrine  · female hormones, like estrogens or progestins and birth control pills  · indinavir  · isoniazid  · ketoconazole  · medicines for diabetes  · medicines that improve muscle tone or strength for conditions like myasthenia gravis  · NSAIDs, medicines for pain and inflammation, like ibuprofen or naproxen  · phenytoin  · rifampin  · thalidomide  · warfarin  This list may not describe all possible interactions. Give your health care provider a list of all the medicines, herbs, non-prescription drugs, or dietary supplements you use. Also tell them if you smoke, drink alcohol, or use illegal drugs. Some items may interact with your medicine.  What should I watch for while using this medicine?  Visit your doctor or health care professional for regular checks on your progress. If you are taking this medicine over a prolonged period, carry an identification card with your name and address, the type and dose of your medicine, and your doctor's name and address.  This medicine may increase your risk of getting an  infection. Stay away from people who are sick. Tell your doctor or health care professional if you are around anyone with measles or chickenpox.  If you are going to have surgery, tell your doctor or health care professional that you have taken this medicine within the last twelve months.  Ask your doctor or health care professional about your diet. You may need to lower the amount of salt you eat.  This medicine may increase blood sugar. Ask your healthcare provider if changes in diet or medicines are needed if you have diabetes.  What side effects may I notice from receiving this medicine?  Side effects that you should report to your doctor or health care professional as soon as possible:  · allergic reactions like skin rash, itching or hives, swelling of the face, lips, or tongue  · fever, sore throat, sneezing, cough, or other signs of infection, wounds that will not heal  · mental depression, mood swings, mistaken feelings of self importance or of being mistreated  · pain in hips, back, ribs, arms, shoulders, or legs  · redness, blistering, peeling or loosening of the skin, including inside the mouth  ·   signs and symptoms of high blood sugar such as being more thirsty or hungry or having to urinate more than normal. You may also feel very tired or have blurry vision.  · trouble passing urine  · swelling of feet or lower legs  · unusual bleeding or bruising  Side effects that usually do not require medical attention (report to your doctor or health care professional if they continue or are bothersome):  · headache  · nausea, vomiting  · skin problems, acne, thin and shiny skin  · weight gain  This list may not describe all possible side effects. Call your doctor for medical advice about side effects. You may report side effects to FDA at 1-812-FDA-5974.  Where should I keep my medicine?  Keep out of the reach of children.  Store at room temperature between 20 and 25 degrees C (68 and 77 degrees F). Protect  from light. Throw away any unused medicine after the expiration date.  NOTE: This sheet is a summary. It may not cover all possible information. If you have questions about this medicine, talk to your doctor, pharmacist, or health care provider.  © 2020 [employer]/Gold Standard (2019-09-18 10:58:53)      Depression / Suicide Risk    As you are discharged from this [facility] facility, it is important to learn how to keep safe from harming yourself.    Recognize the warning signs:  · Abrupt changes in personality, positive or negative- including increase in energy   · Giving away possessions  · Change in eating patterns- significant weight changes-  positive or negative  · Change in sleeping patterns- unable to sleep or sleeping all the time   · Unwillingness or inability to communicate  · Depression  · Unusual sadness, discouragement and loneliness  · Talk of wanting to die  · Neglect of personal appearance   · Rebelliousness- reckless behavior  · Withdrawal from people/activities they love  · Confusion- inability to concentrate     If you or a loved one observes any of these behaviors or has concerns about self-harm, here's what you can do:  · Talk about it- your feelings and reasons for harming yourself  · Remove any means that you might use to hurt yourself (examples: pills, rope, extension cords, firearm)  · Get professional help from the community (Mental Health, Substance Abuse, psychological counseling)  · Do not be alone:Call your Safe Contact- someone whom you trust who will be there for you.  · Call your local CRISIS HOTLINE 577-0744 or 530-768-9085  · Call your local Children's Mobile Crisis Response Team [place] (219) 515-5849 or www.[employer]  · Call the toll free National Suicide Prevention Hotlines   · National Suicide Prevention Lifeline 082-986-EOKX (6871)  · National Hope Line Network 800-SUICIDE (085-9567)

## 2021-05-18 NOTE — PROGRESS NOTES
Assumed report and patient care from Jen AYON via bedside report. Patient is resting comfortably in bed with no signs of labored breathing or restlessness. POC discussed. No questions or concerns at this time. Safety measures in place, call light within reach.

## 2021-05-18 NOTE — TELEPHONE ENCOUNTER
ESTABLISHED PATIENT PRE-VISIT PLANNING     Patient was NOT contacted to complete PVP.     Note: Patient will not be contacted if there is no indication to call.     1.  Reviewed notes from the last few office visits within the medical group: Yes    2.  If any orders were placed at last visit or intended to be done for this visit (i.e. 6 mos follow-up), do we have Results/Consult Notes?         •  Labs - Labs ordered, completed on 5/18/2021 and results are in chart.  Note: If patient appointment is for lab review and patient did not complete labs, check with provider if OK to reschedule patient until labs completed.       •  Imaging - Imaging ordered, completed and results are in chart.       •  Referrals - No referrals were ordered at last office visit.    3. Is this appointment scheduled as a Hospital Follow-Up? Yes, visit was at [facility].     4.  Immunizations were updated in Epic using Reconcile Outside Information activity? Yes    5.  Patient is due for the following Health Maintenance Topics:   Health Maintenance Due   Topic Date Due   • Annual Pulmonary Function Test / Spirometry  Never done   • COVID-19 Vaccine (1) Never done   • IMM DTaP/Tdap/Td Vaccine (1 - Tdap) Never done   • IMM ZOSTER VACCINES (1 of 2) Never done   • Annual Wellness Visit  04/23/2014   • IMM PNEUMOCOCCAL VACCINE: 65+ Years (1 of 2 - PPSV23) 02/09/2017   • BONE DENSITY  04/23/2019       - Patient plans to schedule appointment for Annual Wellness Visit (AWV), Dexa Scan and Immunizations: PNEUMOVAX (PPSV23), TDAP, SHINGRIX (Shingles) and COVID-19.    6.  AHA (Pulse8) form printed for Provider? Yes

## 2021-05-19 ENCOUNTER — TELEPHONE (OUTPATIENT)
Dept: OTHER | Facility: MEDICAL CENTER | Age: 81
End: 2021-05-19

## 2021-05-19 ENCOUNTER — TELEMEDICINE (OUTPATIENT)
Dept: MEDICAL GROUP | Age: 81
End: 2021-05-19
Payer: MEDICARE

## 2021-05-19 ENCOUNTER — PATIENT OUTREACH (OUTPATIENT)
Dept: MEDICAL GROUP | Age: 81
End: 2021-05-19

## 2021-05-19 VITALS — OXYGEN SATURATION: 83 % | HEART RATE: 120 BPM

## 2021-05-19 VITALS — HEART RATE: 102 BPM | OXYGEN SATURATION: 84 % | RESPIRATION RATE: 11 BRPM

## 2021-05-19 VITALS — HEART RATE: 74 BPM | OXYGEN SATURATION: 86 %

## 2021-05-19 VITALS
WEIGHT: 199.4 LBS | DIASTOLIC BLOOD PRESSURE: 101 MMHG | SYSTOLIC BLOOD PRESSURE: 138 MMHG | HEIGHT: 67 IN | BODY MASS INDEX: 31.3 KG/M2 | HEART RATE: 91 BPM

## 2021-05-19 VITALS — HEART RATE: 110 BPM | OXYGEN SATURATION: 87 % | RESPIRATION RATE: 19 BRPM

## 2021-05-19 VITALS — HEART RATE: 87 BPM | RESPIRATION RATE: 14 BRPM | OXYGEN SATURATION: 87 %

## 2021-05-19 VITALS — HEART RATE: 111 BPM | OXYGEN SATURATION: 84 %

## 2021-05-19 DIAGNOSIS — I50.811 ACUTE RIGHT-SIDED HEART FAILURE (HCC): ICD-10-CM

## 2021-05-19 DIAGNOSIS — I27.20 PULMONARY HYPERTENSION (HCC): ICD-10-CM

## 2021-05-19 DIAGNOSIS — J81.0 ACUTE PULMONARY EDEMA (HCC): ICD-10-CM

## 2021-05-19 DIAGNOSIS — J96.01 ACUTE HYPOXEMIC RESPIRATORY FAILURE DUE TO COVID-19 (HCC): ICD-10-CM

## 2021-05-19 DIAGNOSIS — U07.1 ACUTE HYPOXEMIC RESPIRATORY FAILURE DUE TO COVID-19 (HCC): ICD-10-CM

## 2021-05-19 DIAGNOSIS — Z00.8 ENCOUNTER FOR WORK CAPABILITY ASSESSMENT: ICD-10-CM

## 2021-05-19 DIAGNOSIS — Z09 HOSPITAL DISCHARGE FOLLOW-UP: ICD-10-CM

## 2021-05-19 DIAGNOSIS — E61.1 IRON DEFICIENCY: ICD-10-CM

## 2021-05-19 DIAGNOSIS — J43.1 PANLOBULAR EMPHYSEMA (HCC): ICD-10-CM

## 2021-05-19 DIAGNOSIS — J18.9 COMMUNITY ACQUIRED PNEUMONIA, UNSPECIFIED LATERALITY: ICD-10-CM

## 2021-05-19 DIAGNOSIS — R05.9 COUGH: ICD-10-CM

## 2021-05-19 PROCEDURE — 99214 OFFICE O/P EST MOD 30 MIN: CPT | Mod: 95 | Performed by: INTERNAL MEDICINE

## 2021-05-19 RX ORDER — AZITHROMYCIN 250 MG/1
TABLET, FILM COATED ORAL
Qty: 6 TABLET | Refills: 1 | Status: ON HOLD | OUTPATIENT
Start: 2021-05-19 | End: 2021-06-10

## 2021-05-19 RX ORDER — BENZONATATE 100 MG/1
100 CAPSULE ORAL 3 TIMES DAILY PRN
Qty: 90 CAPSULE | Refills: 1 | Status: ON HOLD | OUTPATIENT
Start: 2021-05-19 | End: 2021-06-10

## 2021-05-19 RX ORDER — LANOLIN ALCOHOL/MO/W.PET/CERES
325 CREAM (GRAM) TOPICAL
Qty: 90 TABLET | Refills: 2 | Status: SHIPPED | OUTPATIENT
Start: 2021-05-19 | End: 2023-07-07 | Stop reason: CLARIF

## 2021-05-19 ASSESSMENT — FIBROSIS 4 INDEX: FIB4 SCORE: 1.7

## 2021-05-19 ASSESSMENT — PATIENT HEALTH QUESTIONNAIRE - PHQ9: CLINICAL INTERPRETATION OF PHQ2 SCORE: 0

## 2021-05-19 NOTE — DISCHARGE SUMMARY
Discharge Summary    CHIEF COMPLAINT ON ADMISSION  Chief Complaint   Patient presents with   • Shortness of Breath   • Fever       Reason for Admission  Shortness of Breath      Admission Date  5/15/2021    CODE STATUS  Prior    HPI & HOSPITAL COURSE  80 y.o. female with a past medical history  of atrial fibrillation on chronic anticoagulation, history of asthma, hypertension and hyperlipidemia who presented 5/15/2021 with 2 to 3 days of increasing shortness of breath with dry cough.  Patient denies any other associated symptoms including headache, nausea or vomiting, abdominal pain, she denies fever, chills or body aches.  Patient has not been vaccinated against COVID-19.      On admission patient was  febrile       patient is Covid positive.      Patient was empirically given IV fluids and antibiotics, she was found to be Covid positive we started the patient on p.o. Decadron and titrated oxygen keep sat greater than 92%.    We will use Lasix for pulmonary toilet.  Patient was sent home with p.o. Lasix for ongoing diuresis.    Patient clinically improved however still requires oxygen at the time of discharge.    Patient did have some evidence of some microcytosis and iron study shows findings concerning iron deficiency anemia    She was found to have a hemoglobin A1c of 5.9 but she had been on prednisone prior to this admission.  She was told to follow-up with her PCP for a follow-up hemoglobin A1c and fasting with blood glucose when she is no longer on steroids.          Therefore, she is discharged in good and stable condition to home with close outpatient follow-up.    The patient met 2-midnight criteria for an inpatient stay at the time of discharge.    Discharge Date  5/18/2021    FOLLOW UP ITEMS POST DISCHARGE      DISCHARGE DIAGNOSES  Principal Problem:    Acute hypoxemic respiratory failure due to COVID-19 (HCC) POA: Yes  Active Problems:    Hyperlipidemia POA: Yes    Type 2 diabetes mellitus with  hyperglycemia, without long-term current use of insulin (HCC) POA: Yes    Hypertension POA: Yes  Resolved Problems:    Atrial fibrillation with RVR (HCC) POA: Yes    Pneumonia due to COVID-19 virus POA: Yes    Sepsis (HCC) POA: Yes      FOLLOW UP  Future Appointments   Date Time Provider Department Center   5/24/2021 11:30 AM [facility]   6/4/2021 11:30 AM [facility]   8/19/2021  9:00 AM PFT-[id number] [facility] None   8/19/2021 10:00 AM SPIROMETRY/6MW [facility] None   8/23/2021  9:50 AM Cheryl James M.D. [facility] None     Avery Cruz M.D.  [address]  [address]  [place] [id number] [phone]    Call in 3 days        MEDICATIONS ON DISCHARGE     Medication List      START taking these medications      Instructions   dexamethasone 6 MG Tabs  Commonly known as: DECADRON   Take 1 tablet by mouth every day for 6 days.  Dose: 6 mg     furosemide 20 MG Tabs  Commonly known as: LASIX   Take 1 tablet by mouth every day.  Dose: 20 mg     potassium chloride SA 10 MEQ Tbcr  Commonly known as: K-DUR   Take 1 tablet by mouth every day.  Dose: 10 mEq        CHANGE how you take these medications      Instructions   albuterol 108 (90 Base) MCG/ACT Aers inhalation aerosol  What changed: Another medication with the same name was removed. Continue taking this medication, and follow the directions you see here.   INHALE TWO PUFFS BY MOUTH EVERY SIX HOURS AS NEEDED FOR SHORTNESS OF BREATH     vitamin D 1000 Unit Tabs  What changed: how much to take  Commonly known as: Cholecalciferol   Take 1 Tab by mouth every day.  Dose: 1,000 Units        CONTINUE taking these medications      Instructions   atorvastatin 10 MG Tabs  Commonly known as: LIPITOR   TAKE ONE TABLET BY MOUTH ONE TIME DAILY     azithromycin 250 MG Tabs  Commonly known as: ZITHROMAX   Take 500mg on day 1, then take 250mg on day 2 through 5     C 1000 1000 MG tablet  Generic drug: ascorbic acid   Take 1,000 mg by mouth every day.  Dose: 1,000 mg      DILTIAZem  MG Cp24  Commonly known as: dilTIAZem CD   Take 1 Cap by mouth 2 Times a Day.  Dose: 180 mg     E-Z Spacer Saige   Doctor's comments: For use with albuterol inhaler rxd  Use as directed     Eliquis 5mg Tabs  Generic drug: apixaban   Take 5 mg by mouth 2 Times a Day.  Dose: 5 mg     fluticasone-salmeterol 500-50 MCG/DOSE Aepb  Commonly known as: Advair Diskus   Doctor's comments: 90 day supply  Inhale 1 Puff by mouth 2 times a day. Rinse mouth after each use.  Dose: 1 Puff     montelukast 10 MG Tabs  Commonly known as: SINGULAIR   Take 1 Tab by mouth every evening.  Dose: 10 mg     multivitamin Tabs   Take 1 Tab by mouth every day.  Dose: 1 tablet     PROBIOTIC DAILY PO   Take 1 Tab by mouth every day.  Dose: 1 tablet        STOP taking these medications    predniSONE 10 MG Tabs  Commonly known as: DELTASONE     predniSONE 20 MG Tabs  Commonly known as: DELTASONE            Allergies  No Known Allergies    DIET  No orders of the defined types were placed in this encounter.      ACTIVITY  As tolerated.  Weight bearing as tolerated    CONSULTATIONS  none    PROCEDURES  5/15/2021 1:14 PM     HISTORY/REASON FOR EXAM:  possible sepsis..  Shortness of breath     TECHNIQUE/EXAM DESCRIPTION AND NUMBER OF VIEWS:  Single portable view of the chest.     COMPARISON: August 31, 2020     FINDINGS:  The cardiac silhouette is enlarged. There is fullness of the right hilum which could be related to vascularity such as enlarged ascending aorta. There are some interstitial prominence. No effusion or pneumothorax identified.     IMPRESSION:     Enlarged cardiac silhouette with interstitial prominence suggesting pulmonary edema.     Right hilar soft tissue density again noted could represent enlargement of the ascending aorta or pulmonary arteries.    LABORATORY  Lab Results   Component Value Date    SODIUM 138 05/18/2021    POTASSIUM 4.0 05/18/2021    CHLORIDE 102 05/18/2021    CO2 23 05/18/2021    GLUCOSE 137 (H)  05/18/2021    BUN 31 (H) 05/18/2021    CREATININE 0.84 05/18/2021    CREATININE 0.94 03/23/2011    GLOMRATE 59 (L) 03/23/2011        Lab Results   Component Value Date    WBC 12.2 (H) 05/18/2021    WBC 11.5 (H) 03/23/2011    HEMOGLOBIN 13.6 05/18/2021    HEMATOCRIT 42.7 05/18/2021    PLATELETCT 209 05/18/2021        Total time of the discharge process exceeds 38  minutes.

## 2021-05-19 NOTE — PROGRESS NOTES
Annual Health Assessment Questions:    1.  Are you currently engaging in any exercise or physical activity? Yes    2.  How would you describe your mood or emotional well-being today? good    3.  Have you had any falls in the last year? No    4.  Have you noticed any problems with your balance or had difficulty walking? No    5.  In the last six months have you experienced any leakage of urine? No    6. DPA/Advanced Directive: Patient has Advanced Directive on file.    [address] Comprehensive

## 2021-05-19 NOTE — TELEPHONE ENCOUNTER
Called patient no answer / called patient GABY Saab he advised she is okay and he will check on her.

## 2021-05-19 NOTE — TELEPHONE ENCOUNTER
New start 5/18/2021   Jessica Sofia MR# [id number]  Mobile: [phone]  Emergency Contact: [facility] (son)    [phone]

## 2021-05-20 ENCOUNTER — TELEPHONE (OUTPATIENT)
Dept: OTHER | Facility: MEDICAL CENTER | Age: 81
End: 2021-05-20

## 2021-05-20 ENCOUNTER — HOSPITAL ENCOUNTER (INPATIENT)
Facility: MEDICAL CENTER | Age: 81
LOS: 21 days | DRG: 177 | End: 2021-06-10
Attending: EMERGENCY MEDICINE | Admitting: INTERNAL MEDICINE
Payer: MEDICARE

## 2021-05-20 ENCOUNTER — APPOINTMENT (OUTPATIENT)
Dept: RADIOLOGY | Facility: MEDICAL CENTER | Age: 81
DRG: 177 | End: 2021-05-20
Payer: MEDICARE

## 2021-05-20 ENCOUNTER — TELEPHONE (OUTPATIENT)
Dept: EMERGENCY MEDICINE | Facility: MEDICAL CENTER | Age: 81
End: 2021-05-20

## 2021-05-20 ENCOUNTER — APPOINTMENT (OUTPATIENT)
Dept: RADIOLOGY | Facility: MEDICAL CENTER | Age: 81
DRG: 177 | End: 2021-05-20
Attending: EMERGENCY MEDICINE
Payer: MEDICARE

## 2021-05-20 VITALS — OXYGEN SATURATION: 88 % | HEART RATE: 115 BPM | RESPIRATION RATE: 18 BRPM

## 2021-05-20 VITALS — HEART RATE: 63 BPM | OXYGEN SATURATION: 82 % | RESPIRATION RATE: 13 BRPM

## 2021-05-20 VITALS — RESPIRATION RATE: 16 BRPM | OXYGEN SATURATION: 86 % | HEART RATE: 99 BPM

## 2021-05-20 VITALS — HEART RATE: 124 BPM | OXYGEN SATURATION: 80 %

## 2021-05-20 VITALS — OXYGEN SATURATION: 73 % | HEART RATE: 111 BPM | RESPIRATION RATE: 19 BRPM

## 2021-05-20 VITALS — RESPIRATION RATE: 18 BRPM | OXYGEN SATURATION: 79 % | HEART RATE: 91 BPM

## 2021-05-20 VITALS — RESPIRATION RATE: 16 BRPM | HEART RATE: 91 BPM | OXYGEN SATURATION: 85 %

## 2021-05-20 VITALS — RESPIRATION RATE: 19 BRPM | HEART RATE: 100 BPM | OXYGEN SATURATION: 84 %

## 2021-05-20 VITALS — RESPIRATION RATE: 20 BRPM | OXYGEN SATURATION: 84 % | HEART RATE: 101 BPM

## 2021-05-20 VITALS — RESPIRATION RATE: 16 BRPM | OXYGEN SATURATION: 87 % | HEART RATE: 99 BPM

## 2021-05-20 VITALS — HEART RATE: 100 BPM | OXYGEN SATURATION: 84 % | RESPIRATION RATE: 25 BRPM

## 2021-05-20 VITALS — OXYGEN SATURATION: 75 % | RESPIRATION RATE: 16 BRPM | HEART RATE: 112 BPM

## 2021-05-20 VITALS — HEART RATE: 91 BPM | RESPIRATION RATE: 16 BRPM | OXYGEN SATURATION: 86 %

## 2021-05-20 VITALS — OXYGEN SATURATION: 77 % | HEART RATE: 120 BPM | RESPIRATION RATE: 17 BRPM

## 2021-05-20 VITALS — RESPIRATION RATE: 22 BRPM | OXYGEN SATURATION: 71 % | HEART RATE: 103 BPM

## 2021-05-20 DIAGNOSIS — J96.01 ACUTE HYPOXEMIC RESPIRATORY FAILURE DUE TO SEVERE ACUTE RESPIRATORY SYNDROME CORONAVIRUS 2 (SARS-COV-2) DISEASE (HCC): ICD-10-CM

## 2021-05-20 DIAGNOSIS — R04.2 HEMOPTYSIS: ICD-10-CM

## 2021-05-20 DIAGNOSIS — U07.1 PNEUMONIA DUE TO COVID-19 VIRUS: ICD-10-CM

## 2021-05-20 DIAGNOSIS — J96.01 ACUTE RESPIRATORY FAILURE WITH HYPOXIA (HCC): ICD-10-CM

## 2021-05-20 DIAGNOSIS — J12.82 PNEUMONIA DUE TO COVID-19 VIRUS: ICD-10-CM

## 2021-05-20 DIAGNOSIS — U07.1 ACUTE HYPOXEMIC RESPIRATORY FAILURE DUE TO SEVERE ACUTE RESPIRATORY SYNDROME CORONAVIRUS 2 (SARS-COV-2) DISEASE (HCC): ICD-10-CM

## 2021-05-20 LAB
ALBUMIN SERPL BCP-MCNC: 3.8 G/DL (ref 3.2–4.9)
ALBUMIN/GLOB SERPL: 1.2 G/DL
ALP SERPL-CCNC: 87 U/L (ref 30–99)
ALT SERPL-CCNC: 52 U/L (ref 2–50)
ANION GAP SERPL CALC-SCNC: 13 MMOL/L (ref 7–16)
APPEARANCE UR: CLEAR
AST SERPL-CCNC: 54 U/L (ref 12–45)
BACTERIA #/AREA URNS HPF: ABNORMAL /HPF
BACTERIA BLD CULT: NORMAL
BACTERIA BLD CULT: NORMAL
BASOPHILS # BLD AUTO: 0.2 % (ref 0–1.8)
BASOPHILS # BLD: 0.02 K/UL (ref 0–0.12)
BILIRUB SERPL-MCNC: 0.5 MG/DL (ref 0.1–1.5)
BILIRUB UR QL STRIP.AUTO: NEGATIVE
BUN SERPL-MCNC: 25 MG/DL (ref 8–22)
CALCIUM SERPL-MCNC: 9.7 MG/DL (ref 8.4–10.2)
CHLORIDE SERPL-SCNC: 96 MMOL/L (ref 96–112)
CO2 SERPL-SCNC: 26 MMOL/L (ref 20–33)
COLOR UR: YELLOW
CREAT SERPL-MCNC: 0.75 MG/DL (ref 0.5–1.4)
D DIMER PPP IA.FEU-MCNC: 0.33 UG/ML (FEU) (ref 0–0.5)
EKG IMPRESSION: NORMAL
EKG IMPRESSION: NORMAL
EOSINOPHIL # BLD AUTO: 0 K/UL (ref 0–0.51)
EOSINOPHIL NFR BLD: 0 % (ref 0–6.9)
EPI CELLS #/AREA URNS HPF: ABNORMAL /HPF
ERYTHROCYTE [DISTWIDTH] IN BLOOD BY AUTOMATED COUNT: 47.3 FL (ref 35.9–50)
GLOBULIN SER CALC-MCNC: 3.1 G/DL (ref 1.9–3.5)
GLUCOSE SERPL-MCNC: 177 MG/DL (ref 65–99)
GLUCOSE UR STRIP.AUTO-MCNC: NEGATIVE MG/DL
HCT VFR BLD AUTO: 44.6 % (ref 37–47)
HGB BLD-MCNC: 14.3 G/DL (ref 12–16)
HYALINE CASTS #/AREA URNS LPF: ABNORMAL /LPF
IMM GRANULOCYTES # BLD AUTO: 0.19 K/UL (ref 0–0.11)
IMM GRANULOCYTES NFR BLD AUTO: 1.5 % (ref 0–0.9)
KETONES UR STRIP.AUTO-MCNC: NEGATIVE MG/DL
LACTATE BLD-SCNC: 1.5 MMOL/L (ref 0.5–2)
LACTATE BLD-SCNC: 1.6 MMOL/L (ref 0.5–2)
LACTATE BLD-SCNC: 2.2 MMOL/L (ref 0.5–2)
LEUKOCYTE ESTERASE UR QL STRIP.AUTO: NEGATIVE
LYMPHOCYTES # BLD AUTO: 0.18 K/UL (ref 1–4.8)
LYMPHOCYTES NFR BLD: 1.4 % (ref 22–41)
MCH RBC QN AUTO: 27.2 PG (ref 27–33)
MCHC RBC AUTO-ENTMCNC: 32.1 G/DL (ref 33.6–35)
MCV RBC AUTO: 84.8 FL (ref 81.4–97.8)
MICRO URNS: ABNORMAL
MONOCYTES # BLD AUTO: 0.47 K/UL (ref 0–0.85)
MONOCYTES NFR BLD AUTO: 3.6 % (ref 0–13.4)
MUCOUS THREADS #/AREA URNS HPF: ABNORMAL /HPF
NEUTROPHILS # BLD AUTO: 12.19 K/UL (ref 2–7.15)
NEUTROPHILS NFR BLD: 93.3 % (ref 44–72)
NITRITE UR QL STRIP.AUTO: NEGATIVE
NRBC # BLD AUTO: 0 K/UL
NRBC BLD-RTO: 0 /100 WBC
NT-PROBNP SERPL IA-MCNC: 2629 PG/ML (ref 0–125)
PH UR STRIP.AUTO: 6 [PH] (ref 5–8)
PLATELET # BLD AUTO: 226 K/UL (ref 164–446)
PMV BLD AUTO: 10.2 FL (ref 9–12.9)
POTASSIUM SERPL-SCNC: 4.6 MMOL/L (ref 3.6–5.5)
PROCALCITONIN SERPL-MCNC: 0.07 NG/ML
PROT SERPL-MCNC: 6.9 G/DL (ref 6–8.2)
PROT UR QL STRIP: NEGATIVE MG/DL
RBC # BLD AUTO: 5.26 M/UL (ref 4.2–5.4)
RBC # URNS HPF: ABNORMAL /HPF
RBC UR QL AUTO: ABNORMAL
SIGNIFICANT IND 70042: NORMAL
SIGNIFICANT IND 70042: NORMAL
SITE SITE: NORMAL
SITE SITE: NORMAL
SODIUM SERPL-SCNC: 135 MMOL/L (ref 135–145)
SOURCE SOURCE: NORMAL
SOURCE SOURCE: NORMAL
SP GR UR STRIP.AUTO: 1.02
TROPONIN T SERPL-MCNC: 15 NG/L (ref 6–19)
TSH SERPL DL<=0.005 MIU/L-ACNC: 0.64 UIU/ML (ref 0.38–5.33)
UNIDENT CRYS URNS QL MICRO: ABNORMAL /HPF
WBC # BLD AUTO: 13.1 K/UL (ref 4.8–10.8)

## 2021-05-20 PROCEDURE — 94640 AIRWAY INHALATION TREATMENT: CPT

## 2021-05-20 PROCEDURE — 93005 ELECTROCARDIOGRAM TRACING: CPT | Performed by: EMERGENCY MEDICINE

## 2021-05-20 PROCEDURE — 83605 ASSAY OF LACTIC ACID: CPT | Mod: 91

## 2021-05-20 PROCEDURE — 96375 TX/PRO/DX INJ NEW DRUG ADDON: CPT

## 2021-05-20 PROCEDURE — 87040 BLOOD CULTURE FOR BACTERIA: CPT

## 2021-05-20 PROCEDURE — 700111 HCHG RX REV CODE 636 W/ 250 OVERRIDE (IP): Performed by: EMERGENCY MEDICINE

## 2021-05-20 PROCEDURE — 99285 EMERGENCY DEPT VISIT HI MDM: CPT

## 2021-05-20 PROCEDURE — 84484 ASSAY OF TROPONIN QUANT: CPT

## 2021-05-20 PROCEDURE — 96374 THER/PROPH/DIAG INJ IV PUSH: CPT

## 2021-05-20 PROCEDURE — 8E0ZXY6 ISOLATION: ICD-10-PCS | Performed by: INTERNAL MEDICINE

## 2021-05-20 PROCEDURE — 5A0945A ASSISTANCE WITH RESPIRATORY VENTILATION, 24-96 CONSECUTIVE HOURS, HIGH NASAL FLOW/VELOCITY: ICD-10-PCS | Performed by: INTERNAL MEDICINE

## 2021-05-20 PROCEDURE — 700111 HCHG RX REV CODE 636 W/ 250 OVERRIDE (IP): Performed by: INTERNAL MEDICINE

## 2021-05-20 PROCEDURE — 80053 COMPREHEN METABOLIC PANEL: CPT

## 2021-05-20 PROCEDURE — A9270 NON-COVERED ITEM OR SERVICE: HCPCS | Performed by: INTERNAL MEDICINE

## 2021-05-20 PROCEDURE — 85025 COMPLETE CBC W/AUTO DIFF WBC: CPT

## 2021-05-20 PROCEDURE — 99223 1ST HOSP IP/OBS HIGH 75: CPT | Performed by: INTERNAL MEDICINE

## 2021-05-20 PROCEDURE — 83880 ASSAY OF NATRIURETIC PEPTIDE: CPT

## 2021-05-20 PROCEDURE — 87086 URINE CULTURE/COLONY COUNT: CPT

## 2021-05-20 PROCEDURE — 36415 COLL VENOUS BLD VENIPUNCTURE: CPT

## 2021-05-20 PROCEDURE — 71045 X-RAY EXAM CHEST 1 VIEW: CPT

## 2021-05-20 PROCEDURE — 700102 HCHG RX REV CODE 250 W/ 637 OVERRIDE(OP): Performed by: INTERNAL MEDICINE

## 2021-05-20 PROCEDURE — 81001 URINALYSIS AUTO W/SCOPE: CPT

## 2021-05-20 PROCEDURE — 85379 FIBRIN DEGRADATION QUANT: CPT

## 2021-05-20 PROCEDURE — 770020 HCHG ROOM/CARE - TELE (206)

## 2021-05-20 PROCEDURE — 84145 PROCALCITONIN (PCT): CPT

## 2021-05-20 PROCEDURE — 84443 ASSAY THYROID STIM HORMONE: CPT

## 2021-05-20 RX ORDER — POLYETHYLENE GLYCOL 3350 17 G/17G
1 POWDER, FOR SOLUTION ORAL
Status: DISCONTINUED | OUTPATIENT
Start: 2021-05-20 | End: 2021-06-10 | Stop reason: HOSPADM

## 2021-05-20 RX ORDER — ONDANSETRON 2 MG/ML
4 INJECTION INTRAMUSCULAR; INTRAVENOUS EVERY 6 HOURS PRN
Status: DISCONTINUED | OUTPATIENT
Start: 2021-05-20 | End: 2021-06-10 | Stop reason: HOSPADM

## 2021-05-20 RX ORDER — ACETAMINOPHEN 325 MG/1
650 TABLET ORAL EVERY 6 HOURS PRN
Status: DISCONTINUED | OUTPATIENT
Start: 2021-05-20 | End: 2021-06-10 | Stop reason: HOSPADM

## 2021-05-20 RX ORDER — ATORVASTATIN CALCIUM 10 MG/1
10 TABLET, FILM COATED ORAL DAILY
Status: DISCONTINUED | OUTPATIENT
Start: 2021-05-21 | End: 2021-06-10 | Stop reason: HOSPADM

## 2021-05-20 RX ORDER — DEXAMETHASONE 4 MG/1
6 TABLET ORAL DAILY
Status: DISCONTINUED | OUTPATIENT
Start: 2021-05-21 | End: 2021-05-23

## 2021-05-20 RX ORDER — GUAIFENESIN/DEXTROMETHORPHAN 100-10MG/5
10 SYRUP ORAL EVERY 6 HOURS PRN
Status: DISCONTINUED | OUTPATIENT
Start: 2021-05-20 | End: 2021-06-10 | Stop reason: HOSPADM

## 2021-05-20 RX ORDER — BISACODYL 10 MG
10 SUPPOSITORY, RECTAL RECTAL
Status: DISCONTINUED | OUTPATIENT
Start: 2021-05-20 | End: 2021-06-10 | Stop reason: HOSPADM

## 2021-05-20 RX ORDER — ASCORBIC ACID 500 MG
1000 TABLET ORAL DAILY
Status: DISCONTINUED | OUTPATIENT
Start: 2021-05-21 | End: 2021-06-10 | Stop reason: HOSPADM

## 2021-05-20 RX ORDER — FUROSEMIDE 10 MG/ML
40 INJECTION INTRAMUSCULAR; INTRAVENOUS
Status: DISCONTINUED | OUTPATIENT
Start: 2021-05-20 | End: 2021-05-23

## 2021-05-20 RX ORDER — AMOXICILLIN 250 MG
2 CAPSULE ORAL 2 TIMES DAILY
Status: DISCONTINUED | OUTPATIENT
Start: 2021-05-20 | End: 2021-06-10 | Stop reason: HOSPADM

## 2021-05-20 RX ORDER — BUDESONIDE AND FORMOTEROL FUMARATE DIHYDRATE 160; 4.5 UG/1; UG/1
2 AEROSOL RESPIRATORY (INHALATION)
Status: DISCONTINUED | OUTPATIENT
Start: 2021-05-20 | End: 2021-06-10 | Stop reason: HOSPADM

## 2021-05-20 RX ORDER — FERROUS SULFATE 325(65) MG
325 TABLET ORAL
Status: DISCONTINUED | OUTPATIENT
Start: 2021-05-21 | End: 2021-05-21

## 2021-05-20 RX ORDER — POTASSIUM CHLORIDE 20 MEQ/1
10 TABLET, EXTENDED RELEASE ORAL DAILY
Status: DISCONTINUED | OUTPATIENT
Start: 2021-05-21 | End: 2021-05-22

## 2021-05-20 RX ORDER — ONDANSETRON 4 MG/1
4 TABLET, ORALLY DISINTEGRATING ORAL EVERY 6 HOURS PRN
Status: DISCONTINUED | OUTPATIENT
Start: 2021-05-20 | End: 2021-06-10 | Stop reason: HOSPADM

## 2021-05-20 RX ORDER — GUAIFENESIN 600 MG/1
600 TABLET, EXTENDED RELEASE ORAL 2 TIMES DAILY PRN
Status: DISCONTINUED | OUTPATIENT
Start: 2021-05-20 | End: 2021-06-10 | Stop reason: HOSPADM

## 2021-05-20 RX ORDER — DILTIAZEM HYDROCHLORIDE 180 MG/1
180 CAPSULE, COATED, EXTENDED RELEASE ORAL 2 TIMES DAILY
Status: DISCONTINUED | OUTPATIENT
Start: 2021-05-20 | End: 2021-06-10 | Stop reason: HOSPADM

## 2021-05-20 RX ORDER — BENZONATATE 100 MG/1
100 CAPSULE ORAL 3 TIMES DAILY PRN
Status: DISCONTINUED | OUTPATIENT
Start: 2021-05-20 | End: 2021-06-10

## 2021-05-20 RX ORDER — DEXAMETHASONE SODIUM PHOSPHATE 4 MG/ML
6 INJECTION, SOLUTION INTRA-ARTICULAR; INTRALESIONAL; INTRAMUSCULAR; INTRAVENOUS; SOFT TISSUE ONCE
Status: COMPLETED | OUTPATIENT
Start: 2021-05-20 | End: 2021-05-20

## 2021-05-20 RX ORDER — MONTELUKAST SODIUM 10 MG/1
10 TABLET ORAL EVERY EVENING
Status: DISCONTINUED | OUTPATIENT
Start: 2021-05-20 | End: 2021-06-10 | Stop reason: HOSPADM

## 2021-05-20 RX ADMIN — DEXAMETHASONE SODIUM PHOSPHATE 6 MG: 4 INJECTION, SOLUTION INTRA-ARTICULAR; INTRALESIONAL; INTRAMUSCULAR; INTRAVENOUS; SOFT TISSUE at 17:09

## 2021-05-20 RX ADMIN — DILTIAZEM HYDROCHLORIDE 180 MG: 180 CAPSULE, COATED, EXTENDED RELEASE ORAL at 19:36

## 2021-05-20 RX ADMIN — BENZONATATE 100 MG: 100 CAPSULE ORAL at 19:36

## 2021-05-20 RX ADMIN — APIXABAN 5 MG: 5 TABLET, FILM COATED ORAL at 19:36

## 2021-05-20 RX ADMIN — MONTELUKAST 10 MG: 10 TABLET, FILM COATED ORAL at 19:36

## 2021-05-20 RX ADMIN — BUDESONIDE AND FORMOTEROL FUMARATE DIHYDRATE 2 PUFF: 160; 4.5 AEROSOL RESPIRATORY (INHALATION) at 21:14

## 2021-05-20 RX ADMIN — FUROSEMIDE 40 MG: 10 INJECTION, SOLUTION INTRAMUSCULAR; INTRAVENOUS at 17:14

## 2021-05-20 ASSESSMENT — ENCOUNTER SYMPTOMS
ABDOMINAL PAIN: 0
NERVOUS/ANXIOUS: 0
CLAUDICATION: 0
BLURRED VISION: 0
DEPRESSION: 0
DIARRHEA: 0
VOMITING: 0
NAUSEA: 0
SHORTNESS OF BREATH: 1
HEARTBURN: 0
SORE THROAT: 0
COUGH: 1
INSOMNIA: 0
HEMOPTYSIS: 1
SPUTUM PRODUCTION: 1
PHOTOPHOBIA: 0
MYALGIAS: 0
SENSORY CHANGE: 0
CHILLS: 0
HEADACHES: 0
WHEEZING: 1
DIZZINESS: 0
CONSTIPATION: 0
FEVER: 1
SPEECH CHANGE: 0
WEAKNESS: 0

## 2021-05-20 ASSESSMENT — FIBROSIS 4 INDEX
FIB4 SCORE: 2.68
FIB4 SCORE: 1.7

## 2021-05-20 ASSESSMENT — PATIENT HEALTH QUESTIONNAIRE - PHQ9
1. LITTLE INTEREST OR PLEASURE IN DOING THINGS: NOT AT ALL
SUM OF ALL RESPONSES TO PHQ9 QUESTIONS 1 AND 2: 0
2. FEELING DOWN, DEPRESSED, IRRITABLE, OR HOPELESS: NOT AT ALL

## 2021-05-20 ASSESSMENT — COGNITIVE AND FUNCTIONAL STATUS - GENERAL
SUGGESTED CMS G CODE MODIFIER MOBILITY: CK
TURNING FROM BACK TO SIDE WHILE IN FLAT BAD: A LITTLE
HELP NEEDED FOR BATHING: A LOT
STANDING UP FROM CHAIR USING ARMS: A LITTLE
MOVING TO AND FROM BED TO CHAIR: A LITTLE
EATING MEALS: A LITTLE
DRESSING REGULAR UPPER BODY CLOTHING: A LITTLE
TOILETING: A LITTLE
DRESSING REGULAR LOWER BODY CLOTHING: A LOT
DAILY ACTIVITIY SCORE: 17
SUGGESTED CMS G CODE MODIFIER DAILY ACTIVITY: CK
WALKING IN HOSPITAL ROOM: A LOT
MOBILITY SCORE: 16
MOVING FROM LYING ON BACK TO SITTING ON SIDE OF FLAT BED: A LITTLE
CLIMB 3 TO 5 STEPS WITH RAILING: A LOT

## 2021-05-20 ASSESSMENT — LIFESTYLE VARIABLES
TOTAL SCORE: 0
HAVE YOU EVER FELT YOU SHOULD CUT DOWN ON YOUR DRINKING: NO
TOTAL SCORE: 0
EVER FELT BAD OR GUILTY ABOUT YOUR DRINKING: NO
TOTAL SCORE: 0
EVER HAD A DRINK FIRST THING IN THE MORNING TO STEADY YOUR NERVES TO GET RID OF A HANGOVER: NO
HAVE PEOPLE ANNOYED YOU BY CRITICIZING YOUR DRINKING: NO
ALCOHOL_USE: NO
CONSUMPTION TOTAL: INCOMPLETE

## 2021-05-20 ASSESSMENT — PAIN DESCRIPTION - PAIN TYPE: TYPE: ACUTE PAIN

## 2021-05-20 NOTE — TELEPHONE ENCOUNTER
Pt alerted for low oxygen at 2210. She stated that she felt fine and was getting ready for bed. Levels went back to normal while we were on the phone.

## 2021-05-20 NOTE — TELEPHONE ENCOUNTER
Called patient she advised she is okay was just going to the bathroom brushing her teeth etc.. she is okay

## 2021-05-20 NOTE — TELEPHONE ENCOUNTER
Called patient she advised she is okay and they just took her blood pressure and meds, still dont thing she is getting a good connection on the pulseox . When son gets home they are going to switch everything out and they will call me

## 2021-05-20 NOTE — TELEPHONE ENCOUNTER
Pt alerted for low oxygen at 0515. She stated that she had just gotten up to go to the bathroom and was feeling fine. Her levels went back to normal shortly after.

## 2021-05-20 NOTE — TELEPHONE ENCOUNTER
Called patient no answer /called patient EC he advised she is okay he also have family there to watch over her when he is away and will call if needed

## 2021-05-20 NOTE — TELEPHONE ENCOUNTER
Talked to patient she is okay and just waiting for son to get back to help change the pulse ox so she can get a better connection so we get a better reading

## 2021-05-20 NOTE — ASSESSMENT & PLAN NOTE
CXR appears worse compare to 5/15 with increasing consolidation.    Oxygen    Bronchodilators    Iv diuresis

## 2021-05-20 NOTE — TELEPHONE ENCOUNTER
Pt alerted for low oxygen at 0304. She stated that she felt fine. Her levels went back up to normal shortly after.

## 2021-05-20 NOTE — ED NOTES
Orders rec'ed  SL placed w/ bld drawn, sent to lab  Pt and son aware of POC for pt to be admitted  At present in NAD

## 2021-05-20 NOTE — PROGRESS NOTES
Virtual Visit: Established Patient   This visit was conducted via Zoom using secure and encrypted videoconferencing technology. The patient was in a private location in the state [facility].    The patient's identity was confirmed and verbal consent was obtained for this virtual visit.    Subjective:   CC:   Chief Complaint   Patient presents with   • [facility] Follow-up     [facility]    • Cough     needs medication to help cough        Jessica Black is a 80 y.o. female presenting for evaluation and management of:       Virtual Visit: Established Patient   This visit was conducted via Zoom   using secure and encrypted videoconferencing technology. The patient was in a private location in the [facility].    The patient's identity was confirmed and verbal consent was obtained for this virtual visit.    Subjective:   CC:   Chief Complaint   Patient presents with   • Transitional Care [facility] Follow-up     [facility]    • Cough     needs medication to help cough        Jessica Black is a 80 y.o. female presenting for evaluation and management of:         ROS    Denies any recent fevers or chills. No nausea or vomiting. No chest pains or shortness of breath.     No Known Allergies    Current medicines (including changes today)  Current Outpatient Medications   Medication Sig Dispense Refill   • benzonatate (TESSALON PERLES) 100 MG Cap Take 1 capsule by mouth 3 times a day as needed for Cough. 90 capsule 1   • azithromycin (ZITHROMAX) 250 MG Tab Take 2 tabs today then 1 per day for 4 days 6 tablet 1   • potassium chloride SA (K-DUR) 10 MEQ Tab CR Take 1 tablet by mouth every day. 30 tablet 1   • dexamethasone (DECADRON) 6 MG Tab Take 1 tablet by mouth every day for 6 days. 6 tablet 0   • furosemide (LASIX) 20 MG Tab Take 1 tablet by mouth every day. 30 tablet 1   • atorvastatin (LIPITOR) 10 MG Tab TAKE ONE TABLET BY MOUTH ONE TIME DAILY  100 Tab 0   •  montelukast (SINGULAIR) 10 MG Tab Take 1 Tab by mouth every evening. 30 Tab 11   • albuterol 108 (90 Base) MCG/ACT Aero Soln inhalation aerosol INHALE TWO PUFFS BY MOUTH EVERY SIX HOURS AS NEEDED FOR SHORTNESS OF BREATH  8.5 g 12   • fluticasone-salmeterol (ADVAIR DISKUS) 500-50 MCG/DOSE AEROSOL POWDER, BREATH ACTIVATED Inhale 1 Puff by mouth 2 times a day. Rinse mouth after each use. 3 Each 3   • Spacer/Aero-Holding Chambers (E-Z SPACER) Device Use as directed 1 Device 3   • vitamin D3, cholecalciferol, 1000 UNIT Tab Take 1 Tab by mouth every day. (Patient taking differently: Take 2,000 Units by mouth every day.) 100 Tab 4   • diltiazem CD (DILTIAZEM CD) 180 MG CAPSULE SR 24 HR Take 1 Cap by mouth 2 Times a Day. 60 Cap 1   • multivitamin (THERAGRAN) Tab Take 1 Tab by mouth every day.     • ascorbic acid (C 1000) 1000 MG tablet Take 1,000 mg by mouth every day.     • Probiotic Product (PROBIOTIC DAILY PO) Take 1 Tab by mouth every day.     • apixaban (ELIQUIS) 5mg Tab Take 5 mg by mouth 2 Times a Day.     • ferrous sulfate 325 (65 Fe) MG EC tablet Take 1 tablet by mouth 3 times a day with meals. 90 tablet 2   • azithromycin (ZITHROMAX) 250 MG Tab Take 500mg on day 1, then take 250mg on day 2 through 5 (Patient not taking: Reported on 12/17/2020) 6 Tab 0     No current facility-administered medications for this visit.       Patient Active Problem List    Diagnosis Date Noted   • Acute hypoxemic respiratory failure due to COVID-19 ([facility]) 05/15/2021   • Hypertension 05/15/2021   • Vitamin D deficiency 07/22/2019   • Age-related osteoporosis with current pathological fracture with routine healing 01/08/2018   • IFG (impaired fasting glucose) 12/14/2017   • Menopausal osteoporosis 09/13/2017   • Type 2 diabetes mellitus with hyperglycemia, without long-term current use of insulin ([facility]) 12/15/2016   • Anticoagulant long-term use- dr eunice collado, [place]; for a fib 12/15/2016   • Pulmonary hypertension associated with  "unclear multi-factorial mechanisms (HCC)- mild RSVP=45 mmHg ECHO 2016; (asthma, valvular disease, diastolic dysfunction, severe dilation LA and RV)- dr dawson 12/15/2016   • Longstanding persistent atrial fibrillation ([facility])-Dr. Dawson Afton 11/18/2015   • Asthma, moderate persistent, well-controlled 11/18/2015   • MVP (mitral valve prolapse) 04/22/2013   • Hyperlipidemia 04/22/2013       Family History   Problem Relation Age of Onset   • No Known Problems Mother    • No Known Problems Father        She  has a past medical history of A-fib (HCC) (2008), Asthma, Atrial fibrillation (HCC), Hyperlipidemia, and Hypertension.  She  has no past surgical history on file.       Objective:   /101 (BP Location: Left arm, Patient Position: Sitting, BP Cuff Size: Adult) Comment: Pt. stated  Pulse 91 Comment: Pt. stated  Ht 1.702 m (5' 7\") Comment: Pt. stated  Wt 90.4 kg (199 lb 6.4 oz) Comment: Pt. stated  BMI 31.23 kg/m²     Physical Exam:   Constitutional: Alert, no distress, well-groomed.  Skin: No rashes in visible areas.  Eye: Round. Conjunctiva clear, lids normal. No icterus.   ENMT: Lips pink without lesions, good dentition, moist mucous membranes. Phonation normal.  Neck: No masses, no thyromegaly. Moves freely without pain.  Respiratory: Unlabored respiratory effort, no cough or audible wheeze  Psych: Alert and oriented x3, normal affect and mood.       Assessment and Plan:   The following treatment plan was discussed:     1. Hospital discharge follow-up-acute COVID-19 respiratory infection without lobar infiltrate on chest x-ray, but did show interstitial edema consistent with pulmonary edema.    Lasix prescribed.  Decadron prescribed.  Did receive Rocephin for 2 days.  No remdesivir or Regeneron.    2. Acute hypoxemic respiratory failure due to COVID-19 (HCC)-possibly due to community-acquired pneumonia.  Azithromycin started in the hospital and will continue as an outpatient.  However it was not dispensed as " a prescription of the patient so new and was just sent in now.  - azithromycin (ZITHROMAX) 250 MG Tab; Take 2 tabs today then 1 per day for 4 days  Dispense: 6 tablet; Refill: 1    Patient's oxygen level dropped below 90% on 1 L/min of oxygen.  Therefore she has been on 2 L for the last several hours but still her level is only 92%.  Have instructed her to increase it to 3 L/min and to titrate upward to the least 94 O2 sat.  If she gets to a level of 6 L/min without achieving this goal she will return to the hospital.        3. Acute pulmonary edema (HCC)-as above.  Lasix for pulmonary edema.  Azithromycin for possible community-acquired pneumonia.  Decadron for COVID-19 infection.       4. Cough-secondary CAP  - benzonatate (TESSALON PERLES) 100 MG Cap; Take 1 capsule by mouth 3 times a day as needed for Cough.  Dispense: 90 capsule; Refill: 1  - azithromycin (ZITHROMAX) 250 MG Tab; Take 2 tabs today then 1 per day for 4 days  Dispense: 6 tablet; Refill: 1    5. Acute right-sided heart failure (HCC)  Final overloaded noticed on echocardiogram.  Right ventricular systolic pressure noted to be high at 50.  6. Pulmonary hypertension (HCC)  As above Lasix.      8. Community acquired pneumonia, unspecified laterality  - azithromycin (ZITHROMAX) 250 MG Tab; Take 2 tabs today then 1 per day for 4 days  Dispense: 6 tablet; Refill: 1    9.  Patient was also found to be iron deficient with a saturation of 8% and iron level of 8 but a low iron binding capacity.  However hemoglobin had only dropped from 15-13 and her MCV is not microcytic at 85.  So this appears to be a relatively new process.  And technically she is not yet anemic.  Unclear as the cause of the iron deficiency unless is due to her Covid pneumonia.  She has had no blood in her stools urine or vaginal discharge of blood.  She will take 1 iron pill a day and increase it to 2 a day if she can tolerate without constipation or GI upset.  We will continue to monitor  and follow-up blood work.    10.  COPD with acute exacerbation.-Stable under control.  Continue current regimen    Follow-up: No follow-ups on file.           ROS    Denies any recent fevers or chills. No nausea or vomiting. No chest pains or shortness of breath.     No Known Allergies    Current medicines (including changes today)  Current Outpatient Medications   Medication Sig Dispense Refill   • benzonatate (TESSALON PERLES) 100 MG Cap Take 1 capsule by mouth 3 times a day as needed for Cough. 90 capsule 1   • potassium chloride SA (K-DUR) 10 MEQ Tab CR Take 1 tablet by mouth every day. 30 tablet 1   • dexamethasone (DECADRON) 6 MG Tab Take 1 tablet by mouth every day for 6 days. 6 tablet 0   • furosemide (LASIX) 20 MG Tab Take 1 tablet by mouth every day. 30 tablet 1   • atorvastatin (LIPITOR) 10 MG Tab TAKE ONE TABLET BY MOUTH ONE TIME DAILY  100 Tab 0   • montelukast (SINGULAIR) 10 MG Tab Take 1 Tab by mouth every evening. 30 Tab 11   • albuterol 108 (90 Base) MCG/ACT Aero Soln inhalation aerosol INHALE TWO PUFFS BY MOUTH EVERY SIX HOURS AS NEEDED FOR SHORTNESS OF BREATH  8.5 g 12   • fluticasone-salmeterol (ADVAIR DISKUS) 500-50 MCG/DOSE AEROSOL POWDER, BREATH ACTIVATED Inhale 1 Puff by mouth 2 times a day. Rinse mouth after each use. 3 Each 3   • Spacer/Aero-Holding Chambers (E-Z SPACER) Device Use as directed 1 Device 3   • vitamin D3, cholecalciferol, 1000 UNIT Tab Take 1 Tab by mouth every day. (Patient taking differently: Take 2,000 Units by mouth every day.) 100 Tab 4   • diltiazem CD (DILTIAZEM CD) 180 MG CAPSULE SR 24 HR Take 1 Cap by mouth 2 Times a Day. 60 Cap 1   • multivitamin (THERAGRAN) Tab Take 1 Tab by mouth every day.     • ascorbic acid (C 1000) 1000 MG tablet Take 1,000 mg by mouth every day.     • Probiotic Product (PROBIOTIC DAILY PO) Take 1 Tab by mouth every day.     • apixaban (ELIQUIS) 5mg Tab Take 5 mg by mouth 2 Times a Day.     • ferrous sulfate 325 (65 Fe) MG EC tablet Take 1  "tablet by mouth 3 times a day with meals. 90 tablet 2   • azithromycin (ZITHROMAX) 250 MG Tab Take 500mg on day 1, then take 250mg on day 2 through 5 (Patient not taking: Reported on 12/17/2020) 6 Tab 0     No current facility-administered medications for this visit.       Patient Active Problem List    Diagnosis Date Noted   • Acute hypoxemic respiratory failure due to COVID-19 ([facility]) 05/15/2021   • Hypertension 05/15/2021   • Vitamin D deficiency 07/22/2019   • Age-related osteoporosis with current pathological fracture with routine healing 01/08/2018   • IFG (impaired fasting glucose) 12/14/2017   • Menopausal osteoporosis 09/13/2017   • Type 2 diabetes mellitus with hyperglycemia, without long-term current use of insulin ([facility]) 12/15/2016   • Anticoagulant long-term use- dr eunice collado, [place]; for a fib 12/15/2016   • Pulmonary hypertension associated with unclear multi-factorial mechanisms ([facility])- mild RSVP=45 mmHg ECHO 2016; (asthma, valvular disease, diastolic dysfunction, severe dilation LA and RV)- dr dawson 12/15/2016   • Chronic atrial fibrillation- dr dawson [place] 11/18/2015   • Asthma, moderate persistent, well-controlled 11/18/2015   • MVP (mitral valve prolapse) 04/22/2013   • Hyperlipidemia 04/22/2013       Family History   Problem Relation Age of Onset   • No Known Problems Mother    • No Known Problems Father        She  has a past medical history of A-fib ([facility]) (2008), Asthma, Atrial fibrillation ([facility]), Hyperlipidemia, and Hypertension.  She  has no past surgical history on file.       Objective:   /101 (BP Location: Left arm, Patient Position: Sitting, BP Cuff Size: Adult) Comment: Pt. stated  Pulse 91 Comment: Pt. stated  Ht 1.702 m (5' 7\") Comment: Pt. stated  Wt 90.4 kg (199 lb 6.4 oz) Comment: Pt. stated  BMI 31.23 kg/m²     Physical Exam:    Constitutional: Alert, no distress, well-groomed.  Skin: No rashes in visible areas.  Eye: Round. Conjunctiva clear, lids normal. No " icterus.   ENMT: Lips pink without lesions, good dentition, moist mucous membranes. Phonation normal.  Neck: No masses, no thyromegaly. Moves freely without pain.  Respiratory: Unlabored respiratory effort, no cough or audible wheeze  Psych: Alert and oriented x3, normal affect and mood.       Assessment and Plan:   The following treatment plan was discussed:     1. Cough  - benzonatate (TESSALON PERLES) 100 MG Cap; Take 1 capsule by mouth 3 times a day as needed for Cough.  Dispense: 90 capsule; Refill: 1        Follow-up: No follow-ups on file.

## 2021-05-20 NOTE — H&P
"Hospital Medicine History & Physical Note    Date of Service  5/20/2021    Primary Care Physician  Avery Cruz M.D.    Consultants  ID - Marcy Jimenze    Code Status  Full Code    Chief Complaint  Chief Complaint   Patient presents with   • Shortness of Breath     Reports +COVID result Saturday. Wearing 4L NC, son stating \"she's been going to 84% on 4L\". Reports hemoptysis as well. Pt. O2 titrated to 6L on triage for 85% on 4L. Denies CP or fevers.       History of Presenting Illness  80 y.o. female who presented 5/20/2021 with increasing oxygen demands in the past 24 hours.  She was recently admitted for COVID pneumonia and discharged on 5/18 with 1L of oxygen and home monitoring program.  She has been checked on daily and has been needing to increase her oxygen needs today and she also developed hemoptysis while at home which is new since discharge.  She states her cough is about the same since hospitalization and the tessalon has been helping some.  She denies any swelling and has been taking the medications prescribed on discharge.  Initially in triage she was requiring 6 liters of oxygen to maintain saturation but has been weaned to 5L.  Procalcitonin is normal at 0.07 so I will not initiate antibiotics at this time.  I discussed patient with Dr Jimenez with ID and at this time, she is not appropriate for Remdesivir therapy.  If her oxygen requirements worsen, please re-contact ID for re-evaluation for Remdesivir.  I will increase her anti-tussives to include hycodan if tessalon is not effective to lessen the occurrence of hemoptysis.  BNP has improved with the continuation of lasix after discharge and I will continue this while inpatient.     Review of Systems  Review of Systems   Constitutional: Positive for fever and malaise/fatigue. Negative for chills.   HENT: Negative for congestion and sore throat.    Eyes: Negative for blurred vision and photophobia.   Respiratory: Positive for cough, hemoptysis, " sputum production, shortness of breath and wheezing.    Cardiovascular: Negative for chest pain, claudication and leg swelling.   Gastrointestinal: Negative for abdominal pain, constipation, diarrhea, heartburn, nausea and vomiting.   Genitourinary: Negative for dysuria and hematuria.   Musculoskeletal: Negative for joint pain and myalgias.   Skin: Negative for itching and rash.   Neurological: Negative for dizziness, sensory change, speech change, weakness and headaches.   Psychiatric/Behavioral: Negative for depression. The patient is not nervous/anxious and does not have insomnia.        Past Medical History   has a past medical history of A-fib ([facility]) (2008), Asthma, Atrial fibrillation ([facility]), Hyperlipidemia, and Hypertension.    Surgical History   has no past surgical history on file.     Family History  family history includes No Known Problems in her father and mother.     Social History   reports that she has never smoked. She has never used smokeless tobacco. She reports that she does not drink alcohol and does not use drugs.    Allergies  No Known Allergies    Medications  Prior to Admission Medications   Prescriptions Last Dose Informant Patient Reported? Taking?   Probiotic Product (PROBIOTIC DAILY PO) 5/20/2021 at 0800 Patient Yes No   Sig: Take 1 Tab by mouth every day.   Spacer/Aero-Holding Chambers (E-Z SPACER) Device  Patient No No   Sig: Use as directed   albuterol 108 (90 Base) MCG/ACT Aero Soln inhalation aerosol 5/20/2021 at 1330 Patient No No   Sig: INHALE TWO PUFFS BY MOUTH EVERY SIX HOURS AS NEEDED FOR SHORTNESS OF BREATH    Patient taking differently: Inhale 2 Puffs every four hours as needed for Shortness of Breath.   apixaban (ELIQUIS) 5mg Tab 5/20/2021 at 0900 Patient Yes No   Sig: Take 5 mg by mouth 2 Times a Day.   ascorbic acid (C 1000) 1000 MG tablet 5/20/2021 at 0900 Patient Yes No   Sig: Take 1,000 mg by mouth every day.   atorvastatin (LIPITOR) 10 MG Tab 5/20/2021 at 0900 Patient No  No   Sig: TAKE ONE TABLET BY MOUTH ONE TIME DAILY    Patient taking differently: Take 10 mg by mouth every day.   azithromycin (ZITHROMAX) 250 MG Tab Not Taking at Unknown time Patient No No   Sig: Take 500mg on day 1, then take 250mg on day 2 through 5   Patient not taking: Reported on 5/20/2021   azithromycin (ZITHROMAX) 250 MG Tab not started yet at Unknown time Patient No No   Sig: Take 2 tabs today then 1 per day for 4 days   benzonatate (TESSALON PERLES) 100 MG Cap 5/20/2021 at 0800 Patient No No   Sig: Take 1 capsule by mouth 3 times a day as needed for Cough.   dexamethasone (DECADRON) 6 MG Tab 5/20/2021 at 0800 Patient No No   Sig: Take 1 tablet by mouth every day for 6 days.   diltiazem CD (DILTIAZEM CD) 180 MG CAPSULE SR 24 HR 5/20/2021 at 0800 Patient No No   Sig: Take 1 Cap by mouth 2 Times a Day.   ferrous sulfate 325 (65 Fe) MG EC tablet not started yet at Unknown time Patient No No   Sig: Take 1 tablet by mouth 3 times a day with meals.   fluticasone-salmeterol (ADVAIR DISKUS) 500-50 MCG/DOSE AEROSOL POWDER, BREATH ACTIVATED 5/14/2021 at Unknown time Patient No No   Sig: Inhale 1 Puff by mouth 2 times a day. Rinse mouth after each use.   furosemide (LASIX) 20 MG Tab 5/20/2021 at 0800 Patient No No   Sig: Take 1 tablet by mouth every day.   montelukast (SINGULAIR) 10 MG Tab 5/19/2021 at 2100 Patient No No   Sig: Take 1 Tab by mouth every evening.   multivitamin (THERAGRAN) Tab 5/20/2021 at 0800 Patient Yes No   Sig: Take 1 Tab by mouth every day.   potassium chloride SA (K-DUR) 10 MEQ Tab CR 5/20/2021 at 0800 Patient No No   Sig: Take 1 tablet by mouth every day.   vitamin D3, cholecalciferol, 1000 UNIT Tab 5/20/2021 at 0800 Patient No No   Sig: Take 1 Tab by mouth every day.   Patient taking differently: Take 2,000 Units by mouth every day.      Facility-Administered Medications: None       Physical Exam  Temp:  [38.1 °C (100.5 °F)] 38.1 °C (100.5 °F)  Pulse:  [100-111] 105  Resp:  [20-58] 58  BP:  (125-147)/(68-79) 125/68  SpO2:  [92 %-93 %] 93 %    Physical Exam  Vitals and nursing note reviewed.   Constitutional:       General: She is not in acute distress.     Appearance: Normal appearance. She is not ill-appearing.   HENT:      Head: Normocephalic and atraumatic.      Nose: Nose normal.   Eyes:      General: No scleral icterus.  Cardiovascular:      Rate and Rhythm: Normal rate and regular rhythm.      Heart sounds: Normal heart sounds. No murmur heard.     Pulmonary:      Effort: Pulmonary effort is normal.      Breath sounds: Wheezing and rales (all lung fields, worse at left lung base) present.   Abdominal:      General: Bowel sounds are normal. There is no distension.      Palpations: Abdomen is soft.   Musculoskeletal:         General: No swelling or tenderness.      Cervical back: Neck supple.   Skin:     General: Skin is warm and dry.   Neurological:      General: No focal deficit present.      Mental Status: She is alert and oriented to person, place, and time.   Psychiatric:         Mood and Affect: Mood normal.         Laboratory:  Recent Labs     05/18/21  0345 05/20/21  1436   WBC 12.2* 13.1*   RBC 4.97 5.26   HEMOGLOBIN 13.6 14.3   HEMATOCRIT 42.7 44.6   MCV 85.9 84.8   MCH 27.4 27.2   MCHC 31.9* 32.1*   RDW 49.4 47.3   PLATELETCT 209 226   MPV 10.2 10.2     Recent Labs     05/18/21  0345 05/20/21  1436   SODIUM 138 135   POTASSIUM 4.0 4.6   CHLORIDE 102 96   CO2 23 26   GLUCOSE 137* 177*   BUN 31* 25*   CREATININE 0.84 0.75   CALCIUM 9.5 9.7     Recent Labs     05/18/21  0345 05/20/21  1436   ALTSGPT  --  52*   ASTSGOT  --  54*   ALKPHOSPHAT  --  87   TBILIRUBIN  --  0.5   GLUCOSE 137* 177*         Recent Labs     05/20/21  1436   NTPROBNP 2629*         Recent Labs     05/20/21  1436   TROPONINT 15       Imaging:  DX-CHEST-PORTABLE (1 VIEW)   Final Result      Worsening moderate multifocal consolidation compatible with known Covid pneumonia      Stable marked cardiac silhouette enlargement             Assessment/Plan:  I anticipate this patient will require at least two midnights for appropriate medical management, necessitating inpatient admission.    * Pneumonia due to COVID-19 virus- (present on admission)  Assessment & Plan  Resume dexamethasone  ID reviewing chart to see if remdesivir is appropriate in this patient.  Has mild elevation in LFTs in the 50s  Pro calcitonin is 0.07 - no need for concurrent antibiotics at this time  Oxygen support - currently needing 5L and good saturations.  Mild hemoptysis secondary to cough and anticoagulation.      Acute respiratory failure with hypoxia (HCC)  Assessment & Plan  Discharged on 1L and was doing well, checked in with home monitoring program today and PCP, Dr Cruz and needed to increase her oxygen at home up to 5 liters and started having hemoptysis so came in for evaluation  CXR appears worse compare to 5/15 with increasing consolidation.      Panlobular emphysema (HCC)- (present on admission)  Assessment & Plan  Resume home meds and SVNs      Iron deficiency- (present on admission)  Assessment & Plan  Resume iron supplementation      Hypertension- (present on admission)  Assessment & Plan  Resume cardizem      Anticoagulant long-term use- dr eunice collado [place]; for a fib- (present on admission)  Assessment & Plan  Continue eliquis  Mild hemoptysis with coughing - continue cough suppression      Longstanding persistent atrial fibrillation (HCC)-Dr. Eunice Velasquez- (present on admission)  Assessment & Plan  Rate controlled with cardizem  Continue Eliquis

## 2021-05-20 NOTE — TELEPHONE ENCOUNTER
Patients granddaughter called to see how we can get a better connection for the patient and to see why the notification was different the their dashboard. They are going to take off patient nail polish and put on new tape to see if that works

## 2021-05-20 NOTE — TELEPHONE ENCOUNTER
GABY Saab called advised he is going to bring patient to [facility] ER to be checked out/will keep an eye out for her

## 2021-05-20 NOTE — TELEPHONE ENCOUNTER
Called patient she advised she is fine went to the bathroom then had a little coughing fit but she is good now

## 2021-05-20 NOTE — TELEPHONE ENCOUNTER
Called patient she advised she is okay just hanging out her battery is running low and she will change it once her EC gets back . Low battery might be the cause of the up and down reading on the O2

## 2021-05-20 NOTE — TELEPHONE ENCOUNTER
Called patient she advised she is fine and went to the bathroom and put her robe on now she is sitting down.

## 2021-05-20 NOTE — ED PROVIDER NOTES
"ED Provider Note    CHIEF COMPLAINT  Chief Complaint   Patient presents with   • Shortness of Breath     Reports +COVID result Saturday. Wearing 4L NC, son stating \"she's been going to 84% on 4L\". Reports hemoptysis as well. Pt. O2 titrated to 6L on triage for 85% on 4L. Denies CP or fevers.       HPI  Jessica Black is a 80 y.o. female who presents to the emergency department with shortness of breath and cough.  The patient was recently hospitalized for COVID-19 infection.  She was discharged home on 1 L of oxygen but since that time has been having worsening shortness of breath and increasing oxygen demand.  She is been up to 4 L by nasal cannula clearly having a difficult time keeping her saturation above 85%.  She has had a cough associate with increased hemoptysis.  She gets very dyspneic when she ambulates.  She denies any history of PE or DVT.  She does take chronic anticoagulation.  She is on Eliquis for A. fib.  She reports compliance.  She does have asthma.  She states she is wheezing and coughing.  She is not felt febrile although she has a fever here.  Denies any other complaints of pain.  Denies any other acute concerns or complaints.  She is concerned that she may have a secondary infection.    REVIEW OF SYSTEMS  See HPI for further details. All other systems are negative.    PAST MEDICAL HISTORY  Past Medical History:   Diagnosis Date   • A-fib (HCC) 2008   • Asthma    • Atrial fibrillation (HCC)    • Hyperlipidemia    • Hypertension        FAMILY HISTORY  Family History   Problem Relation Age of Onset   • No Known Problems Mother    • No Known Problems Father        SOCIAL HISTORY  Social History     Socioeconomic History   • Marital status:      Spouse name: Not on file   • Number of children: Not on file   • Years of education: Not on file   • Highest education level: Not on file   Occupational History   • Not on file   Tobacco Use   • Smoking status: Never Smoker   • Smokeless tobacco: " Never Used   Vaping Use   • Vaping Use: Never used   Substance and Sexual Activity   • Alcohol use: No   • Drug use: No   • Sexual activity: Not Currently     Partners: Male   Other Topics Concern   • Not on file   Social History Narrative   • Not on file     Social Determinants of Health     Financial Resource Strain:    • Difficulty of Paying Living Expenses:    Food Insecurity:    • Worried About Running Out of Food in the Last Year:    • Ran Out of Food in the Last Year:    Transportation Needs:    • Lack of Transportation (Medical):    • Lack of Transportation (Non-Medical):    Physical Activity:    • Days of Exercise per Week:    • Minutes of Exercise per Session:    Stress:    • Feeling of Stress :    Social Connections:    • Frequency of Communication with Friends and Family:    • Frequency of Social Gatherings with Friends and Family:    • Attends Muslim Services:    • Active Member of Clubs or Organizations:    • Attends Club or Organization Meetings:    • Marital Status:    Intimate Partner Violence:    • Fear of Current or Ex-Partner:    • Emotionally Abused:    • Physically Abused:    • Sexually Abused:        SURGICAL HISTORY  History reviewed. No pertinent surgical history.    CURRENT MEDICATIONS  Home Medications     Reviewed by Rhea Duque (Pharmacy Tech) on 05/20/21 at 1516  Med List Status: Complete   Medication Last Dose Status   albuterol 108 (90 Base) MCG/ACT Aero Soln inhalation aerosol 5/20/2021 Active   apixaban (ELIQUIS) 5mg Tab 5/20/2021 Active   ascorbic acid (C 1000) 1000 MG tablet 5/20/2021 Active   atorvastatin (LIPITOR) 10 MG Tab 5/20/2021 Active   azithromycin (ZITHROMAX) 250 MG Tab Not Taking Active   azithromycin (ZITHROMAX) 250 MG Tab not started yet Active   benzonatate (TESSALON PERLES) 100 MG Cap 5/20/2021 Active   dexamethasone (DECADRON) 6 MG Tab 5/20/2021 Active   diltiazem CD (DILTIAZEM CD) 180 MG CAPSULE SR 24 HR 5/20/2021 Active   ferrous sulfate 325 (65 Fe)  "MG EC tablet not started yet Active   fluticasone-salmeterol (ADVAIR DISKUS) 500-50 MCG/DOSE AEROSOL POWDER, BREATH ACTIVATED 5/14/2021 Active   furosemide (LASIX) 20 MG Tab 5/20/2021 Active   montelukast (SINGULAIR) 10 MG Tab 5/19/2021 Active   multivitamin (THERAGRAN) Tab 5/20/2021 Active   potassium chloride SA (K-DUR) 10 MEQ Tab CR 5/20/2021 Active   Probiotic Product (PROBIOTIC DAILY PO) 5/20/2021 Active   Spacer/Aero-Holding Chambers (E-Z SPACER) Device  Active   vitamin D3, cholecalciferol, 1000 UNIT Tab 5/20/2021 Active                ALLERGIES  No Known Allergies    PHYSICAL EXAM  VITAL SIGNS: /76   Pulse 100   Temp (!) 38.1 °C (100.5 °F) (Temporal)   Resp 20   Ht 1.702 m (5' 7\")   Wt 90.9 kg (200 lb 6.4 oz)   SpO2 92%   BMI 31.39 kg/m²    Constitutional: Awake alert in moderate respiratory distress  HENT: Normocephalic, Atraumatic.   Eyes: PERRL, EOMI, Conjunctiva normal  Neck: Normal range of motion  Cardiovascular: Tachycardic no murmurs rubs or gallops.  Irregularly irregular  Thorax & Lungs: Crackles bilaterally with wheezing and poor air movement throughout.    Abdomen: Bowel sounds normal, Soft, No tenderness  Skin: Warm, Dry, No erythema, No rash.   Back: No tenderness, No CVA tenderness.  Musculoskeletal: Good range of motion in all major joints.  Mild asymmetric edema.  Neurologic: Alert, No focal deficits noted.   Psychiatric: Affect anious      Results for orders placed or performed during the hospital encounter of 05/20/21   CBC WITH DIFFERENTIAL   Result Value Ref Range    WBC 13.1 (H) 4.8 - 10.8 K/uL    RBC 5.26 4.20 - 5.40 M/uL    Hemoglobin 14.3 12.0 - 16.0 g/dL    Hematocrit 44.6 37.0 - 47.0 %    MCV 84.8 81.4 - 97.8 fL    MCH 27.2 27.0 - 33.0 pg    MCHC 32.1 (L) 33.6 - 35.0 g/dL    RDW 47.3 35.9 - 50.0 fL    Platelet Count 226 164 - 446 K/uL    MPV 10.2 9.0 - 12.9 fL    Neutrophils-Polys 93.30 (H) 44.00 - 72.00 %    Lymphocytes 1.40 (L) 22.00 - 41.00 %    Monocytes 3.60 0.00 " - 13.40 %    Eosinophils 0.00 0.00 - 6.90 %    Basophils 0.20 0.00 - 1.80 %    Immature Granulocytes 1.50 (H) 0.00 - 0.90 %    Nucleated RBC 0.00 /100 WBC    Neutrophils (Absolute) 12.19 (H) 2.00 - 7.15 K/uL    Lymphs (Absolute) 0.18 (L) 1.00 - 4.80 K/uL    Monos (Absolute) 0.47 0.00 - 0.85 K/uL    Eos (Absolute) 0.00 0.00 - 0.51 K/uL    Baso (Absolute) 0.02 0.00 - 0.12 K/uL    Immature Granulocytes (abs) 0.19 (H) 0.00 - 0.11 K/uL    NRBC (Absolute) 0.00 K/uL   COMP METABOLIC PANEL   Result Value Ref Range    Sodium 135 135 - 145 mmol/L    Potassium 4.6 3.6 - 5.5 mmol/L    Chloride 96 96 - 112 mmol/L    Co2 26 20 - 33 mmol/L    Anion Gap 13.0 7.0 - 16.0    Glucose 177 (H) 65 - 99 mg/dL    Bun 25 (H) 8 - 22 mg/dL    Creatinine 0.75 0.50 - 1.40 mg/dL    Calcium 9.7 8.4 - 10.2 mg/dL    AST(SGOT) 54 (H) 12 - 45 U/L    ALT(SGPT) 52 (H) 2 - 50 U/L    Alkaline Phosphatase 87 30 - 99 U/L    Total Bilirubin 0.5 0.1 - 1.5 mg/dL    Albumin 3.8 3.2 - 4.9 g/dL    Total Protein 6.9 6.0 - 8.2 g/dL    Globulin 3.1 1.9 - 3.5 g/dL    A-G Ratio 1.2 g/dL   URINALYSIS    Specimen: Urine   Result Value Ref Range    Micro Urine Req Microscopic    TSH   Result Value Ref Range    TSH 0.643 0.380 - 5.330 uIU/mL   TROPONIN   Result Value Ref Range    Troponin T 15 6 - 19 ng/L   proBrain Natriuretic Peptide, NT   Result Value Ref Range    NT-proBNP 2629 (H) 0 - 125 pg/mL   D-DIMER   Result Value Ref Range    D-Dimer Screen 0.33 0.00 - 0.50 ug/mL (FEU)   PROCALCITONIN   Result Value Ref Range    Procalcitonin 0.07 <0.25 ng/mL   LACTIC ACID   Result Value Ref Range    Lactic Acid 2.2 (H) 0.5 - 2.0 mmol/L   ESTIMATED GFR   Result Value Ref Range    GFR If African American >60 >60 mL/min/1.73 m 2    GFR If Non African American >60 >60 mL/min/1.73 m 2   EKG   Result Value Ref Range    Report       [facility]    Test Date:  2021-05-15  Pt Name:    SHIRA WHALEY               Department: [facility]  MRN:         [id number]                      Room:       -ROOM 9  Gender:     Female                       Technician: YOSELIN  :        [id number]                   Requested By:ER TRIAGE PROTOCOL  Order #:    [id number]                    Reading MD:    Measurements  Intervals                                Axis  Rate:       128                          P:  NV:                                      QRS:        147  QRSD:       135                          T:          1  QT:         298  QTc:        435    Interpretive Statements  Wide-QRS tachycardia  Ventricular bigeminy  Right bundle branch block  Compared to ECG 2016 06:36:16  Right bundle-branch block now present  Atrial fibrillation no longer present  Incomplete right bundle-branch block no longer present          RADIOLOGY/PROCEDURES  DX-CHEST-PORTABLE (1 VIEW)   Final Result      Worsening moderate multifocal consolidation compatible with known Covid pneumonia      Stable marked cardiac silhouette enlargement            COURSE & MEDICAL DECISION MAKING  Pertinent Labs & Imaging studies reviewed. (See chart for details)    Patient presents with shortness of breath, cough, wheezing and recent hospitalization for COVID-19.    Differential diagnosis includes but is not due to COVID-19 pneumonia, bacterial pneumonia, CHF, ACS, asthma exacerbation or possibly pulmonary embolism.      The patient does have a history of chronic A. fib.  She is currently taking Eliquis.  I think this makes PE unlikely despite her hemoptysis.      The patient is noted have significant wheezing and poor air movement.  She is chronically on steroids she does have a history of asthma.  She is given some additional steroids have ordered a breathing treatment.      She is a mild leukocytosis, chest x-ray does not show lobar pneumonia.  I suspect her lobar pneumonia is related to her steroid use.  The patient does not have an elevated procalcitonin.  I will hold off on antibiotics for now.    He does  not show evidence of ACS.  BNP is minimally elevated troponin is reassuring.  The patient was in A. fib with RVR therefore thyroid was obtained.    Patient has a positive respiratory failure secondary to Covid as well as asthma.  She requires hospitalist for ongoing work-up and therapy.  Discussed case the hospitalist and care transfer at that time.      FINAL IMPRESSION  1. Pneumonia due to COVID-19 virus     2. Acute respiratory failure with hypoxia (HCC)     3. Hemoptysis         2.   3.         Electronically signed by: Christoph Conrad M.D., 5/20/2021 2:26 PM

## 2021-05-21 LAB
ALBUMIN SERPL BCP-MCNC: 3.5 G/DL (ref 3.2–4.9)
ALBUMIN/GLOB SERPL: 1.2 G/DL
ALP SERPL-CCNC: 80 U/L (ref 30–99)
ALT SERPL-CCNC: 41 U/L (ref 2–50)
ANION GAP SERPL CALC-SCNC: 9 MMOL/L (ref 7–16)
AST SERPL-CCNC: 35 U/L (ref 12–45)
BASOPHILS # BLD AUTO: 0.1 % (ref 0–1.8)
BASOPHILS # BLD: 0.01 K/UL (ref 0–0.12)
BILIRUB SERPL-MCNC: 0.5 MG/DL (ref 0.1–1.5)
BUN SERPL-MCNC: 24 MG/DL (ref 8–22)
CALCIUM SERPL-MCNC: 9.6 MG/DL (ref 8.4–10.2)
CHLORIDE SERPL-SCNC: 93 MMOL/L (ref 96–112)
CO2 SERPL-SCNC: 31 MMOL/L (ref 20–33)
CREAT SERPL-MCNC: 0.6 MG/DL (ref 0.5–1.4)
CRP SERPL HS-MCNC: 11.1 MG/DL (ref 0–0.75)
EOSINOPHIL # BLD AUTO: 0 K/UL (ref 0–0.51)
EOSINOPHIL NFR BLD: 0 % (ref 0–6.9)
ERYTHROCYTE [DISTWIDTH] IN BLOOD BY AUTOMATED COUNT: 46 FL (ref 35.9–50)
GLOBULIN SER CALC-MCNC: 2.9 G/DL (ref 1.9–3.5)
GLUCOSE SERPL-MCNC: 165 MG/DL (ref 65–99)
HAV IGM SERPL QL IA: NORMAL
HBV CORE IGM SER QL: NORMAL
HBV SURFACE AG SER QL: NORMAL
HCT VFR BLD AUTO: 43.3 % (ref 37–47)
HCV AB SER QL: NORMAL
HGB BLD-MCNC: 14 G/DL (ref 12–16)
IMM GRANULOCYTES # BLD AUTO: 0.11 K/UL (ref 0–0.11)
IMM GRANULOCYTES NFR BLD AUTO: 1.2 % (ref 0–0.9)
LYMPHOCYTES # BLD AUTO: 0.26 K/UL (ref 1–4.8)
LYMPHOCYTES NFR BLD: 2.9 % (ref 22–41)
MCH RBC QN AUTO: 27.3 PG (ref 27–33)
MCHC RBC AUTO-ENTMCNC: 32.3 G/DL (ref 33.6–35)
MCV RBC AUTO: 84.4 FL (ref 81.4–97.8)
MONOCYTES # BLD AUTO: 0.34 K/UL (ref 0–0.85)
MONOCYTES NFR BLD AUTO: 3.7 % (ref 0–13.4)
NEUTROPHILS # BLD AUTO: 8.36 K/UL (ref 2–7.15)
NEUTROPHILS NFR BLD: 92.1 % (ref 44–72)
NRBC # BLD AUTO: 0 K/UL
NRBC BLD-RTO: 0 /100 WBC
PLATELET # BLD AUTO: 195 K/UL (ref 164–446)
PMV BLD AUTO: 9.8 FL (ref 9–12.9)
POTASSIUM SERPL-SCNC: 4.5 MMOL/L (ref 3.6–5.5)
PROT SERPL-MCNC: 6.4 G/DL (ref 6–8.2)
RBC # BLD AUTO: 5.13 M/UL (ref 4.2–5.4)
SODIUM SERPL-SCNC: 133 MMOL/L (ref 135–145)
WBC # BLD AUTO: 9.1 K/UL (ref 4.8–10.8)

## 2021-05-21 PROCEDURE — 80053 COMPREHEN METABOLIC PANEL: CPT

## 2021-05-21 PROCEDURE — 700105 HCHG RX REV CODE 258: Performed by: INTERNAL MEDICINE

## 2021-05-21 PROCEDURE — 94760 N-INVAS EAR/PLS OXIMETRY 1: CPT

## 2021-05-21 PROCEDURE — 85025 COMPLETE CBC W/AUTO DIFF WBC: CPT

## 2021-05-21 PROCEDURE — 700102 HCHG RX REV CODE 250 W/ 637 OVERRIDE(OP): Performed by: INTERNAL MEDICINE

## 2021-05-21 PROCEDURE — 99291 CRITICAL CARE FIRST HOUR: CPT | Performed by: HOSPITALIST

## 2021-05-21 PROCEDURE — XW033H5 INTRODUCTION OF TOCILIZUMAB INTO PERIPHERAL VEIN, PERCUTANEOUS APPROACH, NEW TECHNOLOGY GROUP 5: ICD-10-PCS | Performed by: INTERNAL MEDICINE

## 2021-05-21 PROCEDURE — 86140 C-REACTIVE PROTEIN: CPT

## 2021-05-21 PROCEDURE — 700111 HCHG RX REV CODE 636 W/ 250 OVERRIDE (IP): Performed by: INTERNAL MEDICINE

## 2021-05-21 PROCEDURE — 94640 AIRWAY INHALATION TREATMENT: CPT

## 2021-05-21 PROCEDURE — 80074 ACUTE HEPATITIS PANEL: CPT

## 2021-05-21 PROCEDURE — 86480 TB TEST CELL IMMUN MEASURE: CPT

## 2021-05-21 PROCEDURE — 770020 HCHG ROOM/CARE - TELE (206)

## 2021-05-21 PROCEDURE — A9270 NON-COVERED ITEM OR SERVICE: HCPCS | Performed by: INTERNAL MEDICINE

## 2021-05-21 RX ORDER — FERROUS SULFATE 325(65) MG
325 TABLET ORAL
Status: DISCONTINUED | OUTPATIENT
Start: 2021-05-22 | End: 2021-06-10 | Stop reason: HOSPADM

## 2021-05-21 RX ORDER — FERROUS SULFATE 325(65) MG
325 TABLET ORAL 2 TIMES DAILY WITH MEALS
Status: DISCONTINUED | OUTPATIENT
Start: 2021-05-21 | End: 2021-05-21

## 2021-05-21 RX ADMIN — DILTIAZEM HYDROCHLORIDE 180 MG: 180 CAPSULE, COATED, EXTENDED RELEASE ORAL at 06:06

## 2021-05-21 RX ADMIN — DILTIAZEM HYDROCHLORIDE 180 MG: 180 CAPSULE, COATED, EXTENDED RELEASE ORAL at 17:53

## 2021-05-21 RX ADMIN — OXYCODONE HYDROCHLORIDE AND ACETAMINOPHEN 1000 MG: 500 TABLET ORAL at 06:06

## 2021-05-21 RX ADMIN — MONTELUKAST 10 MG: 10 TABLET, FILM COATED ORAL at 18:18

## 2021-05-21 RX ADMIN — BUDESONIDE AND FORMOTEROL FUMARATE DIHYDRATE 2 PUFF: 160; 4.5 AEROSOL RESPIRATORY (INHALATION) at 07:27

## 2021-05-21 RX ADMIN — APIXABAN 5 MG: 5 TABLET, FILM COATED ORAL at 06:06

## 2021-05-21 RX ADMIN — TOCILIZUMAB 480 MG: 20 INJECTION, SOLUTION, CONCENTRATE INTRAVENOUS at 13:31

## 2021-05-21 RX ADMIN — BENZONATATE 100 MG: 100 CAPSULE ORAL at 21:40

## 2021-05-21 RX ADMIN — FUROSEMIDE 40 MG: 10 INJECTION, SOLUTION INTRAMUSCULAR; INTRAVENOUS at 06:07

## 2021-05-21 RX ADMIN — ATORVASTATIN CALCIUM 10 MG: 10 TABLET, FILM COATED ORAL at 06:07

## 2021-05-21 RX ADMIN — FERROUS SULFATE TAB 325 MG (65 MG ELEMENTAL FE) 325 MG: 325 (65 FE) TAB at 08:56

## 2021-05-21 RX ADMIN — BUDESONIDE AND FORMOTEROL FUMARATE DIHYDRATE 2 PUFF: 160; 4.5 AEROSOL RESPIRATORY (INHALATION) at 19:45

## 2021-05-21 RX ADMIN — APIXABAN 5 MG: 5 TABLET, FILM COATED ORAL at 17:52

## 2021-05-21 RX ADMIN — DEXAMETHASONE 6 MG: 4 TABLET ORAL at 06:06

## 2021-05-21 RX ADMIN — POTASSIUM CHLORIDE 10 MEQ: 1500 TABLET, EXTENDED RELEASE ORAL at 06:06

## 2021-05-21 RX ADMIN — ACETAMINOPHEN 650 MG: 325 TABLET ORAL at 08:56

## 2021-05-21 ASSESSMENT — ENCOUNTER SYMPTOMS
HEADACHES: 0
DOUBLE VISION: 0
PHOTOPHOBIA: 0
PALPITATIONS: 0
ABDOMINAL PAIN: 0
NAUSEA: 0
SENSORY CHANGE: 0
CONSTIPATION: 0
ORTHOPNEA: 0
TINGLING: 0
CHILLS: 0
NECK PAIN: 0
MYALGIAS: 0
EYE PAIN: 0
SHORTNESS OF BREATH: 1
BACK PAIN: 0
DEPRESSION: 0
FEVER: 0
DIZZINESS: 0
HALLUCINATIONS: 0
CLAUDICATION: 0
VOMITING: 0
HEARTBURN: 0
BLURRED VISION: 0
TREMORS: 0

## 2021-05-21 ASSESSMENT — CHA2DS2 SCORE
AGE 75 OR GREATER: YES
PRIOR STROKE OR TIA OR THROMBOEMBOLISM: NO
VASCULAR DISEASE: NO
HYPERTENSION: YES
CHA2DS2 VASC SCORE: 4
CHF OR LEFT VENTRICULAR DYSFUNCTION: NO
SEX: FEMALE
AGE 65 TO 74: NO
DIABETES: NO

## 2021-05-21 ASSESSMENT — PAIN DESCRIPTION - PAIN TYPE
TYPE: ACUTE PAIN
TYPE: ACUTE PAIN;CHRONIC PAIN
TYPE: ACUTE PAIN

## 2021-05-21 NOTE — PROGRESS NOTES
Report received from ED. Assumed care of pt. Assessment complete. Pt A&Ox4. Pt in no apparent signs of distress. POC discussed. Call light within reach. Bed in low position. All needs are met at this time. Pt desat to 81% O2 increase to 5 L, pt o2 sat 83%. Pt states she want to ambulate to the BR. This RN told her her O2 levels are too low for safe ambulation.RN offered bedpan or commode. Pt now on O2 mask at 85%. Pt got up to commode and desat to 75%. Will notify RT

## 2021-05-21 NOTE — CARE PLAN
The patient is Watcher - Medium risk of patient condition declining or worsening    Problem: Knowledge Deficit - Standard  Goal: Patient and family/care givers will demonstrate understanding of plan of care, disease process/condition, diagnostic tests and medications  Outcome: Progressing  Note: Pt and son educated on pt's condition and current POC including titration of oxygen and high flow nasal cannula. Questions answered regarding medications including general information regarding remdesivir.     Problem: Fall Risk  Goal: Patient will remain free from falls  Outcome: Progressing  Note: Pt educated on increased risk of falls d/t activity intolerance and dyspnea as well as need to use call light for assistance before getting up. Pt verbalized understanding and demonstrates ability to use call light appropriately. Pt remains free from falls at this time.      Shift Goals  Clinical Goals: Maintain adequate oxygenation  Patient Goals: Breathe easier    Progress made toward(s) clinical / shift goals:  Pt placed on high flow nasal cannula and oxygen titrated to maintain adequate oxygenation.    Patient is not progressing towards the following goals: n/a

## 2021-05-21 NOTE — PROGRESS NOTES
"Pt refused her 2nd and 3rd dose (1130 and 1730) of Iron, as her PCP told her it was \"too much\" for her. Dr Nunez notified via Voalte text at 1248.   "

## 2021-05-21 NOTE — CARE PLAN
Problem: Respiratory  Goal: Patient will achieve/maintain optimum respiratory ventilation and gas exchange  Outcome: Progressing  Note: Pt has been keeping HOB elevated, using IS 3-5x/hour, and sitting in chair as much as possible, O2 sats have remained in 90s at rest.

## 2021-05-21 NOTE — PROGRESS NOTES
2 RN Skin Check    2 RN skin check complete with GABO Andrade.   Devices in place: Nasal Cannula, PIV x 1.  Skin assessed under devices: yes.  Confirmed pressure ulcers found on: n/a.  New potential pressure ulcers noted on n/a. Wound consult placed N/A.  The following interventions in place Pillows.    All bony prominences and skin folds examined. Skin assessed to be WDL.

## 2021-05-21 NOTE — CONSULTS
COVID-19 pneumonia  -Influenza screening negative  Fevers, 1x  Hypoxemia secondary to above-acute worsening since yesterday and now on high flow   Lymphopenia  Elevated inflammatory markers   Atrial fibrillation  COPD  Heart failure, diuresis ongoing      Inflammatory markers/D Dimer and PCT  CRP 11.10   Ferritin    LDH   D Dimer 0.33  PCT 0.07         Plan:      -Continue supportive care with oxygen supplementation, proning, judicious use of IV fluids  -Monitor inflammatory markers   -Monitor d-dimer, ppx anticoagulation recommended unless high risk for bleeding - low threshold to initiate work-up for VTE's-patient on apixaban  -Agree with dexamethasone 6 mg daily for planned 10-day course- ongoing  -The patient meets [facility] criteria for tocilizumab.  This is preferred for patients with acute decompensation requiring high flow oxygen.  Discussed with pharmacy and they will place order.  We will not use remdesivir in the setting as limited utility in this scenario per studies and no proven mortality benefit.  -Monitor for any secondary bacterial infections including pneumonia.  Procalcitonin is low and agree with antibiotics for now.  Monitor for infections going forward as well as other known side effects associated with tocilizumab such as GI bleed.  -Ordered acute otitis panel and TB quant for baseline

## 2021-05-21 NOTE — PROGRESS NOTES
Tele strip printed at 0312 shows A-fib with BBB HR: 101    Measurements: -/0.14/-    Telemetry Shift Summary     Rhythm: A-fib with BBB  HR Range: 70's-130's, up to 160's with exertion  Ectopy: Frequent PVC, Rare Couplets/Bigeminy/Trigeminy/Triplets, 4 beat run VT     Telemetry monitoring strips placed in pt's chart.

## 2021-05-21 NOTE — FLOWSHEET NOTE
Titrated for patient comfort and SAO2.     05/20/21 2129   Oxygen   O2 (LPM) 35   FiO2% 100 %   O2 Delivery Device Heated High Flow Nasal Cannula   Aerosols   $ Aerosol Delivery Device Heated High Flow Nasal Cannula

## 2021-05-21 NOTE — PROGRESS NOTES
Hospital Medicine Daily Progress Note    Date of Service  5/21/2021    Chief Complaint  80 y.o. female admitted 5/20/2021 with dyspnea    Hospital Course  No notes on file    Interval Problem Update  On high flow oxygen 35 liters with  Sat of 92    Dyspnea with exertion    Non productive cough    Afebrile    Case d/w and consulted dr dumont ID      Consultants/Specialty  ID dr dumont    Code Status  Full Code    Disposition  pending    Review of Systems  Review of Systems   Constitutional: Negative for chills and fever.   HENT: Negative for congestion, ear discharge, ear pain, hearing loss and tinnitus.    Eyes: Negative for blurred vision, double vision, photophobia and pain.   Respiratory: Positive for shortness of breath.    Cardiovascular: Negative for chest pain, palpitations, orthopnea, claudication and leg swelling.   Gastrointestinal: Negative for abdominal pain, constipation, heartburn, nausea and vomiting.   Genitourinary: Negative for dysuria, frequency and urgency.   Musculoskeletal: Negative for back pain, myalgias and neck pain.   Neurological: Negative for dizziness, tingling, tremors, sensory change and headaches.   Psychiatric/Behavioral: Negative for depression, hallucinations and suicidal ideas.        Physical Exam  Temp:  [36.2 °C (97.2 °F)-38.1 °C (100.5 °F)] 36.4 °C (97.5 °F)  Pulse:  [] 55  Resp:  [18-22] 18  BP: (122-147)/(62-88) 129/88  SpO2:  [85 %-95 %] 92 %    Physical Exam  Constitutional:       General: She is not in acute distress.     Appearance: Normal appearance. She is not ill-appearing, toxic-appearing or diaphoretic.   HENT:      Head: Normocephalic and atraumatic.      Mouth/Throat:      Mouth: Mucous membranes are dry.   Eyes:      Extraocular Movements: Extraocular movements intact.      Pupils: Pupils are equal, round, and reactive to light.   Cardiovascular:      Rate and Rhythm: Normal rate and regular rhythm.      Pulses: Normal pulses.      Heart sounds: No  murmur heard.     Pulmonary:      Breath sounds: Rhonchi present.   Abdominal:      General: There is no distension.      Palpations: There is no mass.      Tenderness: There is no abdominal tenderness. There is no guarding or rebound.      Hernia: No hernia is present.   Musculoskeletal:         General: No swelling, tenderness, deformity or signs of injury. Normal range of motion.      Right lower leg: No edema.      Left lower leg: No edema.   Skin:     General: Skin is warm.      Capillary Refill: Capillary refill takes 2 to 3 seconds.      Coloration: Skin is not jaundiced.      Findings: No bruising or lesion.   Neurological:      General: No focal deficit present.      Mental Status: She is alert and oriented to person, place, and time.      Cranial Nerves: No cranial nerve deficit.      Motor: No weakness.      Gait: Gait normal.   Psychiatric:         Mood and Affect: Mood normal.         Fluids    Intake/Output Summary (Last 24 hours) at 5/21/2021 1129  Last data filed at 5/20/2021 1900  Gross per 24 hour   Intake --   Output 400 ml   Net -400 ml       Laboratory  Recent Labs     05/20/21  1436 05/21/21  0259   WBC 13.1* 9.1   RBC 5.26 5.13   HEMOGLOBIN 14.3 14.0   HEMATOCRIT 44.6 43.3   MCV 84.8 84.4   MCH 27.2 27.3   MCHC 32.1* 32.3*   RDW 47.3 46.0   PLATELETCT 226 195   MPV 10.2 9.8     Recent Labs     05/20/21  1436 05/21/21  0259   SODIUM 135 133*   POTASSIUM 4.6 4.5   CHLORIDE 96 93*   CO2 26 31   GLUCOSE 177* 165*   BUN 25* 24*   CREATININE 0.75 0.60   CALCIUM 9.7 9.6                   Imaging  DX-CHEST-PORTABLE (1 VIEW)   Final Result      Worsening moderate multifocal consolidation compatible with known Covid pneumonia      Stable marked cardiac silhouette enlargement           Assessment/Plan  * Pneumonia due to COVID-19 virus- (present on admission)  Assessment & Plan  Resume dexamethasone      Id md consulted on 5/21    Oxygen    Bronchodilators          Acute respiratory failure with hypoxia  (HCC)- (present on admission)  Assessment & Plan    CXR appears worse compare to 5/15 with increasing consolidation.    Oxygen    Bronchodilators    Iv diuresis    Panlobular emphysema (HCC)- (present on admission)  Assessment & Plan  Resume home meds and SVNs      Iron deficiency- (present on admission)  Assessment & Plan  Resume iron supplementation      Hypertension- (present on admission)  Assessment & Plan  Resume cardizem      Anticoagulant long-term use- [facility], dr dawson [facility]; for a fib- (present on admission)  Assessment & Plan  Continue eliquis  Mild hemoptysis with coughing - continue cough suppression      Longstanding persistent atrial fibrillation (HCC)-Dr. Turner [place]- (present on admission)  Assessment & Plan  Rate controlled with cardizem  Continue Eliquis         VTE prophylaxis: scd    Check am cbc, bmp  Critically ill time spent 36 minutes, no overlap, no procedures. Time includes assessing patient, discussing with consultants, formulating treatment plan including iv diuresis, high flow oxygen, po decadron, iv tocilizumab

## 2021-05-21 NOTE — PROGRESS NOTES
Bedside report received from GABO Pitt. Assumed pt care. Son [employer] at bedside. Pt is AOx4. Denies any pain. Reporting SOB and cough, medicated per MAR. Discussed POC including supplemental oxygen, medications, general COVID information. Pt and son verbalized understanding. Hourly rounding in place. Fall precautions in place and call light within reach.

## 2021-05-22 LAB
BACTERIA UR CULT: NORMAL
SIGNIFICANT IND 70042: NORMAL
SITE SITE: NORMAL
SOURCE SOURCE: NORMAL

## 2021-05-22 PROCEDURE — 770020 HCHG ROOM/CARE - TELE (206)

## 2021-05-22 PROCEDURE — A9270 NON-COVERED ITEM OR SERVICE: HCPCS | Performed by: HOSPITALIST

## 2021-05-22 PROCEDURE — 94760 N-INVAS EAR/PLS OXIMETRY 1: CPT

## 2021-05-22 PROCEDURE — 94640 AIRWAY INHALATION TREATMENT: CPT

## 2021-05-22 PROCEDURE — 99291 CRITICAL CARE FIRST HOUR: CPT | Performed by: HOSPITALIST

## 2021-05-22 PROCEDURE — 700102 HCHG RX REV CODE 250 W/ 637 OVERRIDE(OP): Performed by: INTERNAL MEDICINE

## 2021-05-22 PROCEDURE — 700111 HCHG RX REV CODE 636 W/ 250 OVERRIDE (IP): Performed by: INTERNAL MEDICINE

## 2021-05-22 PROCEDURE — 700102 HCHG RX REV CODE 250 W/ 637 OVERRIDE(OP): Performed by: HOSPITALIST

## 2021-05-22 PROCEDURE — 700111 HCHG RX REV CODE 636 W/ 250 OVERRIDE (IP): Performed by: HOSPITALIST

## 2021-05-22 PROCEDURE — A9270 NON-COVERED ITEM OR SERVICE: HCPCS | Performed by: INTERNAL MEDICINE

## 2021-05-22 RX ORDER — POTASSIUM CHLORIDE 20 MEQ/1
20 TABLET, EXTENDED RELEASE ORAL DAILY
Status: DISCONTINUED | OUTPATIENT
Start: 2021-05-23 | End: 2021-05-28

## 2021-05-22 RX ORDER — FAMOTIDINE 20 MG/1
20 TABLET, FILM COATED ORAL 2 TIMES DAILY
Status: DISCONTINUED | OUTPATIENT
Start: 2021-05-22 | End: 2021-06-01

## 2021-05-22 RX ORDER — FUROSEMIDE 10 MG/ML
20 INJECTION INTRAMUSCULAR; INTRAVENOUS ONCE
Status: COMPLETED | OUTPATIENT
Start: 2021-05-22 | End: 2021-05-22

## 2021-05-22 RX ADMIN — APIXABAN 5 MG: 5 TABLET, FILM COATED ORAL at 05:56

## 2021-05-22 RX ADMIN — DILTIAZEM HYDROCHLORIDE 180 MG: 180 CAPSULE, COATED, EXTENDED RELEASE ORAL at 05:50

## 2021-05-22 RX ADMIN — OXYCODONE HYDROCHLORIDE AND ACETAMINOPHEN 1000 MG: 500 TABLET ORAL at 05:56

## 2021-05-22 RX ADMIN — FUROSEMIDE 20 MG: 10 INJECTION, SOLUTION INTRAMUSCULAR; INTRAVENOUS at 10:21

## 2021-05-22 RX ADMIN — FAMOTIDINE 20 MG: 20 TABLET ORAL at 09:41

## 2021-05-22 RX ADMIN — FAMOTIDINE 20 MG: 20 TABLET ORAL at 17:44

## 2021-05-22 RX ADMIN — FUROSEMIDE 40 MG: 10 INJECTION, SOLUTION INTRAMUSCULAR; INTRAVENOUS at 05:46

## 2021-05-22 RX ADMIN — ATORVASTATIN CALCIUM 10 MG: 10 TABLET, FILM COATED ORAL at 05:57

## 2021-05-22 RX ADMIN — BUDESONIDE AND FORMOTEROL FUMARATE DIHYDRATE 2 PUFF: 160; 4.5 AEROSOL RESPIRATORY (INHALATION) at 07:14

## 2021-05-22 RX ADMIN — BENZONATATE 100 MG: 100 CAPSULE ORAL at 11:58

## 2021-05-22 RX ADMIN — DILTIAZEM HYDROCHLORIDE 180 MG: 180 CAPSULE, COATED, EXTENDED RELEASE ORAL at 17:45

## 2021-05-22 RX ADMIN — APIXABAN 5 MG: 5 TABLET, FILM COATED ORAL at 17:44

## 2021-05-22 RX ADMIN — MONTELUKAST 10 MG: 10 TABLET, FILM COATED ORAL at 17:43

## 2021-05-22 RX ADMIN — POTASSIUM CHLORIDE 10 MEQ: 1500 TABLET, EXTENDED RELEASE ORAL at 05:53

## 2021-05-22 RX ADMIN — BUDESONIDE AND FORMOTEROL FUMARATE DIHYDRATE 2 PUFF: 160; 4.5 AEROSOL RESPIRATORY (INHALATION) at 19:00

## 2021-05-22 RX ADMIN — DEXAMETHASONE 6 MG: 4 TABLET ORAL at 05:55

## 2021-05-22 RX ADMIN — FERROUS SULFATE TAB 325 MG (65 MG ELEMENTAL FE) 325 MG: 325 (65 FE) TAB at 09:41

## 2021-05-22 RX ADMIN — ACETAMINOPHEN 650 MG: 325 TABLET ORAL at 10:08

## 2021-05-22 ASSESSMENT — LIFESTYLE VARIABLES
ON A TYPICAL DAY WHEN YOU DRINK ALCOHOL HOW MANY DRINKS DO YOU HAVE: 0
EVER FELT BAD OR GUILTY ABOUT YOUR DRINKING: NO
EVER HAD A DRINK FIRST THING IN THE MORNING TO STEADY YOUR NERVES TO GET RID OF A HANGOVER: NO
HOW MANY TIMES IN THE PAST YEAR HAVE YOU HAD 5 OR MORE DRINKS IN A DAY: 0
TOTAL SCORE: 0
CONSUMPTION TOTAL: NEGATIVE
AVERAGE NUMBER OF DAYS PER WEEK YOU HAVE A DRINK CONTAINING ALCOHOL: 0
TOTAL SCORE: 0
HAVE YOU EVER FELT YOU SHOULD CUT DOWN ON YOUR DRINKING: NO
HAVE PEOPLE ANNOYED YOU BY CRITICIZING YOUR DRINKING: NO
TOTAL SCORE: 0
ALCOHOL_USE: NO

## 2021-05-22 ASSESSMENT — ENCOUNTER SYMPTOMS
DIZZINESS: 0
CONSTIPATION: 0
TINGLING: 0
BACK PAIN: 0
SENSORY CHANGE: 0
TREMORS: 0
SHORTNESS OF BREATH: 1
DOUBLE VISION: 0
MYALGIAS: 0
ABDOMINAL PAIN: 0
HALLUCINATIONS: 0
ORTHOPNEA: 0
HEADACHES: 0
NAUSEA: 0
HEARTBURN: 0
PALPITATIONS: 0
VOMITING: 0
DEPRESSION: 0
EYE PAIN: 0
BLURRED VISION: 0
PHOTOPHOBIA: 0
FEVER: 0
CLAUDICATION: 0
CHILLS: 0
NECK PAIN: 0

## 2021-05-22 ASSESSMENT — PAIN DESCRIPTION - PAIN TYPE
TYPE: ACUTE PAIN;CHRONIC PAIN
TYPE: ACUTE PAIN;CHRONIC PAIN
TYPE: ACUTE PAIN
TYPE: ACUTE PAIN

## 2021-05-22 NOTE — PROGRESS NOTES
Hospital Medicine Daily Progress Note    Date of Service  5/22/2021    Chief Complaint  80 y.o. female admitted 5/20/2021 with dyspnea    Hospital Course  No notes on file    Interval Problem Update  On high flow oxygen 40 liters with  Sat of 90    Dyspnea with exertion    Non productive cough    Afebrile    Extra dose of IV Lasix ordered this today      Consultants/Specialty  ID  dumont    Code Status  Full Code    Disposition  pending    Review of Systems  Review of Systems   Constitutional: Negative for chills and fever.   HENT: Negative for congestion, ear discharge, ear pain, hearing loss and tinnitus.    Eyes: Negative for blurred vision, double vision, photophobia and pain.   Respiratory: Positive for shortness of breath.    Cardiovascular: Negative for chest pain, palpitations, orthopnea, claudication and leg swelling.   Gastrointestinal: Negative for abdominal pain, constipation, heartburn, nausea and vomiting.   Genitourinary: Negative for dysuria, frequency and urgency.   Musculoskeletal: Negative for back pain, myalgias and neck pain.   Neurological: Negative for dizziness, tingling, tremors, sensory change and headaches.   Psychiatric/Behavioral: Negative for depression, hallucinations and suicidal ideas.        Physical Exam  Temp:  [36.3 °C (97.3 °F)-36.6 °C (97.9 °F)] 36.6 °C (97.8 °F)  Pulse:  [] 51  Resp:  [18-20] 18  BP: (122-137)/(61-77) 128/72  SpO2:  [89 %-94 %] 89 %    Physical Exam  Constitutional:       General: She is not in acute distress.     Appearance: Normal appearance. She is not ill-appearing, toxic-appearing or diaphoretic.   HENT:      Head: Normocephalic and atraumatic.      Mouth/Throat:      Mouth: Mucous membranes are moist.   Eyes:      Extraocular Movements: Extraocular movements intact.      Pupils: Pupils are equal, round, and reactive to light.   Cardiovascular:      Rate and Rhythm: Normal rate and regular rhythm.      Pulses: Normal pulses.      Heart sounds:  No murmur heard.     Pulmonary:      Breath sounds: No rhonchi.      Comments: Crackles left base  Abdominal:      General: There is no distension.      Palpations: There is no mass.      Tenderness: There is no abdominal tenderness. There is no guarding or rebound.      Hernia: No hernia is present.   Musculoskeletal:         General: No swelling, tenderness, deformity or signs of injury. Normal range of motion.      Right lower leg: No edema.      Left lower leg: No edema.   Skin:     General: Skin is warm.      Capillary Refill: Capillary refill takes 2 to 3 seconds.      Coloration: Skin is not jaundiced.      Findings: No bruising or lesion.   Neurological:      General: No focal deficit present.      Mental Status: She is alert and oriented to person, place, and time.      Cranial Nerves: No cranial nerve deficit.      Motor: No weakness.      Gait: Gait normal.   Psychiatric:         Mood and Affect: Mood normal.         Fluids    Intake/Output Summary (Last 24 hours) at 5/22/2021 0934  Last data filed at 5/22/2021 0200  Gross per 24 hour   Intake 300 ml   Output 400 ml   Net -100 ml       Laboratory  Recent Labs     05/20/21  1436 05/21/21  0259   WBC 13.1* 9.1   RBC 5.26 5.13   HEMOGLOBIN 14.3 14.0   HEMATOCRIT 44.6 43.3   MCV 84.8 84.4   MCH 27.2 27.3   MCHC 32.1* 32.3*   RDW 47.3 46.0   PLATELETCT 226 195   MPV 10.2 9.8     Recent Labs     05/20/21  1436 05/21/21  0259   SODIUM 135 133*   POTASSIUM 4.6 4.5   CHLORIDE 96 93*   CO2 26 31   GLUCOSE 177* 165*   BUN 25* 24*   CREATININE 0.75 0.60   CALCIUM 9.7 9.6                   Imaging  DX-CHEST-PORTABLE (1 VIEW)   Final Result      Worsening moderate multifocal consolidation compatible with known Covid pneumonia      Stable marked cardiac silhouette enlargement           Assessment/Plan  * Pneumonia due to COVID-19 virus- (present on admission)  Assessment & Plan  dexamethasone      Id md consulted on 5/21    S/p  toci    Oxygen    Bronchodilators          Acute respiratory failure with hypoxia (HCC)- (present on admission)  Assessment & Plan    CXR appears worse compare to 5/15 with increasing consolidation.    Oxygen    Bronchodilators    Iv diuresis    Panlobular emphysema (HCC)- (present on admission)  Assessment & Plan  Resume home meds and SVNs      Iron deficiency- (present on admission)  Assessment & Plan  Resume iron supplementation      Hypertension- (present on admission)  Assessment & Plan  Resume cardizem      Anticoagulant long-term use- [facility], dr dawson [place]; for a fib- (present on admission)  Assessment & Plan  Continue eliquis  Mild hemoptysis with coughing - continue cough suppression      Longstanding persistent atrial fibrillation (HCC)-Dr. Turner [place]- (present on admission)  Assessment & Plan  Rate controlled with cardizem  Continue Eliquis         VTE prophylaxis: scd    Check am cbc, bmp  Critically ill time spent 35 minutes, no overlap, no procedures. Time includes assessing patient, discussing with consultants, formulating treatment plan including iv diuresis, high flow oxygen, po decadron, iv tocilizumab

## 2021-05-22 NOTE — PROGRESS NOTES
Received report from GABO Mendoza. Assisted pt off BSC, pt resting comfortably in bed awaiting breakfast, no needs at this time. Universal fall precautions in place, bed in lowest, call light within reach. Pt agreeable to POC.

## 2021-05-22 NOTE — PROGRESS NOTES
Pt tolerated lying prone/slightly on side for 1 hour, [id number]. Pt willing to try proning again after dinner and with next nurse tonight.

## 2021-05-22 NOTE — CARE PLAN
The patient is Watcher - Medium risk of patient condition declining or worsening    Shift Goals  Clinical Goals: maintain adequate oxygenation  Patient Goals: breathe comfortably    Progress made toward(s) clinical / shift goals:    Problem: Fall Risk  Goal: Patient will remain free from falls  Outcome: Progressing  Note: Pt wearing treaded slipper socks. Pt checked on hourly. Pt placed close to nursing station. Fall precautions in place. Bed locked and in lowest position, call light within reach.     Problem: Respiratory  Goal: Patient will achieve/maintain optimum respiratory ventilation and gas exchange  Outcome: Progressing  Note: Pt is effectively using incentive spirometer. Pt is correctly performing deep breathing and coughing. Work of breathing is mild.

## 2021-05-22 NOTE — PROGRESS NOTES
Assumed care of pt. Received report from GAOB Pitt. Pt sitting up in bed. Safety precautions in place.  Bed locked and in lowest position, call light within reach.

## 2021-05-22 NOTE — PROGRESS NOTES
Telemetry Shift Summary    Rhythm A-fib  HR Range   Ectopy BBB, frequent PVC, rare couplets, rare triplets, rare big, rare trig  Measurements -/0.12/-        Normal Values  Rhythm SR  HR Range    Measurements 0.12-0.20 / 0.06-0.10  / 0.30-0.52

## 2021-05-22 NOTE — FLOWSHEET NOTE
05/21/21 1945   Events/Summary/Plan   Events/Summary/Plan HFNC, Symbicort f/w rinsing of mouth   Skin Inspection Respiratory Device Intact   Vital Signs   Pulse 94   Respiration 18   Pulse Oximetry 94 %   $ Pulse Oximetry (Spot Check) Yes   Respiratory Assessment   Level of Consciousness Alert   Chest Exam   Work Of Breathing / Effort Within Normal Limits   Breath Sounds   RUL Breath Sounds Diminished   RML Breath Sounds Diminished   RLL Breath Sounds Diminished   JESSY Breath Sounds Diminished   LLL Breath Sounds Diminished   Secretions   Cough Non Productive   Oxygen   O2 (LPM) 35   FiO2% 100 %   O2 Delivery Device Heated High Flow Nasal Cannula   Aerosols   $ Aerosol Delivery Device Heated High Flow Nasal Cannula   Aerosol Temperature   (stand-by, pt c/o warmth with heat)

## 2021-05-22 NOTE — CARE PLAN
Problem: Fall Risk  Goal: Patient will remain free from falls  Outcome: Progressing  Note: Reinforced education about fal lrisk with pt, pt has appropriately used call light for every time she needs BSC or needs to get up and has stayed free from falls.      Problem: Respiratory  Goal: Patient will achieve/maintain optimum respiratory ventilation and gas exchange  Outcome: Progressing  Flowsheets  Taken 5/22/2021 1002 by China Blackwood R.N.  O2 Delivery Device: Heated High Flow Nasal Cannula  Incentive Spirometer:   Effective   Self Motivated  Taken 5/22/2021 0959 by China Blackwood R.N.  Incentive Spirometer Volume: 1250 mL  Taken 5/21/2021 2015 by Kelly Robles R.N.  Deep Breathe and Cough: Performs Correctly  Note: Discussed benefits of proning to help with PNA symptoms with pt, pt agreed to try 2-3x during this shift.     Shift Goals  Clinical Goals: maintain adequate oxygenation  Patient Goals: decrease O2 needs, keep O2 sats up, prone 2-3x this shift  Family Goals: discuss POC with MD, decrease oxygen needs    Progress made toward(s) clinical / shift goals:  Pt has been maintaining O2 sats 88-94%, using IS 3 times/hour, and has agreed to 15 minutes of proning 2-3x this shift.

## 2021-05-23 ENCOUNTER — APPOINTMENT (OUTPATIENT)
Dept: RADIOLOGY | Facility: MEDICAL CENTER | Age: 81
DRG: 177 | End: 2021-05-23
Attending: INTERNAL MEDICINE
Payer: MEDICARE

## 2021-05-23 ENCOUNTER — APPOINTMENT (OUTPATIENT)
Dept: RADIOLOGY | Facility: MEDICAL CENTER | Age: 81
DRG: 177 | End: 2021-05-23
Attending: HOSPITALIST
Payer: MEDICARE

## 2021-05-23 PROBLEM — R93.1 ABNORMAL ECHOCARDIOGRAM: Status: ACTIVE | Noted: 2021-05-23

## 2021-05-23 LAB
ALBUMIN SERPL BCP-MCNC: 3.2 G/DL (ref 3.2–4.9)
ALBUMIN SERPL BCP-MCNC: 3.6 G/DL (ref 3.2–4.9)
ANION GAP SERPL CALC-SCNC: 13 MMOL/L (ref 7–16)
BASE EXCESS BLDA CALC-SCNC: 8 MMOL/L (ref -4–3)
BODY TEMPERATURE: ABNORMAL DEGREES
BUN SERPL-MCNC: 41 MG/DL (ref 8–22)
BUN SERPL-MCNC: 41 MG/DL (ref 8–22)
BUN SERPL-MCNC: 43 MG/DL (ref 8–22)
CALCIUM SERPL-MCNC: 10.8 MG/DL (ref 8.4–10.2)
CALCIUM SERPL-MCNC: 9.7 MG/DL (ref 8.4–10.2)
CALCIUM SERPL-MCNC: 9.9 MG/DL (ref 8.4–10.2)
CHLORIDE SERPL-SCNC: 87 MMOL/L (ref 96–112)
CHLORIDE SERPL-SCNC: 95 MMOL/L (ref 96–112)
CHLORIDE SERPL-SCNC: 97 MMOL/L (ref 96–112)
CO2 BLDA-SCNC: 35 MMOL/L (ref 20–33)
CO2 SERPL-SCNC: 29 MMOL/L (ref 20–33)
CO2 SERPL-SCNC: 29 MMOL/L (ref 20–33)
CO2 SERPL-SCNC: 32 MMOL/L (ref 20–33)
CREAT SERPL-MCNC: 0.68 MG/DL (ref 0.5–1.4)
CREAT SERPL-MCNC: 0.71 MG/DL (ref 0.5–1.4)
CREAT SERPL-MCNC: 0.87 MG/DL (ref 0.5–1.4)
CRP SERPL HS-MCNC: 4.56 MG/DL (ref 0–0.75)
D DIMER PPP IA.FEU-MCNC: 1.42 UG/ML (FEU) (ref 0–0.5)
DELSYS IDSYS: ABNORMAL
ERYTHROCYTE [DISTWIDTH] IN BLOOD BY AUTOMATED COUNT: 45.6 FL (ref 35.9–50)
FERRITIN SERPL-MCNC: 1691 NG/ML (ref 10–291)
GLUCOSE SERPL-MCNC: 157 MG/DL (ref 65–99)
GLUCOSE SERPL-MCNC: 162 MG/DL (ref 65–99)
GLUCOSE SERPL-MCNC: 289 MG/DL (ref 65–99)
HCO3 BLDA-SCNC: 33.5 MMOL/L (ref 17–25)
HCT VFR BLD AUTO: 45.7 % (ref 37–47)
HGB BLD-MCNC: 14.2 G/DL (ref 12–16)
HOROWITZ INDEX BLDA+IHG-RTO: 40 MM[HG]
MAGNESIUM SERPL-MCNC: 1.8 MG/DL (ref 1.5–2.5)
MAGNESIUM SERPL-MCNC: 2.1 MG/DL (ref 1.5–2.5)
MCH RBC QN AUTO: 26.1 PG (ref 27–33)
MCHC RBC AUTO-ENTMCNC: 31.1 G/DL (ref 33.6–35)
MCV RBC AUTO: 83.9 FL (ref 81.4–97.8)
O2/TOTAL GAS SETTING VFR VENT: 100 %
PCO2 BLDA: 48.3 MMHG (ref 26–37)
PH BLDA: 7.45 [PH] (ref 7.4–7.5)
PH TEMP ADJ BLDA: 7.46 [PH] (ref 7.4–7.5)
PHOSPHATE SERPL-MCNC: 2.7 MG/DL (ref 2.5–4.5)
PHOSPHATE SERPL-MCNC: 4 MG/DL (ref 2.5–4.5)
PLATELET # BLD AUTO: 306 K/UL (ref 164–446)
PMV BLD AUTO: 9.9 FL (ref 9–12.9)
PO2 BLDA: 40 MMHG (ref 64–87)
PO2 TEMP ADJ BLDA: 39 MMHG (ref 64–87)
POTASSIUM SERPL-SCNC: 4.3 MMOL/L (ref 3.6–5.5)
POTASSIUM SERPL-SCNC: 4.5 MMOL/L (ref 3.6–5.5)
POTASSIUM SERPL-SCNC: 4.6 MMOL/L (ref 3.6–5.5)
PROCALCITONIN SERPL-MCNC: 0.04 NG/ML
RBC # BLD AUTO: 5.45 M/UL (ref 4.2–5.4)
SAO2 % BLDA: 77 % (ref 93–99)
SODIUM SERPL-SCNC: 136 MMOL/L (ref 135–145)
SODIUM SERPL-SCNC: 137 MMOL/L (ref 135–145)
SODIUM SERPL-SCNC: 139 MMOL/L (ref 135–145)
SPECIMEN DRAWN FROM PATIENT: ABNORMAL
WBC # BLD AUTO: 9.6 K/UL (ref 4.8–10.8)

## 2021-05-23 PROCEDURE — 770022 HCHG ROOM/CARE - ICU (200)

## 2021-05-23 PROCEDURE — 99291 CRITICAL CARE FIRST HOUR: CPT | Performed by: HOSPITALIST

## 2021-05-23 PROCEDURE — 94660 CPAP INITIATION&MGMT: CPT

## 2021-05-23 PROCEDURE — 94640 AIRWAY INHALATION TREATMENT: CPT

## 2021-05-23 PROCEDURE — 5A0935A ASSISTANCE WITH RESPIRATORY VENTILATION, LESS THAN 24 CONSECUTIVE HOURS, HIGH NASAL FLOW/VELOCITY: ICD-10-PCS | Performed by: INTERNAL MEDICINE

## 2021-05-23 PROCEDURE — A9270 NON-COVERED ITEM OR SERVICE: HCPCS | Performed by: INTERNAL MEDICINE

## 2021-05-23 PROCEDURE — 82728 ASSAY OF FERRITIN: CPT

## 2021-05-23 PROCEDURE — 83735 ASSAY OF MAGNESIUM: CPT

## 2021-05-23 PROCEDURE — 94760 N-INVAS EAR/PLS OXIMETRY 1: CPT

## 2021-05-23 PROCEDURE — 86140 C-REACTIVE PROTEIN: CPT

## 2021-05-23 PROCEDURE — 36600 WITHDRAWAL OF ARTERIAL BLOOD: CPT

## 2021-05-23 PROCEDURE — 700102 HCHG RX REV CODE 250 W/ 637 OVERRIDE(OP): Performed by: INTERNAL MEDICINE

## 2021-05-23 PROCEDURE — 700111 HCHG RX REV CODE 636 W/ 250 OVERRIDE (IP): Performed by: INTERNAL MEDICINE

## 2021-05-23 PROCEDURE — A9270 NON-COVERED ITEM OR SERVICE: HCPCS | Performed by: HOSPITALIST

## 2021-05-23 PROCEDURE — 85379 FIBRIN DEGRADATION QUANT: CPT

## 2021-05-23 PROCEDURE — 5A09357 ASSISTANCE WITH RESPIRATORY VENTILATION, LESS THAN 24 CONSECUTIVE HOURS, CONTINUOUS POSITIVE AIRWAY PRESSURE: ICD-10-PCS | Performed by: HOSPITALIST

## 2021-05-23 PROCEDURE — 84145 PROCALCITONIN (PCT): CPT

## 2021-05-23 PROCEDURE — 85027 COMPLETE CBC AUTOMATED: CPT

## 2021-05-23 PROCEDURE — 80069 RENAL FUNCTION PANEL: CPT

## 2021-05-23 PROCEDURE — 99291 CRITICAL CARE FIRST HOUR: CPT | Performed by: INTERNAL MEDICINE

## 2021-05-23 PROCEDURE — 700102 HCHG RX REV CODE 250 W/ 637 OVERRIDE(OP): Performed by: HOSPITALIST

## 2021-05-23 PROCEDURE — 93970 EXTREMITY STUDY: CPT

## 2021-05-23 PROCEDURE — 80048 BASIC METABOLIC PNL TOTAL CA: CPT

## 2021-05-23 PROCEDURE — 71045 X-RAY EXAM CHEST 1 VIEW: CPT

## 2021-05-23 PROCEDURE — 82803 BLOOD GASES ANY COMBINATION: CPT

## 2021-05-23 RX ORDER — DEXAMETHASONE 4 MG/1
12 TABLET ORAL DAILY
Status: COMPLETED | OUTPATIENT
Start: 2021-05-24 | End: 2021-05-31

## 2021-05-23 RX ORDER — CHLOROTHIAZIDE SODIUM 500 MG/1
500 INJECTION INTRAVENOUS ONCE
Status: COMPLETED | OUTPATIENT
Start: 2021-05-23 | End: 2021-05-23

## 2021-05-23 RX ORDER — BUMETANIDE 0.25 MG/ML
2 INJECTION INTRAMUSCULAR; INTRAVENOUS ONCE
Status: COMPLETED | OUTPATIENT
Start: 2021-05-23 | End: 2021-05-23

## 2021-05-23 RX ORDER — DEXAMETHASONE 4 MG/1
12 TABLET ORAL DAILY
Status: DISCONTINUED | OUTPATIENT
Start: 2021-05-24 | End: 2021-05-23

## 2021-05-23 RX ORDER — DEXAMETHASONE 4 MG/1
6 TABLET ORAL DAILY
Status: DISCONTINUED | OUTPATIENT
Start: 2021-05-24 | End: 2021-05-23

## 2021-05-23 RX ORDER — BUMETANIDE 0.25 MG/ML
2 INJECTION INTRAMUSCULAR; INTRAVENOUS
Status: DISCONTINUED | OUTPATIENT
Start: 2021-05-23 | End: 2021-05-27

## 2021-05-23 RX ADMIN — APIXABAN 5 MG: 5 TABLET, FILM COATED ORAL at 05:25

## 2021-05-23 RX ADMIN — BUDESONIDE AND FORMOTEROL FUMARATE DIHYDRATE 2 PUFF: 160; 4.5 AEROSOL RESPIRATORY (INHALATION) at 22:14

## 2021-05-23 RX ADMIN — BENZONATATE 100 MG: 100 CAPSULE ORAL at 02:31

## 2021-05-23 RX ADMIN — BUMETANIDE 2 MG: 0.25 INJECTION, SOLUTION INTRAMUSCULAR; INTRAVENOUS at 08:40

## 2021-05-23 RX ADMIN — FUROSEMIDE 40 MG: 10 INJECTION, SOLUTION INTRAMUSCULAR; INTRAVENOUS at 03:12

## 2021-05-23 RX ADMIN — CHLOROTHIAZIDE SODIUM 500 MG: 500 INJECTION, POWDER, LYOPHILIZED, FOR SOLUTION INTRAVENOUS at 08:41

## 2021-05-23 RX ADMIN — APIXABAN 5 MG: 5 TABLET, FILM COATED ORAL at 17:44

## 2021-05-23 RX ADMIN — DEXAMETHASONE 6 MG: 4 TABLET ORAL at 05:24

## 2021-05-23 RX ADMIN — FAMOTIDINE 20 MG: 20 TABLET ORAL at 17:44

## 2021-05-23 RX ADMIN — DILTIAZEM HYDROCHLORIDE 180 MG: 180 CAPSULE, COATED, EXTENDED RELEASE ORAL at 17:44

## 2021-05-23 RX ADMIN — BUMETANIDE 2 MG: 0.25 INJECTION, SOLUTION INTRAMUSCULAR; INTRAVENOUS at 14:08

## 2021-05-23 RX ADMIN — MONTELUKAST 10 MG: 10 TABLET, FILM COATED ORAL at 17:44

## 2021-05-23 RX ADMIN — DILTIAZEM HYDROCHLORIDE 180 MG: 180 CAPSULE, COATED, EXTENDED RELEASE ORAL at 05:24

## 2021-05-23 ASSESSMENT — ENCOUNTER SYMPTOMS
CHILLS: 0
SHORTNESS OF BREATH: 1
BACK PAIN: 1
WEIGHT LOSS: 0
CARDIOVASCULAR NEGATIVE: 1
NECK PAIN: 1
SPUTUM PRODUCTION: 0
HEMOPTYSIS: 0
GASTROINTESTINAL NEGATIVE: 1
COUGH: 0
FEVER: 0

## 2021-05-23 ASSESSMENT — FIBROSIS 4 INDEX: FIB4 SCORE: 1.45

## 2021-05-23 ASSESSMENT — PULMONARY FUNCTION TESTS
EPAP_CMH2O: 8
EPAP_CMH2O: 6

## 2021-05-23 ASSESSMENT — PAIN DESCRIPTION - PAIN TYPE
TYPE: ACUTE PAIN
TYPE: ACUTE PAIN

## 2021-05-23 NOTE — FLOWSHEET NOTE
05/23/21 0415   Events/Summary/Plan   Events/Summary/Plan BiPAP Started   Skin Inspection Respiratory Device Intact   Vital Signs   Pulse (!) 105   Respiration (!) 27   Pulse Oximetry (!) 83 %   Respiratory Assessment   Respiratory Pattern Within Normal Limits   Level of Consciousness Alert   Chest Exam   Work Of Breathing / Effort Increased Work of Breathing;Tachypnea   Breath Sounds   RUL Breath Sounds Crackles;Diminished   RML Breath Sounds Crackles;Diminished   RLL Breath Sounds Crackles;Diminished   JESSY Breath Sounds Crackles;Diminished   LLL Breath Sounds Crackles;Diminished   Secretions   Cough Moist;Non Productive   How Sputum Obtained Spontaneous   Sputum Amount Unable to Evaluate   Sputum Color Unable to Evaluate   Sputum Consistency Unable to Evaluate   Oxygen   FiO2% 100 %   O2 Delivery Device BIPAP   Resuscitation Device at Bedside Self Inflating Bag   Non-Invasive Ventilation [facility]   Nocturnal CPAP or BIPAP BIPAP - [facility]   $ System Evaluation Yes   Settings (If Known) 16/8   FiO2 or

## 2021-05-23 NOTE — CARE PLAN
Patient continues on HHFNC 45 LPM and 100% FI02 with SP02's mid 80's to 92%.  Will continue to wean patient FI02 and flow as tolerated; continue to monitor patient closely

## 2021-05-23 NOTE — ASSESSMENT & PLAN NOTE
-- Confirmed SARS-CoV-2/COVID on 5/15  -- Risk factors -> age and HTN and not vaccinated  -- s/p decadron, tocilizumab, plasma  -- Encourage self prone as tolerated (ie, 2 hour supine and 2 hour prone)  -- Cont aggressive diuresis to seek net negative fluid balance  -- Off precautions after discussion with infection control

## 2021-05-23 NOTE — ASSESSMENT & PLAN NOTE
-- Currently rate controlled with diltiazem   -- On eliquis   -- High lytes goal    -- Goal HR < 110

## 2021-05-23 NOTE — DISCHARGE PLANNING
Anticipated Discharge Disposition:  Pending     Action: Per chart view. Pt is in ICU on Bipap. Pt has a son who will visit today.Pt is listed to have a PCP and [facility] for medical insurance. Pt is able to sit at the side of the bed with BiPap. Pt previously was at home with 1 liter of oxygen with home monitoring program.     Barriers to Discharge: Oxygen needs.     Plan: Wait for recommendations for post acute services by medical team.

## 2021-05-23 NOTE — PROGRESS NOTES
Pt ambulated up to commode and back to bed, tolerated well.  Pt is now side lying per request from RT. Pt has agreed to lay on side/prone for 60 minutes.

## 2021-05-23 NOTE — PROGRESS NOTES
Assumed care of pt. Received report from GABO Atkinson. Pt sitting up in bed. Safety precautions in place. Bed locked and in lowest position, call light within reach.

## 2021-05-23 NOTE — PROGRESS NOTES
"OVERNIGHT HOSPITALIST    Came to the room to evaluate patient as her oxygen demand is increasing and she is becoming more hypoxic.  Patient was just proned and is currently on max high flow at 60 L / 100% with O2 saturations ranging from 77 to 82%.  Patient is not highly symptomatic, she is able to talk full sentences and states that is not feeling short of breath or having any pain at this time.  She is a full code but also mentions and asked to \"please do everything to possibly not have to intubate me unless this is the last resource \".    Brief Hx: 80-year-old female, history of A. fib on Eliquis, asthma, hypertension hyperlipidemia, admitted for the second time with Covid pneumonia.  Initially admitted here from 5/15- discharged home with 1 L of oxygen and home monitoring, sent back to the hospital after requiring 6 L of oxygen.  She was readmitted on  and today is hospitalization day #3.  Patient increased oxygen from 6L to 30 L on the first day of admission, stable on 35 L on second day but yesterday required 40 L.  At this time on 60 L at 100% and still saturating around 80%.    Vitals: Temperature 36.5, heart rate 100, respiratory 20, 80% on 60L heated high flow in addition to non-rebreather.    Gen: Proned.  Minimal respiratory distress, not really feeling short of breath  CV: Tachycardic, no murmurs.  Lungs: Bilateral rales. No wheezing.   Ext: No significant pitting edema.    STAT AB.449/48.3/40/8/33.5/35/77%    A/P  #1. Worsening hypoxic respiratory failure secondary to underlying  COVID Pneumonia    -Transfer patient to the ICU for BiPAP trial  -Stat chest x-ray ordered  -We will give earlier dose of Lasix 40 mg IV  -Added D-dimer  -Discussed and confirmed CODE STATUS and possible intubation if not improving      -The patient is critically ill.  -Patient continues to have: Worsening respiratory failure  -The vital organ system that is affected is the: Lungs  -If untreated there is a high " chance of deterioration into: Cardio respiratory arrest, anoxic brain injury, worsening sepsis and eventually death.  -Critical care services that I am providing today is: As above  -The critical that has been undertaken is medically complex.  -There has been no overlapping critical care time.  -Critical care time not including procedures: 45 min

## 2021-05-23 NOTE — CARE PLAN
The patient is Watcher - Medium risk of patient condition declining or worsening    Shift Goals  Clinical Goals: prone, maintain adequate oxygenation  Patient Goals: maintain adequate oxygenation  Family Goals: prone    Progress made toward(s) clinical / shift goals:      Problem: Knowledge Deficit - Standard  Goal: Patient and family/care givers will demonstrate understanding of plan of care, disease process/condition, diagnostic tests and medications  Outcome: Progressing     Problem: Fall Risk  Goal: Patient will remain free from falls  Outcome: Progressing     Problem: Pain - Standard  Goal: Alleviation of pain or a reduction in pain to the patient’s comfort goal  Outcome: Progressing     Problem: Respiratory  Goal: Patient will achieve/maintain optimum respiratory ventilation and gas exchange  Outcome: Progressing  Note: Patient tolerating HFNC, weaned down from BiPAP/CPAP

## 2021-05-23 NOTE — PROGRESS NOTES
Telemetry Shift Summary    Rhythm Afib  BBB  9 beats Vtach  HR Range   Ectopy Freq PVCs   rare big/trig/coup/trip  Measurements -/0.12/-        Normal Values  Rhythm SR  HR Range    Measurements 0.12-0.20 / 0.06-0.10  / 0.30-0.52

## 2021-05-23 NOTE — FLOWSHEET NOTE
05/22/21 2141   Events/Summary/Plan   Events/Summary/Plan HFNC 45 L & 100% FI02   Skin Inspection Respiratory Device Intact   Vital Signs   Pulse 100   Respiration 20   Pulse Oximetry 89 %   $ Pulse Oximetry (Spot Check) Yes   Respiratory Assessment   Respiratory Pattern Within Normal Limits   Level of Consciousness Alert   Chest Exam   Work Of Breathing / Effort Moderate   Oxygen   O2 (LPM) 45   FiO2% 100 %   O2 Delivery Device Heated High Flow Nasal Cannula   Aerosols   $ Aerosol Delivery Device Heated High Flow Nasal Cannula   Aerosol Temperature   (heating on stand-by at pt request)   Equipment Change Date 05/27/21

## 2021-05-23 NOTE — ASSESSMENT & PLAN NOTE
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  -- Encourage self proning as tolerated if possible 16 hrs  -- Goal SpO2 88-92%   -- CRP and procal normalized, BNP downtrending  -- Cont gentle diuresis to avoid net positive fluid balance    -- Encourage OOB and IS as tolerated   -- Will use BiPAP prn nightly and continue HFNC to maintain SpO2 > 90%

## 2021-05-23 NOTE — CONSULTS
Critical Care Consultation    Date of consult: 5/23/2021    Referring Physician  Dr. Jimenez     Reason for Consultation  Acute hypoxemia respiratory failure     History of Presenting Illness  80 y.o. female who presented 5/20/2021 with acute hypoxemia respiratory failure. Patient with significant history of atrial fibrillation on eliquis, asthma, and hypertension admitted for worsening hypoxemia secondary to COVID pneumonia. She was recently discharged home on 5/17 on 1 liters NC and presents back on 5/20 with worsening shortness of breath. On admission, she required 6 liters NC which quickly escalated to HFNC. ID consulted and patient was tocilizumab on 5/21. Her oxygenation worsen overnight as she was maxed out on HFNC and transferred to ICU for BiPAP 12/6 FiO2 100%.     In ICU, she was diuresed and documented I/O -> neg ~1 liter. Patient is breath comfortably on BiPAP and able to answer questions via BiPAP facemask. Subjectively, patient denies chest pain, cough, N/V, fever/chills, or abdominal pain. She endorse having shortness of breath, neck pain, and back pain. Discussed about the benefits of proning if she can tolerate at which she agrees to with RN assistance. Discussed about risks/benefits endotracheal intubation if she needs it in the upcoming hours to days which she agrees to.     Echocardiogram 5/16/21 personally reviewed -> preserved LV function, mild to moderate MR, dilated coronary sinus, eccentric TR jet with measure RVSP ~50 mmHG which is most likely underestimated, TAPSE ~0.9 cm -> reduced RV function, dilated RV and RA, FL -> 0.89 m/s (~18 mmHG) and abnormal septal bounce, dilated IVC with inspiratory collapse.     Code Status  Full Code    Review of Systems  Review of Systems   Constitutional: Positive for malaise/fatigue. Negative for chills, fever and weight loss.   Respiratory: Positive for shortness of breath. Negative for cough, hemoptysis and sputum production.    Cardiovascular: Negative.     Gastrointestinal: Negative.    Genitourinary: Negative.    Musculoskeletal: Positive for back pain and neck pain.   All other systems reviewed and are negative.      Past Medical History   has a past medical history of A-fib (HCC) (2008), Asthma, Atrial fibrillation (HCC), Hyperlipidemia, and Hypertension.    Surgical History   has no past surgical history on file.    Family History  family history includes No Known Problems in her father and mother.    Social History   reports that she has never smoked. She has never used smokeless tobacco. She reports that she does not drink alcohol and does not use drugs.    Medications  Home Medications     Reviewed by Rhea Duque (Pharmacy Tech) on 05/20/21 at 1516  Med List Status: Complete   Medication Last Dose Status   albuterol 108 (90 Base) MCG/ACT Aero Soln inhalation aerosol 5/20/2021 Active   apixaban (ELIQUIS) 5mg Tab 5/20/2021 Active   ascorbic acid (C 1000) 1000 MG tablet 5/20/2021 Active   atorvastatin (LIPITOR) 10 MG Tab 5/20/2021 Active   azithromycin (ZITHROMAX) 250 MG Tab Not Taking Active   azithromycin (ZITHROMAX) 250 MG Tab not started yet Active   benzonatate (TESSALON PERLES) 100 MG Cap 5/20/2021 Active   dexamethasone (DECADRON) 6 MG Tab 5/20/2021 Active   diltiazem CD (DILTIAZEM CD) 180 MG CAPSULE SR 24 HR 5/20/2021 Active   ferrous sulfate 325 (65 Fe) MG EC tablet not started yet Active   fluticasone-salmeterol (ADVAIR DISKUS) 500-50 MCG/DOSE AEROSOL POWDER, BREATH ACTIVATED 5/14/2021 Active   furosemide (LASIX) 20 MG Tab 5/20/2021 Active   montelukast (SINGULAIR) 10 MG Tab 5/19/2021 Active   multivitamin (THERAGRAN) Tab 5/20/2021 Active   potassium chloride SA (K-DUR) 10 MEQ Tab CR 5/20/2021 Active   Probiotic Product (PROBIOTIC DAILY PO) 5/20/2021 Active   Spacer/Aero-Holding Chambers (E-Z SPACER) Device  Active   vitamin D3, cholecalciferol, 1000 UNIT Tab 5/20/2021 Active              Current Facility-Administered Medications    Medication Dose Route Frequency Provider Last Rate Last Admin   • [START ON 5/24/2021] dexamethasone (DECADRON) tablet 12 mg  12 mg Oral DAILY Dale Tamayo M.D.       • famotidine (PEPCID) tablet 20 mg  20 mg Oral BID Ivan Nunez M.D.   20 mg at 05/22/21 1744   • potassium chloride SA (Kdur) tablet 20 mEq  20 mEq Oral DAILY Ivan Nunez M.D.       • ferrous sulfate tablet 325 mg  325 mg Oral QDAY with Breakfast Ivan Nunez M.D.   325 mg at 05/22/21 0941   • apixaban (ELIQUIS) tablet 5 mg  5 mg Oral BID Cheryl Gallagher, D.O.   5 mg at 05/23/21 0525   • ascorbic acid (Vitamin C) tablet 1,000 mg  1,000 mg Oral DAILY Cheryl Gallagher, D.O.   1,000 mg at 05/22/21 0556   • atorvastatin (LIPITOR) tablet 10 mg  10 mg Oral DAILY Cheryl Gallagher, D.O.   10 mg at 05/22/21 0557   • benzonatate (TESSALON) capsule 100 mg  100 mg Oral TID PRN Cheryl Gallagher, D.O.   100 mg at 05/23/21 0231   • DILTIAZem CD (CARDIZEM CD) capsule 180 mg  180 mg Oral BID Cheryl Gallagher, D.O.   180 mg at 05/23/21 0524   • montelukast (SINGULAIR) tablet 10 mg  10 mg Oral Q EVENING Cheryl Gallagher, D.O.   10 mg at 05/22/21 1743   • furosemide (LASIX) injection 40 mg  40 mg Intravenous Q DAY Cheryl Gallagher, D.O.   40 mg at 05/23/21 0312   • ondansetron (ZOFRAN) syringe/vial injection 4 mg  4 mg Intravenous Q6HRS PRN Cheryl Gallagher, D.O.       • ondansetron (ZOFRAN ODT) dispertab 4 mg  4 mg Oral Q6HRS PRN Cheryl Gallagher, D.O.       • senna-docusate (PERICOLACE or SENOKOT S) 8.6-50 MG per tablet 2 tablet  2 tablet Oral BID Cheryl Gallagher, D.O.        And   • polyethylene glycol/lytes (MIRALAX) PACKET 1 Packet  1 Packet Oral QDAY PRN Cheryl Gallagher D.O.        And   • magnesium hydroxide (MILK OF MAGNESIA) suspension 30 mL  30 mL Oral QDAY PRN Cheryl Gallagher D.O.        And   • bisacodyl (DULCOLAX) suppository 10 mg  10 mg Rectal QDAY PRN LEN MoranO.       • acetaminophen (Tylenol) tablet 650 mg  650 mg Oral Q6HRS PRN LEN MoranO.   650 mg at  05/22/21 1008   • guaiFENesin ER (MUCINEX) tablet 600 mg  600 mg Oral BID PRN Cheryl Gallagher D.O.       • guaiFENesin dextromethorphan (ROBITUSSIN DM) 100-10 MG/5ML syrup 10 mL  10 mL Oral Q6HRS PRN Cheryl Gallagher D.O.       • budesonide-formoterol (SYMBICORT) 160-4.5 MCG/ACT inhaler 2 Puff  2 Puff Inhalation BID (RT) Cheryl Gallagher D.O.   2 Puff at 05/22/21 1900   • HYDROcodone-homatropine 5-1.5 mg/5 mL solution 5 mL  5 mL Oral Q4HRS PRN Cheryl Gallagher D.O.           Allergies  No Known Allergies    Vital Signs last 24 hours  Temp:  [36.1 °C (97 °F)-36.8 °C (98.2 °F)] 36.2 °C (97.2 °F)  Pulse:  [] 99  Resp:  [16-27] 20  BP: (108-142)/(48-87) 128/74  SpO2:  [78 %-94 %] 88 %    Physical Exam  Physical Exam  Constitutional:       Appearance: She is ill-appearing.      Comments: On BiPAP with no significant respiratory distress     HENT:      Head: Normocephalic.      Nose: Nose normal.      Mouth/Throat:      Mouth: Mucous membranes are moist.   Eyes:      Extraocular Movements: Extraocular movements intact.      Pupils: Pupils are equal, round, and reactive to light.   Cardiovascular:      Rate and Rhythm: Normal rate. Rhythm irregular.      Pulses: Normal pulses.   Pulmonary:      Comments: Decrease BS bilaterally     Abdominal:      General: Abdomen is flat. Bowel sounds are normal. There is no distension.      Palpations: Abdomen is soft.   Musculoskeletal:         General: Normal range of motion.      Cervical back: Normal range of motion.      Right lower leg: Edema present.      Left lower leg: Edema present.      Comments: Chronic brown pigmentation      Skin:     General: Skin is warm.   Neurological:      Mental Status: She is alert and oriented to person, place, and time.      Cranial Nerves: No cranial nerve deficit.      Comments: Moving all four extremities       Psychiatric:         Mood and Affect: Mood normal.         Fluids    Intake/Output Summary (Last 24 hours) at 5/23/2021 0906  Last data  filed at 5/23/2021 0800  Gross per 24 hour   Intake 180 ml   Output 1250 ml   Net -1070 ml       Laboratory  Recent Results (from the past 48 hour(s))   HEPATITIS PANEL ACUTE(4 COMPONENTS)    Collection Time: 05/21/21 12:19 PM   Result Value Ref Range    Hepatitis B Surface Antigen Non-Reactive Non-Reactive    Hepatitis B Cors Ab,IgM Non-Reactive Non-Reactive    Hepatitis A Virus Ab, IgM Non-Reactive Non-Reactive    Hepatitis C Antibody Non-Reactive Non-Reactive   CBC WITHOUT DIFFERENTIAL    Collection Time: 05/23/21  2:03 AM   Result Value Ref Range    WBC 9.6 4.8 - 10.8 K/uL    RBC 5.45 (H) 4.20 - 5.40 M/uL    Hemoglobin 14.2 12.0 - 16.0 g/dL    Hematocrit 45.7 37.0 - 47.0 %    MCV 83.9 81.4 - 97.8 fL    MCH 26.1 (L) 27.0 - 33.0 pg    MCHC 31.1 (L) 33.6 - 35.0 g/dL    RDW 45.6 35.9 - 50.0 fL    Platelet Count 306 164 - 446 K/uL    MPV 9.9 9.0 - 12.9 fL   Basic Metabolic Panel    Collection Time: 05/23/21  2:03 AM   Result Value Ref Range    Sodium 139 135 - 145 mmol/L    Potassium 4.3 3.6 - 5.5 mmol/L    Chloride 97 96 - 112 mmol/L    Co2 29 20 - 33 mmol/L    Glucose 162 (H) 65 - 99 mg/dL    Bun 41 (H) 8 - 22 mg/dL    Creatinine 0.71 0.50 - 1.40 mg/dL    Calcium 10.8 (H) 8.4 - 10.2 mg/dL    Anion Gap 13.0 7.0 - 16.0   ESTIMATED GFR    Collection Time: 05/23/21  2:03 AM   Result Value Ref Range    GFR If African American >60 >60 mL/min/1.73 m 2    GFR If Non African American >60 >60 mL/min/1.73 m 2   Renal Function Panel    Collection Time: 05/23/21  2:03 AM   Result Value Ref Range    Sodium 137 135 - 145 mmol/L    Potassium 4.5 3.6 - 5.5 mmol/L    Chloride 95 (L) 96 - 112 mmol/L    Co2 29 20 - 33 mmol/L    Glucose 157 (H) 65 - 99 mg/dL    Creatinine 0.68 0.50 - 1.40 mg/dL    Bun 41 (H) 8 - 22 mg/dL    Calcium 9.7 8.4 - 10.2 mg/dL    Phosphorus 2.7 2.5 - 4.5 mg/dL    Albumin 3.2 3.2 - 4.9 g/dL   MAGNESIUM    Collection Time: 05/23/21  2:03 AM   Result Value Ref Range    Magnesium 2.1 1.5 - 2.5 mg/dL   ESTIMATED  GFR    Collection Time: 05/23/21  2:03 AM   Result Value Ref Range    GFR If African American >60 >60 mL/min/1.73 m 2    GFR If Non African American >60 >60 mL/min/1.73 m 2   POCT arterial blood gas device results    Collection Time: 05/23/21  3:11 AM   Result Value Ref Range    Ph 7.449 7.400 - 7.500    Pco2 48.3 (H) 26.0 - 37.0 mmHg    Po2 40 (LL) 64 - 87 mmHg    Tco2 35 (H) 20 - 33 mmol/L    S02 77 (L) 93 - 99 %    Hco3 33.5 (H) 17.0 - 25.0 mmol/L    BE 8 (H) -4 - 3 mmol/L    Body Temp 97.7 F degrees    O2 Therapy 100 %    iPF Ratio 40     Ph Temp [place] 7.457 7.400 - 7.500    Po2 Temp Cor 39 (LL) 64 - 87 mmHg    Specimen Arterial     DelSys HHFNC    D-DIMER    Collection Time: 05/23/21  3:15 AM   Result Value Ref Range    D-Dimer Screen 1.42 (H) 0.00 - 0.50 ug/mL (FEU)       Imaging  DX-CHEST-PORTABLE (1 VIEW)   Final Result      1.  Persistent bilateral interstitial and alveolar opacities are present consistent with edema or pneumonia. Differential diagnosis would include Covid 19 pneumonia.      2.  No pneumothorax identified.      DX-CHEST-PORTABLE (1 VIEW)   Final Result      Worsening moderate multifocal consolidation compatible with known Covid pneumonia      Stable marked cardiac silhouette enlargement      US-EXTREMITY VENOUS LOWER BILAT    (Results Pending)       Assessment/Plan  * Pneumonia due to COVID-19 virus- (present on admission)  Assessment & Plan  -- Confirmed SARS-CoV-2/COVID on 5/15  -- Risk factors -> age and HTN   -- Trend CRP, LDH, troponin, ferritin, D-dimer, and LFT  -- Increase decadron to 12mg   -- Received tocilizumab on 5/21   -- Avoid NSAIDs  -- Encourage self prone as tolerated (ie, 2 hour supine and 2 hour prone)  -- Cont aggressive diuresis to seek net negative fluid balance  -- On strict contact, droplet/airbone, eye protection, and critical meticulous hand hygiene    Acute respiratory failure with hypoxia (HCC)- (present on admission)  Assessment & Plan  -- Worsening hypoxemia  secondary to COVID pneumonia, goal saturations greater than 88% as long as she is asymptomatic  -- Query component of pulmonary edema secondary to A fib and echocardiogram findings   -- Moved to ICU NIVVP   -- Switched BiPAP to CPAP 12 FiO2 100% to increase recruitment with higher EPAP    -- Will cont aggressive diuresis to obtain negative fluid balance as BP and renal function allows  -- Treatment for COVID as below   -- Monitor for the need for intubation mechanical ventilation  -- Currently titrating FiO2 and and CPAP   -- Check venous duplex to ruled out DVT   -- Aggressive pulmonary toilet as able  -- Encourage self proning as tolerated   -- Goal SpO2 > 88%       Abnormal echocardiogram  Assessment & Plan  -- Review of echocardiogram suggestive of pulmonary hypertension given abnormal septal bounce with elevated NC jet -> elevated PADP ~18   -- No PAAT or RVOT VTI was performed to derive PVR    -- Consider etiology to be multifactorial due to left atrial hypertension and severe hypoxemia causing pulmonary vasoconstriction    -- Will initiate aggressive diuresis to seek net negative fluid balance    -- Need repeat echocardiogram in 2-3 months    -- Avoid hypoxemia, respiratory acidosis, and A fib RVR which can cause worsening pulmonary vasoconstriction leading to worsening PA pressure       Asthma, moderate persistent, well-controlled- (present on admission)  Assessment & Plan  -- Not in acute flare   -- Cont symbicort and singulair        Longstanding persistent atrial fibrillation (HCC)-Dr. Turner [place]- (present on admission)  Assessment & Plan  -- Currently rate controlled with diltiazem   -- On eliquis   -- High lytes goal    -- Goal HR < 110      Addendum 1808: Patient requesting to be DNR/DNI. Code status changed per patient's wishes.     Discussed patient condition and risk of morbidity and/or mortality with Hospitalist, RN, RT, Pharmacy and Patient.    The patient remains critically ill.  Critical  care time = 46 minutes in directly providing and coordinating critical care and extensive data review.  No time overlap and excludes procedures.

## 2021-05-23 NOTE — PROGRESS NOTES
Gave bedside report GABO Gerber. Pt resting comfortably, no current needs, universal fall and safety precautions in place, bed in lowest position and call light within reach.

## 2021-05-23 NOTE — PROGRESS NOTES
Notified pt's son, [facility], about pt being transferred to ICU. Updated him on POC. He will be in this am to visit her.

## 2021-05-23 NOTE — CARE PLAN
The patient is Watcher - Medium risk of patient condition declining or worsening    Shift Goals  Clinical Goals: maintain adequate oxygenation  Patient Goals: prone  Family Goals: discuss POC with MD, decrease oxygen needs    Progress made toward(s) clinical / shift goals:    Problem: Fall Risk  Goal: Patient will remain free from falls  Outcome: Progressing  Note: Pt placed close to nursing station. Pt calls appropriately. Bed locked and in lowest position, call light within reach.  Pt wearing treaded slipper socks.  Clutter free environment.     Problem: Respiratory  Goal: Patient will achieve/maintain optimum respiratory ventilation and gas exchange  Outcome: Progressing  Note: Pt has agreed to prone during shift. Pt tolerated proning well during previous shift.       Patient is not progressing towards the following goals:

## 2021-05-23 NOTE — FLOWSHEET NOTE
05/23/21 0415   Events/Summary/Plan   Events/Summary/Plan BiPAP Started   Skin Inspection Respiratory Device Intact   Vent Settings   FiO2% 100 %   NIV for Respiratory Failure   $ NIV Charge Yes   Non-Invasive Vent Mode ST   IPAP (cmH2O) 16   EPAP (cm H2O) 8   FiO2 100   Alarms Set / Checked Yes   Non-Invasive Temp (C) 31   Respiratory Rate Patient 38   Spontaneous Vt (ml) 535   Spontaneous Ve (LPM) 20.5   NIV Interface Full Mask

## 2021-05-23 NOTE — PROGRESS NOTES
Telemetry Shift Summary    Rhythm A-fib  HR Range   Ectopy BBB, occasional couplets, occasional big, occasional trig, occasional triplets, 9 bts v-tach (previous shift)  Measurements -/0.12/-        Normal Values  Rhythm SR  HR Range    Measurements 0.12-0.20 / 0.06-0.10  / 0.30-0.52

## 2021-05-23 NOTE — CARE PLAN
Pt transferred to ICU and being placed on BIPAP for increasing.  Will continue to monitor once in ICU and on BiPAP.

## 2021-05-23 NOTE — FLOWSHEET NOTE
05/23/21 0300   Events/Summary/Plan   Events/Summary/Plan ABG drawn, pt on HHFNC 60 LPM & 100% FI02   Vital Signs   Pulse 100   Respiration (!) 23   Pulse Oximetry (!) 78 %   $ Pulse Oximetry (Spot Check) Yes   Respiratory Assessment   Level of Consciousness Alert   Chest Exam   Work Of Breathing / Effort Increased Work of Breathing   Breath Sounds   RUL Breath Sounds Crackles   RML Breath Sounds Crackles;Diminished   RLL Breath Sounds Diminished   JESSY Breath Sounds Crackles;Diminished   LLL Breath Sounds Diminished   Secretions   Cough Non Productive   Oxygen   O2 (LPM) 60   FiO2% 100 %   O2 Delivery Device Heated High Flow Nasal Cannula   Aerosols   $ Aerosol Delivery Device Heated High Flow Nasal Cannula   Equipment Change Date 05/27/21

## 2021-05-23 NOTE — PROGRESS NOTES
0330: Received bedside report from Kelly. Pt was attempting to pee after receiving lasix. SpO2 in the 70s on HFNC at 60L and 100% with 15L oxymask on top.    0345: Pt unable to void with purewick. Attempted to place a white. Unable to visualize urethra easily. Attempt aborted due to pt's oxygen sats. Will transfer pt to ICU for Bipap and try again when sats have improved.      0400: Transferred pt to ICU for Bipap. Chest xray done. Assessment completed. Pt denied any pain or increased WOB. Pt was able to void with purewick. Pt has only voided 150 ml so far.

## 2021-05-23 NOTE — ASSESSMENT & PLAN NOTE
--  Pulmonary hypertension  -- Consider etiology to be multifactorial due to left atrial hypertension and severe hypoxemia causing pulmonary vasoconstriction    -- Cont diuresis   -- Need repeat echocardiogram in 2-3 months    -- Avoid hypoxemia, respiratory acidosis, and A fib RVR which can cause worsening pulmonary vasoconstriction leading to worsening PA pressure

## 2021-05-23 NOTE — PROGRESS NOTES
Pt SO2 was in the low 80%, RT came to bedside and turned oxygenation up to 100-60 (max). Pt desatted to 75-78% SO2.  RN alerted Dr Jimenez of new changes during shift.

## 2021-05-23 NOTE — FLOWSHEET NOTE
05/22/21 1900   Events/Summary/Plan   Events/Summary/Plan HFNC 40 % FI02, symbicort 2 puffs f/w rinsing of mouth   Skin Inspection Respiratory Device Intact   Vital Signs   Pulse 89   Respiration 20   Pulse Oximetry (!) 87 %   $ Pulse Oximetry (Spot Check) Yes   Respiratory Assessment   Respiratory Pattern Within Normal Limits   Level of Consciousness Alert   Chest Exam   Work Of Breathing / Effort Moderate   Breath Sounds   RUL Breath Sounds Crackles;Diminished   RML Breath Sounds Crackles;Diminished   RLL Breath Sounds Diminished   JESSY Breath Sounds Crackles;Diminished   LLL Breath Sounds Diminished   Secretions   Cough Non Productive   Oxygen   O2 (LPM) 40   FiO2% 100 %   O2 Delivery Device Heated High Flow Nasal Cannula   Aerosols   $ Aerosol Delivery Device Heated High Flow Nasal Cannula   Aerosol Temperature   (heater off @ patient's request)   Equipment Change Date 05/27/21

## 2021-05-24 LAB
ALBUMIN SERPL BCP-MCNC: 2.6 G/DL (ref 3.2–4.9)
ALBUMIN/GLOB SERPL: 1.1 G/DL
ALP SERPL-CCNC: 81 U/L (ref 30–99)
ALT SERPL-CCNC: 33 U/L (ref 2–50)
ANION GAP SERPL CALC-SCNC: 12 MMOL/L (ref 7–16)
AST SERPL-CCNC: 39 U/L (ref 12–45)
BASOPHILS # BLD AUTO: 0.2 % (ref 0–1.8)
BASOPHILS # BLD: 0.02 K/UL (ref 0–0.12)
BILIRUB SERPL-MCNC: 0.7 MG/DL (ref 0.1–1.5)
BUN SERPL-MCNC: 45 MG/DL (ref 8–22)
CALCIUM SERPL-MCNC: 7.7 MG/DL (ref 8.4–10.2)
CHLORIDE SERPL-SCNC: 99 MMOL/L (ref 96–112)
CO2 SERPL-SCNC: 27 MMOL/L (ref 20–33)
CREAT SERPL-MCNC: 0.65 MG/DL (ref 0.5–1.4)
EOSINOPHIL # BLD AUTO: 0.02 K/UL (ref 0–0.51)
EOSINOPHIL NFR BLD: 0.2 % (ref 0–6.9)
ERYTHROCYTE [DISTWIDTH] IN BLOOD BY AUTOMATED COUNT: 44 FL (ref 35.9–50)
GAMMA INTERFERON BACKGROUND BLD IA-ACNC: 0.05 IU/ML
GLOBULIN SER CALC-MCNC: 2.3 G/DL (ref 1.9–3.5)
GLUCOSE SERPL-MCNC: 125 MG/DL (ref 65–99)
HCT VFR BLD AUTO: 49.2 % (ref 37–47)
HGB BLD-MCNC: 15.9 G/DL (ref 12–16)
IMM GRANULOCYTES # BLD AUTO: 0.17 K/UL (ref 0–0.11)
IMM GRANULOCYTES NFR BLD AUTO: 1.8 % (ref 0–0.9)
LYMPHOCYTES # BLD AUTO: 0.36 K/UL (ref 1–4.8)
LYMPHOCYTES NFR BLD: 3.9 % (ref 22–41)
M TB IFN-G BLD-IMP: ABNORMAL
M TB IFN-G CD4+ BCKGRND COR BLD-ACNC: 0.01 IU/ML
MAGNESIUM SERPL-MCNC: 1.6 MG/DL (ref 1.5–2.5)
MCH RBC QN AUTO: 27 PG (ref 27–33)
MCHC RBC AUTO-ENTMCNC: 32.3 G/DL (ref 33.6–35)
MCV RBC AUTO: 83.7 FL (ref 81.4–97.8)
MITOGEN IGNF BCKGRD COR BLD-ACNC: 0.05 IU/ML
MONOCYTES # BLD AUTO: 0.35 K/UL (ref 0–0.85)
MONOCYTES NFR BLD AUTO: 3.8 % (ref 0–13.4)
NEUTROPHILS # BLD AUTO: 8.27 K/UL (ref 2–7.15)
NEUTROPHILS NFR BLD: 90.1 % (ref 44–72)
NRBC # BLD AUTO: 0 K/UL
NRBC BLD-RTO: 0 /100 WBC
PHOSPHATE SERPL-MCNC: 3.2 MG/DL (ref 2.5–4.5)
PLATELET # BLD AUTO: 261 K/UL (ref 164–446)
PMV BLD AUTO: 9.8 FL (ref 9–12.9)
POTASSIUM SERPL-SCNC: 4.1 MMOL/L (ref 3.6–5.5)
PROT SERPL-MCNC: 4.9 G/DL (ref 6–8.2)
QFT TB2 - NIL TBQ2: -0.01 IU/ML
RBC # BLD AUTO: 5.88 M/UL (ref 4.2–5.4)
SODIUM SERPL-SCNC: 138 MMOL/L (ref 135–145)
WBC # BLD AUTO: 9.2 K/UL (ref 4.8–10.8)

## 2021-05-24 PROCEDURE — 99291 CRITICAL CARE FIRST HOUR: CPT | Performed by: INTERNAL MEDICINE

## 2021-05-24 PROCEDURE — 85025 COMPLETE CBC W/AUTO DIFF WBC: CPT

## 2021-05-24 PROCEDURE — 94640 AIRWAY INHALATION TREATMENT: CPT

## 2021-05-24 PROCEDURE — 80053 COMPREHEN METABOLIC PANEL: CPT

## 2021-05-24 PROCEDURE — 5A0935A ASSISTANCE WITH RESPIRATORY VENTILATION, LESS THAN 24 CONSECUTIVE HOURS, HIGH NASAL FLOW/VELOCITY: ICD-10-PCS | Performed by: INTERNAL MEDICINE

## 2021-05-24 PROCEDURE — 700102 HCHG RX REV CODE 250 W/ 637 OVERRIDE(OP): Performed by: INTERNAL MEDICINE

## 2021-05-24 PROCEDURE — 83735 ASSAY OF MAGNESIUM: CPT

## 2021-05-24 PROCEDURE — 700102 HCHG RX REV CODE 250 W/ 637 OVERRIDE(OP): Performed by: HOSPITALIST

## 2021-05-24 PROCEDURE — 84100 ASSAY OF PHOSPHORUS: CPT

## 2021-05-24 PROCEDURE — A9270 NON-COVERED ITEM OR SERVICE: HCPCS | Performed by: INTERNAL MEDICINE

## 2021-05-24 PROCEDURE — 94760 N-INVAS EAR/PLS OXIMETRY 1: CPT

## 2021-05-24 PROCEDURE — 770022 HCHG ROOM/CARE - ICU (200)

## 2021-05-24 PROCEDURE — A9270 NON-COVERED ITEM OR SERVICE: HCPCS | Performed by: HOSPITALIST

## 2021-05-24 PROCEDURE — 94660 CPAP INITIATION&MGMT: CPT

## 2021-05-24 PROCEDURE — 700111 HCHG RX REV CODE 636 W/ 250 OVERRIDE (IP): Performed by: INTERNAL MEDICINE

## 2021-05-24 PROCEDURE — 5A09357 ASSISTANCE WITH RESPIRATORY VENTILATION, LESS THAN 24 CONSECUTIVE HOURS, CONTINUOUS POSITIVE AIRWAY PRESSURE: ICD-10-PCS | Performed by: HOSPITALIST

## 2021-05-24 RX ORDER — MAGNESIUM SULFATE HEPTAHYDRATE 40 MG/ML
2 INJECTION, SOLUTION INTRAVENOUS ONCE
Status: COMPLETED | OUTPATIENT
Start: 2021-05-24 | End: 2021-05-24

## 2021-05-24 RX ADMIN — APIXABAN 5 MG: 5 TABLET, FILM COATED ORAL at 05:28

## 2021-05-24 RX ADMIN — BUDESONIDE AND FORMOTEROL FUMARATE DIHYDRATE 2 PUFF: 160; 4.5 AEROSOL RESPIRATORY (INHALATION) at 20:51

## 2021-05-24 RX ADMIN — MAGNESIUM SULFATE 2 G: 2 INJECTION INTRAVENOUS at 06:44

## 2021-05-24 RX ADMIN — BENZONATATE 100 MG: 100 CAPSULE ORAL at 17:40

## 2021-05-24 RX ADMIN — BUDESONIDE AND FORMOTEROL FUMARATE DIHYDRATE 2 PUFF: 160; 4.5 AEROSOL RESPIRATORY (INHALATION) at 06:36

## 2021-05-24 RX ADMIN — DILTIAZEM HYDROCHLORIDE 180 MG: 180 CAPSULE, COATED, EXTENDED RELEASE ORAL at 17:19

## 2021-05-24 RX ADMIN — BUMETANIDE 2 MG: 0.25 INJECTION, SOLUTION INTRAMUSCULAR; INTRAVENOUS at 17:08

## 2021-05-24 RX ADMIN — APIXABAN 5 MG: 5 TABLET, FILM COATED ORAL at 17:09

## 2021-05-24 RX ADMIN — FAMOTIDINE 20 MG: 20 TABLET ORAL at 05:28

## 2021-05-24 RX ADMIN — FAMOTIDINE 20 MG: 20 TABLET ORAL at 17:09

## 2021-05-24 RX ADMIN — OXYCODONE HYDROCHLORIDE AND ACETAMINOPHEN 1000 MG: 500 TABLET ORAL at 05:28

## 2021-05-24 RX ADMIN — ATORVASTATIN CALCIUM 10 MG: 10 TABLET, FILM COATED ORAL at 05:28

## 2021-05-24 RX ADMIN — DILTIAZEM HYDROCHLORIDE 180 MG: 180 CAPSULE, COATED, EXTENDED RELEASE ORAL at 06:13

## 2021-05-24 RX ADMIN — POTASSIUM CHLORIDE 20 MEQ: 1500 TABLET, EXTENDED RELEASE ORAL at 05:29

## 2021-05-24 RX ADMIN — MONTELUKAST 10 MG: 10 TABLET, FILM COATED ORAL at 17:09

## 2021-05-24 RX ADMIN — FERROUS SULFATE TAB 325 MG (65 MG ELEMENTAL FE) 325 MG: 325 (65 FE) TAB at 08:30

## 2021-05-24 RX ADMIN — DEXAMETHASONE 12 MG: 4 TABLET ORAL at 05:28

## 2021-05-24 RX ADMIN — BUMETANIDE 2 MG: 0.25 INJECTION, SOLUTION INTRAMUSCULAR; INTRAVENOUS at 05:29

## 2021-05-24 ASSESSMENT — PATIENT HEALTH QUESTIONNAIRE - PHQ9
SUM OF ALL RESPONSES TO PHQ9 QUESTIONS 1 AND 2: 0
1. LITTLE INTEREST OR PLEASURE IN DOING THINGS: NOT AT ALL
2. FEELING DOWN, DEPRESSED, IRRITABLE, OR HOPELESS: NOT AT ALL

## 2021-05-24 ASSESSMENT — ENCOUNTER SYMPTOMS
CARDIOVASCULAR NEGATIVE: 1
WEAKNESS: 1
EYES NEGATIVE: 1
MUSCULOSKELETAL NEGATIVE: 1
GASTROINTESTINAL NEGATIVE: 1
RESPIRATORY NEGATIVE: 1
PSYCHIATRIC NEGATIVE: 1

## 2021-05-24 ASSESSMENT — PAIN DESCRIPTION - PAIN TYPE
TYPE: ACUTE PAIN

## 2021-05-24 NOTE — PROGRESS NOTES
"Pulmonary Progress Note    Date of admission  5/20/2021    Chief Complaint  80 y.o. female admitted 5/20/2021 with covid pna    Hospital Course  \"80 y.o. female who presented 5/20/2021 with acute hypoxemia respiratory failure. Patient with significant history of atrial fibrillation on eliquis, asthma, and hypertension admitted for worsening hypoxemia secondary to COVID pneumonia. She was recently discharged home on 5/17 on 1 liters NC and presents back on 5/20 with worsening shortness of breath. On admission, she required 6 liters NC which quickly escalated to HFNC. ID consulted and patient was tocilizumab on 5/21. Her oxygenation worsen overnight as she was maxed out on HFNC and transferred to ICU for BiPAP 12/6 FiO2 100%.      In ICU, she was diuresed and documented I/O -> neg ~1 liter. Patient is breath comfortably on BiPAP and able to answer questions via BiPAP facemask. Subjectively, patient denies chest pain, cough, N/V, fever/chills, or abdominal pain. She endorse having shortness of breath, neck pain, and back pain. Discussed about the benefits of proning if she can tolerate at which she agrees to with RN assistance. Discussed about risks/benefits endotracheal intubation if she needs it in the upcoming hours to days which she agrees to.      Echocardiogram 5/16/21 personally reviewed -> preserved LV function, mild to moderate MR, dilated coronary sinus, eccentric TR jet with measure RVSP ~50 mmHG which is most likely underestimated, TAPSE ~0.9 cm -> reduced RV function, dilated RV and RA, MA -> 0.89 m/s (~18 mmHG) and abnormal septal bounce, dilated IVC with inspiratory collapse. \" From Dr. Tamayo's note    COVID meds: Received Tocilimuzab on 5/21; on dexamethasone 5/20 5/24: LE doppler negative      Interval Problem Update  Reviewed last 24 hour events:  Chart review from the past 24 hours includes imaging, laboratory studies, vital signs and notes available.  Pertinent data for today    Cardiac:  No " events BP ok  Pulm:  40 l and 80%  Neuro:  intact  Heme:  H and H stable  I/O:  -4.4 since admission  ID:   GI/endo:  Sugar better controlled  Labs/Imaging:  reviewed  Lines:  peripherals  Mobility:  OOB to chair  Symptoms:tired but otherwise no issues    Review of Systems  Review of Systems   Constitutional: Positive for malaise/fatigue.   HENT: Negative.    Eyes: Negative.    Respiratory: Negative.    Cardiovascular: Negative.    Gastrointestinal: Negative.    Genitourinary: Negative.    Musculoskeletal: Negative.    Skin: Negative.    Neurological: Positive for weakness.   Endo/Heme/Allergies: Negative.    Psychiatric/Behavioral: Negative.         Vital Signs for last 24 hours   Temp:  [35.9 °C (96.7 °F)-36.8 °C (98.2 °F)] 36.8 °C (98.2 °F)  Pulse:  [] 99  Resp:  [12-45] 33  BP: ()/(55-87) 136/72  SpO2:  [88 %-99 %] 92 %          Physical Exam   Physical Exam  Constitutional:       Appearance: She is ill-appearing.   HENT:      Head: Normocephalic and atraumatic.      Mouth/Throat:      Mouth: Mucous membranes are moist.   Eyes:      Extraocular Movements: Extraocular movements intact.      Pupils: Pupils are equal, round, and reactive to light.   Cardiovascular:      Rate and Rhythm: Rhythm irregular.      Heart sounds: Murmur heard.     Pulmonary:      Effort: Pulmonary effort is normal.   Musculoskeletal:      Right lower leg: Edema present.      Left lower leg: Edema present.   Skin:     General: Skin is warm and dry.   Neurological:      General: No focal deficit present.      Mental Status: She is alert and oriented to person, place, and time.   Psychiatric:         Mood and Affect: Mood normal.         Behavior: Behavior normal.         Thought Content: Thought content normal.         Judgment: Judgment normal.         Medications  Current Facility-Administered Medications   Medication Dose Route Frequency Provider Last Rate Last Admin   • bumetanide (BUMEX) injection 2 mg  2 mg Intravenous BID  DIURETIC Dale Tamayo M.D.   2 mg at 05/24/21 0529   • dexamethasone (DECADRON) tablet 12 mg  12 mg Oral DAILY Dale Tamayo M.D.   12 mg at 05/24/21 0528   • MD Alert...ICU Electrolyte Replacement per Pharmacy   Other PHARMACY TO DOSE Dale Tamayo M.D.       • famotidine (PEPCID) tablet 20 mg  20 mg Oral BID Ivan Nunez M.D.   20 mg at 05/24/21 0528   • potassium chloride SA (Kdur) tablet 20 mEq  20 mEq Oral DAILY Ivan Nunez M.D.   20 mEq at 05/24/21 0529   • ferrous sulfate tablet 325 mg  325 mg Oral QDAY with Breakfast Ivan Nunez M.D.   325 mg at 05/24/21 0830   • apixaban (ELIQUIS) tablet 5 mg  5 mg Oral BID Cheryl Gallagher, D.O.   5 mg at 05/24/21 0528   • ascorbic acid (Vitamin C) tablet 1,000 mg  1,000 mg Oral DAILY Cheryl Gallagher, D.O.   1,000 mg at 05/24/21 0528   • atorvastatin (LIPITOR) tablet 10 mg  10 mg Oral DAILY Cheryl Gallagher, D.O.   10 mg at 05/24/21 0528   • benzonatate (TESSALON) capsule 100 mg  100 mg Oral TID PRN Cheryl Gallagher, D.O.   100 mg at 05/23/21 0231   • DILTIAZem CD (CARDIZEM CD) capsule 180 mg  180 mg Oral BID Cheryl Gallagher, D.O.   180 mg at 05/24/21 0613   • montelukast (SINGULAIR) tablet 10 mg  10 mg Oral Q EVENING Cheryl Gallagher, D.O.   10 mg at 05/23/21 1744   • ondansetron (ZOFRAN) syringe/vial injection 4 mg  4 mg Intravenous Q6HRS PRN Cheryl Gallagher, D.O.       • ondansetron (ZOFRAN ODT) dispertab 4 mg  4 mg Oral Q6HRS PRN Cheryl Gallagher, D.O.       • senna-docusate (PERICOLACE or SENOKOT S) 8.6-50 MG per tablet 2 tablet  2 tablet Oral BID Cheryl Gallagher D.O.        And   • polyethylene glycol/lytes (MIRALAX) PACKET 1 Packet  1 Packet Oral QDAY PRN Cheryl Gallagher D.O.        And   • magnesium hydroxide (MILK OF MAGNESIA) suspension 30 mL  30 mL Oral QDAY PRN Cheryl Gallagher D.O.        And   • bisacodyl (DULCOLAX) suppository 10 mg  10 mg Rectal QDAY PRN Cheryl Gallagher D.O.       • acetaminophen (Tylenol) tablet 650 mg  650 mg Oral Q6HRS PRN Cheryl Gallagher D.O.   650 mg at  05/22/21 1008   • guaiFENesin ER (MUCINEX) tablet 600 mg  600 mg Oral BID PRN Cheryl Gallagher D.O.       • guaiFENesin dextromethorphan (ROBITUSSIN DM) 100-10 MG/5ML syrup 10 mL  10 mL Oral Q6HRS PRN Cheryl Gallagher D.O.       • budesonide-formoterol (SYMBICORT) 160-4.5 MCG/ACT inhaler 2 Puff  2 Puff Inhalation BID (RT) LEN MoranOOksana   2 Puff at 05/24/21 0636   • HYDROcodone-homatropine 5-1.5 mg/5 mL solution 5 mL  5 mL Oral Q4HRS PRN Cheryl Gallagher D.O.           Fluids    Intake/Output Summary (Last 24 hours) at 5/24/2021 1415  Last data filed at 5/24/2021 1200  Gross per 24 hour   Intake 1640 ml   Output 2145 ml   Net -505 ml       Laboratory  Recent Labs     05/23/21  0311   ISTATAPH 7.449   ISTATAPCO2 48.3*   ISTATAPO2 40*   ISTATATCO2 35*   [id number] 77*   ISTATARTHCO3 33.5*   ISTATARTBE 8*   ISTATTEMP 97.7 F   ISTATFIO2 100   ISTATSPEC Arterial   ISTATAPHTC 7.457   BFGLIIPN6ZR 39*         Recent Labs     05/23/21 0203 05/23/21  1840 05/24/21  0335   SODIUM 137  139 136 138   POTASSIUM 4.5  4.3 4.6 4.1   CHLORIDE 95*  97 87* 99   CO2 29  29 32 27   BUN 41*  41* 43* 45*   CREATININE 0.68  0.71 0.87 0.65   MAGNESIUM 2.1 1.8 1.6   PHOSPHORUS 2.7 4.0 3.2   CALCIUM 9.7  10.8* 9.9 7.7*     Recent Labs     05/23/21 0203 05/23/21  1840 05/24/21  0335   ALTSGPT  --   --  33   ASTSGOT  --   --  39   ALKPHOSPHAT  --   --  81   TBILIRUBIN  --   --  0.7   GLUCOSE 157*  162* 289* 125*     Recent Labs     05/23/21 0203 05/24/21  0335   WBC 9.6 9.2   NEUTSPOLYS  --  90.10*   LYMPHOCYTES  --  3.90*   MONOCYTES  --  3.80   EOSINOPHILS  --  0.20   BASOPHILS  --  0.20   ASTSGOT  --  39   ALTSGPT  --  33   ALKPHOSPHAT  --  81   TBILIRUBIN  --  0.7     Recent Labs     05/23/21  0203 05/24/21  0335   RBC 5.45* 5.88*   HEMOGLOBIN 14.2 15.9   HEMATOCRIT 45.7 49.2*   PLATELETCT 306 261   FERRITIN 1691.0*  --        Imaging  No new imaging to review    Assessment/Plan  * Pneumonia due to COVID-19 virus- (present on  admission)  Assessment & Plan  -- Confirmed SARS-CoV-2/COVID on 5/15  -- Risk factors -> age and HTN and not vaccinated  -- Increase decadron to 12mg   -- Received tocilizumab on 5/21   -- Encourage self prone as tolerated (ie, 2 hour supine and 2 hour prone)  -- Cont aggressive diuresis to seek net negative fluid balance  -- On strict contact, droplet/airbone, eye protection, and critical meticulous hand hygiene    Acute respiratory failure with hypoxia (HCC)- (present on admission)  Assessment & Plan  -- Worsening hypoxemia secondary to COVID pneumonia, goal saturations greater than 88% as long as she is asymptomatic  -- Query component of pulmonary edema secondary to A fib and echocardiogram findings   -- off NIV and on hiflo and proning     -- negative fluid balance  -- Treatment for COVID as below  -- Currently titrating FiO2   -- Aggressive pulmonary toilet as able  -- Encourage self proning as tolerated   -- Goal SpO2 > 88%       Abnormal echocardiogram  Assessment & Plan  -- Review of echocardiogram suggestive of pulmonary hypertension  -- Consider etiology to be multifactorial due to left atrial hypertension and severe hypoxemia causing pulmonary vasoconstriction    -- being diuresed    -- Need repeat echocardiogram in 2-3 months    -- Avoid hypoxemia, respiratory acidosis, and A fib RVR which can cause worsening pulmonary vasoconstriction leading to worsening PA pressure       Asthma, moderate persistent, well-controlled- (present on admission)  Assessment & Plan  -- Not in acute flare   -- Cont symbicort and singulair        Longstanding persistent atrial fibrillation (HCC)-Dr. Turner [place]- (present on admission)  Assessment & Plan  -- Currently rate controlled with diltiazem   -- On eliquis   -- High lytes goal    -- Goal HR < 110         VTE:  NOAC and Not Indicated  Ulcer: Not Indicated  Lines: cindy reassessed    I have performed a physical exam and reviewed and updated ROS and Plan today  (5/24/2021). In review of yesterday's note (5/23/2021), there are no changes except as documented above.     Discussed patient condition and risk of morbidity and/or mortality with Pharmacy, Charge nurse / hot rounds and Patient  The patient remains critically ill.  Critical care time = 32 minutes in directly providing and coordinating critical care and extensive data review.  No time overlap and excludes procedures.

## 2021-05-24 NOTE — PROGRESS NOTES
Report received from Ashley AYON. Patient A/O x4, sitting up in bed. On 40L 90% HHFNC O2 sat 94%, decreases to about 89% when talking. Patient is a-fib on the monitor, all other VSS stable. Patient denies pain, SOB, nausea at this time. POC reviewed with patient, she is agreeable to proning tonight. Call light and belongings within reach, safety precautions in place, all needs met at this time.

## 2021-05-24 NOTE — CARE PLAN
Pt tolerating CPAP +12 well t/o night, pt laying on either side with saturations in the mid to high 90s. Will attempt to wean fi02, and HFNC as able.

## 2021-05-24 NOTE — PROGRESS NOTES
"While giving patient medications, patient voiced to this RN that she does not wish to be placed on a ventilator at any point in time.  This RN discussed patient wishes in more detail with the patient and she voiced that she would like to be resuscitated with chest compressions and shock.  This RN confirmed with Dr Tamayo that is not an option as a code status and explained such to the patient.  Patient then voiced that she does not wish to be intubated or have CPR performed and that she would like to be DNR/DNI code status.  States that she \"will leave it in the hands of the Lord\".   "

## 2021-05-24 NOTE — PROGRESS NOTES
Report from Angelina AYON. Assumed care of pt. Pt resting in bed. HFNC 40L/80% with O2 sats 88-92%. Reviewed POC.

## 2021-05-24 NOTE — PROGRESS NOTES
ISOLATION PRECAUTIONS EDUCATION    Educated PATIENT, FAMILY, S.O: patient and family member on isolation for COVID-19.    Educated on reason for isolation, how the infection may be transmitted, and how to help prevent transmission to others. Educated precautions involves staff and visitors wearing PPE, following Standard Precautions and performing meticulous hand hygiene in order to prevent transmission of infection.     Enhanced Droplet Precautions: Educated that Enhanced Droplet Precautions involves staff and visitors wearing a surgical mask when in the patient room.     In addition,  educated that they may leave their room, but prior to exiting the patient room each time, the patient needs to have on a fresh patient gown, a surgical mask must be worn by the patient while out of the patient room, and perform hand hygiene immediately prior to exiting the room.       Patient transport and mobilization on unit  Educated that they may leave their room, but prior to exiting, the patient needs to have on a fresh patient gown, ensure the potentially infectious area is covered, performing appropriate hand hygiene immediately prior to exiting the room.

## 2021-05-24 NOTE — CARE PLAN
The patient is Watcher - Medium risk of patient condition declining or worsening    Shift Goals  Clinical Goals: Maintain adequate oxygenation on HHFNC  Patient Goals: Proning, turn/cough/deep breathe  Family Goals: Support    Progress made toward(s) clinical / shift goals:    Problem: Fall Risk  Goal: Patient will remain free from falls  Outcome: Progressing     Problem: Pain - Standard  Goal: Alleviation of pain or a reduction in pain to the patient’s comfort goal  Outcome: Progressing       Patient is not progressing towards the following goals:    Problem: Respiratory  Goal: Patient will achieve/maintain optimum respiratory ventilation and gas exchange  Outcome: Not Progressing    Plan to advance goal:  Patient educated about proning, coughing, and deep breathing. Unable to tolerate full prone, will alternate between left/right side. CPAP overnight to help with oxygen recruitment. Patient's O2 has been in high 90s so far this shift, will desaturate during movement/exertion but quickly recovers.

## 2021-05-24 NOTE — DISCHARGE PLANNING
Anticipated Discharge Disposition:   TBD, possibly home with DME oxygen     Action:     Per chart review, patient on 40 liters of heated HFNC. Patient not medically cleared for discharge at this time. RN CM will continue to follow and assist as needed.     Barriers to Discharge:   Medical Clearance  Oxygen needs   Possible DME need     Plan:   Wait for medical clearance   Hospital care management will continue to assist with discharge planning needs.     Care Transition Team Assessment    Information Source  Orientation Level: Oriented X4  Who is responsible for making decisions for patient? : Patient    Readmission Evaluation  Is this a readmission?: Yes - unplanned readmission    Elopement Risk  Legal Hold: No  Ambulatory or Self Mobile in Wheelchair: No-Not an Elopement Risk  Disoriented: No  Psychiatric Symptoms: None  History of Wandering: No  Elopement this Admit: No  Vocalizing Wanting to Leave: No  Displays Behaviors, Body Language Wanting to Leave: No-Not at Risk for Elopement  Elopement Risk: Not at Risk for Elopement    Interdisciplinary Discharge Planning  Does Admitting Nurse Feel This Could be a Complex Discharge?: No  Primary Care Physician: FRANK CONROY M.D.  Lives with - Patient's Self Care Capacity: Alone and Able to Care For Self  Patient or legal guardian wants to designate a caregiver: No  Support Systems: Children  Housing / Facility: [address]  Do You Take your Prescribed Medications Regularly: Yes  Able to Return to Previous ADL's: Future Time w/Therapy  Mobility Issues: Yes  Patient Prefers to be Discharged to:: home  Durable Medical Equipment: Not Applicable    Discharge Preparedness  What is your plan after discharge?: Uncertain - pending medical team collaboration  What are your discharge supports?: Child    Finances  Financial Barriers to Discharge: No  Prescription Coverage: Yes    Vision / Hearing Impairment  Right Eye Vision: Impaired, Wears Glasses  Left Eye Vision: Impaired,  Wears Glasses    Domestic Abuse  Have you ever been the victim of abuse or violence?: No  Physical Abuse or Sexual Abuse: No  Verbal Abuse or Emotional Abuse: No  Possible Abuse/Neglect Reported to:: Not Applicable    Psychological Assessment  History of Substance Abuse: None  History of Psychiatric Problems: No    Discharge Risks or Barriers  Discharge risks or barriers?: Complex medical needs  Patient risk factors: Complex medical needs, Vulnerable adult    Anticipated Discharge Information  Discharge Disposition: Still a Patient (30)

## 2021-05-24 NOTE — CARE PLAN
The patient is Watcher - Medium risk of patient condition declining or worsening    Shift Goals  Clinical Goals: Maintain O2 sats >88%, proning  Patient Goals: Maintain O2 sats >88%  Family Goals: Support    Progress made toward(s) clinical / shift goals:  Oxygen titrated up to HFNC 50L/60% with pt maintaining O2 sats 90-92%. Intermittently lying on side.       Problem: Knowledge Deficit - Standard  Goal: Patient and family/care givers will demonstrate understanding of plan of care, disease process/condition, diagnostic tests and medications  Outcome: Progressing     Problem: Fall Risk  Goal: Patient will remain free from falls  Outcome: Progressing     Problem: Respiratory  Goal: Patient will achieve/maintain optimum respiratory ventilation and gas exchange  Outcome: Progressing     Problem: Pain - Standard  Goal: Alleviation of pain or a reduction in pain to the patient’s comfort goal  Outcome: Met

## 2021-05-24 NOTE — RESPIRATORY CARE
"   COPD EDUCATION by COPD CLINICAL EDUCATOR  5/24/2021 at 8:41 AM by Padmaja Contreras, RRT     Patient reviewed by COPD education team. Patient does not have a history or diagnosis of COPD and is a non-smoker.  Therefore does not qualify for the COPD program.    COPD Screen       COPD Assessment  COPD Clinical Specialists ONLY  COPD Education Initiated: No--Quick Screen (No COPD dx hx, pt readmit for COVID went home with remote monitor came back and is now in ICU)  Is this a COPD exacerbation patient?: No    Meds to Beds  Would the patient like to opt in for Bedside Medication Delivery at Discharge?: No     MY COPD ACTION PLAN     It is recommended that patients and physicians /healthcare providers complete this action plan together. This plan should be discussed at each physician visit and updated as needed.    The green, yellow and red zones show groups of symptoms of COPD. This list of symptoms is not comprehensive, and you may experience other symptoms. In the \"Actions\" column, your healthcare provider has recommended actions for you to take based on your symptoms.    Patient Name: Jessica Black   YOB: 1940   Last Updated on:     Green Zone:  I am doing well today Actions   •  Usual activitiy and exercise level •  Take daily medications   •  Usual amounts of cough and phlegm/mucus •  Use oxygen as prescribed   •  Sleep well at night •  Continue regular exercise/diet plan   •  Appetite is good •  At all times avoid cigarette smoke, inhaled irritants     Daily Medications (these medications are taken every day):                Yellow Zone:  I am having a bad day or a COPD flare Actions   •  More breathless than usual •  Continue daily medications   •  I have less energy for my daily activities •  Use quick relief inhaler as ordered   •  Increased or thicker phlegm/mucus •  Use oxygen as prescribed   •  Using quick relief inhaler/nebulizer more often •  Get plenty of rest   •  Swelling of ankles " "more than usual •  Use pursed lip breathing   •  More coughing than usual •  At all times avoid cigarette smoke, inhaled irritants   •  I feel like I have a \"chest cold\"   •  Poor sleep and my symptoms woke me up   •  My appetite is not good   •  My medicine is not helping    •  Call provider immediately if symptoms don’t improve     Continue daily medications, add rescue medications:               Medications to be used during a flare up, (as Discussed with Provider):              Red Zone:  I need urgent medical care Actions   •  Severe shortness of breath even at rest •  Call 911 or seek medical care immediately   •  Not able to do any activity because of breathing    •  Fever or shaking chills    •  Feeling confused or very drowsy     •  Chest pains    •  Coughing up blood              "

## 2021-05-25 ENCOUNTER — TELEPHONE (OUTPATIENT)
Dept: SLEEP MEDICINE | Facility: MEDICAL CENTER | Age: 81
End: 2021-05-25

## 2021-05-25 LAB
BACTERIA BLD CULT: NORMAL
BACTERIA BLD CULT: NORMAL
MAGNESIUM SERPL-MCNC: 2.5 MG/DL (ref 1.5–2.5)
SIGNIFICANT IND 70042: NORMAL
SIGNIFICANT IND 70042: NORMAL
SITE SITE: NORMAL
SITE SITE: NORMAL
SOURCE SOURCE: NORMAL
SOURCE SOURCE: NORMAL

## 2021-05-25 PROCEDURE — 86901 BLOOD TYPING SEROLOGIC RH(D): CPT

## 2021-05-25 PROCEDURE — 5A0935A ASSISTANCE WITH RESPIRATORY VENTILATION, LESS THAN 24 CONSECUTIVE HOURS, HIGH NASAL FLOW/VELOCITY: ICD-10-PCS | Performed by: INTERNAL MEDICINE

## 2021-05-25 PROCEDURE — 700102 HCHG RX REV CODE 250 W/ 637 OVERRIDE(OP): Performed by: INTERNAL MEDICINE

## 2021-05-25 PROCEDURE — 86900 BLOOD TYPING SEROLOGIC ABO: CPT

## 2021-05-25 PROCEDURE — 94640 AIRWAY INHALATION TREATMENT: CPT

## 2021-05-25 PROCEDURE — 99291 CRITICAL CARE FIRST HOUR: CPT | Performed by: INTERNAL MEDICINE

## 2021-05-25 PROCEDURE — 94660 CPAP INITIATION&MGMT: CPT

## 2021-05-25 PROCEDURE — 86850 RBC ANTIBODY SCREEN: CPT

## 2021-05-25 PROCEDURE — 94760 N-INVAS EAR/PLS OXIMETRY 1: CPT

## 2021-05-25 PROCEDURE — 83735 ASSAY OF MAGNESIUM: CPT

## 2021-05-25 PROCEDURE — A9270 NON-COVERED ITEM OR SERVICE: HCPCS | Performed by: INTERNAL MEDICINE

## 2021-05-25 PROCEDURE — 770022 HCHG ROOM/CARE - ICU (200)

## 2021-05-25 PROCEDURE — 700102 HCHG RX REV CODE 250 W/ 637 OVERRIDE(OP): Performed by: HOSPITALIST

## 2021-05-25 PROCEDURE — 5A09357 ASSISTANCE WITH RESPIRATORY VENTILATION, LESS THAN 24 CONSECUTIVE HOURS, CONTINUOUS POSITIVE AIRWAY PRESSURE: ICD-10-PCS | Performed by: HOSPITALIST

## 2021-05-25 PROCEDURE — A9270 NON-COVERED ITEM OR SERVICE: HCPCS | Performed by: HOSPITALIST

## 2021-05-25 PROCEDURE — 700111 HCHG RX REV CODE 636 W/ 250 OVERRIDE (IP): Performed by: INTERNAL MEDICINE

## 2021-05-25 RX ADMIN — DILTIAZEM HYDROCHLORIDE 180 MG: 180 CAPSULE, COATED, EXTENDED RELEASE ORAL at 06:03

## 2021-05-25 RX ADMIN — BUMETANIDE 2 MG: 0.25 INJECTION, SOLUTION INTRAMUSCULAR; INTRAVENOUS at 15:36

## 2021-05-25 RX ADMIN — FERROUS SULFATE TAB 325 MG (65 MG ELEMENTAL FE) 325 MG: 325 (65 FE) TAB at 07:54

## 2021-05-25 RX ADMIN — BUDESONIDE AND FORMOTEROL FUMARATE DIHYDRATE 2 PUFF: 160; 4.5 AEROSOL RESPIRATORY (INHALATION) at 21:19

## 2021-05-25 RX ADMIN — APIXABAN 5 MG: 5 TABLET, FILM COATED ORAL at 17:37

## 2021-05-25 RX ADMIN — OXYCODONE HYDROCHLORIDE AND ACETAMINOPHEN 1000 MG: 500 TABLET ORAL at 06:04

## 2021-05-25 RX ADMIN — DILTIAZEM HYDROCHLORIDE 180 MG: 180 CAPSULE, COATED, EXTENDED RELEASE ORAL at 17:38

## 2021-05-25 RX ADMIN — DOCUSATE SODIUM 50 MG AND SENNOSIDES 8.6 MG 2 TABLET: 8.6; 5 TABLET, FILM COATED ORAL at 17:38

## 2021-05-25 RX ADMIN — ATORVASTATIN CALCIUM 10 MG: 10 TABLET, FILM COATED ORAL at 06:05

## 2021-05-25 RX ADMIN — DOCUSATE SODIUM 50 MG AND SENNOSIDES 8.6 MG 2 TABLET: 8.6; 5 TABLET, FILM COATED ORAL at 06:05

## 2021-05-25 RX ADMIN — FAMOTIDINE 20 MG: 20 TABLET ORAL at 17:38

## 2021-05-25 RX ADMIN — POTASSIUM CHLORIDE 20 MEQ: 1500 TABLET, EXTENDED RELEASE ORAL at 06:05

## 2021-05-25 RX ADMIN — MONTELUKAST 10 MG: 10 TABLET, FILM COATED ORAL at 17:38

## 2021-05-25 RX ADMIN — APIXABAN 5 MG: 5 TABLET, FILM COATED ORAL at 06:06

## 2021-05-25 RX ADMIN — FAMOTIDINE 20 MG: 20 TABLET ORAL at 06:04

## 2021-05-25 RX ADMIN — BUMETANIDE 2 MG: 0.25 INJECTION, SOLUTION INTRAMUSCULAR; INTRAVENOUS at 06:02

## 2021-05-25 RX ADMIN — DEXAMETHASONE 12 MG: 4 TABLET ORAL at 06:05

## 2021-05-25 ASSESSMENT — ENCOUNTER SYMPTOMS
CARDIOVASCULAR NEGATIVE: 1
EYES NEGATIVE: 1
MUSCULOSKELETAL NEGATIVE: 1
RESPIRATORY NEGATIVE: 1
WEAKNESS: 1
GASTROINTESTINAL NEGATIVE: 1
PSYCHIATRIC NEGATIVE: 1

## 2021-05-25 ASSESSMENT — PAIN DESCRIPTION - PAIN TYPE: TYPE: ACUTE PAIN

## 2021-05-25 NOTE — CARE PLAN
The patient is Watcher - Medium risk of patient condition declining or worsening    Shift Goals  Clinical Goals: Maintain O2 levels at >88%. Proning periodically.  Patient Goals: Maintain O2 sats >88%  Family Goals: Support    Progress made toward(s) clinical / shift goals: Pt is tolerating HHFNC at 50L 80% with stats remaining in the 90s     Patient is not progressing towards the following goals:

## 2021-05-25 NOTE — TELEPHONE ENCOUNTER
Received a voice message from patient's family. They wanted Dr. James to know that patient is in the hospital with COVID detected on 5/18/21.    Message routed as FYI to Dr James

## 2021-05-25 NOTE — FLOWSHEET NOTE
05/24/21 1830   Events/Summary/Plan   Events/Summary/Plan HHFNC Check   Skin Inspection Respiratory Device Intact   Vent Settings   FiO2% 100 %

## 2021-05-25 NOTE — FLOWSHEET NOTE
Provided MDI demo with available spacer. Return demo reinforced compliance with MDI therapy.  MDI given by RN prior.

## 2021-05-25 NOTE — PROGRESS NOTES
Patient with c-pap in place. Turned to right side. Preparing for sleep. States that she was assisted with a bath earlier in the day. Strength is good. No dyspnea on exertion noted, but levels still drop some when she turns for instance. It takes several minutes for her to catch back up. Pleasant lady.

## 2021-05-25 NOTE — FLOWSHEET NOTE
05/24/21 2156   Events/Summary/Plan   Events/Summary/Plan CPAP check   Skin Inspection Respiratory Device Intact   Vent Settings   FiO2% 90 %   NIV for Respiratory Failure   $ NIV Charge Yes   Non-Invasive Vent Mode CPAP   IPAP (cmH2O) 12   FiO2 90   Alarms Set / Checked Yes   Respiratory Rate Patient 24   Spontaneous Vt (ml) 520   Spontaneous Ve (LPM) 12.9   NIV Interface Full Mask   VAP   Head of Bed Elevated Greater or equal to 30 degrees

## 2021-05-25 NOTE — PROGRESS NOTES
I spoke with the patient's son Kaiser Sparks at length today.  I believe I answered all of his questions.  He was appreciative and expressed satisfaction with the call.    I will see the patient tomorrow with formal H&P.    Mel Powers MD

## 2021-05-25 NOTE — FLOWSHEET NOTE
05/24/21 2156   Events/Summary/Plan   Events/Summary/Plan CPAP check   Skin Inspection Respiratory Device Intact   Vital Signs   Pulse 96   Respiration (!) 26   Pulse Oximetry 95 %   $ Pulse Oximetry (Spot Check) Yes   O2 Alarms Set & Reviewed Yes   Respiratory Assessment   Respiratory Pattern Within Normal Limits   Level of Consciousness Alert   Chest Exam   Work Of Breathing / Effort Mild   Breath Sounds   RUL Breath Sounds Clear   RML Breath Sounds Diminished   RLL Breath Sounds Diminished   JESSY Breath Sounds Clear   LLL Breath Sounds Diminished   Secretions   Cough Non Productive   How Sputum Obtained Cough on Request   Sputum Amount Unable to Evaluate   Sputum Color Unable to Evaluate   Sputum Consistency Unable to Evaluate   Oxygen   FiO2% 90 %   O2 Delivery Device CPAP   Resuscitation Device at Bedside Self Inflating Bag   Non-Invasive Ventilation [facility]   Nocturnal CPAP or BIPAP CPAP - [facility]   $ System Evaluation Yes   Settings (If Known) 12   FiO2 or LPM 90

## 2021-05-25 NOTE — FLOWSHEET NOTE
Provided MDI demo with available spacer. Return demo reinforced compliance with MDI therapy.  MDI given by     05/24/21 3254   Inhalation Therapy Treatment   $ MDI/DPI Given   (MDI given by RN.)    RN prior.

## 2021-05-25 NOTE — PROGRESS NOTES
"Pulmonary Progress Note    Date of admission  5/20/2021    Chief Complaint  80 y.o. female admitted 5/20/2021 with covid pna    Hospital Course  \"80 y.o. female who presented 5/20/2021 with acute hypoxemia respiratory failure. Patient with significant history of atrial fibrillation on eliquis, asthma, and hypertension admitted for worsening hypoxemia secondary to COVID pneumonia. She was recently discharged home on 5/17 on 1 liters NC and presents back on 5/20 with worsening shortness of breath. On admission, she required 6 liters NC which quickly escalated to HFNC. ID consulted and patient was tocilizumab on 5/21. Her oxygenation worsen overnight as she was maxed out on HFNC and transferred to ICU for BiPAP 12/6 FiO2 100%.      In ICU, she was diuresed and documented I/O -> neg ~1 liter. Patient is breath comfortably on BiPAP and able to answer questions via BiPAP facemask. Subjectively, patient denies chest pain, cough, N/V, fever/chills, or abdominal pain. She endorse having shortness of breath, neck pain, and back pain. Discussed about the benefits of proning if she can tolerate at which she agrees to with RN assistance. Discussed about risks/benefits endotracheal intubation if she needs it in the upcoming hours to days which she agrees to.      Echocardiogram 5/16/21 personally reviewed -> preserved LV function, mild to moderate MR, dilated coronary sinus, eccentric TR jet with measure RVSP ~50 mmHG which is most likely underestimated, TAPSE ~0.9 cm -> reduced RV function, dilated RV and RA, OR -> 0.89 m/s (~18 mmHG) and abnormal septal bounce, dilated IVC with inspiratory collapse. \" From Dr. Tamayo's note    COVID meds: Received Tocilimuzab on 5/21; on dexamethasone restartted 5/20 5/24: LE doppler negative; FIo2 increased to 100% and 50L    5/25: FIo2 down to 40 l 80%.   Pt's son [facility] Black requested an infectious disease doctor to call as he is expressed that he was not happy with patient's care and " progress. Main questions patients son asked about were  for Ivermectin, Hydroxychloroquine and Remdesivir.  ID on call DR. Powers will call son this pm.      Interval Problem Update  Reviewed last 24 hour events:  Chart review from the past 24 hours includes imaging, laboratory studies, vital signs and notes available.  Pertinent data for today    Cardiac:  No events BP ok  Pulm:  50 l and 80%  Neuro:  intact  Heme:  H and H stable  I/O:  -5.4 since admission  ID: tocilimuzab  GI/endo:  Sugar better controlled  Labs/Imaging:  reviewed  Lines:  peripherals  Mobility:  OOB to chair  Symptoms:tired but otherwise no issues    Review of Systems  Review of Systems   Constitutional: Positive for malaise/fatigue.   HENT: Negative.    Eyes: Negative.    Respiratory: Negative.    Cardiovascular: Negative.    Gastrointestinal: Negative.    Genitourinary: Negative.    Musculoskeletal: Negative.    Skin: Negative.    Neurological: Positive for weakness.   Endo/Heme/Allergies: Negative.    Psychiatric/Behavioral: Negative.         Vital Signs for last 24 hours   Temp:  [36 °C (96.8 °F)-36.9 °C (98.4 °F)] 36.3 °C (97.3 °F)  Pulse:  [] 93  Resp:  [19-57] 29  BP: (107-148)/(55-74) 107/60  SpO2:  [80 %-98 %] 90 %          Physical Exam   Physical Exam  Constitutional:       Appearance: She is ill-appearing.   HENT:      Head: Normocephalic and atraumatic.      Mouth/Throat:      Mouth: Mucous membranes are moist.   Eyes:      Extraocular Movements: Extraocular movements intact.      Pupils: Pupils are equal, round, and reactive to light.   Cardiovascular:      Rate and Rhythm: Rhythm irregular.      Heart sounds: Murmur heard.     Pulmonary:      Effort: Pulmonary effort is normal.   Musculoskeletal:      Right lower leg: Edema present.      Left lower leg: Edema present.   Skin:     General: Skin is warm and dry.   Neurological:      General: No focal deficit present.      Mental Status: She is alert and oriented to person,  place, and time.   Psychiatric:         Mood and Affect: Mood normal.         Behavior: Behavior normal.         Thought Content: Thought content normal.         Judgment: Judgment normal.         Medications  Current Facility-Administered Medications   Medication Dose Route Frequency Provider Last Rate Last Admin   • bumetanide (BUMEX) injection 2 mg  2 mg Intravenous BID DIURETIC Dale Tamayo M.D.   2 mg at 05/25/21 1536   • dexamethasone (DECADRON) tablet 12 mg  12 mg Oral DAILY Dale Tamayo M.D.   12 mg at 05/25/21 0605   • MD Alert...ICU Electrolyte Replacement per Pharmacy   Other PHARMACY TO DOSE Dale Tamayo M.D.       • famotidine (PEPCID) tablet 20 mg  20 mg Oral BID Ivan Nunez M.D.   20 mg at 05/25/21 0604   • potassium chloride SA (Kdur) tablet 20 mEq  20 mEq Oral DAILY Ivan Nunez M.D.   20 mEq at 05/25/21 0605   • ferrous sulfate tablet 325 mg  325 mg Oral QDAY with Breakfast Ivan Nunez M.D.   325 mg at 05/25/21 0754   • apixaban (ELIQUIS) tablet 5 mg  5 mg Oral BID Cheryl Gallagher, D.O.   5 mg at 05/25/21 0606   • ascorbic acid (Vitamin C) tablet 1,000 mg  1,000 mg Oral DAILY Cheryl Gallagher, D.O.   1,000 mg at 05/25/21 0604   • atorvastatin (LIPITOR) tablet 10 mg  10 mg Oral DAILY Cheryl Gallagher, D.O.   10 mg at 05/25/21 0605   • benzonatate (TESSALON) capsule 100 mg  100 mg Oral TID PRN Cheryl Gallagher, D.O.   100 mg at 05/24/21 1740   • DILTIAZem CD (CARDIZEM CD) capsule 180 mg  180 mg Oral BID Cheryl Gallagher, D.O.   180 mg at 05/25/21 0603   • montelukast (SINGULAIR) tablet 10 mg  10 mg Oral Q EVENING Cherylmaría Gallagher, D.O.   10 mg at 05/24/21 1709   • ondansetron (ZOFRAN) syringe/vial injection 4 mg  4 mg Intravenous Q6HRS PRN Cheryl Gallagher, D.O.       • ondansetron (ZOFRAN ODT) dispertab 4 mg  4 mg Oral Q6HRS PRN Cheryl Gallagher D.O.       • senna-docusate (PERICOLACE or SENOKOT S) 8.6-50 MG per tablet 2 tablet  2 tablet Oral BID Cheryl Gallagher D.O.   2 tablet at 05/25/21 0605    And   •  polyethylene glycol/lytes (MIRALAX) PACKET 1 Packet  1 Packet Oral QDAY PRN LEN MoranOOksana        And   • magnesium hydroxide (MILK OF MAGNESIA) suspension 30 mL  30 mL Oral QDAY PRN Cheryl Gallagher D.O.        And   • bisacodyl (DULCOLAX) suppository 10 mg  10 mg Rectal QDAY PRN CHARISMA Moran.O.       • acetaminophen (Tylenol) tablet 650 mg  650 mg Oral Q6HRS PRN CHARISMA Moran.O.   650 mg at 05/22/21 1008   • guaiFENesin ER (MUCINEX) tablet 600 mg  600 mg Oral BID PRN CHARISMA Moran.O.       • guaiFENesin dextromethorphan (ROBITUSSIN DM) 100-10 MG/5ML syrup 10 mL  10 mL Oral Q6HRS PRN CHARISMA Moran.O.       • budesonide-formoterol (SYMBICORT) 160-4.5 MCG/ACT inhaler 2 Puff  2 Puff Inhalation BID (RT) CHARISMA Moran.OOksana   2 Puff at 05/24/21 2051   • HYDROcodone-homatropine 5-1.5 mg/5 mL solution 5 mL  5 mL Oral Q4HRS PRN LEN MoranOOksana           Fluids    Intake/Output Summary (Last 24 hours) at 5/25/2021 1619  Last data filed at 5/25/2021 1600  Gross per 24 hour   Intake 560 ml   Output 2385 ml   Net -1825 ml       Laboratory  Recent Labs     05/23/21  0311   ISTATAPH 7.449   ISTATAPCO2 48.3*   ISTATAPO2 40*   ISTATATCO2 35*   [id number] 77*   ISTATARTHCO3 33.5*   ISTATARTBE 8*   ISTATTEMP 97.7 F   ISTATFIO2 100   ISTATSPEC Arterial   ISTATAPHTC 7.457   [id number] 39*         Recent Labs     05/23/21  0203 05/23/21  0203 05/23/21  1840 05/24/21  0335 05/25/21  0215   SODIUM 137  139  --  136 138  --    POTASSIUM 4.5  4.3  --  4.6 4.1  --    CHLORIDE 95*  97  --  87* 99  --    CO2 29  29  --  32 27  --    BUN 41*  41*  --  43* 45*  --    CREATININE 0.68  0.71  --  0.87 0.65  --    MAGNESIUM 2.1   < > 1.8 1.6 2.5   PHOSPHORUS 2.7  --  4.0 3.2  --    CALCIUM 9.7  10.8*  --  9.9 7.7*  --     < > = values in this interval not displayed.     Recent Labs     05/23/21  0203 05/23/21  1840 05/24/21  0335   ALTSGPT  --   --  33   ASTSGOT  --   --  39   ALKPHOSPHAT  --   --  81   TBILIRUBIN  --    --  0.7   GLUCOSE 157*  162* 289* 125*     Recent Labs     05/23/21  0203 05/24/21  0335   WBC 9.6 9.2   NEUTSPOLYS  --  90.10*   LYMPHOCYTES  --  3.90*   MONOCYTES  --  3.80   EOSINOPHILS  --  0.20   BASOPHILS  --  0.20   ASTSGOT  --  39   ALTSGPT  --  33   ALKPHOSPHAT  --  81   TBILIRUBIN  --  0.7     Recent Labs     05/23/21  0203 05/24/21  0335   RBC 5.45* 5.88*   HEMOGLOBIN 14.2 15.9   HEMATOCRIT 45.7 49.2*   PLATELETCT 306 261   FERRITIN 1691.0*  --        Imaging  No new imaging to review    Assessment/Plan  * Pneumonia due to COVID-19 virus- (present on admission)  Assessment & Plan  -- Confirmed SARS-CoV-2/COVID on 5/15  -- Risk factors -> age and HTN and not vaccinated  -- Increase decadron to 12mg   -- Received tocilizumab on 5/21   -- Encourage self prone as tolerated (ie, 2 hour supine and 2 hour prone)  -- Cont aggressive diuresis to seek net negative fluid balance  -- On strict contact, droplet/airbone, eye protection, and critical meticulous hand hygiene    Acute respiratory failure with hypoxia (HCC)- (present on admission)  Assessment & Plan  -- Worsening hypoxemia secondary to COVID pneumonia, goal saturations greater than 88% as long as she is asymptomatic and fluctuating oxygen needs  -- off NIV > 48 hrs and on hiflo and proning     -- negative fluid balance  -- Treatment for COVID as below  -- Currently titrating FiO2   -- Aggressive pulmonary toilet as able  -- Encourage self proning as tolerated   -- Goal SpO2 > 88%       Abnormal echocardiogram  Assessment & Plan  --  Pulmonary hypertension  -- Consider etiology to be multifactorial due to left atrial hypertension and severe hypoxemia causing pulmonary vasoconstriction    -- being diuresed    -- Need repeat echocardiogram in 2-3 months    -- Avoid hypoxemia, respiratory acidosis, and A fib RVR which can cause worsening pulmonary vasoconstriction leading to worsening PA pressure       Asthma, moderate persistent, well-controlled- (present on  admission)  Assessment & Plan  -- Not in acute flare   -- Cont symbicort and singulair        Longstanding persistent atrial fibrillation (HCC)-Dr. Turner [place]- (present on admission)  Assessment & Plan  -- Currently rate controlled with diltiazem   -- On eliquis   -- High lytes goal    -- Goal HR < 110           VTE:  NOAC and Not Indicated  Ulcer: Not Indicated  Lines: cindy reassessed    I have performed a physical exam and reviewed and updated ROS and Plan today (5/25/2021). In review of yesterday's note (5/24/2021), there are no changes except as documented above.     Discussed patient condition and risk of morbidity and/or mortality with Pharmacy, Charge nurse / hot rounds and Patient  The patient remains critically ill.  Critical care time = 31 minutes in directly providing and coordinating critical care and extensive data review.  No time overlap and excludes procedures.

## 2021-05-25 NOTE — PROGRESS NOTES
Son at bedside. Patient cheerful and talkative.  No distress noted or c/o offered. Rucker patent of clear, yellow urine. Ate approx. 75% of her dinner. Respirations unlabored.

## 2021-05-25 NOTE — FLOWSHEET NOTE
05/25/21 0215   Events/Summary/Plan   Events/Summary/Plan CPAP Check   Skin Inspection Respiratory Device Intact   Vital Signs   Pulse 91   Respiration (!) 39   Pulse Oximetry 98 %   $ Pulse Oximetry (Spot Check) Yes   O2 Alarms Set & Reviewed Yes   Respiratory Assessment   Respiratory Pattern Within Normal Limits   Chest Exam   Work Of Breathing / Effort Mild   Oxygen   FiO2% 90 %   O2 Delivery Device CPAP   Resuscitation Device at Bedside Self Inflating Bag   Non-Invasive Ventilation [facility]   Nocturnal CPAP or BIPAP CPAP - [facility]   $ System Evaluation Yes   Settings (If Known) 12   FiO2 or LPM 90

## 2021-05-25 NOTE — FLOWSHEET NOTE
05/24/21 1830   Events/Summary/Plan   Events/Summary/Plan HHFNC Check   Skin Inspection Respiratory Device Intact   Vital Signs   Pulse 86   Respiration (!) 34   Pulse Oximetry 90 %   $ Pulse Oximetry (Spot Check) Yes   O2 Alarms Set & Reviewed Yes   Respiratory Assessment   Respiratory Pattern Within Normal Limits   Level of Consciousness Alert   Chest Exam   Work Of Breathing / Effort Mild   Breath Sounds   RUL Breath Sounds Clear   RML Breath Sounds Diminished   RLL Breath Sounds Diminished   JESSY Breath Sounds Clear   LLL Breath Sounds Diminished   Secretions   Cough Non Productive   How Sputum Obtained Cough on Request   Sputum Amount Unable to Evaluate   Sputum Color Unable to Evaluate   Sputum Consistency Unable to Evaluate   Oxygen   O2 (LPM) 60   FiO2% 100 %   O2 Delivery Device Heated High Flow Nasal Cannula   Resuscitation Device at Bedside Self Inflating Bag   Aerosols   $ Aerosol Delivery Device Heated High Flow Nasal Cannula

## 2021-05-25 NOTE — CARE PLAN
Problem: Knowledge Deficit - Standard  Goal: Patient and family/care givers will demonstrate understanding of plan of care, disease process/condition, diagnostic tests and medications  Outcome: Progressing     Problem: Fall Risk  Goal: Patient will remain free from falls  Outcome: Progressing     Problem: Respiratory  Goal: Patient will achieve/maintain optimum respiratory ventilation and gas exchange  Outcome: Progressing   The patient is Unstable - High likelihood or risk of patient condition declining or worsening    Shift Goals  Clinical Goals: Maintain O2 levels at >88%. Proning periodically.  Patient Goals: Maintain O2 sats >88%  Family Goals: Support    Progress made toward(s) clinical / shift goals:      Patient is not progressing towards the following goals:

## 2021-05-25 NOTE — FLOWSHEET NOTE
05/25/21 0215   Events/Summary/Plan   Events/Summary/Plan CPAP Check   Skin Inspection Respiratory Device Intact   Ventilator Management Group   Intensivist Group Yes   Vent Settings   FiO2% 90 %   NIV for Respiratory Failure   $ NIV Charge Yes   Non-Invasive Vent Mode CPAP   IPAP (cmH2O) 12   FiO2 90   Alarms Set / Checked Yes   Non-Invasive Temp (C)   (Pt requested no heat from humidifier)   Respiratory Rate Patient 20   Spontaneous Vt (ml) 488   Spontaneous Ve (LPM) 10.2   NIV Interface Full Mask   VAP   Head of Bed Elevated Greater or equal to 30 degrees

## 2021-05-25 NOTE — PROGRESS NOTES
Placed on bedpan. Patient greatly assisted without difficulty. Is trying to sit up in bed to be able to go.

## 2021-05-26 ENCOUNTER — PATIENT OUTREACH (OUTPATIENT)
Dept: HEALTH INFORMATION MANAGEMENT | Facility: OTHER | Age: 81
End: 2021-05-26

## 2021-05-26 LAB
ABO + RH BLD: NORMAL
ABO GROUP BLD: NORMAL
BLD GP AB SCN SERPL QL: NORMAL
MAGNESIUM SERPL-MCNC: 2 MG/DL (ref 1.5–2.5)
POTASSIUM SERPL-SCNC: 5.1 MMOL/L (ref 3.6–5.5)
RH BLD: NORMAL

## 2021-05-26 PROCEDURE — A9270 NON-COVERED ITEM OR SERVICE: HCPCS | Performed by: INTERNAL MEDICINE

## 2021-05-26 PROCEDURE — 700111 HCHG RX REV CODE 636 W/ 250 OVERRIDE (IP): Performed by: INTERNAL MEDICINE

## 2021-05-26 PROCEDURE — 94760 N-INVAS EAR/PLS OXIMETRY 1: CPT

## 2021-05-26 PROCEDURE — 5A09357 ASSISTANCE WITH RESPIRATORY VENTILATION, LESS THAN 24 CONSECUTIVE HOURS, CONTINUOUS POSITIVE AIRWAY PRESSURE: ICD-10-PCS | Performed by: HOSPITALIST

## 2021-05-26 PROCEDURE — 700102 HCHG RX REV CODE 250 W/ 637 OVERRIDE(OP): Performed by: INTERNAL MEDICINE

## 2021-05-26 PROCEDURE — 700102 HCHG RX REV CODE 250 W/ 637 OVERRIDE(OP): Performed by: HOSPITALIST

## 2021-05-26 PROCEDURE — 770022 HCHG ROOM/CARE - ICU (200)

## 2021-05-26 PROCEDURE — 94640 AIRWAY INHALATION TREATMENT: CPT

## 2021-05-26 PROCEDURE — 5A0935A ASSISTANCE WITH RESPIRATORY VENTILATION, LESS THAN 24 CONSECUTIVE HOURS, HIGH NASAL FLOW/VELOCITY: ICD-10-PCS | Performed by: INTERNAL MEDICINE

## 2021-05-26 PROCEDURE — 83735 ASSAY OF MAGNESIUM: CPT

## 2021-05-26 PROCEDURE — 99291 CRITICAL CARE FIRST HOUR: CPT | Performed by: INTERNAL MEDICINE

## 2021-05-26 PROCEDURE — 84132 ASSAY OF SERUM POTASSIUM: CPT

## 2021-05-26 PROCEDURE — 94660 CPAP INITIATION&MGMT: CPT

## 2021-05-26 PROCEDURE — A9270 NON-COVERED ITEM OR SERVICE: HCPCS | Performed by: HOSPITALIST

## 2021-05-26 RX ADMIN — BUDESONIDE AND FORMOTEROL FUMARATE DIHYDRATE 2 PUFF: 160; 4.5 AEROSOL RESPIRATORY (INHALATION) at 07:10

## 2021-05-26 RX ADMIN — FAMOTIDINE 20 MG: 20 TABLET ORAL at 05:55

## 2021-05-26 RX ADMIN — BUMETANIDE 2 MG: 0.25 INJECTION, SOLUTION INTRAMUSCULAR; INTRAVENOUS at 05:53

## 2021-05-26 RX ADMIN — BENZONATATE 100 MG: 100 CAPSULE ORAL at 12:22

## 2021-05-26 RX ADMIN — POTASSIUM CHLORIDE 20 MEQ: 1500 TABLET, EXTENDED RELEASE ORAL at 05:55

## 2021-05-26 RX ADMIN — DOCUSATE SODIUM 50 MG AND SENNOSIDES 8.6 MG 2 TABLET: 8.6; 5 TABLET, FILM COATED ORAL at 05:54

## 2021-05-26 RX ADMIN — OXYCODONE HYDROCHLORIDE AND ACETAMINOPHEN 1000 MG: 500 TABLET ORAL at 05:55

## 2021-05-26 RX ADMIN — APIXABAN 5 MG: 5 TABLET, FILM COATED ORAL at 18:12

## 2021-05-26 RX ADMIN — BUMETANIDE 2 MG: 0.25 INJECTION, SOLUTION INTRAMUSCULAR; INTRAVENOUS at 16:01

## 2021-05-26 RX ADMIN — FERROUS SULFATE TAB 325 MG (65 MG ELEMENTAL FE) 325 MG: 325 (65 FE) TAB at 09:17

## 2021-05-26 RX ADMIN — MONTELUKAST 10 MG: 10 TABLET, FILM COATED ORAL at 18:11

## 2021-05-26 RX ADMIN — BUDESONIDE AND FORMOTEROL FUMARATE DIHYDRATE 2 PUFF: 160; 4.5 AEROSOL RESPIRATORY (INHALATION) at 19:48

## 2021-05-26 RX ADMIN — FAMOTIDINE 20 MG: 20 TABLET ORAL at 18:11

## 2021-05-26 RX ADMIN — DEXAMETHASONE 12 MG: 4 TABLET ORAL at 05:55

## 2021-05-26 RX ADMIN — APIXABAN 5 MG: 5 TABLET, FILM COATED ORAL at 05:54

## 2021-05-26 RX ADMIN — ATORVASTATIN CALCIUM 10 MG: 10 TABLET, FILM COATED ORAL at 05:55

## 2021-05-26 RX ADMIN — DILTIAZEM HYDROCHLORIDE 180 MG: 180 CAPSULE, COATED, EXTENDED RELEASE ORAL at 18:12

## 2021-05-26 RX ADMIN — DILTIAZEM HYDROCHLORIDE 180 MG: 180 CAPSULE, COATED, EXTENDED RELEASE ORAL at 05:53

## 2021-05-26 ASSESSMENT — ENCOUNTER SYMPTOMS
CARDIOVASCULAR NEGATIVE: 1
EYES NEGATIVE: 1
GASTROINTESTINAL NEGATIVE: 1
WEAKNESS: 1
MUSCULOSKELETAL NEGATIVE: 1
RESPIRATORY NEGATIVE: 1
PSYCHIATRIC NEGATIVE: 1

## 2021-05-26 ASSESSMENT — PAIN DESCRIPTION - PAIN TYPE
TYPE: ACUTE PAIN
TYPE: ACUTE PAIN

## 2021-05-26 NOTE — CARE PLAN
The patient is Watcher - Medium risk of patient condition declining or worsening    Shift Goals  Clinical Goals: Maintain O2 sats >88%. Decrease supplemental O2.   Patient Goals: Maintain O2 sats >88% and comfort.  Family Goals: Support    Progress made toward(s) clinical / shift goals:  HFNC 50L/75-80% throughout the day with O2 sats low 90's. Continues to desat to low 80's with activity.    Problem: Knowledge Deficit - Standard  Goal: Patient and family/care givers will demonstrate understanding of plan of care, disease process/condition, diagnostic tests and medications  Outcome: Progressing     Problem: Fall Risk  Goal: Patient will remain free from falls  Outcome: Progressing     Problem: Respiratory  Goal: Patient will achieve/maintain optimum respiratory ventilation and gas exchange  Outcome: Progressing

## 2021-05-26 NOTE — PROGRESS NOTES
Small, soft BM. Stomach appears less distended and is softer. States that she feels better. Continues to desaturate quickly with exertion or oxygen delivery system change.  Preparing for sleep. Refuses bedtime snack.

## 2021-05-26 NOTE — FLOWSHEET NOTE
05/25/21 1825   Events/Summary/Plan   Events/Summary/Plan HHFNC   Skin Inspection Respiratory Device Intact   Vital Signs   Pulse 74   Respiration 20   Pulse Oximetry (!) 79 %   Respiratory Assessment   Respiratory Pattern Within Normal Limits   Level of Consciousness Alert   Chest Exam   Work Of Breathing / Effort Mild   Breath Sounds   RUL Breath Sounds Crackles   RML Breath Sounds Diminished   RLL Breath Sounds Diminished   JESSY Breath Sounds Crackles   LLL Breath Sounds Diminished   Secretions   Cough Non Productive   How Sputum Obtained Cough on Request   Sputum Amount Unable to Evaluate   Sputum Color Unable to Evaluate   Sputum Consistency Unable to Evaluate   Oxygen   O2 (LPM) 50   FiO2% 80 %   O2 Delivery Device Heated High Flow Nasal Cannula

## 2021-05-26 NOTE — CARE PLAN
Problem: Knowledge Deficit - Standard  Goal: Patient and family/care givers will demonstrate understanding of plan of care, disease process/condition, diagnostic tests and medications  Outcome: Progressing     Problem: Fall Risk  Goal: Patient will remain free from falls  Outcome: Progressing     Problem: Respiratory  Goal: Patient will achieve/maintain optimum respiratory ventilation and gas exchange  Outcome: Progressing   The patient is Watcher - Medium risk of patient condition declining or worsening    Shift Goals  Clinical Goals: Maintain O2 levels at >88%. Proning periodically.  Patient Goals: Maintain O2 sats >88%  Family Goals: Support    Progress made toward(s) clinical / shift goals:      Patient is not progressing towards the following goals:

## 2021-05-26 NOTE — PROGRESS NOTES
5/26- CHW Vish contacted pt son for introduction to CCM services. [facility] asked for call back post d/c for assessment and needs.   6/10- Pt to be d/c to [facility]. CCM not to follow.

## 2021-05-26 NOTE — PROGRESS NOTES
Patient preparing for sleep. States headache is better. Talking with family on the phone. Refuses oxygen. Says that he gets tangled up in it.

## 2021-05-26 NOTE — FLOWSHEET NOTE
05/26/21 0430   Events/Summary/Plan   Events/Summary/Plan Changed to [facility]   Skin Inspection Respiratory Device Intact   Vital Signs   Pulse 88   Respiration (!) 22   Pulse Oximetry 96 %   $ Pulse Oximetry (Spot Check) Yes   O2 Alarms Set & Reviewed Yes   Respiratory Assessment   Respiratory Pattern Within Normal Limits   Level of Consciousness Alert   Chest Exam   Work Of Breathing / Effort Mild   Breath Sounds   RUL Breath Sounds Crackles   RML Breath Sounds Diminished   RLL Breath Sounds Diminished   JESSY Breath Sounds Crackles   LLL Breath Sounds Diminished   Secretions   Cough Non Productive   How Sputum Obtained Cough on Request   Sputum Amount Unable to Evaluate   Sputum Color Unable to Evaluate   Sputum Consistency Unable to Evaluate   Oxygen   O2 (LPM) 50   FiO2% 80 %   O2 Delivery Device CPAP   Resuscitation Device at Bedside Self Inflating Bag   Aerosols   $ Aerosol Delivery Device Heated High Flow Nasal Cannula

## 2021-05-26 NOTE — FLOWSHEET NOTE
05/25/21 2119   Events/Summary/Plan   Events/Summary/Plan MDI Tx Given   Vital Signs   Pulse 68   Respiration 18   Pulse Oximetry 96 %   $ Pulse Oximetry (Spot Check) Yes   O2 Alarms Set & Reviewed Yes   Respiratory Assessment   Respiratory Pattern Within Normal Limits   Level of Consciousness Alert   Chest Exam   Work Of Breathing / Effort Within Normal Limits   Breath Sounds   RUL Breath Sounds Fine Crackles   RML Breath Sounds Diminished   RLL Breath Sounds Diminished   JESSY Breath Sounds Fine Crackles   LLL Breath Sounds Diminished   Secretions   Cough Non Productive   How Sputum Obtained Spontaneous   Sputum Amount Unable to Evaluate   Sputum Color Unable to Evaluate   Sputum Consistency Unable to Evaluate   Oxygen   O2 (LPM) 50   FiO2% 80 %   O2 Delivery Device Heated High Flow Nasal Cannula

## 2021-05-26 NOTE — FLOWSHEET NOTE
05/26/21 0215   Events/Summary/Plan   Events/Summary/Plan CPAP Check   Skin Inspection Respiratory Device Intact   Vital Signs   Pulse 95   Respiration (!) 26   Pulse Oximetry 96 %   $ Pulse Oximetry (Spot Check) Yes   O2 Alarms Set & Reviewed Yes   Respiratory Assessment   Respiratory Pattern Within Normal Limits   Chest Exam   Work Of Breathing / Effort Within Normal Limits   Oxygen   FiO2% 90 %   O2 Delivery Device CPAP   Resuscitation Device at Bedside Self Inflating Bag   Non-Invasive Ventilation [facility]   Nocturnal CPAP or BIPAP CPAP - [facility]   $ System Evaluation Yes   Settings (If Known) 12   FiO2 or LPM 90

## 2021-05-26 NOTE — FLOWSHEET NOTE
05/25/21 2150   Events/Summary/Plan   Events/Summary/Plan CPAP Check   Vent Settings   FiO2% 90 %

## 2021-05-26 NOTE — FLOWSHEET NOTE
05/26/21 0215   Events/Summary/Plan   Events/Summary/Plan CPAP Check   Skin Inspection Respiratory Device Intact   Vent Settings   FiO2% 90 %   NIV for Respiratory Failure   $ NIV Charge Yes   Non-Invasive Vent Mode CPAP   IPAP (cmH2O) 12   FiO2 90   Alarms Set / Checked Yes   Respiratory Rate Patient 22   Spontaneous Vt (ml) 490   Spontaneous Ve (LPM) 11.6   NIV Interface Full Mask

## 2021-05-26 NOTE — FLOWSHEET NOTE
05/25/21 2150   Events/Summary/Plan   Events/Summary/Plan CPAP Check   Ventilator Management Group   Intensivist Group Yes   Vent Settings   FiO2% 90 %   NIV for Respiratory Failure   $ NIV Charge Yes   Non-Invasive Vent Mode CPAP   IPAP (cmH2O) 12   FiO2 90   Alarms Set / Checked Yes   Respiratory Rate Patient 20   Spontaneous Vt (ml) 488   Spontaneous Ve (LPM) 11.2   NIV Interface Full Mask   VAP   Head of Bed Elevated 45 degrees or higher

## 2021-05-26 NOTE — FLOWSHEET NOTE
05/26/21 0215   Events/Summary/Plan   Events/Summary/Plan CPAP Check   Skin Inspection Respiratory Device Intact   Vent Settings   FiO2% 90 %

## 2021-05-26 NOTE — PROGRESS NOTES
Patient resting quietly up in bed. Does c/o with a headache. Tylenol given. Refuses bedtime snack. Pulses in right foot are weaker than left, but foot is warm and same color as the left foot. Dr. Manrique notified. Patient is on heparin drip.

## 2021-05-26 NOTE — FLOWSHEET NOTE
05/26/21 0430   Events/Summary/Plan   Events/Summary/Plan Changed to [facility]   Skin Inspection Respiratory Device Intact   Vent Settings   FiO2% 80 %

## 2021-05-26 NOTE — PROGRESS NOTES
"Pulmonary Progress Note    Date of admission  5/20/2021    Chief Complaint  80 y.o. female admitted 5/20/2021 with covid pna    Hospital Course  \"80 y.o. female who presented 5/20/2021 with acute hypoxemia respiratory failure. Patient with significant history of atrial fibrillation on eliquis, asthma, and hypertension admitted for worsening hypoxemia secondary to COVID pneumonia. She was recently discharged home on 5/17 on 1 liters NC and presents back on 5/20 with worsening shortness of breath. On admission, she required 6 liters NC which quickly escalated to HFNC. ID consulted and patient was tocilizumab on 5/21. Her oxygenation worsen overnight as she was maxed out on HFNC and transferred to ICU for BiPAP 12/6 FiO2 100%.      In ICU, she was diuresed and documented I/O -> neg ~1 liter. Patient is breath comfortably on BiPAP and able to answer questions via BiPAP facemask. Subjectively, patient denies chest pain, cough, N/V, fever/chills, or abdominal pain. She endorse having shortness of breath, neck pain, and back pain. Discussed about the benefits of proning if she can tolerate at which she agrees to with RN assistance. Discussed about risks/benefits endotracheal intubation if she needs it in the upcoming hours to days which she agrees to.      Echocardiogram 5/16/21 personally reviewed -> preserved LV function, mild to moderate MR, dilated coronary sinus, eccentric TR jet with measure RVSP ~50 mmHG which is most likely underestimated, TAPSE ~0.9 cm -> reduced RV function, dilated RV and RA, PA -> 0.89 m/s (~18 mmHG) and abnormal septal bounce, dilated IVC with inspiratory collapse. \" From Dr. Tamayo's note    COVID meds: Received Tocilimuzab on 5/21; on dexamethasone restartted 5/20 5/24: LE doppler negative; FIo2 increased to 100% and 50L    5/25: FIo2 down to 40 l 80%.   Pt's son Kaiser Black requested an infectious disease doctor to call as he is expressed that he was not happy with patient's care and " progress. Main questions patients son asked about were  for Ivermectin, Hydroxychloroquine and Remdesivir.  ID on call DR. Powers will call son this pm.    5/26: Fio2 50 L 70%  Dr. Powers reviewed with son and is reviewing options of plasma      Interval Problem Update  Reviewed last 24 hour events:  Chart review from the past 24 hours includes imaging, laboratory studies, vital signs and notes available.  Pertinent data for today    Cardiac:  No events BP ok  Pulm:  50 l and 70%  Neuro:  intact  Heme:  H and H stable  I/O:  -6.8 since admission  ID: tocilimuzab 5/21  GI/endo:  Sugar  ok  Labs/Imaging:  No labs to review - am labs  Lines:  peripherals  Mobility:  OOB to chair  Symptoms: feeling better    Review of Systems  Review of Systems   HENT: Negative.    Eyes: Negative.    Respiratory: Negative.    Cardiovascular: Negative.    Gastrointestinal: Negative.    Genitourinary: Negative.    Musculoskeletal: Negative.    Skin: Negative.    Neurological: Positive for weakness.   Endo/Heme/Allergies: Negative.    Psychiatric/Behavioral: Negative.         Vital Signs for last 24 hours   Temp:  [36.3 °C (97.3 °F)-36.8 °C (98.2 °F)] 36.6 °C (97.9 °F)  Pulse:  [] 60  Resp:  [18-37] 34  BP: ()/(55-79) 109/66  SpO2:  [74 %-98 %] 96 %          Physical Exam   Physical Exam  Constitutional:       Appearance: She is ill-appearing.   HENT:      Head: Normocephalic and atraumatic.      Mouth/Throat:      Mouth: Mucous membranes are moist.   Eyes:      Extraocular Movements: Extraocular movements intact.      Pupils: Pupils are equal, round, and reactive to light.   Cardiovascular:      Rate and Rhythm: Rhythm irregular.      Heart sounds: Murmur heard.     Pulmonary:      Effort: Pulmonary effort is normal.   Skin:     General: Skin is warm and dry.   Neurological:      General: No focal deficit present.      Mental Status: She is alert and oriented to person, place, and time.   Psychiatric:         Mood and  Affect: Mood normal.         Behavior: Behavior normal.         Thought Content: Thought content normal.         Judgment: Judgment normal.         Medications  Current Facility-Administered Medications   Medication Dose Route Frequency Provider Last Rate Last Admin   • bumetanide (BUMEX) injection 2 mg  2 mg Intravenous BID DIURETIC Dale Tamayo M.D.   2 mg at 05/26/21 0553   • dexamethasone (DECADRON) tablet 12 mg  12 mg Oral DAILY Dale Tamayo M.D.   12 mg at 05/26/21 0555   • MD Alert...ICU Electrolyte Replacement per Pharmacy   Other PHARMACY TO DOSE Dale Tamayo M.D.       • famotidine (PEPCID) tablet 20 mg  20 mg Oral BID Ivan Nunez M.D.   20 mg at 05/26/21 0555   • potassium chloride SA (Kdur) tablet 20 mEq  20 mEq Oral DAILY Ivan Nunez M.D.   20 mEq at 05/26/21 0555   • ferrous sulfate tablet 325 mg  325 mg Oral QDAY with Breakfast Ivan Nunez M.D.   325 mg at 05/26/21 0917   • apixaban (ELIQUIS) tablet 5 mg  5 mg Oral BID Cheryl Gallagher, D.O.   5 mg at 05/26/21 0554   • ascorbic acid (Vitamin C) tablet 1,000 mg  1,000 mg Oral DAILY Cheryl Gallagher, D.O.   1,000 mg at 05/26/21 0555   • atorvastatin (LIPITOR) tablet 10 mg  10 mg Oral DAILY Cheryl Gallagher, D.O.   10 mg at 05/26/21 0555   • benzonatate (TESSALON) capsule 100 mg  100 mg Oral TID PRN Cheryl Gallagher, D.O.   100 mg at 05/26/21 1222   • DILTIAZem CD (CARDIZEM CD) capsule 180 mg  180 mg Oral BID Cheryl Gallagher, D.O.   180 mg at 05/26/21 0553   • montelukast (SINGULAIR) tablet 10 mg  10 mg Oral Q EVENING Cherylmaría Gallagher, D.O.   10 mg at 05/25/21 1738   • ondansetron (ZOFRAN) syringe/vial injection 4 mg  4 mg Intravenous Q6HRS PRN Cheryl Gallagher, D.O.       • ondansetron (ZOFRAN ODT) dispertab 4 mg  4 mg Oral Q6HRS PRN Cheryl Gallagher D.O.       • senna-docusate (PERICOLACE or SENOKOT S) 8.6-50 MG per tablet 2 tablet  2 tablet Oral BID Cheryl Gallagher D.O.   2 tablet at 05/26/21 0554    And   • polyethylene glycol/lytes (MIRALAX) PACKET 1 Packet  1  Packet Oral QDAY PRN Cheryl Gallagher D.O.        And   • magnesium hydroxide (MILK OF MAGNESIA) suspension 30 mL  30 mL Oral QDAY PRN Cheryl Gallagher D.O.        And   • bisacodyl (DULCOLAX) suppository 10 mg  10 mg Rectal QDAY PRN LEN MoranO.       • acetaminophen (Tylenol) tablet 650 mg  650 mg Oral Q6HRS PRN LEN MoranO.   650 mg at 05/22/21 1008   • guaiFENesin ER (MUCINEX) tablet 600 mg  600 mg Oral BID PRN CHARISMA Moran.O.       • guaiFENesin dextromethorphan (ROBITUSSIN DM) 100-10 MG/5ML syrup 10 mL  10 mL Oral Q6HRS PRN CHARISMA Moran.O.       • budesonide-formoterol (SYMBICORT) 160-4.5 MCG/ACT inhaler 2 Puff  2 Puff Inhalation BID (RT) LEN MoranOOksana   2 Puff at 05/26/21 0710   • HYDROcodone-homatropine 5-1.5 mg/5 mL solution 5 mL  5 mL Oral Q4HRS PRN LEN MoranOOksana           Fluids    Intake/Output Summary (Last 24 hours) at 5/26/2021 1330  Last data filed at 5/26/2021 1200  Gross per 24 hour   Intake 540 ml   Output 2015 ml   Net -1475 ml       Laboratory          Recent Labs     05/23/21 1840 05/24/21  0335 05/25/21  0215   SODIUM 136 138  --    POTASSIUM 4.6 4.1  --    CHLORIDE 87* 99  --    CO2 32 27  --    BUN 43* 45*  --    CREATININE 0.87 0.65  --    MAGNESIUM 1.8 1.6 2.5   PHOSPHORUS 4.0 3.2  --    CALCIUM 9.9 7.7*  --      Recent Labs     05/23/21  1840 05/24/21  0335   ALTSGPT  --  33   ASTSGOT  --  39   ALKPHOSPHAT  --  81   TBILIRUBIN  --  0.7   GLUCOSE 289* 125*     Recent Labs     05/24/21  0335   WBC 9.2   NEUTSPOLYS 90.10*   LYMPHOCYTES 3.90*   MONOCYTES 3.80   EOSINOPHILS 0.20   BASOPHILS 0.20   ASTSGOT 39   ALTSGPT 33   ALKPHOSPHAT 81   TBILIRUBIN 0.7     Recent Labs     05/24/21  0335   RBC 5.88*   HEMOGLOBIN 15.9   HEMATOCRIT 49.2*   PLATELETCT 261       Imaging  No new imaging to review    Assessment/Plan  * Pneumonia due to COVID-19 virus- (present on admission)  Assessment & Plan  -- Confirmed SARS-CoV-2/COVID on 5/15  -- Risk factors -> age and HTN and  not vaccinated  --  decadron to 12mg   -- Received tocilizumab on 5/21   -- Encourage self prone as tolerated (ie, 2 hour supine and 2 hour prone)  -- Cont aggressive diuresis to seek net negative fluid balance  -- On strict contact, droplet/airbone, eye protection, and critical meticulous hand hygiene    Abnormal echocardiogram  Assessment & Plan  --  Pulmonary hypertension  -- Consider etiology to be multifactorial due to left atrial hypertension and severe hypoxemia causing pulmonary vasoconstriction    -- being diuresed    -- Need repeat echocardiogram in 2-3 months    -- Avoid hypoxemia, respiratory acidosis, and A fib RVR which can cause worsening pulmonary vasoconstriction leading to worsening PA pressure       Acute respiratory failure with hypoxia (HCC)- (present on admission)  Assessment & Plan  -- Worsening hypoxemia secondary to COVID pneumonia, goal saturations greater than 88% as long as she is asymptomatic and fluctuating oxygen needs  -- improving with FIo2 requirement decreased   -- negative fluid balance  -- Treatment for COVID as below  -- Currently titrating FiO2 down  -- Aggressive pulmonary toilet as able  -- Encourage self proning as tolerated   -- Goal SpO2 > 88%       Asthma, moderate persistent, well-controlled- (present on admission)  Assessment & Plan  -- Not in acute flare   -- Cont symbicort and singulair        Longstanding persistent atrial fibrillation (HCC)-Dr. Turner [place]- (present on admission)  Assessment & Plan  -- Currently rate controlled with diltiazem   -- On eliquis   -- High lytes goal    -- Goal HR < 110           VTE:  NOAC and Not Indicated  Ulcer: Not Indicated  Lines: cindy reassessed    I have performed a physical exam and reviewed and updated ROS and Plan today (5/26/2021). In review of yesterday's note (5/25/2021), there are no changes except as documented above.     Discussed patient condition and risk of morbidity and/or mortality with Pharmacy, Charge nurse /  hot rounds and Patient  The patient remains critically ill.  Critical care time = 30.5 minutes in directly providing and coordinating critical care and extensive data review.  No time overlap and excludes procedures.

## 2021-05-26 NOTE — FLOWSHEET NOTE
05/25/21 1825   Events/Summary/Plan   Events/Summary/Plan HHFNC   Skin Inspection Respiratory Device Intact   Vent Settings   FiO2% 80 %

## 2021-05-26 NOTE — PROGRESS NOTES
Patient slept well throughout the night. She has been turning every couple of hours on her own. Intake and output are adequate.

## 2021-05-26 NOTE — CONSULTS
Reviewed chart periodically during the day and discussed patient with Dr. Aguilera.  Initiated type and cross (B+) and discussed potential convalescent plasma therapy with [facility] director.     --- As the patient had some improvement today elected to watch and wait. Will continue to monitor and will see the patient tomorrow with formal H&P. Will obtain consent for convalescent plasma at that point in the event that we move forward with this therapy.   --- Noted bacterial infectious work-up and all cultures negative so far.  Agree with no additional antibiotics.    Mel Powers MD

## 2021-05-27 PROBLEM — E87.1 HYPONATREMIA: Status: ACTIVE | Noted: 2021-05-27

## 2021-05-27 PROBLEM — R74.01 TRANSAMINITIS: Status: ACTIVE | Noted: 2021-05-27

## 2021-05-27 LAB
ALBUMIN SERPL BCP-MCNC: 3.4 G/DL (ref 3.2–4.9)
ALBUMIN/GLOB SERPL: 1.4 G/DL
ALP SERPL-CCNC: 106 U/L (ref 30–99)
ALT SERPL-CCNC: 51 U/L (ref 2–50)
ANION GAP SERPL CALC-SCNC: 12 MMOL/L (ref 7–16)
AST SERPL-CCNC: 27 U/L (ref 12–45)
BARCODED ABORH UBTYP: 7300
BARCODED PRD CODE UBPRD: NORMAL
BARCODED UNIT NUM UBUNT: NORMAL
BASOPHILS # BLD AUTO: 0.3 % (ref 0–1.8)
BASOPHILS # BLD: 0.07 K/UL (ref 0–0.12)
BILIRUB SERPL-MCNC: 0.6 MG/DL (ref 0.1–1.5)
BUN SERPL-MCNC: 63 MG/DL (ref 8–22)
CA-I SERPL-SCNC: 1.13 MMOL/L (ref 1.1–1.3)
CALCIUM SERPL-MCNC: 9.7 MG/DL (ref 8.4–10.2)
CHLORIDE SERPL-SCNC: 87 MMOL/L (ref 96–112)
CO2 SERPL-SCNC: 31 MMOL/L (ref 20–33)
COMPONENT F 8504F: NORMAL
CREAT SERPL-MCNC: 1.09 MG/DL (ref 0.5–1.4)
EOSINOPHIL # BLD AUTO: 0 K/UL (ref 0–0.51)
EOSINOPHIL NFR BLD: 0 % (ref 0–6.9)
ERYTHROCYTE [DISTWIDTH] IN BLOOD BY AUTOMATED COUNT: 41.5 FL (ref 35.9–50)
GLOBULIN SER CALC-MCNC: 2.4 G/DL (ref 1.9–3.5)
GLUCOSE SERPL-MCNC: 158 MG/DL (ref 65–99)
HCT VFR BLD AUTO: 54 % (ref 37–47)
HGB BLD-MCNC: 17.7 G/DL (ref 12–16)
IMM GRANULOCYTES # BLD AUTO: 0.56 K/UL (ref 0–0.11)
IMM GRANULOCYTES NFR BLD AUTO: 2.5 % (ref 0–0.9)
LYMPHOCYTES # BLD AUTO: 0.44 K/UL (ref 1–4.8)
LYMPHOCYTES NFR BLD: 1.9 % (ref 22–41)
MCH RBC QN AUTO: 27.1 PG (ref 27–33)
MCHC RBC AUTO-ENTMCNC: 32.8 G/DL (ref 33.6–35)
MCV RBC AUTO: 82.6 FL (ref 81.4–97.8)
MONOCYTES # BLD AUTO: 0.57 K/UL (ref 0–0.85)
MONOCYTES NFR BLD AUTO: 2.5 % (ref 0–13.4)
NEUTROPHILS # BLD AUTO: 20.95 K/UL (ref 2–7.15)
NEUTROPHILS NFR BLD: 92.8 % (ref 44–72)
NRBC # BLD AUTO: 0 K/UL
NRBC BLD-RTO: 0 /100 WBC
PHOSPHATE SERPL-MCNC: 3.3 MG/DL (ref 2.5–4.5)
PLATELET # BLD AUTO: 296 K/UL (ref 164–446)
PMV BLD AUTO: 9.6 FL (ref 9–12.9)
POTASSIUM SERPL-SCNC: 5 MMOL/L (ref 3.6–5.5)
PROCALCITONIN SERPL-MCNC: 0.04 NG/ML
PRODUCT TYPE UPROD: NORMAL
PROT SERPL-MCNC: 5.8 G/DL (ref 6–8.2)
RBC # BLD AUTO: 6.54 M/UL (ref 4.2–5.4)
SODIUM SERPL-SCNC: 130 MMOL/L (ref 135–145)
UNIT STATUS USTAT: NORMAL
WBC # BLD AUTO: 22.6 K/UL (ref 4.8–10.8)

## 2021-05-27 PROCEDURE — XW13325 TRANSFUSION OF CONVALESCENT PLASMA (NONAUTOLOGOUS) INTO PERIPHERAL VEIN, PERCUTANEOUS APPROACH, NEW TECHNOLOGY GROUP 5: ICD-10-PCS | Performed by: INTERNAL MEDICINE

## 2021-05-27 PROCEDURE — 84145 PROCALCITONIN (PCT): CPT

## 2021-05-27 PROCEDURE — 700102 HCHG RX REV CODE 250 W/ 637 OVERRIDE(OP): Performed by: INTERNAL MEDICINE

## 2021-05-27 PROCEDURE — 85025 COMPLETE CBC W/AUTO DIFF WBC: CPT

## 2021-05-27 PROCEDURE — A9270 NON-COVERED ITEM OR SERVICE: HCPCS | Performed by: INTERNAL MEDICINE

## 2021-05-27 PROCEDURE — 700102 HCHG RX REV CODE 250 W/ 637 OVERRIDE(OP): Performed by: HOSPITALIST

## 2021-05-27 PROCEDURE — A9270 NON-COVERED ITEM OR SERVICE: HCPCS | Performed by: HOSPITALIST

## 2021-05-27 PROCEDURE — 770022 HCHG ROOM/CARE - ICU (200)

## 2021-05-27 PROCEDURE — 94640 AIRWAY INHALATION TREATMENT: CPT

## 2021-05-27 PROCEDURE — 80053 COMPREHEN METABOLIC PANEL: CPT

## 2021-05-27 PROCEDURE — 99223 1ST HOSP IP/OBS HIGH 75: CPT | Performed by: INTERNAL MEDICINE

## 2021-05-27 PROCEDURE — 82330 ASSAY OF CALCIUM: CPT

## 2021-05-27 PROCEDURE — 36430 TRANSFUSION BLD/BLD COMPNT: CPT

## 2021-05-27 PROCEDURE — 700111 HCHG RX REV CODE 636 W/ 250 OVERRIDE (IP): Performed by: INTERNAL MEDICINE

## 2021-05-27 PROCEDURE — 99291 CRITICAL CARE FIRST HOUR: CPT | Performed by: INTERNAL MEDICINE

## 2021-05-27 PROCEDURE — 94660 CPAP INITIATION&MGMT: CPT

## 2021-05-27 PROCEDURE — 84100 ASSAY OF PHOSPHORUS: CPT

## 2021-05-27 PROCEDURE — 94760 N-INVAS EAR/PLS OXIMETRY 1: CPT

## 2021-05-27 PROCEDURE — 5A0945A ASSISTANCE WITH RESPIRATORY VENTILATION, 24-96 CONSECUTIVE HOURS, HIGH NASAL FLOW/VELOCITY: ICD-10-PCS | Performed by: INTERNAL MEDICINE

## 2021-05-27 PROCEDURE — P9017 PLASMA 1 DONOR FRZ W/IN 8 HR: HCPCS

## 2021-05-27 RX ADMIN — DOCUSATE SODIUM 50 MG AND SENNOSIDES 8.6 MG 2 TABLET: 8.6; 5 TABLET, FILM COATED ORAL at 17:45

## 2021-05-27 RX ADMIN — OXYCODONE HYDROCHLORIDE AND ACETAMINOPHEN 1000 MG: 500 TABLET ORAL at 05:14

## 2021-05-27 RX ADMIN — BUMETANIDE 2 MG: 0.25 INJECTION, SOLUTION INTRAMUSCULAR; INTRAVENOUS at 05:14

## 2021-05-27 RX ADMIN — BENZOCAINE AND MENTHOL 1 LOZENGE: 15; 3.6 LOZENGE ORAL at 13:45

## 2021-05-27 RX ADMIN — BUDESONIDE AND FORMOTEROL FUMARATE DIHYDRATE 2 PUFF: 160; 4.5 AEROSOL RESPIRATORY (INHALATION) at 07:10

## 2021-05-27 RX ADMIN — BUDESONIDE AND FORMOTEROL FUMARATE DIHYDRATE 2 PUFF: 160; 4.5 AEROSOL RESPIRATORY (INHALATION) at 21:44

## 2021-05-27 RX ADMIN — APIXABAN 5 MG: 5 TABLET, FILM COATED ORAL at 05:14

## 2021-05-27 RX ADMIN — DILTIAZEM HYDROCHLORIDE 180 MG: 180 CAPSULE, COATED, EXTENDED RELEASE ORAL at 05:14

## 2021-05-27 RX ADMIN — DILTIAZEM HYDROCHLORIDE 180 MG: 180 CAPSULE, COATED, EXTENDED RELEASE ORAL at 17:45

## 2021-05-27 RX ADMIN — FAMOTIDINE 20 MG: 20 TABLET ORAL at 05:14

## 2021-05-27 RX ADMIN — APIXABAN 5 MG: 5 TABLET, FILM COATED ORAL at 17:46

## 2021-05-27 RX ADMIN — BENZOCAINE AND MENTHOL 1 LOZENGE: 15; 3.6 LOZENGE ORAL at 19:46

## 2021-05-27 RX ADMIN — MONTELUKAST 10 MG: 10 TABLET, FILM COATED ORAL at 17:45

## 2021-05-27 RX ADMIN — FAMOTIDINE 20 MG: 20 TABLET ORAL at 17:45

## 2021-05-27 RX ADMIN — POTASSIUM CHLORIDE 20 MEQ: 1500 TABLET, EXTENDED RELEASE ORAL at 05:14

## 2021-05-27 RX ADMIN — FERROUS SULFATE TAB 325 MG (65 MG ELEMENTAL FE) 325 MG: 325 (65 FE) TAB at 08:06

## 2021-05-27 RX ADMIN — ATORVASTATIN CALCIUM 10 MG: 10 TABLET, FILM COATED ORAL at 05:14

## 2021-05-27 RX ADMIN — BENZOCAINE AND MENTHOL 1 LOZENGE: 15; 3.6 LOZENGE ORAL at 21:53

## 2021-05-27 RX ADMIN — BENZOCAINE AND MENTHOL 1 LOZENGE: 15; 3.6 LOZENGE ORAL at 15:53

## 2021-05-27 RX ADMIN — DEXAMETHASONE 12 MG: 4 TABLET ORAL at 05:14

## 2021-05-27 ASSESSMENT — ENCOUNTER SYMPTOMS
NAUSEA: 0
COUGH: 1
HEMOPTYSIS: 0
NERVOUS/ANXIOUS: 0
SHORTNESS OF BREATH: 1
ABDOMINAL PAIN: 0
SPUTUM PRODUCTION: 1
PALPITATIONS: 0
RESPIRATORY NEGATIVE: 1
CONSTIPATION: 0
DOUBLE VISION: 0
HEADACHES: 0
CHILLS: 0
GASTROINTESTINAL NEGATIVE: 1
VOMITING: 0
BLURRED VISION: 0
WEAKNESS: 1
PSYCHIATRIC NEGATIVE: 1
MUSCULOSKELETAL NEGATIVE: 1
MYALGIAS: 0
DIARRHEA: 0
EYES NEGATIVE: 1
CARDIOVASCULAR NEGATIVE: 1
WEAKNESS: 0
FEVER: 0

## 2021-05-27 ASSESSMENT — PAIN DESCRIPTION - PAIN TYPE
TYPE: ACUTE PAIN

## 2021-05-27 ASSESSMENT — PATIENT HEALTH QUESTIONNAIRE - PHQ9
1. LITTLE INTEREST OR PLEASURE IN DOING THINGS: NOT AT ALL
2. FEELING DOWN, DEPRESSED, IRRITABLE, OR HOPELESS: NOT AT ALL
SUM OF ALL RESPONSES TO PHQ9 QUESTIONS 1 AND 2: 0

## 2021-05-27 NOTE — CARE PLAN
The patient is Watcher - Medium risk of patient condition declining or worsening    Shift Goals  Clinical Goals: Maintain O2 >88%, wean down HHFC settings.  Patient Goals: Tolerate CPAP overnight, prone  Family Goals: Support    Progress made toward(s) clinical / shift goals:  Pt is tolerating HHFNC with stats in the 90s.     Patient is not progressing towards the following goals:

## 2021-05-27 NOTE — PROGRESS NOTES
Report received from Allison AYON. Patient A/O x4, on isolation precautions. Patient is currently on 50L 100% HHFNC, O2 sat 90%, talked with patient about doing CPAP again tonight and proning, patient is agreeable to that. A-fib 90-100s on the monitor, all other VSS, denies pain/SOB at this time. Safety precautions in place, call light and belongings within reach, all needs met at this time.

## 2021-05-27 NOTE — FLOWSHEET NOTE
05/26/21 1948   Events/Summary/Plan   Events/Summary/Plan MDI w/spacer f/w rinsing of mouth   Skin Inspection Respiratory Device Intact   Vital Signs   $ Pulse Oximetry (Spot Check) Yes   Respiratory Assessment   Respiratory Pattern Within Normal Limits   Level of Consciousness Alert   Chest Exam   Work Of Breathing / Effort Tachypnea   Breath Sounds   RUL Breath Sounds Diminished   RML Breath Sounds Diminished   RLL Breath Sounds Diminished   JESSY Breath Sounds Diminished   LLL Breath Sounds Diminished   Secretions   Cough Dry   How Sputum Obtained Spontaneous   Oxygen   O2 (LPM) 50   FiO2% 100 %   O2 Delivery Device Heated High Flow Nasal Cannula   Aerosols   $ Aerosol Delivery Device Heated High Flow Nasal Cannula   Equipment Change Date 05/27/21

## 2021-05-27 NOTE — ASSESSMENT & PLAN NOTE
resolved  DC salt tabs  Cont diuretics  -- na loss from bumex and also may be from SIADH due to the COVID pna  -- TSH low but FT4 normal  -- cortisol sent 5/28  -- Autodiuresing

## 2021-05-27 NOTE — FLOWSHEET NOTE
05/27/21 0205   Events/Summary/Plan   Events/Summary/Plan Patient does NOT tolerate heat being in on position of humidifier   Skin Inspection Respiratory Device Intact   Vital Signs   $ Pulse Oximetry (Spot Check) Yes   Respiratory Assessment   Respiratory Pattern Within Normal Limits   Level of Consciousness Alert   Chest Exam   Work Of Breathing / Effort Mild   Breath Sounds   RUL Breath Sounds Clear   RML Breath Sounds Diminished   RLL Breath Sounds Diminished   JESSY Breath Sounds Clear   LLL Breath Sounds Diminished   Oxygen   FiO2% 75 %   O2 Delivery Device CPAP   Resuscitation Device at Bedside Self Inflating Bag   Non-Invasive Ventilation [facility]   Nocturnal CPAP or BIPAP CPAP - [facility]   Settings (If Known) 12   FiO2 or LPM 75

## 2021-05-27 NOTE — FLOWSHEET NOTE
05/26/21 5698   Events/Summary/Plan   Events/Summary/Plan CPAP started @ 12 cm H20 pressure and 100% FI02  (pt lying on left side)   Skin Inspection Respiratory Device Intact   Vital Signs   $ Pulse Oximetry (Spot Check) Yes   Respiratory Assessment   Respiratory Pattern Within Normal Limits   Level of Consciousness Alert   Chest Exam   Work Of Breathing / Effort Tachypnea   Breath Sounds   RUL Breath Sounds Clear   RML Breath Sounds Diminished   RLL Breath Sounds Diminished   JESSY Breath Sounds Clear   LLL Breath Sounds Diminished   Secretions   Cough Non Productive   Oxygen   FiO2% 100 %   O2 Delivery Device CPAP   Non-Invasive Ventilation [facility]   Nocturnal CPAP or BIPAP CPAP - [facility]   $ System Evaluation Yes   Settings (If Known) 12   FiO2 or

## 2021-05-27 NOTE — CONSULTS
Consults   INFECTIOUS DISEASES INPATIENT CONSULT NOTE     Date of Service: 5/27/2021    Consult Requested By: Rashad Colon M.D.    Reason for Consultation: COVID 19 pneumonia    History of Present Illness:   Jessica Black is a 80 y.o.  admitted 5/20/2021. Pt has a past medical history of A. fib on Eliquis, asthma and hypertension.  She was diagnosed with COVID-19 earlier this month and was briefly admitted but had improved so sent home.  However, she had worsening cough, shortness of breath fevers and chills and came into the ED.  In the ED she was found be hypoxic and admitted for on oxygen.  ID was consulted for possible remdesivir.  However, she rapidly decompensated requiring high flow oxygen and was transferred to ICU.  Due to acute decompensation and now on high flow she was not given remdesivir but was given tocilizumab.  She is remained in the ICU on high flow and ID consulted for additional management..    Review Of Systems:  Review of Systems   Constitutional: Positive for malaise/fatigue. Negative for chills and fever.   HENT: Negative for hearing loss and tinnitus.    Eyes: Negative for blurred vision and double vision.   Respiratory: Positive for cough, sputum production and shortness of breath. Negative for hemoptysis.    Cardiovascular: Negative for chest pain and palpitations.   Gastrointestinal: Negative for abdominal pain, constipation, diarrhea, nausea and vomiting.   Genitourinary: Negative for dysuria.   Musculoskeletal: Negative for joint pain and myalgias.   Skin: Negative for rash.   Neurological: Negative for weakness and headaches.   Psychiatric/Behavioral: The patient is not nervous/anxious.          PMH:   Past Medical History:   Diagnosis Date   • A-fib (HCC) 2008   • Asthma    • Atrial fibrillation (HCC)    • Hyperlipidemia    • Hypertension        PSH:  History reviewed. No pertinent surgical history.    FAMILY HX:  Family History   Problem Relation Age of Onset   • No Known  Problems Mother    • No Known Problems Father        SOCIAL HX:  Social History     Socioeconomic History   • Marital status:      Spouse name: Not on file   • Number of children: Not on file   • Years of education: Not on file   • Highest education level: Not on file   Occupational History   • Not on file   Tobacco Use   • Smoking status: Never Smoker   • Smokeless tobacco: Never Used   Vaping Use   • Vaping Use: Never used   Substance and Sexual Activity   • Alcohol use: No   • Drug use: No   • Sexual activity: Not Currently     Partners: Male   Other Topics Concern   • Not on file   Social History Narrative   • Not on file     Social Determinants of Health     Financial Resource Strain:    • Difficulty of Paying Living Expenses:    Food Insecurity:    • Worried About Running Out of Food in the Last Year:    • Ran Out of Food in the Last Year:    Transportation Needs:    • Lack of Transportation (Medical):    • Lack of Transportation (Non-Medical):    Physical Activity:    • Days of Exercise per Week:    • Minutes of Exercise per Session:    Stress:    • Feeling of Stress :    Social Connections:    • Frequency of Communication with Friends and Family:    • Frequency of Social Gatherings with Friends and Family:    • Attends Sabianist Services:    • Active Member of Clubs or Organizations:    • Attends Club or Organization Meetings:    • Marital Status:    Intimate Partner Violence:    • Fear of Current or Ex-Partner:    • Emotionally Abused:    • Physically Abused:    • Sexually Abused:      Social History     Tobacco Use   Smoking Status Never Smoker   Smokeless Tobacco Never Used     Social History     Substance and Sexual Activity   Alcohol Use No       Allergies/Intolerances:  No Known Allergies    History reviewed with the patient     Other Current Medications:    Current Facility-Administered Medications:   •  dexamethasone (DECADRON) tablet 12 mg, 12 mg, Oral, DAILY, Dale Tamayo M.D., 12 mg at  05/27/21 0514  •  MD Alert...ICU Electrolyte Replacement per Pharmacy, , Other, PHARMACY TO DOSE, Dale Tamayo M.D.  •  famotidine (PEPCID) tablet 20 mg, 20 mg, Oral, BID, Ivan Nunez M.D., 20 mg at 05/27/21 0514  •  potassium chloride SA (Kdur) tablet 20 mEq, 20 mEq, Oral, DAILY, Ivan Nunez M.D., 20 mEq at 05/27/21 0514  •  ferrous sulfate tablet 325 mg, 325 mg, Oral, QDAY with Breakfast, Ivan Nunez M.D., 325 mg at 05/27/21 0806  •  apixaban (ELIQUIS) tablet 5 mg, 5 mg, Oral, BID, Cheryl Gallagher D.O., 5 mg at 05/27/21 0514  •  ascorbic acid (Vitamin C) tablet 1,000 mg, 1,000 mg, Oral, DAILY, Cheryl Gallagher D.O., 1,000 mg at 05/27/21 0514  •  atorvastatin (LIPITOR) tablet 10 mg, 10 mg, Oral, DAILY, Cheryl Gallagher D.O., 10 mg at 05/27/21 0514  •  benzonatate (TESSALON) capsule 100 mg, 100 mg, Oral, TID PRN, Cheryl Gallagher D.O., 100 mg at 05/26/21 1222  •  DILTIAZem CD (CARDIZEM CD) capsule 180 mg, 180 mg, Oral, BID, Cheryl Gallagher, D.O., 180 mg at 05/27/21 0514  •  montelukast (SINGULAIR) tablet 10 mg, 10 mg, Oral, Q EVENING, Cheryl Gallagher D.O., 10 mg at 05/26/21 1811  •  ondansetron (ZOFRAN) syringe/vial injection 4 mg, 4 mg, Intravenous, Q6HRS PRN, Cheryl Gallagher D.O.  •  ondansetron (ZOFRAN ODT) dispertab 4 mg, 4 mg, Oral, Q6HRS PRN, Cheryl Gallagher D.O.  •  senna-docusate (PERICOLACE or SENOKOT S) 8.6-50 MG per tablet 2 tablet, 2 tablet, Oral, BID, 2 tablet at 05/26/21 0554 **AND** polyethylene glycol/lytes (MIRALAX) PACKET 1 Packet, 1 Packet, Oral, QDAY PRN **AND** magnesium hydroxide (MILK OF MAGNESIA) suspension 30 mL, 30 mL, Oral, QDAY PRN **AND** bisacodyl (DULCOLAX) suppository 10 mg, 10 mg, Rectal, QDAY PRN, Cheryl Gallagher D.O.  •  acetaminophen (Tylenol) tablet 650 mg, 650 mg, Oral, Q6HRS PRN, Cheryl Gallagher D.O., 650 mg at 05/22/21 1008  •  guaiFENesin ER (MUCINEX) tablet 600 mg, 600 mg, Oral, BID PRN, Cheryl Gallagher D.O.  •  guaiFENesin dextromethorphan (ROBITUSSIN DM) 100-10 MG/5ML syrup  "10 mL, 10 mL, Oral, Q6HRS PRN, Cheryl Gallagher D.O.  •  budesonide-formoterol (SYMBICORT) 160-4.5 MCG/ACT inhaler 2 Puff, 2 Puff, Inhalation, BID (RT), Cheryl Gallagher D.O., 2 Puff at 21 0710  •  HYDROcodone-homatropine 5-1.5 mg/5 mL solution 5 mL, 5 mL, Oral, Q4HRS PRN, Cheryl Gallagher D.O.  [unfilled]    Most Recent Vital Signs:  /63   Pulse 93   Temp 36.6 °C (97.8 °F) (Temporal)   Resp 14   Ht 1.702 m (5' 7\")   Wt 88.3 kg (194 lb 10.7 oz)   SpO2 96%   BMI 30.49 kg/m²   Temp  Av.4 °C (97.6 °F)  Min: 35.9 °C (96.7 °F)  Max: 38.1 °C (100.5 °F)    Physical Exam:  Physical Exam  Constitutional:       Appearance: Normal appearance. She is not ill-appearing.   HENT:      Head: Normocephalic and atraumatic.      Right Ear: External ear normal.      Left Ear: External ear normal.      Nose: Nose normal.      Mouth/Throat:      Mouth: Mucous membranes are dry.      Pharynx: Oropharynx is clear.   Eyes:      Extraocular Movements: Extraocular movements intact.      Conjunctiva/sclera: Conjunctivae normal.      Pupils: Pupils are equal, round, and reactive to light.   Cardiovascular:      Rate and Rhythm: Normal rate. Rhythm irregular.   Pulmonary:      Effort: Pulmonary effort is normal.      Comments: Bronchial breath sounds and faint crackles bilaterally  Abdominal:      General: Abdomen is flat. Bowel sounds are normal.      Palpations: Abdomen is soft.   Musculoskeletal:         General: Normal range of motion.      Cervical back: Normal range of motion and neck supple.      Right lower leg: No edema.      Left lower leg: No edema.   Skin:     General: Skin is warm and dry.   Neurological:      General: No focal deficit present.      Mental Status: She is alert and oriented to person, place, and time.   Psychiatric:         Mood and Affect: Mood normal.         Behavior: Behavior normal.             Pertinent Lab Results:  Recent Labs     21  0443   WBC 22.6*      Recent Labs     " "05/27/21  0443   HEMOGLOBIN 17.7*   HEMATOCRIT 54.0*   MCV 82.6   MCH 27.1   PLATELETCT 296         Recent Labs     05/26/21  1330 05/27/21  0443   SODIUM  --  130*   POTASSIUM 5.1 5.0   CHLORIDE  --  87*   CO2  --  31   CREATININE  --  1.09        Recent Labs     05/27/21  0443   ALBUMIN 3.4        Pertinent Micro:  Results     Procedure Component Value Units Date/Time    BLOOD CULTURE [[id number]] Collected: 05/20/21 1430    Order Status: Completed Specimen: Blood from Peripheral Updated: 05/25/21 1617     Significant Indicator NEG     Source BLD     Site PERIPHERAL     Culture Result No growth after 5 days of incubation.  Blood culture testing and Gram stain, if indicated, are  performed at [facility], [address] [place] [address].  Positive blood cultures are  sent to [facility], [address] [place] [address], for organism identification and  susceptibility testing.      Narrative:      Per Hospital Policy: Only change Specimen Src: to \"Line\" if  specified by physician order.  Right AC    BLOOD CULTURE [[id number]] Collected: 05/20/21 1445    Order Status: Completed Specimen: Blood from Peripheral Updated: 05/25/21 1617     Significant Indicator NEG     Source BLD     Site PERIPHERAL     Culture Result No growth after 5 days of incubation.  Blood culture testing and Gram stain, if indicated, are  performed at [facility], [facility]  [address] [place], [place].  Positive blood cultures are  sent to [facility], [address] [place] [address], for organism identification and  susceptibility testing.      Narrative:      Per Hospital Policy: Only change Specimen Src: to \"Line\" if  specified by physician order.  Left Hand    URINE CULTURE(NEW) [[id number]] Collected: 05/20/21 1517    Order Status: Completed Specimen: Urine Updated: 05/22/21 0832     Significant Indicator NEG     Source UR     Site -     Culture Result " Usual skin jennifer 10-50,000 cfu/mL    Narrative:      Indication for culture:->Evaluation for sepsis without a  clear source of infection  Indication for culture:->Evaluation for sepsis without a    URINALYSIS [[id number]]  (Abnormal) Collected: 05/20/21 1517    Order Status: Completed Specimen: Urine Updated: 05/20/21 1636     Color Yellow     Character Clear     Specific Gravity 1.020     Ph 6.0     Glucose Negative mg/dL      Ketones Negative mg/dL      Protein Negative mg/dL      Bilirubin Negative     Nitrite Negative     Leukocyte Esterase Negative     Occult Blood Small     Micro Urine Req Microscopic    Narrative:      Indication for culture:->Evaluation for sepsis without a  clear source of infection        Blood Culture   Date Value Ref Range Status   05/17/2007 No growth after 5 days of incubation.  Final        Studies:  DX-CHEST-PORTABLE (1 VIEW)    Result Date: 5/23/2021 5/23/2021 3:11 AM HISTORY/REASON FOR EXAM:  Shortness of Breath TECHNIQUE/EXAM DESCRIPTION AND NUMBER OF VIEWS: Single portable view of the chest. COMPARISON:  5/20/2021 and 5/15/2021 FINDINGS: Cardiomegaly is unchanged. Diffuse interstitial and alveolar opacities are without significant change consistent with edema or pneumonia. Differential diagnosis would include Covid 19 pneumonia. No lobar consolidation is noted. No pleural effusion is noted. No pneumothorax is identified.     1.  Persistent bilateral interstitial and alveolar opacities are present consistent with edema or pneumonia. Differential diagnosis would include Covid 19 pneumonia. 2.  No pneumothorax identified.    DX-CHEST-PORTABLE (1 VIEW)    Result Date: 5/20/2021 5/20/2021 2:41 PM HISTORY/REASON FOR EXAM: Worsening hypoxia, shortness of breath. Elevated pneumonia. Hemoptysis... TECHNIQUE/EXAM DESCRIPTION AND NUMBER OF VIEWS: Single AP view of the chest. COMPARISON: 5/15/2021 FINDINGS: Lungs: New multifocal consolidation of moderate severity Pleura:  No pleural space  process is seen. Heart and mediastinum: There is stable cardiac silhouette enlargement.     Worsening moderate multifocal consolidation compatible with known Covid pneumonia Stable marked cardiac silhouette enlargement    DX-CHEST-PORTABLE (1 VIEW)    Result Date: 5/15/2021  5/15/2021 1:14 PM HISTORY/REASON FOR EXAM:  possible sepsis.. Shortness of breath TECHNIQUE/EXAM DESCRIPTION AND NUMBER OF VIEWS: Single portable view of the chest. COMPARISON: August 31, 2020 FINDINGS: The cardiac silhouette is enlarged. There is fullness of the right hilum which could be related to vascularity such as enlarged ascending aorta. There are some interstitial prominence. No effusion or pneumothorax identified.     Enlarged cardiac silhouette with interstitial prominence suggesting pulmonary edema. Right hilar soft tissue density again noted could represent enlargement of the ascending aorta or pulmonary arteries.    US-EXTREMITY VENOUS LOWER BILAT    Result Date: 5/23/2021 5/23/2021 10:48 AM HISTORY/REASON FOR EXAM:  Swelling of Limb Right lower extremity edema TECHNIQUE/EXAM DESCRIPTION: Using both color and pulsed-wave Doppler imaging, multiple images were obtained from the right common femoral vein origin distally through the popliteal trifurcation.  Graded compression was used to demonstrate a patent lumen. COMPARISON:  None. FINDINGS: There is no evidence of luminal thrombus.  There is normal augmentation to flow and respiratory variation.     1. No evidence of right lower extremity deep venous thrombosis.    EC-ECHOCARDIOGRAM LTD W/O CONT    Result Date: 5/16/2021  Transthoracic Echo Report Echocardiography Laboratory CONCLUSIONS Compared to the images of the prior study done 9/15/2020, there is evidence of increase in pulmonary hypertension along with right ventricular systolic reduction and ventricular dilation. Normal left ventricular systolic function. Abnormal septal motion consistent with right ventricular (RV) volume  overload and/or elevated RV end-diastolic pressure. Mild aortic insufficiency. Moderate mitral regurgitation. Estimated right ventricular systolic pressure  is 50 mmHg. SHIRA WHALEY Exam Date:         2021                    13:25 Exam Location:     Inpatient Priority:          Routine Ordering Physician:        LAINE JULIAN Referring Physician: Sonographer:               Anastacio Shaffer RDCS Age:    80     Gender:    F MRN:    [id number] :    [id number] BSA:    2.08   Ht (in):    67     Wt (lb):    213 Exam Type:     Limited Indications:     Edema, unspecified ICD Codes:       R60.9 CPT Codes:       98908 BP:   141    /   79     HR:   90 Technical Quality:       Fair MEASUREMENTS  (Male / Female) Normal Values 2D ECHO LV Diastolic Diameter PLAX        5.3 cm                4.2 - 5.9 / 3.9 - 5.3 cm LV Systolic Diameter PLAX         3.1 cm                2.1 - 4.0 cm IVS Diastolic Thickness           0.66 cm               LVPW Diastolic Thickness          0.97 cm               Estimated LV Ejection Fraction    60 %                  LV Ejection Fraction MOD BP       58.4 %                >= 55  % LV Ejection Fraction MOD 4C       64.4 %                LV Ejection Fraction MOD 2C       56.8 %                IVC Diameter                      3.1 cm                DOPPLER AI Pressure Half Time             397 ms                MR ERO PISA                       0.2 cm2               MR Regurgitant Volume PISA        30.9 cm3              TR Peak Velocity                  272 cm/s              * Indicates values subject to auto-interpretation LV EF:  60    % FINDINGS Left Ventricle Normal left ventricular chamber size. Normal left ventricular wall thickness. Normal left ventricular systolic function. Left ventricular ejection fraction is visually estimated to be 60%. Abnormal septal motion consistent with right ventricular (RV) volume overload and/or elevated RV end-diastolic pressure. Right Ventricle Severely  dilated right ventricle. Reduced right ventricular systolic function. Right Atrium Enlarged right atrium. Dilated inferior vena cava with inspiratory collapse. Left Atrium Left atrium not well visualized. Mitral Valve Thickened mitral valve leaflets. No mitral stenosis. Moderate mitral regurgitation. Aortic Valve Tricuspid aortic valve. Aortic sclerosis without stenosis. Mild aortic insufficiency. Tricuspid Valve Structurally normal tricuspid valve. No tricuspid stenosis. Moderate tricuspid regurgitation. Right atrial pressure is estimated to be 8 mmHg. Estimated right ventricular systolic pressure  is 50 mmHg. Pulmonic Valve The pulmonic valve is not well visualized. No pulmonic stenosis. Trace pulmonic insufficiency. Pulmonic artery diastolic pressure is 12 mmHg. Pericardium No pericardial effusion seen. Aorta Gene-Farzadoziel N To (Electronically Signed) Final Date:     16 May 2021 18:43      ASSESSMENT/PLAN:     80 y.o.  admitted 5/20/2021. Pt has a past medical history of A. fib on Eliquis, asthma and hypertension.  She was diagnosed with COVID-19 on 5/15 and admitted for a few days but then discharged home once improved.  She then had worsening cough and shortness of breath so again presented to the ED.  She was initiated on steroids and ID was consulted for possible remdesivir.  However, she rapidly decompensated requiring high flow oxygen and was transferred to ICU.  Due to acute decompensation and on high flow she was not given remdesivir is no proven benefit in this patient group.  Rapid decompensation and demonstrating markedly elevated CRP she was given tocilizumab on 5/21.  TB quant ordered and equivocal likely due to acute illness.  Acute hepatitis panel is negative.  She has remained in the ICU on high flow and ID consulted for additional management.    Problem List     COVID-19 pneumonia  Hypoxemia, on high flow nasal cannula due to above  Leukocytosis, new today  Elevated inflammatory markers  History  of A. filemon on Eliquis  History of asthma    Plan     --- Continue supportive care   --- Continue steroids   --- Monitor for any secondary infections -she has been afebrile, new increase in WBC today.  Has been on steroids. Procalcitonin has been consistently low.   --- Ordered procalcitonin for tomorrow to trend   --- Recommend repeat TB quant once patient has improved-in 1 to 2-months   --- Patient and family are very interested in any and all potential therapies.  Discussed numerous therapies at length.  They are very interested in convalescent plasma.  I discussed the limitations of plasma this late in the disease course as well as associated risks.  However, the very much want to proceed with convalescent plasma.  I left the FDA fact sheet  with the patient and and her son.  Type and cross completed.  Notified blood bank yesterday potential order and will place order for convalescent plasma today.  .  --- Monitor labs          Plan of care discussed with JASPAL Colon M.D.. Will continue to follow    Mel Powers M.D.

## 2021-05-27 NOTE — PROGRESS NOTES
"Pulmonary Progress Note    Date of admission  5/20/2021    Chief Complaint  80 y.o. female admitted 5/20/2021 with covid pna    Hospital Course  \"80 y.o. female who presented 5/20/2021 with acute hypoxemia respiratory failure. Patient with significant history of atrial fibrillation on eliquis, asthma, and hypertension admitted for worsening hypoxemia secondary to COVID pneumonia. She was recently discharged home on 5/17 on 1 liters NC and presents back on 5/20 with worsening shortness of breath. On admission, she required 6 liters NC which quickly escalated to HFNC. ID consulted and patient was tocilizumab on 5/21. Her oxygenation worsen overnight as she was maxed out on HFNC and transferred to ICU for BiPAP 12/6 FiO2 100%.      In ICU, she was diuresed and documented I/O -> neg ~1 liter. Patient is breath comfortably on BiPAP and able to answer questions via BiPAP facemask. Subjectively, patient denies chest pain, cough, N/V, fever/chills, or abdominal pain. She endorse having shortness of breath, neck pain, and back pain. Discussed about the benefits of proning if she can tolerate at which she agrees to with RN assistance. Discussed about risks/benefits endotracheal intubation if she needs it in the upcoming hours to days which she agrees to.      Echocardiogram 5/16/21 personally reviewed -> preserved LV function, mild to moderate MR, dilated coronary sinus, eccentric TR jet with measure RVSP ~50 mmHG which is most likely underestimated, TAPSE ~0.9 cm -> reduced RV function, dilated RV and RA, DC -> 0.89 m/s (~18 mmHG) and abnormal septal bounce, dilated IVC with inspiratory collapse. \" From Dr. Tamayo's note    COVID meds: Received Tocilimuzab on 5/21; on dexamethasone restartted 5/20 5/24: LE doppler negative; FIo2 increased to 100% and 50L    5/25: FIo2 down to 40 l 80%.   Pt's son Kaiser Black requested an infectious disease doctor to call as he is expressed that he was not happy with patient's care and " progress. Main questions patients son asked about were  for Ivermectin, Hydroxychloroquine and Remdesivir.  ID on call DR. Powers will call son this pm.    5/26: Fio2 50 L 70%  Dr. Powers reviewed with son and is reviewing options of plasma    5/27: Plasma      Interval Problem Update  Reviewed last 24 hour events:  Chart review from the past 24 hours includes imaging, laboratory studies, vital signs and notes available.  Pertinent data for today    Cardiac:  No events BP ok  Pulm:  50 l and 80% with movement pt decompensates  Neuro:  intact  Heme:  H and H stable  I/O:  -8 since admission  ID: tocilimuzab 5/21, 5/27 plasma  GI/endo:  Sugar  ok  Labs/Imaging:  Hyponatremia, creatinine elevated and elevated LFTS  Lines:  peripherals  Mobility:  OOB to chair  Symptoms: feeling better    Review of Systems  Review of Systems   HENT: Negative.    Eyes: Negative.    Respiratory: Negative.    Cardiovascular: Negative.    Gastrointestinal: Negative.    Genitourinary: Negative.    Musculoskeletal: Negative.    Skin: Negative.    Neurological: Positive for weakness.   Endo/Heme/Allergies: Negative.    Psychiatric/Behavioral: Negative.         Vital Signs for last 24 hours   Temp:  [36.1 °C (97 °F)-36.8 °C (98.3 °F)] 36.4 °C (97.6 °F)  Pulse:  [] 93  Resp:  [12-37] 14  BP: ()/(50-80) 116/63  SpO2:  [87 %-99 %] 96 %          Physical Exam   Physical Exam  Constitutional:       Appearance: She is ill-appearing.   HENT:      Head: Normocephalic and atraumatic.      Mouth/Throat:      Mouth: Mucous membranes are moist.   Eyes:      Extraocular Movements: Extraocular movements intact.      Pupils: Pupils are equal, round, and reactive to light.   Cardiovascular:      Rate and Rhythm: Rhythm irregular.      Heart sounds: Murmur heard.     Pulmonary:      Effort: Pulmonary effort is normal.   Skin:     General: Skin is warm and dry.   Neurological:      General: No focal deficit present.      Mental Status: She is  alert and oriented to person, place, and time.   Psychiatric:         Mood and Affect: Mood normal.         Behavior: Behavior normal.         Thought Content: Thought content normal.         Judgment: Judgment normal.         Medications  Current Facility-Administered Medications   Medication Dose Route Frequency Provider Last Rate Last Admin   • bumetanide (BUMEX) injection 2 mg  2 mg Intravenous BID DIURETIC Dale Tamayo M.D.   2 mg at 05/27/21 0514   • dexamethasone (DECADRON) tablet 12 mg  12 mg Oral DAILY Dale Tamayo M.D.   12 mg at 05/27/21 0514   • MD Alert...ICU Electrolyte Replacement per Pharmacy   Other PHARMACY TO DOSE Dale Tamayo M.D.       • famotidine (PEPCID) tablet 20 mg  20 mg Oral BID Ivan Nunez M.D.   20 mg at 05/27/21 0514   • potassium chloride SA (Kdur) tablet 20 mEq  20 mEq Oral DAILY Ivan Nunez M.D.   20 mEq at 05/27/21 0514   • ferrous sulfate tablet 325 mg  325 mg Oral QDAY with Breakfast Ivan Nunez M.D.   325 mg at 05/26/21 0917   • apixaban (ELIQUIS) tablet 5 mg  5 mg Oral BID Cheryl Gallagher, D.O.   5 mg at 05/27/21 0514   • ascorbic acid (Vitamin C) tablet 1,000 mg  1,000 mg Oral DAILY Cheryl Gallagher, D.O.   1,000 mg at 05/27/21 0514   • atorvastatin (LIPITOR) tablet 10 mg  10 mg Oral DAILY Cheryl Gallagher, D.O.   10 mg at 05/27/21 0514   • benzonatate (TESSALON) capsule 100 mg  100 mg Oral TID PRN Cheryl Gallagher, D.O.   100 mg at 05/26/21 1222   • DILTIAZem CD (CARDIZEM CD) capsule 180 mg  180 mg Oral BID Cheryl Gallagher, D.O.   180 mg at 05/27/21 0514   • montelukast (SINGULAIR) tablet 10 mg  10 mg Oral Q EVENING Cherylmaría Gallagher, D.O.   10 mg at 05/26/21 1811   • ondansetron (ZOFRAN) syringe/vial injection 4 mg  4 mg Intravenous Q6HRS PRN Cheryl Gallagher, D.O.       • ondansetron (ZOFRAN ODT) dispertab 4 mg  4 mg Oral Q6HRS PRN Cheryl Gallagher D.O.       • senna-docusate (PERICOLACE or SENOKOT S) 8.6-50 MG per tablet 2 tablet  2 tablet Oral BID Cheryl Gallagher D.O.   2 tablet at  05/26/21 0554    And   • polyethylene glycol/lytes (MIRALAX) PACKET 1 Packet  1 Packet Oral QDAY PRN Cheryl Gallagher D.O.        And   • magnesium hydroxide (MILK OF MAGNESIA) suspension 30 mL  30 mL Oral QDAY PRN LEN MoranOOksana        And   • bisacodyl (DULCOLAX) suppository 10 mg  10 mg Rectal QDAY PRN CHARISMA Moran.O.       • acetaminophen (Tylenol) tablet 650 mg  650 mg Oral Q6HRS PRN CHARISMA Moran.O.   650 mg at 05/22/21 1008   • guaiFENesin ER (MUCINEX) tablet 600 mg  600 mg Oral BID PRN LEN MoranO.       • guaiFENesin dextromethorphan (ROBITUSSIN DM) 100-10 MG/5ML syrup 10 mL  10 mL Oral Q6HRS PRN CHARISMA Moran.O.       • budesonide-formoterol (SYMBICORT) 160-4.5 MCG/ACT inhaler 2 Puff  2 Puff Inhalation BID (RT) LEN MoranOOksana   2 Puff at 05/27/21 0710   • HYDROcodone-homatropine 5-1.5 mg/5 mL solution 5 mL  5 mL Oral Q4HRS PRN LEN MoranOOksana           Fluids    Intake/Output Summary (Last 24 hours) at 5/27/2021 0802  Last data filed at 5/27/2021 0600  Gross per 24 hour   Intake 120 ml   Output 2380 ml   Net -2260 ml       Laboratory          Recent Labs     05/25/21  0215 05/26/21  1330 05/27/21  0443   SODIUM  --   --  130*   POTASSIUM  --  5.1 5.0   CHLORIDE  --   --  87*   CO2  --   --  31   BUN  --   --  63*   CREATININE  --   --  1.09   MAGNESIUM 2.5 2.0  --    PHOSPHORUS  --   --  3.3   CALCIUM  --   --  9.7     Recent Labs     05/27/21 0443   ALTSGPT 51*   ASTSGOT 27   ALKPHOSPHAT 106*   TBILIRUBIN 0.6   GLUCOSE 158*     Recent Labs     05/27/21 0443   WBC 22.6*   NEUTSPOLYS 92.80*   LYMPHOCYTES 1.90*   MONOCYTES 2.50   EOSINOPHILS 0.00   BASOPHILS 0.30   ASTSGOT 27   ALTSGPT 51*   ALKPHOSPHAT 106*   TBILIRUBIN 0.6     Recent Labs     05/27/21  0443   RBC 6.54*   HEMOGLOBIN 17.7*   HEMATOCRIT 54.0*   PLATELETCT 296       Imaging  No new imaging to review    Assessment/Plan  * Pneumonia due to COVID-19 virus- (present on admission)  Assessment & Plan  -- Confirmed  SARS-CoV-2/COVID on 5/15  -- Risk factors -> age and HTN and not vaccinated  --  decadron to 12mg   -- Received tocilizumab on 5/21   -- Encourage self prone as tolerated (ie, 2 hour supine and 2 hour prone)  -- Cont aggressive diuresis to seek net negative fluid balance  -- On strict contact, droplet/airbone, eye protection, and critical meticulous hand hygiene    Abnormal echocardiogram  Assessment & Plan  --  Pulmonary hypertension  -- Consider etiology to be multifactorial due to left atrial hypertension and severe hypoxemia causing pulmonary vasoconstriction    -- being diuresed    -- Need repeat echocardiogram in 2-3 months    -- Avoid hypoxemia, respiratory acidosis, and A fib RVR which can cause worsening pulmonary vasoconstriction leading to worsening PA pressure       Acute respiratory failure with hypoxia (HCC)- (present on admission)  Assessment & Plan  -- Worsening hypoxemia secondary to COVID pneumonia, goal saturations greater than 88% as long as she is asymptomatic and fluctuating oxygen needs  -- improving with FIo2 requirement decreased   -- negative fluid balance  -- Treatment for COVID as below  -- Currently titrating FiO2 down  -- Aggressive pulmonary toilet as able  -- Encourage self proning as tolerated   -- Goal SpO2 > 88%       Asthma, moderate persistent, well-controlled- (present on admission)  Assessment & Plan  -- Not in acute flare   -- Cont symbicort and singulair        Longstanding persistent atrial fibrillation (HCC)-Dr. Turner [place]- (present on admission)  Assessment & Plan  -- Currently rate controlled with diltiazem   -- On eliquis   -- High lytes goal    -- Goal HR < 110           VTE:  NOAC and Not Indicated  Ulcer: Not Indicated  Lines: cindy reassessed    I have performed a physical exam and reviewed and updated ROS and Plan today (5/27/2021). In review of yesterday's note (5/26/2021), there are no changes except as documented above.     Discussed patient condition and  risk of morbidity and/or mortality with Pharmacy, Charge nurse / hot rounds and Patient  The patient remains critically ill.  Critical care time = 33 minutes in directly providing and coordinating critical care and extensive data review.  No time overlap and excludes procedures.

## 2021-05-27 NOTE — CARE PLAN
The patient is Watcher - Medium risk of patient condition declining or worsening    Shift Goals  Clinical Goals: Maintain O2 >88%, wean down HHFC settings.  Patient Goals: Tolerate CPAP overnight, prone  Family Goals: Support    Progress made toward(s) clinical / shift goals:      Problem: Fall Risk  Goal: Patient will remain free from falls  Outcome: Progressing     Problem: Knowledge Deficit - Standard  Goal: Patient and family/care givers will demonstrate understanding of plan of care, disease process/condition, diagnostic tests and medications  Outcome: Progressing       Patient is not progressing towards the following goals:    Problem: Respiratory  Goal: Patient will achieve/maintain optimum respiratory ventilation and gas exchange  Outcome: Not Progressing   Patient was on HHFNC 50L 100% at the start of this shift, switched over to CPAP overnight, able to wean down from 100% to 75%, O2 sat high 90s while patient is laying on side/partial prone.

## 2021-05-28 ENCOUNTER — APPOINTMENT (OUTPATIENT)
Dept: RADIOLOGY | Facility: MEDICAL CENTER | Age: 81
DRG: 177 | End: 2021-05-28
Attending: INTERNAL MEDICINE
Payer: MEDICARE

## 2021-05-28 PROBLEM — R74.01 TRANSAMINITIS: Status: RESOLVED | Noted: 2021-05-27 | Resolved: 2021-05-28

## 2021-05-28 PROBLEM — E87.5 HYPERKALEMIA: Status: ACTIVE | Noted: 2021-05-28

## 2021-05-28 LAB
ALBUMIN SERPL BCP-MCNC: 3.1 G/DL (ref 3.2–4.9)
ALBUMIN/GLOB SERPL: 1 G/DL
ALP SERPL-CCNC: 122 U/L (ref 30–99)
ALT SERPL-CCNC: 45 U/L (ref 2–50)
ANION GAP SERPL CALC-SCNC: 12 MMOL/L (ref 7–16)
AST SERPL-CCNC: 41 U/L (ref 12–45)
BASOPHILS # BLD AUTO: 0.6 % (ref 0–1.8)
BASOPHILS # BLD: 0.14 K/UL (ref 0–0.12)
BILIRUB SERPL-MCNC: 0.6 MG/DL (ref 0.1–1.5)
BUN SERPL-MCNC: 53 MG/DL (ref 8–22)
CALCIUM SERPL-MCNC: 9.7 MG/DL (ref 8.4–10.2)
CHLORIDE SERPL-SCNC: 89 MMOL/L (ref 96–112)
CO2 SERPL-SCNC: 26 MMOL/L (ref 20–33)
COMMENT 1642: NORMAL
CREAT SERPL-MCNC: 0.86 MG/DL (ref 0.5–1.4)
EOSINOPHIL # BLD AUTO: 2.02 K/UL (ref 0–0.51)
EOSINOPHIL NFR BLD: 8.9 % (ref 0–6.9)
ERYTHROCYTE [DISTWIDTH] IN BLOOD BY AUTOMATED COUNT: 45.1 FL (ref 35.9–50)
GLOBULIN SER CALC-MCNC: 3.1 G/DL (ref 1.9–3.5)
GLUCOSE SERPL-MCNC: 148 MG/DL (ref 65–99)
HCT VFR BLD AUTO: 55.3 % (ref 37–47)
HGB BLD-MCNC: 17.2 G/DL (ref 12–16)
IMM GRANULOCYTES # BLD AUTO: 0.89 K/UL (ref 0–0.11)
IMM GRANULOCYTES NFR BLD AUTO: 3.9 % (ref 0–0.9)
LYMPHOCYTES # BLD AUTO: 0.51 K/UL (ref 1–4.8)
LYMPHOCYTES NFR BLD: 2.3 % (ref 22–41)
MCH RBC QN AUTO: 27 PG (ref 27–33)
MCHC RBC AUTO-ENTMCNC: 31.1 G/DL (ref 33.6–35)
MCV RBC AUTO: 86.9 FL (ref 81.4–97.8)
MONOCYTES # BLD AUTO: 0.61 K/UL (ref 0–0.85)
MONOCYTES NFR BLD AUTO: 2.7 % (ref 0–13.4)
NEUTROPHILS # BLD AUTO: 18.46 K/UL (ref 2–7.15)
NEUTROPHILS NFR BLD: 81.6 % (ref 44–72)
NRBC # BLD AUTO: 0 K/UL
NRBC BLD-RTO: 0 /100 WBC
PLATELET # BLD AUTO: 207 K/UL (ref 164–446)
PMV BLD AUTO: 10.1 FL (ref 9–12.9)
POTASSIUM SERPL-SCNC: 5.3 MMOL/L (ref 3.6–5.5)
POTASSIUM SERPL-SCNC: 5.6 MMOL/L (ref 3.6–5.5)
PROT SERPL-MCNC: 6.2 G/DL (ref 6–8.2)
RBC # BLD AUTO: 6.36 M/UL (ref 4.2–5.4)
SODIUM SERPL-SCNC: 127 MMOL/L (ref 135–145)
T4 FREE SERPL-MCNC: 1.2 NG/DL (ref 0.93–1.7)
TSH SERPL DL<=0.005 MIU/L-ACNC: 0.36 UIU/ML (ref 0.38–5.33)
WBC # BLD AUTO: 22.6 K/UL (ref 4.8–10.8)

## 2021-05-28 PROCEDURE — 84132 ASSAY OF SERUM POTASSIUM: CPT

## 2021-05-28 PROCEDURE — 84443 ASSAY THYROID STIM HORMONE: CPT

## 2021-05-28 PROCEDURE — 99291 CRITICAL CARE FIRST HOUR: CPT | Performed by: INTERNAL MEDICINE

## 2021-05-28 PROCEDURE — 770022 HCHG ROOM/CARE - ICU (200)

## 2021-05-28 PROCEDURE — 82533 TOTAL CORTISOL: CPT

## 2021-05-28 PROCEDURE — 700102 HCHG RX REV CODE 250 W/ 637 OVERRIDE(OP): Performed by: INTERNAL MEDICINE

## 2021-05-28 PROCEDURE — 71045 X-RAY EXAM CHEST 1 VIEW: CPT

## 2021-05-28 PROCEDURE — A9270 NON-COVERED ITEM OR SERVICE: HCPCS | Performed by: HOSPITALIST

## 2021-05-28 PROCEDURE — 94640 AIRWAY INHALATION TREATMENT: CPT

## 2021-05-28 PROCEDURE — A9270 NON-COVERED ITEM OR SERVICE: HCPCS | Performed by: INTERNAL MEDICINE

## 2021-05-28 PROCEDURE — 700102 HCHG RX REV CODE 250 W/ 637 OVERRIDE(OP): Performed by: HOSPITALIST

## 2021-05-28 PROCEDURE — 94760 N-INVAS EAR/PLS OXIMETRY 1: CPT

## 2021-05-28 PROCEDURE — 85025 COMPLETE CBC W/AUTO DIFF WBC: CPT

## 2021-05-28 PROCEDURE — 80053 COMPREHEN METABOLIC PANEL: CPT

## 2021-05-28 PROCEDURE — 84439 ASSAY OF FREE THYROXINE: CPT

## 2021-05-28 RX ORDER — SODIUM CHLORIDE 1 G/1
1 TABLET ORAL
Status: COMPLETED | OUTPATIENT
Start: 2021-05-28 | End: 2021-05-30

## 2021-05-28 RX ADMIN — BENZOCAINE AND MENTHOL 1 LOZENGE: 15; 3.6 LOZENGE ORAL at 03:46

## 2021-05-28 RX ADMIN — BENZOCAINE AND MENTHOL 1 LOZENGE: 15; 3.6 LOZENGE ORAL at 17:17

## 2021-05-28 RX ADMIN — DOCUSATE SODIUM 50 MG AND SENNOSIDES 8.6 MG 2 TABLET: 8.6; 5 TABLET, FILM COATED ORAL at 17:17

## 2021-05-28 RX ADMIN — Medication 1 G: at 10:57

## 2021-05-28 RX ADMIN — Medication 1 G: at 17:18

## 2021-05-28 RX ADMIN — FERROUS SULFATE TAB 325 MG (65 MG ELEMENTAL FE) 325 MG: 325 (65 FE) TAB at 08:19

## 2021-05-28 RX ADMIN — DEXAMETHASONE 12 MG: 4 TABLET ORAL at 05:46

## 2021-05-28 RX ADMIN — DILTIAZEM HYDROCHLORIDE 180 MG: 180 CAPSULE, COATED, EXTENDED RELEASE ORAL at 17:18

## 2021-05-28 RX ADMIN — BENZOCAINE AND MENTHOL 1 LOZENGE: 15; 3.6 LOZENGE ORAL at 22:09

## 2021-05-28 RX ADMIN — OXYCODONE HYDROCHLORIDE AND ACETAMINOPHEN 1000 MG: 500 TABLET ORAL at 05:47

## 2021-05-28 RX ADMIN — APIXABAN 5 MG: 5 TABLET, FILM COATED ORAL at 17:17

## 2021-05-28 RX ADMIN — ATORVASTATIN CALCIUM 10 MG: 10 TABLET, FILM COATED ORAL at 05:47

## 2021-05-28 RX ADMIN — BENZOCAINE AND MENTHOL 1 LOZENGE: 15; 3.6 LOZENGE ORAL at 05:50

## 2021-05-28 RX ADMIN — BUDESONIDE AND FORMOTEROL FUMARATE DIHYDRATE 2 PUFF: 160; 4.5 AEROSOL RESPIRATORY (INHALATION) at 07:18

## 2021-05-28 RX ADMIN — BENZOCAINE AND MENTHOL 1 LOZENGE: 15; 3.6 LOZENGE ORAL at 10:57

## 2021-05-28 RX ADMIN — MONTELUKAST 10 MG: 10 TABLET, FILM COATED ORAL at 17:17

## 2021-05-28 RX ADMIN — DILTIAZEM HYDROCHLORIDE 180 MG: 180 CAPSULE, COATED, EXTENDED RELEASE ORAL at 05:46

## 2021-05-28 RX ADMIN — FAMOTIDINE 20 MG: 20 TABLET ORAL at 05:47

## 2021-05-28 RX ADMIN — APIXABAN 5 MG: 5 TABLET, FILM COATED ORAL at 05:46

## 2021-05-28 RX ADMIN — FAMOTIDINE 20 MG: 20 TABLET ORAL at 17:18

## 2021-05-28 RX ADMIN — DOCUSATE SODIUM 50 MG AND SENNOSIDES 8.6 MG 2 TABLET: 8.6; 5 TABLET, FILM COATED ORAL at 05:46

## 2021-05-28 RX ADMIN — POTASSIUM CHLORIDE 20 MEQ: 1500 TABLET, EXTENDED RELEASE ORAL at 05:47

## 2021-05-28 RX ADMIN — BUDESONIDE AND FORMOTEROL FUMARATE DIHYDRATE 2 PUFF: 160; 4.5 AEROSOL RESPIRATORY (INHALATION) at 20:06

## 2021-05-28 ASSESSMENT — ENCOUNTER SYMPTOMS
RESPIRATORY NEGATIVE: 1
GASTROINTESTINAL NEGATIVE: 1
WEAKNESS: 1
PSYCHIATRIC NEGATIVE: 1
MUSCULOSKELETAL NEGATIVE: 1
EYES NEGATIVE: 1
CARDIOVASCULAR NEGATIVE: 1

## 2021-05-28 ASSESSMENT — PAIN DESCRIPTION - PAIN TYPE
TYPE: ACUTE PAIN

## 2021-05-28 NOTE — FLOWSHEET NOTE
05/27/21 1907   Events/Summary/Plan   Events/Summary/Plan HHFNC   Skin Inspection Respiratory Device Intact   Vital Signs   Pulse 74   Respiration (!) 42   Pulse Oximetry 88 %   $ Pulse Oximetry (Spot Check) Yes   O2 Alarms Set & Reviewed Yes   Respiratory Assessment   Respiratory Pattern Within Normal Limits   Level of Consciousness Alert   Chest Exam   Work Of Breathing / Effort Moderate   Breath Sounds   RUL Breath Sounds Clear   RML Breath Sounds Clear   RLL Breath Sounds Diminished   JESSY Breath Sounds Clear   LLL Breath Sounds Diminished   Secretions   Cough Non Productive   How Sputum Obtained Cough on Request   Sputum Amount Unable to Evaluate   Sputum Color Unable to Evaluate   Sputum Consistency Unable to Evaluate   Oxygen   O2 (LPM) 50   FiO2% 80 %   O2 Delivery Device Heated High Flow Nasal Cannula   Resuscitation Device at Bedside Self Inflating Bag   Aerosols   $ Aerosol Delivery Device Heated High Flow Nasal Cannula

## 2021-05-28 NOTE — PROGRESS NOTES
"Pulmonary Progress Note    Date of admission  5/20/2021    Chief Complaint  80 y.o. female admitted 5/20/2021 with covid pna    Hospital Course  \"80 y.o. female who presented 5/20/2021 with acute hypoxemia respiratory failure. Patient with significant history of atrial fibrillation on eliquis, asthma, and hypertension admitted for worsening hypoxemia secondary to COVID pneumonia. She was recently discharged home on 5/17 on 1 liters NC and presents back on 5/20 with worsening shortness of breath. On admission, she required 6 liters NC which quickly escalated to HFNC. ID consulted and patient was tocilizumab on 5/21. Her oxygenation worsen overnight as she was maxed out on HFNC and transferred to ICU for BiPAP 12/6 FiO2 100%.      In ICU, she was diuresed and documented I/O -> neg ~1 liter. Patient is breath comfortably on BiPAP and able to answer questions via BiPAP facemask. Subjectively, patient denies chest pain, cough, N/V, fever/chills, or abdominal pain. She endorse having shortness of breath, neck pain, and back pain. Discussed about the benefits of proning if she can tolerate at which she agrees to with RN assistance. Discussed about risks/benefits endotracheal intubation if she needs it in the upcoming hours to days which she agrees to.      Echocardiogram 5/16/21 personally reviewed -> preserved LV function, mild to moderate MR, dilated coronary sinus, eccentric TR jet with measure RVSP ~50 mmHG which is most likely underestimated, TAPSE ~0.9 cm -> reduced RV function, dilated RV and RA, NE -> 0.89 m/s (~18 mmHG) and abnormal septal bounce, dilated IVC with inspiratory collapse. \" From Dr. Tamayo's note    COVID meds: Received Tocilimuzab on 5/21; on dexamethasone restartted 5/20 5/24: LE doppler negative; FIo2 increased to 100% and 50L    5/25: FIo2 down to 40 l 80%.   Pt's son Kaiser Black requested an infectious disease doctor to call as he is expressed that he was not happy with patient's care and " progress. Main questions patients son asked about were  for Ivermectin, Hydroxychloroquine and Remdesivir.  ID on call DR. Powers will call son this pm.    5/26: Fio2 50 L 70%  Dr. Powers reviewed with son and is reviewing options of plasma    5/27: Plasma      Interval Problem Update  Reviewed last 24 hour events:  Chart review from the past 24 hours includes imaging, laboratory studies, vital signs and notes available.  Pertinent data for today    Cardiac:  No events BP ok  Pulm:  50 l and 90% with movement pt decompensates  Neuro:  intact  Heme:  H and H very concentrated and hct now 55  Off diuretics since yesterday  I/O:  -10 since admission  ID: tocilimuzab 5/21, 5/27 plasma  GI/endo:  Sugar  ok  Labs/Imaging:  Hyponatremia worsening, creatinine improved, alk phos worse but AST and ALT wnl  Lines:  peripherals  Mobility:  OOB to chair  Symptoms:really has no complaints    Review of Systems  Review of Systems   HENT: Negative.    Eyes: Negative.    Respiratory: Negative.    Cardiovascular: Negative.    Gastrointestinal: Negative.    Genitourinary: Negative.    Musculoskeletal: Negative.    Skin: Negative.    Neurological: Positive for weakness.   Endo/Heme/Allergies: Negative.    Psychiatric/Behavioral: Negative.         Vital Signs for last 24 hours   Temp:  [36 °C (96.8 °F)-36.5 °C (97.7 °F)] 36.3 °C (97.4 °F)  Pulse:  [] 83  Resp:  [11-51] 20  BP: ()/(51-87) 134/64  SpO2:  [86 %-96 %] 96 %          Physical Exam   Physical Exam  Constitutional:       Appearance: She is ill-appearing.   HENT:      Head: Normocephalic and atraumatic.      Mouth/Throat:      Mouth: Mucous membranes are moist.   Eyes:      Extraocular Movements: Extraocular movements intact.      Pupils: Pupils are equal, round, and reactive to light.   Cardiovascular:      Rate and Rhythm: Rhythm irregular.      Heart sounds: Murmur heard.     Pulmonary:      Effort: Pulmonary effort is normal.   Skin:     General: Skin is warm  and dry.   Neurological:      General: No focal deficit present.      Mental Status: She is alert and oriented to person, place, and time.   Psychiatric:         Mood and Affect: Mood normal.         Behavior: Behavior normal.         Thought Content: Thought content normal.         Judgment: Judgment normal.         Medications  Current Facility-Administered Medications   Medication Dose Route Frequency Provider Last Rate Last Admin   • sodium chloride (SALT) tablet 1 g  1 g Oral TID WITH MEALS Rashad Colon M.D.   1 g at 05/28/21 1718   • benzocaine-menthol (CEPACOL) lozenge 1 Lozenge  1 Lozenge Mouth/Throat Q2HRS PRN Rashad Colon M.D.   1 Lozenge at 05/28/21 1717   • dexamethasone (DECADRON) tablet 12 mg  12 mg Oral DAILY Dale Tamayo M.D.   12 mg at 05/28/21 0546   • MD Alert...ICU Electrolyte Replacement per Pharmacy   Other PHARMACY TO DOSE Dale Tamayo M.D.       • famotidine (PEPCID) tablet 20 mg  20 mg Oral BID Ivan Nunez M.D.   20 mg at 05/28/21 1718   • ferrous sulfate tablet 325 mg  325 mg Oral QDAY with Breakfast Ivan Nunez M.D.   325 mg at 05/28/21 0819   • apixaban (ELIQUIS) tablet 5 mg  5 mg Oral BID Cheryl Gallagher, D.O.   5 mg at 05/28/21 1717   • ascorbic acid (Vitamin C) tablet 1,000 mg  1,000 mg Oral DAILY Cheryl Gallagher, D.O.   1,000 mg at 05/28/21 0547   • atorvastatin (LIPITOR) tablet 10 mg  10 mg Oral DAILY Cheryl Gallagher, D.O.   10 mg at 05/28/21 0547   • benzonatate (TESSALON) capsule 100 mg  100 mg Oral TID PRN Cheryl Gallagher, D.O.   100 mg at 05/26/21 1222   • DILTIAZem CD (CARDIZEM CD) capsule 180 mg  180 mg Oral BID Cheryl Gallagher, D.O.   180 mg at 05/28/21 1718   • montelukast (SINGULAIR) tablet 10 mg  10 mg Oral Q EVENING Cherylmaría Gallagher, D.O.   10 mg at 05/28/21 1717   • ondansetron (ZOFRAN) syringe/vial injection 4 mg  4 mg Intravenous Q6HRS PRN Cheryl Gallagher D.O.       • ondansetron (ZOFRAN ODT) dispertab 4 mg  4 mg Oral Q6HRS PRN LEN MoranO.       •  senna-docusate (PERICOLACE or SENOKOT S) 8.6-50 MG per tablet 2 tablet  2 tablet Oral BID LEN MoranOOksana   2 tablet at 05/28/21 1717    And   • polyethylene glycol/lytes (MIRALAX) PACKET 1 Packet  1 Packet Oral QDAY PRN LEN MoranOOksana        And   • magnesium hydroxide (MILK OF MAGNESIA) suspension 30 mL  30 mL Oral QDAY PRN LEN MoranOOksana        And   • bisacodyl (DULCOLAX) suppository 10 mg  10 mg Rectal QDAY PRN CHARISMA Moran.O.       • acetaminophen (Tylenol) tablet 650 mg  650 mg Oral Q6HRS PRN LEN MoranO.   650 mg at 05/22/21 1008   • guaiFENesin ER (MUCINEX) tablet 600 mg  600 mg Oral BID PRN CHARISMA Moran.O.       • guaiFENesin dextromethorphan (ROBITUSSIN DM) 100-10 MG/5ML syrup 10 mL  10 mL Oral Q6HRS PRN LEN MoranO.       • budesonide-formoterol (SYMBICORT) 160-4.5 MCG/ACT inhaler 2 Puff  2 Puff Inhalation BID (RT) LEN MoranOOksana   2 Puff at 05/28/21 0718   • HYDROcodone-homatropine 5-1.5 mg/5 mL solution 5 mL  5 mL Oral Q4HRS PRN LEN MoranOOksana           Fluids    Intake/Output Summary (Last 24 hours) at 5/28/2021 1808  Last data filed at 5/28/2021 1800  Gross per 24 hour   Intake 240 ml   Output 2180 ml   Net -1940 ml       Laboratory          Recent Labs     05/26/21  1330 05/26/21  1330 05/27/21  0443 05/28/21  0412 05/28/21  1412   SODIUM  --   --  130* 127*  --    POTASSIUM 5.1   < > 5.0 5.6* 5.3   CHLORIDE  --   --  87* 89*  --    CO2  --   --  31 26  --    BUN  --   --  63* 53*  --    CREATININE  --   --  1.09 0.86  --    MAGNESIUM 2.0  --   --   --   --    PHOSPHORUS  --   --  3.3  --   --    CALCIUM  --   --  9.7 9.7  --     < > = values in this interval not displayed.     Recent Labs     05/27/21 0443 05/28/21 0412   ALTSGPT 51* 45   ASTSGOT 27 41   ALKPHOSPHAT 106* 122*   TBILIRUBIN 0.6 0.6   GLUCOSE 158* 148*     Recent Labs     05/27/21 0443 05/28/21 0412   WBC 22.6* 22.6*   NEUTSPOLYS 92.80* 81.60*   LYMPHOCYTES 1.90* 2.30*   MONOCYTES 2.50  2.70   EOSINOPHILS 0.00 8.90*   BASOPHILS 0.30 0.60   ASTSGOT 27 41   ALTSGPT 51* 45   ALKPHOSPHAT 106* 122*   TBILIRUBIN 0.6 0.6     Recent Labs     05/27/21  0443 05/28/21  0412   RBC 6.54* 6.36*   HEMOGLOBIN 17.7* 17.2*   HEMATOCRIT 54.0* 55.3*   PLATELETCT 296 207       Imaging  No new imaging to review    Assessment/Plan  * Pneumonia due to COVID-19 virus- (present on admission)  Assessment & Plan  -- Confirmed SARS-CoV-2/COVID on 5/15  -- Risk factors -> age and HTN and not vaccinated  --  decadron to 12mg   -- Received tocilizumab on 5/21   -- plasma to be given today  -- Encourage self prone as tolerated (ie, 2 hour supine and 2 hour prone)  -- Cont aggressive diuresis to seek net negative fluid balance  -- On strict contact, droplet/airbone, eye protection, and critical meticulous hand hygiene    Hyperkalemia  Assessment & Plan  Unclear etiology but also receiving supplemental K  Stopped supplemental K  Check cortisol (pt on dexamethasone)    Hyponatremia  Assessment & Plan  -- na loss from bumex and also may be from SIADH due to the COVID pna  -- worse now  - autodiuresing despite not being on bumex  - add salt tabs  - check TSH and cortisol    Abnormal echocardiogram  Assessment & Plan  --  Pulmonary hypertension  -- Consider etiology to be multifactorial due to left atrial hypertension and severe hypoxemia causing pulmonary vasoconstriction    -- being diuresed  But as 8 l negative and so hold off bumex  -- Need repeat echocardiogram in 2-3 months    -- Avoid hypoxemia, respiratory acidosis, and A fib RVR which can cause worsening pulmonary vasoconstriction leading to worsening PA pressure       Acute respiratory failure with hypoxia (HCC)- (present on admission)  Assessment & Plan  --  hypoxemia secondary to COVID pneumonia, goal saturations greater than 88% as long as she is asymptomatic and fluctuating oxygen needs  -- improving with FIo2 requirement decreased at rest worse with exertion  --  appreciate ID consult - family and patient feel strongly re: convalescent plasma -- one dose 5/27  -- negative fluid balance pt is -10 liters - off diuretics since 5/27  -- Aggressive pulmonary toilet as able  -- Encourage self proning as tolerated   -- Goal SpO2 > 88%       Asthma, moderate persistent, well-controlled- (present on admission)  Assessment & Plan  -- Not in acute flare   -- Cont symbicort and singulair        Longstanding persistent atrial fibrillation (HCC)-Dr. Turner [place]- (present on admission)  Assessment & Plan  -- Currently rate controlled with diltiazem   -- On eliquis   -- High lytes goal    -- Goal HR < 110           VTE:  NOAC and Not Indicated  Ulcer: Not Indicated  Lines: cindy reassessed    I have performed a physical exam and reviewed and updated ROS and Plan today (5/28/2021). In review of yesterday's note (5/27/2021), there are no changes except as documented above.     Discussed patient condition and risk of morbidity and/or mortality with Pharmacy, Charge nurse / hot rounds and Patient  The patient remains critically ill.  Critical care time = 36 minutes in directly providing and coordinating critical care and extensive data review.  No time overlap and excludes procedures.

## 2021-05-28 NOTE — PROGRESS NOTES
Received pt from Hannah AYON, patient is in good spirits sitting comfortably up in bed on hiFlow running at 50L at 90%. Pt is maintaining adequate oxygen levels above 90, however with any type of exertion desats quickly to low 70s. All safety precautions in place.

## 2021-05-28 NOTE — FLOWSHEET NOTE
05/27/21 4723   Events/Summary/Plan   Events/Summary/Plan [facility]   Skin Inspection Respiratory Device Intact   Vital Signs   Pulse 76   Respiration (!) 21   Pulse Oximetry 89 %   $ Pulse Oximetry (Spot Check) Yes   O2 Alarms Set & Reviewed Yes   Respiratory Assessment   Respiratory Pattern Within Normal Limits   Chest Exam   Work Of Breathing / Effort Moderate   Oxygen   O2 (LPM) 50   FiO2% 90 %   O2 Delivery Device Heated High Flow Nasal Cannula   Resuscitation Device at Bedside Self Inflating Bag   Aerosols   $ Aerosol Delivery Device Heated High Flow Nasal Cannula   Non-Invasive Ventilation [facility]   Nocturnal CPAP or BIPAP   (CPAP in room on s/b  - Not in use at this time.)

## 2021-05-28 NOTE — PROGRESS NOTES
Assumed pt care. Pt is alert and oriented X4. Pt.denied any pain. Assessment completed.Pt currently of HHFNC at 50L 90%. Pt destats to mid to low 80s with movement. Pt denied any needs. Safety precautions in place.

## 2021-05-28 NOTE — CARE PLAN
The patient is Watcher - Medium risk of patient condition declining or worsening    Shift Goals  Clinical Goals: Maintain O2 >88%, wean down HHFC settings.  Patient Goals: Tolerate CPAP overnight, prone  Family Goals: Support    Progress made toward(s) clinical / shift goals:  Pt currently on HHFNC at 50L 100%. Stats decrease with movement.     Patient is not progressing towards the following goals:

## 2021-05-28 NOTE — FLOWSHEET NOTE
05/28/21 0212   Events/Summary/Plan   Events/Summary/Plan HHFNC   Skin Inspection Respiratory Device Intact   Vital Signs   Pulse 85   Respiration 18   Pulse Oximetry 90 %   $ Pulse Oximetry (Spot Check) Yes   O2 Alarms Set & Reviewed Yes   Respiratory Assessment   Respiratory Pattern Within Normal Limits   Chest Exam   Work Of Breathing / Effort Mild   Oxygen   O2 (LPM) 50   FiO2% 90 %   O2 Delivery Device Heated High Flow Nasal Cannula   Resuscitation Device at Bedside Self Inflating Bag   Aerosols   $ Aerosol Delivery Device Heated High Flow Nasal Cannula

## 2021-05-28 NOTE — FLOWSHEET NOTE
05/28/21 0212   Events/Summary/Plan   Events/Summary/Plan HHFNC   Skin Inspection Respiratory Device Intact   Vent Settings   FiO2% 90 %

## 2021-05-28 NOTE — CARE PLAN
The patient is Watcher - Medium risk of patient condition declining or worsening    Shift Goals  Clinical Goals: Maintain O2 >88%, wean down HHFC settings.  Patient Goals: Tolerate CPAP overnight, prone  Family Goals: Support    Problem: Fall Risk  Goal: Patient will remain free from falls  5/27/2021 2249 by Jassi Martinez R.N.  Outcome: Progressing  Note: Verbalized to pt the importance of communicating with staff when she requires to use the commode, pt verbalized back understanding and utilizing the call light.   5/27/2021 2246 by Jassi Martinez R.N.  Outcome: Progressing     Problem: Respiratory  Goal: Patient will achieve/maintain optimum respiratory ventilation and gas exchange  5/27/2021 2249 by Jassi Martinez, R.N.  Outcome: Progressing  Note: Explained to pt importance of taking deep inspirations and utilizing the incentive spirometer. Pt verbalized back understanding and will regular complete both exercises.   5/27/2021 2246 by Jassi Martinez R.N.  Outcome: Progressing

## 2021-05-28 NOTE — FLOWSHEET NOTE
05/27/21 1907   Events/Summary/Plan   Events/Summary/Plan HHFNC   Skin Inspection Respiratory Device Intact   Vent Settings   FiO2% 80 %

## 2021-05-29 PROBLEM — D69.6 THROMBOCYTOPENIA (HCC): Status: ACTIVE | Noted: 2021-05-29

## 2021-05-29 LAB
ALBUMIN SERPL BCP-MCNC: 3.4 G/DL (ref 3.2–4.9)
ALBUMIN/GLOB SERPL: 1.4 G/DL
ALP SERPL-CCNC: 133 U/L (ref 30–99)
ALT SERPL-CCNC: 44 U/L (ref 2–50)
ANION GAP SERPL CALC-SCNC: 9 MMOL/L (ref 7–16)
AST SERPL-CCNC: 27 U/L (ref 12–45)
BASOPHILS # BLD AUTO: 0.4 % (ref 0–1.8)
BASOPHILS # BLD: 0.12 K/UL (ref 0–0.12)
BILIRUB SERPL-MCNC: 0.6 MG/DL (ref 0.1–1.5)
BUN SERPL-MCNC: 37 MG/DL (ref 8–22)
CALCIUM SERPL-MCNC: 9.8 MG/DL (ref 8.4–10.2)
CHLORIDE SERPL-SCNC: 93 MMOL/L (ref 96–112)
CO2 SERPL-SCNC: 29 MMOL/L (ref 20–33)
CREAT SERPL-MCNC: 0.8 MG/DL (ref 0.5–1.4)
EOSINOPHIL # BLD AUTO: 0.01 K/UL (ref 0–0.51)
EOSINOPHIL NFR BLD: 0 % (ref 0–6.9)
ERYTHROCYTE [DISTWIDTH] IN BLOOD BY AUTOMATED COUNT: 43 FL (ref 35.9–50)
GLOBULIN SER CALC-MCNC: 2.5 G/DL (ref 1.9–3.5)
GLUCOSE SERPL-MCNC: 169 MG/DL (ref 65–99)
HCT VFR BLD AUTO: 51.9 % (ref 37–47)
HGB BLD-MCNC: 16.5 G/DL (ref 12–16)
IMM GRANULOCYTES # BLD AUTO: 0.84 K/UL (ref 0–0.11)
IMM GRANULOCYTES NFR BLD AUTO: 2.9 % (ref 0–0.9)
LYMPHOCYTES # BLD AUTO: 0.28 K/UL (ref 1–4.8)
LYMPHOCYTES NFR BLD: 1 % (ref 22–41)
MCH RBC QN AUTO: 27 PG (ref 27–33)
MCHC RBC AUTO-ENTMCNC: 31.8 G/DL (ref 33.6–35)
MCV RBC AUTO: 84.9 FL (ref 81.4–97.8)
MONOCYTES # BLD AUTO: 0.56 K/UL (ref 0–0.85)
MONOCYTES NFR BLD AUTO: 1.9 % (ref 0–13.4)
NEUTROPHILS # BLD AUTO: 27.39 K/UL (ref 2–7.15)
NEUTROPHILS NFR BLD: 93.8 % (ref 44–72)
NRBC # BLD AUTO: 0 K/UL
NRBC BLD-RTO: 0 /100 WBC
PLATELET # BLD AUTO: 157 K/UL (ref 164–446)
PMV BLD AUTO: 9.3 FL (ref 9–12.9)
POTASSIUM SERPL-SCNC: 4.9 MMOL/L (ref 3.6–5.5)
PROT SERPL-MCNC: 5.9 G/DL (ref 6–8.2)
RBC # BLD AUTO: 6.11 M/UL (ref 4.2–5.4)
SODIUM SERPL-SCNC: 131 MMOL/L (ref 135–145)
WBC # BLD AUTO: 29.2 K/UL (ref 4.8–10.8)

## 2021-05-29 PROCEDURE — 80053 COMPREHEN METABOLIC PANEL: CPT

## 2021-05-29 PROCEDURE — A9270 NON-COVERED ITEM OR SERVICE: HCPCS | Performed by: INTERNAL MEDICINE

## 2021-05-29 PROCEDURE — 700102 HCHG RX REV CODE 250 W/ 637 OVERRIDE(OP): Performed by: INTERNAL MEDICINE

## 2021-05-29 PROCEDURE — 5A0935A ASSISTANCE WITH RESPIRATORY VENTILATION, LESS THAN 24 CONSECUTIVE HOURS, HIGH NASAL FLOW/VELOCITY: ICD-10-PCS | Performed by: INTERNAL MEDICINE

## 2021-05-29 PROCEDURE — 85025 COMPLETE CBC W/AUTO DIFF WBC: CPT

## 2021-05-29 PROCEDURE — 94760 N-INVAS EAR/PLS OXIMETRY 1: CPT

## 2021-05-29 PROCEDURE — 99233 SBSQ HOSP IP/OBS HIGH 50: CPT | Performed by: INTERNAL MEDICINE

## 2021-05-29 PROCEDURE — 94640 AIRWAY INHALATION TREATMENT: CPT

## 2021-05-29 PROCEDURE — 770022 HCHG ROOM/CARE - ICU (200)

## 2021-05-29 PROCEDURE — A9270 NON-COVERED ITEM OR SERVICE: HCPCS | Performed by: HOSPITALIST

## 2021-05-29 PROCEDURE — 5A09357 ASSISTANCE WITH RESPIRATORY VENTILATION, LESS THAN 24 CONSECUTIVE HOURS, CONTINUOUS POSITIVE AIRWAY PRESSURE: ICD-10-PCS | Performed by: HOSPITALIST

## 2021-05-29 PROCEDURE — 94669 MECHANICAL CHEST WALL OSCILL: CPT

## 2021-05-29 PROCEDURE — 94660 CPAP INITIATION&MGMT: CPT

## 2021-05-29 PROCEDURE — 700102 HCHG RX REV CODE 250 W/ 637 OVERRIDE(OP): Performed by: HOSPITALIST

## 2021-05-29 PROCEDURE — 99291 CRITICAL CARE FIRST HOUR: CPT | Performed by: INTERNAL MEDICINE

## 2021-05-29 RX ORDER — ALPRAZOLAM 0.25 MG/1
0.25 TABLET ORAL NIGHTLY PRN
Status: DISCONTINUED | OUTPATIENT
Start: 2021-05-29 | End: 2021-05-29

## 2021-05-29 RX ORDER — ALPRAZOLAM 0.5 MG/1
0.5 TABLET ORAL 3 TIMES DAILY PRN
Status: DISCONTINUED | OUTPATIENT
Start: 2021-05-29 | End: 2021-06-10 | Stop reason: HOSPADM

## 2021-05-29 RX ORDER — DILTIAZEM HYDROCHLORIDE 5 MG/ML
10 INJECTION INTRAVENOUS EVERY 4 HOURS PRN
Status: DISCONTINUED | OUTPATIENT
Start: 2021-05-29 | End: 2021-06-10 | Stop reason: HOSPADM

## 2021-05-29 RX ADMIN — BUDESONIDE AND FORMOTEROL FUMARATE DIHYDRATE 2 PUFF: 160; 4.5 AEROSOL RESPIRATORY (INHALATION) at 07:21

## 2021-05-29 RX ADMIN — Medication 1 G: at 13:12

## 2021-05-29 RX ADMIN — APIXABAN 5 MG: 5 TABLET, FILM COATED ORAL at 05:42

## 2021-05-29 RX ADMIN — APIXABAN 5 MG: 5 TABLET, FILM COATED ORAL at 17:51

## 2021-05-29 RX ADMIN — DOCUSATE SODIUM 50 MG AND SENNOSIDES 8.6 MG 2 TABLET: 8.6; 5 TABLET, FILM COATED ORAL at 05:42

## 2021-05-29 RX ADMIN — BUDESONIDE AND FORMOTEROL FUMARATE DIHYDRATE 2 PUFF: 160; 4.5 AEROSOL RESPIRATORY (INHALATION) at 20:03

## 2021-05-29 RX ADMIN — BENZOCAINE AND MENTHOL 1 LOZENGE: 15; 3.6 LOZENGE ORAL at 05:43

## 2021-05-29 RX ADMIN — FAMOTIDINE 20 MG: 20 TABLET ORAL at 05:43

## 2021-05-29 RX ADMIN — DEXAMETHASONE 12 MG: 4 TABLET ORAL at 05:42

## 2021-05-29 RX ADMIN — Medication 1 G: at 08:04

## 2021-05-29 RX ADMIN — FERROUS SULFATE TAB 325 MG (65 MG ELEMENTAL FE) 325 MG: 325 (65 FE) TAB at 08:04

## 2021-05-29 RX ADMIN — MONTELUKAST 10 MG: 10 TABLET, FILM COATED ORAL at 17:50

## 2021-05-29 RX ADMIN — BENZOCAINE AND MENTHOL 1 LOZENGE: 15; 3.6 LOZENGE ORAL at 01:35

## 2021-05-29 RX ADMIN — ATORVASTATIN CALCIUM 10 MG: 10 TABLET, FILM COATED ORAL at 05:42

## 2021-05-29 RX ADMIN — DILTIAZEM HYDROCHLORIDE 180 MG: 180 CAPSULE, COATED, EXTENDED RELEASE ORAL at 17:50

## 2021-05-29 RX ADMIN — OXYCODONE HYDROCHLORIDE AND ACETAMINOPHEN 1000 MG: 500 TABLET ORAL at 05:42

## 2021-05-29 RX ADMIN — FAMOTIDINE 20 MG: 20 TABLET ORAL at 17:51

## 2021-05-29 RX ADMIN — DOCUSATE SODIUM 50 MG AND SENNOSIDES 8.6 MG 2 TABLET: 8.6; 5 TABLET, FILM COATED ORAL at 17:49

## 2021-05-29 RX ADMIN — Medication 1 G: at 17:48

## 2021-05-29 RX ADMIN — DILTIAZEM HYDROCHLORIDE 180 MG: 180 CAPSULE, COATED, EXTENDED RELEASE ORAL at 05:43

## 2021-05-29 RX ADMIN — ALPRAZOLAM 0.25 MG: 0.25 TABLET ORAL at 20:05

## 2021-05-29 ASSESSMENT — PULMONARY FUNCTION TESTS
EPAP_CMH2O: 10

## 2021-05-29 ASSESSMENT — ENCOUNTER SYMPTOMS
GASTROINTESTINAL NEGATIVE: 1
WEAKNESS: 1
SHORTNESS OF BREATH: 1
MUSCULOSKELETAL NEGATIVE: 1
CARDIOVASCULAR NEGATIVE: 1
PSYCHIATRIC NEGATIVE: 1
EYES NEGATIVE: 1

## 2021-05-29 ASSESSMENT — PAIN DESCRIPTION - PAIN TYPE
TYPE: ACUTE PAIN

## 2021-05-29 ASSESSMENT — FIBROSIS 4 INDEX
FIB4 SCORE: 2.1
FIB4 SCORE: 2.1

## 2021-05-29 NOTE — CARE PLAN
The patient is Watcher - Medium risk of patient condition declining or worsening    Shift Goals  Clinical Goals: maintain O2 >88%, wean down HFNC settings  Patient Goals: Attempt to prone to improve oxygenation  Family Goals: Support    Progress made toward(s) clinical / shift goals: Pt being placed on bipap, unable to tolerate HFNC on max settings with oxymask in place.    Patient is not progressing towards the following goals: O2 demands and fatigue are increasing, bipap placed on pt\      Problem: Knowledge Deficit - Standard  Goal: Patient and family/care givers will demonstrate understanding of plan of care, disease process/condition, diagnostic tests and medications  Outcome: Progressing     Problem: Fall Risk  Goal: Patient will remain free from falls  Outcome: Progressing     Problem: Respiratory  Goal: Patient will achieve/maintain optimum respiratory ventilation and gas exchange  Outcome: Not Progressing

## 2021-05-29 NOTE — CARE PLAN
The patient is Watcher - Medium risk of patient condition declining or worsening    Shift Goals  Clinical Goals: Maintain O2 >88%, wean down HHFC settings.  Patient Goals: Tolerate CPAP overnight, prone  Family Goals: Support      Problem: Fall Risk  Goal: Patient will remain free from falls  5/28/2021 2219 by Jassi Martinez R.N.  Note: Verbalized to pt the importance of utilizing the call light and not attempting to ambulate on her own. Pt verbalized back understanding of safety precautions.   5/28/2021 2201 by Jassi Martinez R.N.  Outcome: Progressing  Note: Explained to pt the importance of utilizing the call light and making sure that she does not try to attempt in getting up from bed      Problem: Respiratory  Goal: Patient will achieve/maintain optimum respiratory ventilation and gas exchange  5/28/2021 2219 by Jassi Martinez R.N.  Note: Talking with pt about the importance of oxygen saturation numbers and the benefits of rotating her body while in bed to help lungs adequately shift fluids that may be accumulated for proper oxygen exchange to occur. Pt verbalized understanding but would like to lay on her sides rather then prone this evening.   5/28/2021 2201 by Jassi Martinez R.N.  Outcome: Progressing

## 2021-05-29 NOTE — PROGRESS NOTES
Infectious Disease Progress Note    Author: Mel Powers M.D. Date & Time of service: 2021  8:54 AM    Chief Complaint:  COVID 19 pneumonia    Interval History:    Review of Systems:  Review of Systems   Unable to perform ROS: Medical condition       Hemodynamics:  Temp (24hrs), Av.3 °C (97.4 °F), Min:36 °C (96.8 °F), Max:36.6 °C (97.9 °F)  Temperature: 36.6 °C (97.9 °F)  Pulse  Av.7  Min: 44  Max: 114   Blood Pressure : 131/66       Physical Exam:  Physical Exam  Constitutional:       Appearance: Normal appearance. She is not ill-appearing or diaphoretic.   Cardiovascular:      Rate and Rhythm: Normal rate.   Pulmonary:      Comments: Increased work of breathing   Abdominal:      General: Abdomen is flat.   Musculoskeletal:      Left lower leg: No edema.   Skin:     General: Skin is warm and dry.   Neurological:      General: No focal deficit present.      Mental Status: She is alert.   Psychiatric:         Mood and Affect: Mood normal.         Behavior: Behavior normal.         Meds:    Current Facility-Administered Medications:   •  sodium chloride  •  benzocaine-menthol  •  dexamethasone  •  MD Alert...Adult ICU Electrolyte Replacement per Pharmacy  •  famotidine  •  ferrous sulfate  •  apixaban  •  ascorbic acid  •  atorvastatin  •  benzonatate  •  DILTIAZem CD  •  montelukast  •  ondansetron  •  ondansetron  •  senna-docusate **AND** polyethylene glycol/lytes **AND** magnesium hydroxide **AND** bisacodyl  •  acetaminophen  •  guaiFENesin ER  •  guaiFENesin dextromethorphan  •  budesonide-formoterol  •  HYDROcodone-homatropine 5-1.5 mg/5 mL    Labs:  Recent Labs     21  0443 21  0412 21  0426   WBC 22.6* 22.6* 29.2*   RBC 6.54* 6.36* 6.11*   HEMOGLOBIN 17.7* 17.2* 16.5*   HEMATOCRIT 54.0* 55.3* 51.9*   MCV 82.6 86.9 84.9   MCH 27.1 27.0 27.0   RDW 41.5 45.1 43.0   PLATELETCT 296 207 157*   MPV 9.6 10.1 9.3   NEUTSPOLYS 92.80* 81.60* 93.80*   LYMPHOCYTES 1.90* 2.30* 1.00*    MONOCYTES 2.50 2.70 1.90   EOSINOPHILS 0.00 8.90* 0.00   BASOPHILS 0.30 0.60 0.40     Recent Labs     05/27/21 0443 05/27/21 0443 05/28/21 0412 05/28/21  1412 05/29/21  0426   SODIUM 130*  --  127*  --  131*   POTASSIUM 5.0   < > 5.6* 5.3 4.9   CHLORIDE 87*  --  89*  --  93*   CO2 31  --  26  --  29   GLUCOSE 158*  --  148*  --  169*   BUN 63*  --  53*  --  37*    < > = values in this interval not displayed.     Recent Labs     05/27/21 0443 05/28/21 0412 05/29/21 0426   ALBUMIN 3.4 3.1* 3.4   TBILIRUBIN 0.6 0.6 0.6   ALKPHOSPHAT 106* 122* 133*   TOTPROTEIN 5.8* 6.2 5.9*   ALTSGPT 51* 45 44   ASTSGOT 27 41 27   CREATININE 1.09 0.86 0.80       Imaging:  DX-CHEST-PORTABLE (1 VIEW)    Result Date: 5/28/2021 5/28/2021 10:29 AM HISTORY/REASON FOR EXAM: Cough and shortness of breath. TECHNIQUE/EXAM DESCRIPTION AND NUMBER OF VIEWS: Single portable view of the chest. COMPARISON: 5/23/2021 FINDINGS: There are diffuse bilateral pulmonary infiltrates. There is no pleural effusion. The heart is enlarged.     1.  Diffuse bilateral pulmonary infiltrates. This may represent pulmonary edema versus pneumonia.. 2.  Cardiomegaly.    DX-CHEST-PORTABLE (1 VIEW)    Result Date: 5/23/2021 5/23/2021 3:11 AM HISTORY/REASON FOR EXAM:  Shortness of Breath TECHNIQUE/EXAM DESCRIPTION AND NUMBER OF VIEWS: Single portable view of the chest. COMPARISON:  5/20/2021 and 5/15/2021 FINDINGS: Cardiomegaly is unchanged. Diffuse interstitial and alveolar opacities are without significant change consistent with edema or pneumonia. Differential diagnosis would include Covid 19 pneumonia. No lobar consolidation is noted. No pleural effusion is noted. No pneumothorax is identified.     1.  Persistent bilateral interstitial and alveolar opacities are present consistent with edema or pneumonia. Differential diagnosis would include Covid 19 pneumonia. 2.  No pneumothorax identified.    DX-CHEST-PORTABLE (1 VIEW)    Result Date: 5/20/2021 5/20/2021  2:41 PM HISTORY/REASON FOR EXAM: Worsening hypoxia, shortness of breath. Elevated pneumonia. Hemoptysis... TECHNIQUE/EXAM DESCRIPTION AND NUMBER OF VIEWS: Single AP view of the chest. COMPARISON: 5/15/2021 FINDINGS: Lungs: New multifocal consolidation of moderate severity Pleura:  No pleural space process is seen. Heart and mediastinum: There is stable cardiac silhouette enlargement.     Worsening moderate multifocal consolidation compatible with known Covid pneumonia Stable marked cardiac silhouette enlargement    DX-CHEST-PORTABLE (1 VIEW)    Result Date: 5/15/2021  5/15/2021 1:14 PM HISTORY/REASON FOR EXAM:  possible sepsis.. Shortness of breath TECHNIQUE/EXAM DESCRIPTION AND NUMBER OF VIEWS: Single portable view of the chest. COMPARISON: August 31, 2020 FINDINGS: The cardiac silhouette is enlarged. There is fullness of the right hilum which could be related to vascularity such as enlarged ascending aorta. There are some interstitial prominence. No effusion or pneumothorax identified.     Enlarged cardiac silhouette with interstitial prominence suggesting pulmonary edema. Right hilar soft tissue density again noted could represent enlargement of the ascending aorta or pulmonary arteries.    US-EXTREMITY VENOUS LOWER BILAT    Result Date: 5/23/2021 5/23/2021 10:48 AM HISTORY/REASON FOR EXAM:  Swelling of Limb Right lower extremity edema TECHNIQUE/EXAM DESCRIPTION: Using both color and pulsed-wave Doppler imaging, multiple images were obtained from the right common femoral vein origin distally through the popliteal trifurcation.  Graded compression was used to demonstrate a patent lumen. COMPARISON:  None. FINDINGS: There is no evidence of luminal thrombus.  There is normal augmentation to flow and respiratory variation.     1. No evidence of right lower extremity deep venous thrombosis.    EC-ECHOCARDIOGRAM LTD W/O CONT    Result Date: 5/16/2021  Transthoracic Echo Report Echocardiography Laboratory CONCLUSIONS  Compared to the images of the prior study done 9/15/2020, there is evidence of increase in pulmonary hypertension along with right ventricular systolic reduction and ventricular dilation. Normal left ventricular systolic function. Abnormal septal motion consistent with right ventricular (RV) volume overload and/or elevated RV end-diastolic pressure. Mild aortic insufficiency. Moderate mitral regurgitation. Estimated right ventricular systolic pressure  is 50 mmHg. SHIRA WHALEY Exam Date:         2021                    13:25 Exam Location:     Inpatient Priority:          Routine Ordering Physician:        LAINE JULIAN Referring Physician: Sonographer:               Anastacio Shaffer [facility] Age:    80     Gender:    F MRN:    [id number] :    [id number] BSA:    2.08   Ht (in):    67     Wt (lb):    213 Exam Type:     Limited Indications:     Edema, unspecified ICD Codes:       R60.9 CPT Codes:       31450 BP:   141    /   79     HR:   90 Technical Quality:       Fair MEASUREMENTS  (Male / Female) Normal Values 2D ECHO LV Diastolic Diameter PLAX        5.3 cm                4.2 - 5.9 / 3.9 - 5.3 cm LV Systolic Diameter PLAX         3.1 cm                2.1 - 4.0 cm IVS Diastolic Thickness           0.66 cm               LVPW Diastolic Thickness          0.97 cm               Estimated LV Ejection Fraction    60 %                  LV Ejection Fraction MOD BP       58.4 %                >= 55  % LV Ejection Fraction MOD 4C       64.4 %                LV Ejection Fraction MOD 2C       56.8 %                IVC Diameter                      3.1 cm                DOPPLER AI Pressure Half Time             397 ms                MR ERO PISA                       0.2 cm2               MR Regurgitant Volume PISA        30.9 cm3              TR Peak Velocity                  272 cm/s              * Indicates values subject to auto-interpretation LV EF:  60    % FINDINGS Left Ventricle Normal left ventricular chamber  "size. Normal left ventricular wall thickness. Normal left ventricular systolic function. Left ventricular ejection fraction is visually estimated to be 60%. Abnormal septal motion consistent with right ventricular (RV) volume overload and/or elevated RV end-diastolic pressure. Right Ventricle Severely dilated right ventricle. Reduced right ventricular systolic function. Right Atrium Enlarged right atrium. Dilated inferior vena cava with inspiratory collapse. Left Atrium Left atrium not well visualized. Mitral Valve Thickened mitral valve leaflets. No mitral stenosis. Moderate mitral regurgitation. Aortic Valve Tricuspid aortic valve. Aortic sclerosis without stenosis. Mild aortic insufficiency. Tricuspid Valve Structurally normal tricuspid valve. No tricuspid stenosis. Moderate tricuspid regurgitation. Right atrial pressure is estimated to be 8 mmHg. Estimated right ventricular systolic pressure  is 50 mmHg. Pulmonic Valve The pulmonic valve is not well visualized. No pulmonic stenosis. Trace pulmonic insufficiency. Pulmonic artery diastolic pressure is 12 mmHg. Pericardium No pericardial effusion seen. Aorta Gene-Vale N To (Electronically Signed) Final Date:     16 May 2021 18:43      Micro:  Results     Procedure Component Value Units Date/Time    BLOOD CULTURE [[id number]] Collected: 05/20/21 1430    Order Status: Completed Specimen: Blood from Peripheral Updated: 05/25/21 1617     Significant Indicator NEG     Source BLD     Site PERIPHERAL     Culture Result No growth after 5 days of incubation.  Blood culture testing and Gram stain, if indicated, are  performed at [facility], [address] [place] [address].  Positive blood cultures are  sent to [facility], [address] [place] [address], for organism identification and  susceptibility testing.      Narrative:      Per Hospital Policy: Only change Specimen Src: to \"Line\" if  specified by physician " "order.  Right AC    BLOOD CULTURE [[id number]] Collected: 05/20/21 1445    Order Status: Completed Specimen: Blood from Peripheral Updated: 05/25/21 1617     Significant Indicator NEG     Source BLD     Site PERIPHERAL     Culture Result No growth after 5 days of incubation.  Blood culture testing and Gram stain, if indicated, are  performed at [facility], [address] [place] [address].  Positive blood cultures are  sent to [facility], [address] [place] [address], for organism identification and  susceptibility testing.      Narrative:      Per Hospital Policy: Only change Specimen Src: to \"Line\" if  specified by physician order.  Left Hand          Assessment:  Active Hospital Problems    Diagnosis    • *Pneumonia due to COVID-19 virus [U07.1, J12.82]    • Hyperkalemia [E87.5]    • Hyponatremia [E87.1]    • Abnormal echocardiogram [R93.1]    • Acute respiratory failure with hypoxia (HCC) [J96.01]    • Panlobular emphysema (HCC) [J43.1]    • Iron deficiency [E61.1]    • Hypertension [I10]    • Anticoagulant long-term use- dr eunice collado, [place]; for a fib [Z79.01]    • Longstanding persistent atrial fibrillation (HCC)-Dr. Tompkins [facility] [I48.11]    • Asthma, moderate persistent, well-controlled [J45.40]      Interval 24 hours:      AF, 60/100% - increased   Labs reviewed  Imaging personally reviewed both images and report.   Studies reviewed  Micro reviewed    Pt requiring more oxygen today and will transition to BiPAP.  Elevation in WBC but CXR improved slightly and no worsening of cough or secretions.     Assessment:  80 y.o.  admitted 5/20/2021. Pt has a past medical history of A. fib on Eliquis, asthma and hypertension.  She was diagnosed with COVID-19 on 5/15 and admitted for a few days but then discharged home once improved.  She then had worsening cough and shortness of breath so again presented to the ED.  She was initiated on steroids and ID was " consulted for possible remdesivir.  However, she rapidly decompensated requiring high flow oxygen and was transferred to ICU.  Due to acute decompensation and on high flow she was not given remdesivir is no proven benefit in this patient group.  Rapid decompensation and demonstrating markedly elevated CRP she was given tocilizumab on 5/21.  TB quant ordered and equivocal likely due to acute illness.  Acute hepatitis panel is negative.  She has remained in the ICU on high flow and ID consulted for additional management.     Problem List      COVID-19 pneumonia  - Received Toci  - Received plasma   Hypoxemia, on high flow nasal cannula due to above  Leukocytosis, new today  Elevated inflammatory markers  History of A. fib on Eliquis  History of asthma     Plan      --- Continue supportive care   --- Continue steroids   --- Monitor for any secondary infections -she has been afebrile, WBC increasing but procalcitonin has been consistently low, no increase in cough or secretions, no new consolidations on CXR  --- Repeat procalcitonin for tomorrow to trend   --- Recommend repeat TB quant once patient has improved-in 1 to 2-months   --- Monitor labs     Discussed with ICU nurse and Dr. Aguilera.  ID will follow.

## 2021-05-29 NOTE — PROGRESS NOTES
"Pulmonary Progress Note    Date of admission  5/20/2021    Chief Complaint  80 y.o. female admitted 5/20/2021 with covid pna    Hospital Course  \"80 y.o. female who presented 5/20/2021 with acute hypoxemia respiratory failure. Patient with significant history of atrial fibrillation on eliquis, asthma, and hypertension admitted for worsening hypoxemia secondary to COVID pneumonia. She was recently discharged home on 5/17 on 1 liters NC and presents back on 5/20 with worsening shortness of breath. On admission, she required 6 liters NC which quickly escalated to HFNC. ID consulted and patient was tocilizumab on 5/21. Her oxygenation worsen overnight as she was maxed out on HFNC and transferred to ICU for BiPAP 12/6 FiO2 100%.      In ICU, she was diuresed and documented I/O -> neg ~1 liter. Patient is breath comfortably on BiPAP and able to answer questions via BiPAP facemask. Subjectively, patient denies chest pain, cough, N/V, fever/chills, or abdominal pain. She endorse having shortness of breath, neck pain, and back pain. Discussed about the benefits of proning if she can tolerate at which she agrees to with RN assistance. Discussed about risks/benefits endotracheal intubation if she needs it in the upcoming hours to days which she agrees to.      Echocardiogram 5/16/21 personally reviewed -> preserved LV function, mild to moderate MR, dilated coronary sinus, eccentric TR jet with measure RVSP ~50 mmHG which is most likely underestimated, TAPSE ~0.9 cm -> reduced RV function, dilated RV and RA, IL -> 0.89 m/s (~18 mmHG) and abnormal septal bounce, dilated IVC with inspiratory collapse. \" From Dr. Tamayo's note    COVID meds: Received Tocilimuzab on 5/21; on dexamethasone restarted 5/20, 12 mg, plasma 5/27 5/24: LE doppler negative; FIo2 increased to 100% and 50L    5/25: FIo2 down to 40 l 80%.   Pt's son Kaiser Black requested an infectious disease doctor to call as he is expressed that he was not happy with " patient's care and progress. Main questions patients son asked about were  for Ivermectin, Hydroxychloroquine and Remdesivir.  ID on call DR. Powers will call son this pm.    5/26: Fio2 50 L 70%  Dr. Powers reviewed with son and is reviewing options of plasma    5/27: Plasma    5/29: pt worse with sats now 85% on 100% 60 l and oxymask -- changed to bipap. CXR 5/28 showed improvement. Family aware      Interval Problem Update  Reviewed last 24 hour events:  Chart review from the past 24 hours includes imaging, laboratory studies, vital signs and notes available.  Pertinent data for today    Cardiac:  No events BP ok  Pulm: 60 l 100% and NRB ---> BIPAP 16/10 70%  Neuro:  intact  Heme:  H and H 51  Off diuretics since 5/28  I/O:  -12 since admission  ID: tocilimuzab 5/21, 5/27 plasma  GI/endo:  Sugar  ok  Labs/Imaging:  Hyponatremia better 131  Lines:  peripherals  Mobility:  OOB to chair  Symptoms:really has no complaints    Review of Systems  Review of Systems   Constitutional: Positive for malaise/fatigue.   HENT: Negative.    Eyes: Negative.    Respiratory: Positive for shortness of breath.    Cardiovascular: Negative.    Gastrointestinal: Negative.    Genitourinary: Negative.    Musculoskeletal: Negative.    Skin: Negative.    Neurological: Positive for weakness.   Endo/Heme/Allergies: Negative.    Psychiatric/Behavioral: Negative.         Vital Signs for last 24 hours   Temp:  [36 °C (96.8 °F)-36.6 °C (97.9 °F)] 36.6 °C (97.9 °F)  Pulse:  [] 72  Resp:  [11-52] 32  BP: ()/(45-73) 155/62  SpO2:  [80 %-96 %] 91 %          Physical Exam   Physical Exam  Constitutional:       Appearance: She is ill-appearing.   HENT:      Head: Normocephalic and atraumatic.      Mouth/Throat:      Mouth: Mucous membranes are moist.   Eyes:      Extraocular Movements: Extraocular movements intact.      Pupils: Pupils are equal, round, and reactive to light.   Cardiovascular:      Rate and Rhythm: Rhythm irregular.       Heart sounds: Murmur heard.     Pulmonary:      Effort: Pulmonary effort is normal.   Skin:     General: Skin is warm and dry.   Neurological:      General: No focal deficit present.      Mental Status: She is alert and oriented to person, place, and time.   Psychiatric:         Mood and Affect: Mood normal.         Behavior: Behavior normal.         Thought Content: Thought content normal.         Judgment: Judgment normal.         Medications  Current Facility-Administered Medications   Medication Dose Route Frequency Provider Last Rate Last Admin   • sodium chloride (SALT) tablet 1 g  1 g Oral TID WITH MEALS Rashad Colon M.D.   1 g at 05/29/21 0804   • benzocaine-menthol (CEPACOL) lozenge 1 Lozenge  1 Lozenge Mouth/Throat Q2HRS PRN Rashad oClon M.D.   1 Lozenge at 05/29/21 0543   • dexamethasone (DECADRON) tablet 12 mg  12 mg Oral DAILY Dale Tamayo M.D.   12 mg at 05/29/21 0542   • MD Alert...ICU Electrolyte Replacement per Pharmacy   Other PHARMACY TO DOSE Dale Tamayo M.D.       • famotidine (PEPCID) tablet 20 mg  20 mg Oral BID Ivan Nunez M.D.   20 mg at 05/29/21 0543   • ferrous sulfate tablet 325 mg  325 mg Oral QDAY with Breakfast Ivan Nunez M.D.   325 mg at 05/29/21 0804   • apixaban (ELIQUIS) tablet 5 mg  5 mg Oral BID Cheryl Gallagher, D.O.   5 mg at 05/29/21 0542   • ascorbic acid (Vitamin C) tablet 1,000 mg  1,000 mg Oral DAILY Cheryl Gallagher, D.O.   1,000 mg at 05/29/21 0542   • atorvastatin (LIPITOR) tablet 10 mg  10 mg Oral DAILY Cheryl Gallagher, D.O.   10 mg at 05/29/21 0542   • benzonatate (TESSALON) capsule 100 mg  100 mg Oral TID PRN Cheryl Gallagher, D.O.   100 mg at 05/26/21 1222   • DILTIAZem CD (CARDIZEM CD) capsule 180 mg  180 mg Oral BID Cheryl Gallagher, D.O.   180 mg at 05/29/21 0543   • montelukast (SINGULAIR) tablet 10 mg  10 mg Oral Q EVENING LEN MoranOOksana   10 mg at 05/28/21 1717   • ondansetron (ZOFRAN) syringe/vial injection 4 mg  4 mg Intravenous Q6HRS PRN  Cheryl Gallagher D.O.       • ondansetron (ZOFRAN ODT) dispertab 4 mg  4 mg Oral Q6HRS PRN Cheryl Gallagher D.O.       • senna-docusate (PERICOLACE or SENOKOT S) 8.6-50 MG per tablet 2 tablet  2 tablet Oral BID LEN MoranOOksana   2 tablet at 05/29/21 0542    And   • polyethylene glycol/lytes (MIRALAX) PACKET 1 Packet  1 Packet Oral QDAY PRN LEN MoranOOksana        And   • magnesium hydroxide (MILK OF MAGNESIA) suspension 30 mL  30 mL Oral QDAY PRN Cheryl Gallagher D.O.        And   • bisacodyl (DULCOLAX) suppository 10 mg  10 mg Rectal QDAY PRN LEN MoranO.       • acetaminophen (Tylenol) tablet 650 mg  650 mg Oral Q6HRS PRN LEN MoranOOksana   650 mg at 05/22/21 1008   • guaiFENesin ER (MUCINEX) tablet 600 mg  600 mg Oral BID PRN LEN MoranO.       • guaiFENesin dextromethorphan (ROBITUSSIN DM) 100-10 MG/5ML syrup 10 mL  10 mL Oral Q6HRS PRN LEN MoranO.       • budesonide-formoterol (SYMBICORT) 160-4.5 MCG/ACT inhaler 2 Puff  2 Puff Inhalation BID (RT) LEN MoranOOksana   2 Puff at 05/29/21 0721   • HYDROcodone-homatropine 5-1.5 mg/5 mL solution 5 mL  5 mL Oral Q4HRS PRN LEN MoranOOksana           Fluids    Intake/Output Summary (Last 24 hours) at 5/29/2021 1019  Last data filed at 5/29/2021 1000  Gross per 24 hour   Intake 100 ml   Output 1615 ml   Net -1515 ml       Laboratory          Recent Labs     05/26/21  1330 05/26/21  1330 05/27/21  0443 05/27/21  0443 05/28/21  0412 05/28/21  1412 05/29/21  0426   SODIUM  --   --  130*  --  127*  --  131*   POTASSIUM 5.1   < > 5.0   < > 5.6* 5.3 4.9   CHLORIDE  --   --  87*  --  89*  --  93*   CO2  --   --  31  --  26  --  29   BUN  --   --  63*  --  53*  --  37*   CREATININE  --   --  1.09  --  0.86  --  0.80   MAGNESIUM 2.0  --   --   --   --   --   --    PHOSPHORUS  --   --  3.3  --   --   --   --    CALCIUM  --   --  9.7  --  9.7  --  9.8    < > = values in this interval not displayed.     Recent Labs     05/27/21  0443 05/28/21  0412  05/29/21 0426   ALTSGPT 51* 45 44   ASTSGOT 27 41 27   ALKPHOSPHAT 106* 122* 133*   TBILIRUBIN 0.6 0.6 0.6   GLUCOSE 158* 148* 169*     Recent Labs     05/27/21 0443 05/28/21 0412 05/29/21 0426   WBC 22.6* 22.6* 29.2*   NEUTSPOLYS 92.80* 81.60* 93.80*   LYMPHOCYTES 1.90* 2.30* 1.00*   MONOCYTES 2.50 2.70 1.90   EOSINOPHILS 0.00 8.90* 0.00   BASOPHILS 0.30 0.60 0.40   ASTSGOT 27 41 27   ALTSGPT 51* 45 44   ALKPHOSPHAT 106* 122* 133*   TBILIRUBIN 0.6 0.6 0.6     Recent Labs     05/27/21 0443 05/28/21 0412 05/29/21 0426   RBC 6.54* 6.36* 6.11*   HEMOGLOBIN 17.7* 17.2* 16.5*   HEMATOCRIT 54.0* 55.3* 51.9*   PLATELETCT 296 207 157*       Imaging  5/28: Compared to 5/23 improved CXR still reticular pattern    Assessment/Plan  * Pneumonia due to COVID-19 virus- (present on admission)  Assessment & Plan  -- Confirmed SARS-CoV-2/COVID on 5/15  -- Risk factors -> age and HTN and not vaccinated  --  decadron to 12mg   -- Received tocilizumab on 5/21   -- plasma to be given today  -- Encourage self prone as tolerated (ie, 2 hour supine and 2 hour prone)  -- Cont aggressive diuresis to seek net negative fluid balance  -- On strict contact, droplet/airbone, eye protection, and critical meticulous hand hygiene    Thrombocytopenia (HCC)  Assessment & Plan  COVID  No signs of bleeding    Hyperkalemia  Assessment & Plan  Normal now    Hyponatremia  Assessment & Plan  -- na loss from bumex and also may be from SIADH due to the COVID pna  -- worse now  - autodiuresing despite not being on bumex  - add salt tabs  - check TSH and cortisol    Abnormal echocardiogram  Assessment & Plan  --  Pulmonary hypertension  -- Consider etiology to be multifactorial due to left atrial hypertension and severe hypoxemia causing pulmonary vasoconstriction    -- being diuresed  But as 8 l negative and so hold off bumex  -- Need repeat echocardiogram in 2-3 months    -- Avoid hypoxemia, respiratory acidosis, and A fib RVR which can cause worsening  pulmonary vasoconstriction leading to worsening PA pressure       Acute respiratory failure with hypoxia (HCC)- (present on admission)  Assessment & Plan  --  hypoxemia secondary to COVID pneumonia, goal saturations greater than 88% as long as she is asymptomatic and fluctuating oxygen needs  -- improving with FIo2 requirement decreased at rest worse with exertion  -- appreciate ID consult - family and patient feel strongly re: convalescent plasma -- one dose 5/27  -- negative fluid balance pt is -10 liters - off diuretics since 5/27  -- Aggressive pulmonary toilet as able  -- Encourage self proning as tolerated   -- Goal SpO2 > 88%       Asthma, moderate persistent, well-controlled- (present on admission)  Assessment & Plan  -- Not in acute flare   -- Cont symbicort and singulair        Longstanding persistent atrial fibrillation (HCC)-Dr. Turner [place]- (present on admission)  Assessment & Plan  -- Currently rate controlled with diltiazem   -- On eliquis   -- High lytes goal    -- Goal HR < 110       Pt is not progressing and prognosis guarded  D/w ID Dr. Powers who also discussed with family      VTE:  NOAC and Not Indicated  Ulcer: Not Indicated  Lines: cindy reassessed    I have performed a physical exam and reviewed and updated ROS and Plan today (5/29/2021). In review of yesterday's note (5/28/2021), there are no changes except as documented above.     Discussed patient condition and risk of morbidity and/or mortality with Pharmacy, Charge nurse / hot rounds and Patient  The patient remains critically ill.  Critical care time = 32 minutes in directly providing and coordinating critical care and extensive data review.  No time overlap and excludes procedures.

## 2021-05-29 NOTE — PROGRESS NOTES
Received pt from Hannah AYON, family at bedside. Pt currently on hiFlow at 50l and 100%. Patient in good spirits states not in any pain and will try to prone this evening. All safety precautions in place.

## 2021-05-29 NOTE — PROGRESS NOTES
Received report and assumed care of pt. Pt resting in bed at this time. Pt fatigued, reports mild shortness of breath. All needs met at this time, safety measures in place.

## 2021-05-29 NOTE — CARE PLAN
Pt remains on HFNC 50L, 100% fi02. Pt is attempting to prone (will lay on side if unable), and is aware of importance. Pt states that i.s., and flutter valve use make her cough excessively.Will continue to monitor.

## 2021-05-30 LAB
ALBUMIN SERPL BCP-MCNC: 3.3 G/DL (ref 3.2–4.9)
ALBUMIN/GLOB SERPL: 1.4 G/DL
ALP SERPL-CCNC: 155 U/L (ref 30–99)
ALT SERPL-CCNC: 39 U/L (ref 2–50)
ANION GAP SERPL CALC-SCNC: 12 MMOL/L (ref 7–16)
AST SERPL-CCNC: 30 U/L (ref 12–45)
BASOPHILS # BLD AUTO: 0.2 % (ref 0–1.8)
BASOPHILS # BLD: 0.06 K/UL (ref 0–0.12)
BILIRUB SERPL-MCNC: 0.7 MG/DL (ref 0.1–1.5)
BUN SERPL-MCNC: 43 MG/DL (ref 8–22)
CALCIUM SERPL-MCNC: 10 MG/DL (ref 8.4–10.2)
CHLORIDE SERPL-SCNC: 94 MMOL/L (ref 96–112)
CO2 SERPL-SCNC: 28 MMOL/L (ref 20–33)
CORTIS SERPL-MCNC: 2.4 UG/DL (ref 0–23)
CREAT SERPL-MCNC: 0.86 MG/DL (ref 0.5–1.4)
CRP SERPL HS-MCNC: 3.69 MG/DL (ref 0–0.75)
EOSINOPHIL # BLD AUTO: 0 K/UL (ref 0–0.51)
EOSINOPHIL NFR BLD: 0 % (ref 0–6.9)
ERYTHROCYTE [DISTWIDTH] IN BLOOD BY AUTOMATED COUNT: 42.5 FL (ref 35.9–50)
GLOBULIN SER CALC-MCNC: 2.3 G/DL (ref 1.9–3.5)
GLUCOSE BLD-MCNC: 228 MG/DL (ref 65–99)
GLUCOSE SERPL-MCNC: 193 MG/DL (ref 65–99)
HCT VFR BLD AUTO: 47.3 % (ref 37–47)
HGB BLD-MCNC: 15.2 G/DL (ref 12–16)
IMM GRANULOCYTES # BLD AUTO: 0.48 K/UL (ref 0–0.11)
IMM GRANULOCYTES NFR BLD AUTO: 1.8 % (ref 0–0.9)
LYMPHOCYTES # BLD AUTO: 0.21 K/UL (ref 1–4.8)
LYMPHOCYTES NFR BLD: 0.8 % (ref 22–41)
MCH RBC QN AUTO: 26.4 PG (ref 27–33)
MCHC RBC AUTO-ENTMCNC: 32.1 G/DL (ref 33.6–35)
MCV RBC AUTO: 82.1 FL (ref 81.4–97.8)
MONOCYTES # BLD AUTO: 0.41 K/UL (ref 0–0.85)
MONOCYTES NFR BLD AUTO: 1.6 % (ref 0–13.4)
NEUTROPHILS # BLD AUTO: 24.85 K/UL (ref 2–7.15)
NEUTROPHILS NFR BLD: 95.6 % (ref 44–72)
NRBC # BLD AUTO: 0 K/UL
NRBC BLD-RTO: 0 /100 WBC
PLATELET # BLD AUTO: 142 K/UL (ref 164–446)
PMV BLD AUTO: 10.2 FL (ref 9–12.9)
POTASSIUM SERPL-SCNC: 5.5 MMOL/L (ref 3.6–5.5)
PROCALCITONIN SERPL-MCNC: 0.2 NG/ML
PROT SERPL-MCNC: 5.6 G/DL (ref 6–8.2)
RBC # BLD AUTO: 5.76 M/UL (ref 4.2–5.4)
SODIUM SERPL-SCNC: 134 MMOL/L (ref 135–145)
WBC # BLD AUTO: 26 K/UL (ref 4.8–10.8)

## 2021-05-30 PROCEDURE — 94760 N-INVAS EAR/PLS OXIMETRY 1: CPT

## 2021-05-30 PROCEDURE — 700102 HCHG RX REV CODE 250 W/ 637 OVERRIDE(OP): Performed by: INTERNAL MEDICINE

## 2021-05-30 PROCEDURE — 80053 COMPREHEN METABOLIC PANEL: CPT

## 2021-05-30 PROCEDURE — A9270 NON-COVERED ITEM OR SERVICE: HCPCS | Performed by: INTERNAL MEDICINE

## 2021-05-30 PROCEDURE — A9270 NON-COVERED ITEM OR SERVICE: HCPCS | Performed by: HOSPITALIST

## 2021-05-30 PROCEDURE — 84145 PROCALCITONIN (PCT): CPT

## 2021-05-30 PROCEDURE — 82962 GLUCOSE BLOOD TEST: CPT

## 2021-05-30 PROCEDURE — 85025 COMPLETE CBC W/AUTO DIFF WBC: CPT

## 2021-05-30 PROCEDURE — 700102 HCHG RX REV CODE 250 W/ 637 OVERRIDE(OP): Performed by: HOSPITALIST

## 2021-05-30 PROCEDURE — 99291 CRITICAL CARE FIRST HOUR: CPT | Performed by: INTERNAL MEDICINE

## 2021-05-30 PROCEDURE — 94660 CPAP INITIATION&MGMT: CPT

## 2021-05-30 PROCEDURE — 94640 AIRWAY INHALATION TREATMENT: CPT

## 2021-05-30 PROCEDURE — 86140 C-REACTIVE PROTEIN: CPT

## 2021-05-30 PROCEDURE — 5A09357 ASSISTANCE WITH RESPIRATORY VENTILATION, LESS THAN 24 CONSECUTIVE HOURS, CONTINUOUS POSITIVE AIRWAY PRESSURE: ICD-10-PCS | Performed by: HOSPITALIST

## 2021-05-30 PROCEDURE — 770022 HCHG ROOM/CARE - ICU (200)

## 2021-05-30 PROCEDURE — 5A0935A ASSISTANCE WITH RESPIRATORY VENTILATION, LESS THAN 24 CONSECUTIVE HOURS, HIGH NASAL FLOW/VELOCITY: ICD-10-PCS | Performed by: INTERNAL MEDICINE

## 2021-05-30 PROCEDURE — 94669 MECHANICAL CHEST WALL OSCILL: CPT

## 2021-05-30 RX ORDER — DEXTROSE MONOHYDRATE 25 G/50ML
50 INJECTION, SOLUTION INTRAVENOUS
Status: DISCONTINUED | OUTPATIENT
Start: 2021-05-30 | End: 2021-06-03

## 2021-05-30 RX ADMIN — APIXABAN 5 MG: 5 TABLET, FILM COATED ORAL at 06:30

## 2021-05-30 RX ADMIN — DOCUSATE SODIUM 50 MG AND SENNOSIDES 8.6 MG 2 TABLET: 8.6; 5 TABLET, FILM COATED ORAL at 06:30

## 2021-05-30 RX ADMIN — DEXAMETHASONE 12 MG: 4 TABLET ORAL at 06:30

## 2021-05-30 RX ADMIN — DILTIAZEM HYDROCHLORIDE 180 MG: 180 CAPSULE, COATED, EXTENDED RELEASE ORAL at 17:39

## 2021-05-30 RX ADMIN — INSULIN HUMAN 3 UNITS: 100 INJECTION, SOLUTION PARENTERAL at 20:55

## 2021-05-30 RX ADMIN — ATORVASTATIN CALCIUM 10 MG: 10 TABLET, FILM COATED ORAL at 06:30

## 2021-05-30 RX ADMIN — DILTIAZEM HYDROCHLORIDE 180 MG: 180 CAPSULE, COATED, EXTENDED RELEASE ORAL at 06:30

## 2021-05-30 RX ADMIN — ALPRAZOLAM 0.5 MG: 0.5 TABLET ORAL at 01:55

## 2021-05-30 RX ADMIN — FAMOTIDINE 20 MG: 20 TABLET ORAL at 06:30

## 2021-05-30 RX ADMIN — APIXABAN 5 MG: 5 TABLET, FILM COATED ORAL at 17:39

## 2021-05-30 RX ADMIN — INSULIN HUMAN 2 UNITS: 100 INJECTION, SOLUTION PARENTERAL at 17:37

## 2021-05-30 RX ADMIN — FAMOTIDINE 20 MG: 20 TABLET ORAL at 17:39

## 2021-05-30 RX ADMIN — OXYCODONE HYDROCHLORIDE AND ACETAMINOPHEN 1000 MG: 500 TABLET ORAL at 06:30

## 2021-05-30 RX ADMIN — BUDESONIDE AND FORMOTEROL FUMARATE DIHYDRATE 2 PUFF: 160; 4.5 AEROSOL RESPIRATORY (INHALATION) at 20:31

## 2021-05-30 RX ADMIN — DOCUSATE SODIUM 50 MG AND SENNOSIDES 8.6 MG 2 TABLET: 8.6; 5 TABLET, FILM COATED ORAL at 17:38

## 2021-05-30 RX ADMIN — BUDESONIDE AND FORMOTEROL FUMARATE DIHYDRATE 2 PUFF: 160; 4.5 AEROSOL RESPIRATORY (INHALATION) at 08:24

## 2021-05-30 RX ADMIN — FERROUS SULFATE TAB 325 MG (65 MG ELEMENTAL FE) 325 MG: 325 (65 FE) TAB at 08:23

## 2021-05-30 RX ADMIN — ALPRAZOLAM 0.5 MG: 0.5 TABLET ORAL at 20:03

## 2021-05-30 RX ADMIN — MONTELUKAST 10 MG: 10 TABLET, FILM COATED ORAL at 17:39

## 2021-05-30 RX ADMIN — Medication 1 G: at 08:23

## 2021-05-30 ASSESSMENT — ENCOUNTER SYMPTOMS
GASTROINTESTINAL NEGATIVE: 1
MUSCULOSKELETAL NEGATIVE: 1
SHORTNESS OF BREATH: 1
EYES NEGATIVE: 1
CARDIOVASCULAR NEGATIVE: 1
PSYCHIATRIC NEGATIVE: 1
WEAKNESS: 1

## 2021-05-30 ASSESSMENT — PAIN DESCRIPTION - PAIN TYPE
TYPE: ACUTE PAIN

## 2021-05-30 ASSESSMENT — PULMONARY FUNCTION TESTS
EPAP_CMH2O: 10

## 2021-05-30 NOTE — PROGRESS NOTES
Received report and assumed care of pt. Pt resting on HFNC, son at bedside. All needs met, safety measures in place.

## 2021-05-30 NOTE — FLOWSHEET NOTE
Pt remains proned.  No distress or SOB noted or observed.     05/30/21 0241   Events/Summary/Plan   Events/Summary/Plan BiPAP Check   Skin Inspection Respiratory Device Intact   Ventilator Management Group   Intensivist Group Yes   Vent Settings   FiO2% 100 %   NIV for Respiratory Failure   $ NIV Charge Yes   Non-Invasive Vent Mode ST   IPAP (cmH2O) 16   EPAP (cm H2O) 10   FiO2 100   Alarms Set / Checked Yes   Respiratory Rate Patient 26   Spontaneous Vt (ml) 420   Spontaneous Ve (LPM) 10.2   NIV Interface Full Mask

## 2021-05-30 NOTE — FLOWSHEET NOTE
Pt is proned at this time.     05/29/21 2030   Events/Summary/Plan   Events/Summary/Plan BiPAP Check   Skin Inspection Respiratory Device Intact   Ventilator Management Group   Intensivist Group Yes   Vent Settings   FiO2% 100 %   NIV for Respiratory Failure   $ NIV Charge Yes   Non-Invasive Vent Mode ST   IPAP (cmH2O) 16   EPAP (cm H2O) 10   FiO2 100   Alarms Set / Checked Yes   Respiratory Rate Patient 28   Spontaneous Vt (ml) 600   Spontaneous Ve (LPM) 18.2   NIV Interface Full Mask   VAP   Head of Bed Elevated   (Pt is proned at this time)

## 2021-05-30 NOTE — CARE PLAN
Problem: Knowledge Deficit - Standard  Goal: Patient and family/care givers will demonstrate understanding of plan of care, disease process/condition, diagnostic tests and medications  Outcome: Progressing  Note: Explained to pt the importance of proning and the benefits of doing so in helping her lungs combat covid affects. Pt verbalized understanding.      Problem: Respiratory  Goal: Patient will achieve/maintain optimum respiratory ventilation and gas exchange  Outcome: Progressing  Note: Pt is utilizing the incentive spirometer with staff to help open her lungs and increase her volume capacity.    The patient is Watcher - Medium risk of patient condition declining or worsening    Shift Goals  Clinical Goals: maintain O2 >88%, wean down HFNC settings  Patient Goals: Attempt to prone to improve oxygenation  Family Goals: Support

## 2021-05-30 NOTE — PROGRESS NOTES
Received pt from Samantha AYON, family at bedside, on HiFlow at 60L at 100% + 15 Lpm oxy mask. Pt is in good spirits and will have son stay at bedside through out the evening shift. Between family and staff we will encourage her to stay prone for the duration of the shift. All safety measures in place.

## 2021-05-30 NOTE — PROGRESS NOTES
"Pulmonary Progress Note    Date of admission  5/20/2021    Chief Complaint  80 y.o. female admitted 5/20/2021 with covid pna    Hospital Course  \"80 y.o. female who presented 5/20/2021 with acute hypoxemia respiratory failure. Patient with significant history of atrial fibrillation on eliquis, asthma, and hypertension admitted for worsening hypoxemia secondary to COVID pneumonia. She was recently discharged home on 5/17 on 1 liters NC and presents back on 5/20 with worsening shortness of breath. On admission, she required 6 liters NC which quickly escalated to HFNC. ID consulted and patient was tocilizumab on 5/21. Her oxygenation worsen overnight as she was maxed out on HFNC and transferred to ICU for BiPAP 12/6 FiO2 100%.      In ICU, she was diuresed and documented I/O -> neg ~1 liter. Patient is breath comfortably on BiPAP and able to answer questions via BiPAP facemask. Subjectively, patient denies chest pain, cough, N/V, fever/chills, or abdominal pain. She endorse having shortness of breath, neck pain, and back pain. Discussed about the benefits of proning if she can tolerate at which she agrees to with RN assistance. Discussed about risks/benefits endotracheal intubation if she needs it in the upcoming hours to days which she agrees to.      Echocardiogram 5/16/21 personally reviewed -> preserved LV function, mild to moderate MR, dilated coronary sinus, eccentric TR jet with measure RVSP ~50 mmHG which is most likely underestimated, TAPSE ~0.9 cm -> reduced RV function, dilated RV and RA, LA -> 0.89 m/s (~18 mmHG) and abnormal septal bounce, dilated IVC with inspiratory collapse. \" From Dr. Tamayo's note    COVID meds: Received Tocilimuzab on 5/21; on dexamethasone restarted 5/20, 12 mg, plasma 5/27 5/24: LE doppler negative; FIo2 increased to 100% and 50L    5/25: FIo2 down to 40 l 80%.   Pt's son Kaiser Black requested an infectious disease doctor to call as he is expressed that he was not happy with " patient's care and progress. Main questions patients son asked about were  for Ivermectin, Hydroxychloroquine and Remdesivir.  ID on call DR. Powers will call son this pm.    5/26: Fio2 50 L 70%  Dr. Powers reviewed with son and is reviewing options of plasma    5/27: Plasma    5/29: pt worse with sats now 85% on 100% 60 l and oxymask -- changed to bipap. CXR 5/28 showed improvement. Family aware    5/30: proned all night. Son spent the night in the room      Interval Problem Update  Reviewed last 24 hour events:  Chart review from the past 24 hours includes imaging, laboratory studies, vital signs and notes available.  Pertinent data for today    Cardiac:  No events BP ok  Pulm: 60 l 100% and NRB ---> BIPAP 16/10 70% and proned 8 hrs with hiflo; improves as sustaining between 88-90%  Neuro:  intact  Heme:  H and H 47  Off diuretics since 5/28  I/O:  -13 since admission  ID: tocilimuzab 5/21, 5/27 plasma  GI/endo:  Sugar  Elevated this am -- appetite increased - start [facility]  Labs/Imaging:  Hyponatremia better 134 -- stop salt tabs when wnl  Lines:  peripherals  Mobility:  OOB to chair  Symptoms:sob - feels better than yesterday    Review of Systems  Review of Systems   Constitutional: Positive for malaise/fatigue.   HENT: Negative.    Eyes: Negative.    Respiratory: Positive for shortness of breath.    Cardiovascular: Negative.    Gastrointestinal: Negative.    Genitourinary: Negative.    Musculoskeletal: Negative.    Skin: Negative.    Neurological: Positive for weakness.   Endo/Heme/Allergies: Negative.    Psychiatric/Behavioral: Negative.         Vital Signs for last 24 hours   Temp:  [36.1 °C (97 °F)-36.3 °C (97.3 °F)] 36.2 °C (97.1 °F)  Pulse:  [] 77  Resp:  [16-44] 40  BP: (100-157)/(50-99) 100/71  SpO2:  [88 %-99 %] 95 %          Physical Exam   Physical Exam  Constitutional:       Appearance: She is ill-appearing.   HENT:      Head: Normocephalic and atraumatic.      Mouth/Throat:      Mouth: Mucous  membranes are moist.   Eyes:      Extraocular Movements: Extraocular movements intact.      Pupils: Pupils are equal, round, and reactive to light.   Cardiovascular:      Rate and Rhythm: Rhythm irregular.      Heart sounds: Murmur heard.     Pulmonary:      Effort: Pulmonary effort is normal.   Skin:     General: Skin is warm and dry.   Neurological:      General: No focal deficit present.      Mental Status: She is alert and oriented to person, place, and time.   Psychiatric:         Mood and Affect: Mood normal.         Behavior: Behavior normal.         Thought Content: Thought content normal.         Judgment: Judgment normal.         Medications  Current Facility-Administered Medications   Medication Dose Route Frequency Provider Last Rate Last Admin   • insulin regular (HumuLIN R,NovoLIN R) injection  1-6 Units Subcutaneous 4X/DAY HUMBERTO Colon M.D.        And   • glucose 4 g chewable tablet 16 g  16 g Oral Q15 MIN PRN Rashad Colon M.D.        And   • dextrose 50% (D50W) injection 50 mL  50 mL Intravenous Q15 MIN PRN Rashad Colon M.D.       • ALPRAZolam (XANAX) tablet 0.5 mg  0.5 mg Oral TID PRN Osvaldo Manrique DOksanaO.   0.5 mg at 05/30/21 0155   • DILTIAZem (CARDIZEM) injection 10 mg  10 mg Intravenous Q4HRS PRN LEN RíosO.       • benzocaine-menthol (CEPACOL) lozenge 1 Lozenge  1 Lozenge Mouth/Throat Q2HRS PRN Rashad Colon M.D.   1 Lozenge at 05/29/21 0543   • dexamethasone (DECADRON) tablet 12 mg  12 mg Oral DAILY Dale Tamayo M.D.   12 mg at 05/30/21 0630   • famotidine (PEPCID) tablet 20 mg  20 mg Oral BID Ivan Nunez M.D.   20 mg at 05/30/21 0630   • ferrous sulfate tablet 325 mg  325 mg Oral QDAY with Breakfast Ivan Nunez M.D.   325 mg at 05/30/21 0823   • apixaban (ELIQUIS) tablet 5 mg  5 mg Oral BID LEN MoranOOksana   5 mg at 05/30/21 0630   • ascorbic acid (Vitamin C) tablet 1,000 mg  1,000 mg Oral DAILY Cheryl Gallagher D.O.   1,000 mg at  05/30/21 0630   • atorvastatin (LIPITOR) tablet 10 mg  10 mg Oral DAILY CHARISMA Moran.O.   10 mg at 05/30/21 0630   • benzonatate (TESSALON) capsule 100 mg  100 mg Oral TID PRN CHARISMA Moran.O.   100 mg at 05/26/21 1222   • DILTIAZem CD (CARDIZEM CD) capsule 180 mg  180 mg Oral BID CHARISMA Moran.O.   180 mg at 05/30/21 0630   • montelukast (SINGULAIR) tablet 10 mg  10 mg Oral Q EVENING CHARISMA Moran.O.   10 mg at 05/29/21 1750   • ondansetron (ZOFRAN) syringe/vial injection 4 mg  4 mg Intravenous Q6HRS PRN CHARISMA Moran.O.       • ondansetron (ZOFRAN ODT) dispertab 4 mg  4 mg Oral Q6HRS PRN CHARISMA Moran.O.       • senna-docusate (PERICOLACE or SENOKOT S) 8.6-50 MG per tablet 2 tablet  2 tablet Oral BID CHARISMA Moran.O.   2 tablet at 05/30/21 0630    And   • polyethylene glycol/lytes (MIRALAX) PACKET 1 Packet  1 Packet Oral QDAY PRN CHARISMA Moran.O.        And   • magnesium hydroxide (MILK OF MAGNESIA) suspension 30 mL  30 mL Oral QDAY PRN CHARISMA Moran.O.        And   • bisacodyl (DULCOLAX) suppository 10 mg  10 mg Rectal QDAY PRN CHARISMA Moran.O.       • acetaminophen (Tylenol) tablet 650 mg  650 mg Oral Q6HRS PRN CHARISMA Moran.O.   650 mg at 05/22/21 1008   • guaiFENesin ER (MUCINEX) tablet 600 mg  600 mg Oral BID PRN CHARISMA Moran.O.       • guaiFENesin dextromethorphan (ROBITUSSIN DM) 100-10 MG/5ML syrup 10 mL  10 mL Oral Q6HRS PRN CHARISMA Moran.O.       • budesonide-formoterol (SYMBICORT) 160-4.5 MCG/ACT inhaler 2 Puff  2 Puff Inhalation BID (RT) Cheryl Gallagher D.O.   2 Puff at 05/30/21 0824   • HYDROcodone-homatropine 5-1.5 mg/5 mL solution 5 mL  5 mL Oral Q4HRS PRN Cheryl Gallagher D.O.           Fluids    Intake/Output Summary (Last 24 hours) at 5/30/2021 1033  Last data filed at 5/30/2021 0800  Gross per 24 hour   Intake 360 ml   Output 1430 ml   Net -1070 ml       Laboratory          Recent Labs     05/28/21  0412 05/28/21  0412 05/28/21  1412 05/29/21  0429  05/30/21 0148   SODIUM 127*  --   --  131* 134*   POTASSIUM 5.6*   < > 5.3 4.9 5.5   CHLORIDE 89*  --   --  93* 94*   CO2 26  --   --  29 28   BUN 53*  --   --  37* 43*   CREATININE 0.86  --   --  0.80 0.86   CALCIUM 9.7  --   --  9.8 10.0    < > = values in this interval not displayed.     Recent Labs     05/28/21 0412 05/29/21 0426 05/30/21 0148   ALTSGPT 45 44 39   ASTSGOT 41 27 30   ALKPHOSPHAT 122* 133* 155*   TBILIRUBIN 0.6 0.6 0.7   GLUCOSE 148* 169* 193*     Recent Labs     05/28/21 0412 05/29/21 0426 05/30/21 0148   WBC 22.6* 29.2* 26.0*   NEUTSPOLYS 81.60* 93.80* 95.60*   LYMPHOCYTES 2.30* 1.00* 0.80*   MONOCYTES 2.70 1.90 1.60   EOSINOPHILS 8.90* 0.00 0.00   BASOPHILS 0.60 0.40 0.20   ASTSGOT 41 27 30   ALTSGPT 45 44 39   ALKPHOSPHAT 122* 133* 155*   TBILIRUBIN 0.6 0.6 0.7     Recent Labs     05/28/21 0412 05/29/21 0426 05/30/21 0148   RBC 6.36* 6.11* 5.76*   HEMOGLOBIN 17.2* 16.5* 15.2   HEMATOCRIT 55.3* 51.9* 47.3*   PLATELETCT 207 157* 142*       Imaging  5/28: Compared to 5/23 improved CXR still reticular pattern    Assessment/Plan  * Pneumonia due to COVID-19 virus- (present on admission)  Assessment & Plan  -- Confirmed SARS-CoV-2/COVID on 5/15  -- Risk factors -> age and HTN and not vaccinated  --  decadron to 12mg   -- Received tocilizumab on 5/21   -- plasma to be given 5/27  -- Encourage self prone as tolerated (ie, 2 hour supine and 2 hour prone)  -- Cont aggressive diuresis to seek net negative fluid balance  -- On strict contact, droplet/airbone, eye protection, and critical meticulous hand hygiene    Thrombocytopenia (HCC)  Assessment & Plan  COVID  No signs of bleeding    Hyperkalemia  Assessment & Plan  Normal now    Hyponatremia  Assessment & Plan  -- na loss from bumex and also may be from SIADH due to the COVID pna - improving and on salt tabs  -- TSH low but FT4 normal  -- cortisol sent 5/28 pending  -- Autodiuresing  -- on salt tabs so once normal to stop    Abnormal  echocardiogram  Assessment & Plan  --  Pulmonary hypertension  -- Consider etiology to be multifactorial due to left atrial hypertension and severe hypoxemia causing pulmonary vasoconstriction    -- off diuresis but 13 l negative and so hold off bumex - being held since 5/27  -- Need repeat echocardiogram in 2-3 months    -- Avoid hypoxemia, respiratory acidosis, and A fib RVR which can cause worsening pulmonary vasoconstriction leading to worsening PA pressure       Acute respiratory failure with hypoxia (HCC)- (present on admission)  Assessment & Plan  --  hypoxemia secondary to COVID pneumonia, goal saturations greater than 88% as long as she is asymptomatic and fluctuating oxygen needs  -- Worsened last 48 hrs so agreed to prone overnight with improvement  -- appreciate ID consult - family and patient feel strongly re: convalescent plasma -- one dose 5/27  -- negative fluid balance pt is -13 liters - off diuretics since 5/27  -- Aggressive pulmonary toilet as able  -- Encourage self proning as tolerated if possible 16 hrs  -- Goal SpO2 > 88%       Asthma, moderate persistent, well-controlled- (present on admission)  Assessment & Plan  -- Not in acute flare   -- Cont symbicort and singulair        Longstanding persistent atrial fibrillation (HCC)-Dr. Turner [place]- (present on admission)  Assessment & Plan  -- Currently rate controlled with diltiazem   -- On eliquis   -- High lytes goal    -- Goal HR < 110          VTE:  NOAC and Not Indicated  Ulcer: Not Indicated  Lines: cindy reassessed    I have performed a physical exam and reviewed and updated ROS and Plan today (5/30/2021). In review of yesterday's note (5/29/2021), there are no changes except as documented above.     Discussed patient condition and risk of morbidity and/or mortality with Pharmacy, Charge nurse / hot rounds and Patient and family  The patient remains critically ill.  Critical care time = 31 minutes in directly providing and coordinating  critical care and extensive data review.  No time overlap and excludes procedures.

## 2021-05-30 NOTE — FLOWSHEET NOTE
Pt remains in the proned position.  Tolerating well.  No distress or SOB noted or observed.  Will continue to monitor.     05/29/21 2250   Events/Summary/Plan   Events/Summary/Plan BiPAP Check   Skin Inspection Respiratory Device Intact   Ventilator Management Group   Intensivist Group Yes   Vent Settings   FiO2% 100 %   NIV for Respiratory Failure   $ NIV Charge Yes   Non-Invasive Vent Mode ST   IPAP (cmH2O) 16   EPAP (cm H2O) 10   FiO2 100   Alarms Set / Checked Yes   Respiratory Rate Patient 22   Spontaneous Vt (ml) 530   Spontaneous Ve (LPM) 12.4   NIV Interface Full Mask

## 2021-05-30 NOTE — FLOWSHEET NOTE
05/29/21 2250   Events/Summary/Plan   Events/Summary/Plan BiPAP Check   Skin Inspection Respiratory Device Intact   Vital Signs   Pulse 77   Respiration (!) 30   Pulse Oximetry 95 %   $ Pulse Oximetry (Spot Check) Yes   O2 Alarms Set & Reviewed Yes   Respiratory Assessment   Respiratory Pattern Within Normal Limits   Chest Exam   Work Of Breathing / Effort Mild   Oxygen   FiO2% 100 %   O2 Delivery Device BIPAP   Resuscitation Device at Bedside Self Inflating Bag   Non-Invasive Ventilation JAROCHO Group   $ System Evaluation Yes   Settings (If Known) 16/10   FiO2 or

## 2021-05-30 NOTE — CARE PLAN
The patient is Stable - Low risk of patient condition declining or worsening    Shift Goals  Clinical Goals: Decrease O2 denands, Prone throughout the day  Patient Goals: Remain comfortable  Family Goals: Support    Progress made toward(s) clinical / shift goals:    Problem: Knowledge Deficit - Standard  Goal: Patient and family/care givers will demonstrate understanding of plan of care, disease process/condition, diagnostic tests and medications  Outcome: Progressing     Problem: Respiratory  Goal: Patient will achieve/maintain optimum respiratory ventilation and gas exchange  Outcome: Progressing  Problem: Fall Risk  Goal: Patient will remain free from falls  Outcome: Met      Patient is not progressing towards the following goals:

## 2021-05-30 NOTE — FLOWSHEET NOTE
05/29/21 2003   Events/Summary/Plan   Events/Summary/Plan MDI Tx Given   Skin Inspection Respiratory Device Intact   Ventilator Management Group   Intensivist Group Yes   Vent Settings   FiO2% 100 %

## 2021-05-30 NOTE — CARE PLAN
Adult Noninvasive Ventilation    IPAP (cmH2O): 16 (05/29/21 1030)  EPAP (cm H2O): 10 (05/29/21 1030)  FiO2: 100 (05/29/21 1030)        HHFNC 60 lpm @ 100 % + 15lpm OxyMask (05/29/21 1520)

## 2021-05-30 NOTE — FLOWSHEET NOTE
05/30/21 0241   Events/Summary/Plan   Events/Summary/Plan BiPAP Check   Skin Inspection Respiratory Device Intact   Vital Signs   Pulse 91   Respiration (!) 24   Pulse Oximetry 96 %   $ Pulse Oximetry (Spot Check) Yes   O2 Alarms Set & Reviewed Yes   Respiratory Assessment   Respiratory Pattern Within Normal Limits   Oxygen   FiO2% 100 %   O2 Delivery Device BIPAP   Resuscitation Device at Bedside Self Inflating Bag   Non-Invasive Ventilation JAROCHO Group   $ System Evaluation Yes   Settings (If Known) 16/10   FiO2 or

## 2021-05-31 PROBLEM — E87.5 HYPERKALEMIA: Status: RESOLVED | Noted: 2021-05-28 | Resolved: 2021-05-31

## 2021-05-31 PROBLEM — T38.0X5A STEROID-INDUCED HYPERGLYCEMIA: Status: ACTIVE | Noted: 2021-05-31

## 2021-05-31 PROBLEM — R73.9 STEROID-INDUCED HYPERGLYCEMIA: Status: ACTIVE | Noted: 2021-05-31

## 2021-05-31 LAB
ALBUMIN SERPL BCP-MCNC: 3.2 G/DL (ref 3.2–4.9)
ALBUMIN/GLOB SERPL: 1.5 G/DL
ALP SERPL-CCNC: 141 U/L (ref 30–99)
ALT SERPL-CCNC: 36 U/L (ref 2–50)
ANION GAP SERPL CALC-SCNC: 8 MMOL/L (ref 7–16)
AST SERPL-CCNC: 32 U/L (ref 12–45)
BASOPHILS # BLD AUTO: 0.2 % (ref 0–1.8)
BASOPHILS # BLD: 0.05 K/UL (ref 0–0.12)
BILIRUB SERPL-MCNC: 0.8 MG/DL (ref 0.1–1.5)
BUN SERPL-MCNC: 35 MG/DL (ref 8–22)
CALCIUM SERPL-MCNC: 9.6 MG/DL (ref 8.4–10.2)
CHLORIDE SERPL-SCNC: 95 MMOL/L (ref 96–112)
CO2 SERPL-SCNC: 30 MMOL/L (ref 20–33)
CREAT SERPL-MCNC: 0.59 MG/DL (ref 0.5–1.4)
EOSINOPHIL # BLD AUTO: 0.01 K/UL (ref 0–0.51)
EOSINOPHIL NFR BLD: 0 % (ref 0–6.9)
ERYTHROCYTE [DISTWIDTH] IN BLOOD BY AUTOMATED COUNT: 42.8 FL (ref 35.9–50)
GLOBULIN SER CALC-MCNC: 2.1 G/DL (ref 1.9–3.5)
GLUCOSE BLD-MCNC: 275 MG/DL (ref 65–99)
GLUCOSE SERPL-MCNC: 176 MG/DL (ref 65–99)
HCT VFR BLD AUTO: 47.5 % (ref 37–47)
HGB BLD-MCNC: 15 G/DL (ref 12–16)
IMM GRANULOCYTES # BLD AUTO: 0.45 K/UL (ref 0–0.11)
IMM GRANULOCYTES NFR BLD AUTO: 2.2 % (ref 0–0.9)
LYMPHOCYTES # BLD AUTO: 0.39 K/UL (ref 1–4.8)
LYMPHOCYTES NFR BLD: 1.9 % (ref 22–41)
MCH RBC QN AUTO: 26.2 PG (ref 27–33)
MCHC RBC AUTO-ENTMCNC: 31.6 G/DL (ref 33.6–35)
MCV RBC AUTO: 83 FL (ref 81.4–97.8)
MONOCYTES # BLD AUTO: 0.44 K/UL (ref 0–0.85)
MONOCYTES NFR BLD AUTO: 2.2 % (ref 0–13.4)
NEUTROPHILS # BLD AUTO: 18.72 K/UL (ref 2–7.15)
NEUTROPHILS NFR BLD: 93.5 % (ref 44–72)
NRBC # BLD AUTO: 0 K/UL
NRBC BLD-RTO: 0 /100 WBC
PLATELET # BLD AUTO: 98 K/UL (ref 164–446)
PMV BLD AUTO: 10.4 FL (ref 9–12.9)
POTASSIUM SERPL-SCNC: 5.4 MMOL/L (ref 3.6–5.5)
PROT SERPL-MCNC: 5.3 G/DL (ref 6–8.2)
RBC # BLD AUTO: 5.72 M/UL (ref 4.2–5.4)
SODIUM SERPL-SCNC: 133 MMOL/L (ref 135–145)
WBC # BLD AUTO: 20.1 K/UL (ref 4.8–10.8)

## 2021-05-31 PROCEDURE — 94640 AIRWAY INHALATION TREATMENT: CPT

## 2021-05-31 PROCEDURE — A9270 NON-COVERED ITEM OR SERVICE: HCPCS | Performed by: INTERNAL MEDICINE

## 2021-05-31 PROCEDURE — 5A09357 ASSISTANCE WITH RESPIRATORY VENTILATION, LESS THAN 24 CONSECUTIVE HOURS, CONTINUOUS POSITIVE AIRWAY PRESSURE: ICD-10-PCS | Performed by: HOSPITALIST

## 2021-05-31 PROCEDURE — 85025 COMPLETE CBC W/AUTO DIFF WBC: CPT

## 2021-05-31 PROCEDURE — 770022 HCHG ROOM/CARE - ICU (200)

## 2021-05-31 PROCEDURE — 5A0935A ASSISTANCE WITH RESPIRATORY VENTILATION, LESS THAN 24 CONSECUTIVE HOURS, HIGH NASAL FLOW/VELOCITY: ICD-10-PCS | Performed by: INTERNAL MEDICINE

## 2021-05-31 PROCEDURE — 700102 HCHG RX REV CODE 250 W/ 637 OVERRIDE(OP): Performed by: INTERNAL MEDICINE

## 2021-05-31 PROCEDURE — 94760 N-INVAS EAR/PLS OXIMETRY 1: CPT

## 2021-05-31 PROCEDURE — 80053 COMPREHEN METABOLIC PANEL: CPT

## 2021-05-31 PROCEDURE — 82962 GLUCOSE BLOOD TEST: CPT | Mod: 91

## 2021-05-31 PROCEDURE — A9270 NON-COVERED ITEM OR SERVICE: HCPCS | Performed by: HOSPITALIST

## 2021-05-31 PROCEDURE — 99291 CRITICAL CARE FIRST HOUR: CPT | Performed by: INTERNAL MEDICINE

## 2021-05-31 PROCEDURE — 700102 HCHG RX REV CODE 250 W/ 637 OVERRIDE(OP): Performed by: HOSPITALIST

## 2021-05-31 PROCEDURE — 94669 MECHANICAL CHEST WALL OSCILL: CPT

## 2021-05-31 PROCEDURE — 94660 CPAP INITIATION&MGMT: CPT

## 2021-05-31 RX ADMIN — INSULIN HUMAN 3 UNITS: 100 INJECTION, SOLUTION PARENTERAL at 21:16

## 2021-05-31 RX ADMIN — BUDESONIDE AND FORMOTEROL FUMARATE DIHYDRATE 2 PUFF: 160; 4.5 AEROSOL RESPIRATORY (INHALATION) at 21:11

## 2021-05-31 RX ADMIN — APIXABAN 5 MG: 5 TABLET, FILM COATED ORAL at 06:28

## 2021-05-31 RX ADMIN — DOCUSATE SODIUM 50 MG AND SENNOSIDES 8.6 MG 2 TABLET: 8.6; 5 TABLET, FILM COATED ORAL at 06:27

## 2021-05-31 RX ADMIN — APIXABAN 5 MG: 5 TABLET, FILM COATED ORAL at 18:13

## 2021-05-31 RX ADMIN — ALPRAZOLAM 0.5 MG: 0.5 TABLET ORAL at 20:40

## 2021-05-31 RX ADMIN — FAMOTIDINE 20 MG: 20 TABLET ORAL at 06:28

## 2021-05-31 RX ADMIN — DILTIAZEM HYDROCHLORIDE 180 MG: 180 CAPSULE, COATED, EXTENDED RELEASE ORAL at 06:27

## 2021-05-31 RX ADMIN — ATORVASTATIN CALCIUM 10 MG: 10 TABLET, FILM COATED ORAL at 06:28

## 2021-05-31 RX ADMIN — DEXAMETHASONE 12 MG: 4 TABLET ORAL at 06:28

## 2021-05-31 RX ADMIN — INSULIN HUMAN 1 UNITS: 100 INJECTION, SOLUTION PARENTERAL at 06:26

## 2021-05-31 RX ADMIN — BUDESONIDE AND FORMOTEROL FUMARATE DIHYDRATE 2 PUFF: 160; 4.5 AEROSOL RESPIRATORY (INHALATION) at 07:23

## 2021-05-31 RX ADMIN — FAMOTIDINE 20 MG: 20 TABLET ORAL at 18:13

## 2021-05-31 RX ADMIN — DILTIAZEM HYDROCHLORIDE 180 MG: 180 CAPSULE, COATED, EXTENDED RELEASE ORAL at 18:13

## 2021-05-31 RX ADMIN — INSULIN HUMAN 3 UNITS: 100 INJECTION, SOLUTION PARENTERAL at 12:37

## 2021-05-31 RX ADMIN — MONTELUKAST 10 MG: 10 TABLET, FILM COATED ORAL at 18:13

## 2021-05-31 RX ADMIN — INSULIN HUMAN 1 UNITS: 100 INJECTION, SOLUTION PARENTERAL at 18:10

## 2021-05-31 RX ADMIN — FERROUS SULFATE TAB 325 MG (65 MG ELEMENTAL FE) 325 MG: 325 (65 FE) TAB at 06:30

## 2021-05-31 RX ADMIN — OXYCODONE HYDROCHLORIDE AND ACETAMINOPHEN 1000 MG: 500 TABLET ORAL at 06:00

## 2021-05-31 ASSESSMENT — PAIN DESCRIPTION - PAIN TYPE
TYPE: ACUTE PAIN

## 2021-05-31 ASSESSMENT — ENCOUNTER SYMPTOMS
CARDIOVASCULAR NEGATIVE: 1
PSYCHIATRIC NEGATIVE: 1
EYES NEGATIVE: 1
MUSCULOSKELETAL NEGATIVE: 1
COUGH: 1
SHORTNESS OF BREATH: 1
WEAKNESS: 1
GASTROINTESTINAL NEGATIVE: 1

## 2021-05-31 ASSESSMENT — PULMONARY FUNCTION TESTS
EPAP_CMH2O: 10
EPAP_CMH2O: 10

## 2021-05-31 NOTE — FLOWSHEET NOTE
05/31/21 1008   Vital Signs   Pulse 82   Respiration (!) 31   Pulse Oximetry 92 %   $ Pulse Oximetry (Spot Check) Yes   Oxygen   O2 (LPM) 15   FiO2% 100 %   O2 Delivery Device Oxymask   Aerosols   $ Aerosol Delivery Device Cool Mist Aerosol

## 2021-05-31 NOTE — CARE PLAN
Problem: Knowledge Deficit - Standard  Goal: Patient and family/care givers will demonstrate understanding of plan of care, disease process/condition, diagnostic tests and medications  Outcome: Progressing     Problem: Respiratory  Goal: Patient will achieve/maintain optimum respiratory ventilation and gas exchange  Outcome: Progressing     Shift Goals  Clinical Goals: Decrease 02 Demands   Patient Goals: Remains comfortable   Family Goals: Support     Progress made toward(s) clinical / shift goals: Patient is alert and oriented times 4. Complains of no pain at this point and is prone for respiratory support. Given a dose of Xanax to help control anxiety. Will continue to monitor.

## 2021-05-31 NOTE — CARE PLAN
The patient is Watcher - Medium risk of patient condition declining or worsening    Shift Goals  Clinical Goals: Decrease O2 demands, prone for 16 hours/day  Patient Goals: Stay comfortable and calm while proning  Family Goals: Support     Progress made toward(s) clinical / shift goals:    Problem: Knowledge Deficit - Standard  Goal: Patient and family/care givers will demonstrate understanding of plan of care, disease process/condition, diagnostic tests and medications  Outcome: Progressing     Problem: Respiratory  Goal: Patient will achieve/maintain optimum respiratory ventilation and gas exchange  Outcome: Progressing       Patient is not progressing towards the following goals:

## 2021-05-31 NOTE — CARE PLAN
Pt tolerating Bipap well t/o night with multiple mask adjustments - Sp02 low to high 90s while pt is proning. Will continue to monitor.

## 2021-05-31 NOTE — FLOWSHEET NOTE
05/31/21 0915   Events/Summary/Plan   Events/Summary/Plan Patient paced on 100% cool aerosol with 15 lpm bleed in.   Vital Signs   Pulse 77   Respiration (!) 24   Pulse Oximetry 94 %   $ Pulse Oximetry (Spot Check) Yes   Respiratory Assessment   Level of Consciousness Alert   Oxygen   O2 (LPM) 15   FiO2% 100 %   O2 Delivery Device Aerosol Mask   Resuscitation Device at Bedside Self Inflating Bag

## 2021-05-31 NOTE — PROGRESS NOTES
Received report and assumed care of pt. Pts son at bedside. Pt sitting up in bed with NRB mask in place at 15 L O2. Pt tolerating at this time. All needs met, safety measures in place.

## 2021-05-31 NOTE — PROGRESS NOTES
"Critical Care Progress Note    Date of admission  5/20/2021    Chief Complaint  80 y.o. female admitted 5/20/2021 with covid pna    Hospital Course  \"80 y.o. female who presented 5/20/2021 with acute hypoxemia respiratory failure. Patient with significant history of atrial fibrillation on eliquis, asthma, and hypertension admitted for worsening hypoxemia secondary to COVID pneumonia. She was recently discharged home on 5/17 on 1 liters NC and presents back on 5/20 with worsening shortness of breath. On admission, she required 6 liters NC which quickly escalated to HFNC. ID consulted and patient was tocilizumab on 5/21. Her oxygenation worsen overnight as she was maxed out on HFNC and transferred to ICU for BiPAP 12/6 FiO2 100%.      In ICU, she was diuresed and documented I/O -> neg ~1 liter. Patient is breath comfortably on BiPAP and able to answer questions via BiPAP facemask. Subjectively, patient denies chest pain, cough, N/V, fever/chills, or abdominal pain. She endorse having shortness of breath, neck pain, and back pain. Discussed about the benefits of proning if she can tolerate at which she agrees to with RN assistance. Discussed about risks/benefits endotracheal intubation if she needs it in the upcoming hours to days which she agrees to.      Echocardiogram 5/16/21 personally reviewed -> preserved LV function, mild to moderate MR, dilated coronary sinus, eccentric TR jet with measure RVSP ~50 mmHG which is most likely underestimated, TAPSE ~0.9 cm -> reduced RV function, dilated RV and RA, IN -> 0.89 m/s (~18 mmHG) and abnormal septal bounce, dilated IVC with inspiratory collapse. \" From Dr. Tamayo's note    COVID meds: Received Tocilimuzab on 5/21; on dexamethasone restarted 5/20, 12 mg, plasma 5/27 5/24: LE doppler negative; FIo2 increased to 100% and 50L    5/25: FIo2 down to 40 l 80%.   Pt's son Kaiser Black requested an infectious disease doctor to call as he is expressed that he was not happy with " patient's care and progress. Main questions patients son asked about were  for Ivermectin, Hydroxychloroquine and Remdesivir.  ID on call DR. Powers will call son this pm.    5/26: Fio2 50 L 70%  Dr. Powers reviewed with son and is reviewing options of plasma    5/27: Plasma    5/29: pt worse with sats now 85% on 100% 60 l and oxymask -- changed to bipap. CXR 5/28 showed improvement. Family aware    5/30: proned all night. Son spent the night in the room      Interval Problem Update  Reviewed last 24 hour events:  Chart review from the past 24 hours includes imaging, laboratory studies, vital signs and notes available.  Pertinent data for today    First day rounding on patient    Cardiac: Atrial fib, rate controlled, HD stable  Pulm: BIPAP 16/10 70% overnight, this AM on 15 L / 100%; proning; improving as sustaining between 88-90%  Neuro:  intact  Heme: Reviewed, platelets downtrending  Off diuretics since 5/28  I/O:  -13 since admission  ID: tocilimuzab 5/21, 5/27 plasma, completed dexamethasone 5/31  GI/endo:  FSBG improving with [facility]  Labs/Imaging:  Hyponatremia better 133  Lines:  peripherals  Mobility:  OOB to chair  Symptoms:sob - feels better than yesterday    Review of Systems  Review of Systems   Constitutional: Positive for malaise/fatigue.   HENT: Negative.    Eyes: Negative.    Respiratory: Positive for cough (dry) and shortness of breath.    Cardiovascular: Negative.    Gastrointestinal: Negative.    Genitourinary: Negative.    Musculoskeletal: Negative.    Skin: Negative.    Neurological: Positive for weakness.   Endo/Heme/Allergies: Negative.    Psychiatric/Behavioral: Negative.         Vital Signs for last 24 hours   Temp:  [36.1 °C (97 °F)-36.3 °C (97.4 °F)] 36.3 °C (97.4 °F)  Pulse:  [] 65  Resp:  [16-50] 26  BP: ()/(47-77) 87/54  SpO2:  [83 %-98 %] 90 %          Physical Exam   Physical Exam  Vitals and nursing note reviewed.   Constitutional:       Appearance: She is  ill-appearing.      Comments: Sitting up in bed sitting up in bed eating lunch   HENT:      Head: Normocephalic and atraumatic.      Mouth/Throat:      Mouth: Mucous membranes are moist.   Eyes:      Extraocular Movements: Extraocular movements intact.      Pupils: Pupils are equal, round, and reactive to light.   Cardiovascular:      Rate and Rhythm: Rhythm irregular.      Heart sounds: Murmur heard.     Pulmonary:      Effort: Pulmonary effort is normal.   Skin:     General: Skin is warm and dry.   Neurological:      General: No focal deficit present.      Mental Status: She is alert and oriented to person, place, and time.   Psychiatric:         Mood and Affect: Mood normal.         Behavior: Behavior normal.         Thought Content: Thought content normal.         Judgment: Judgment normal.       Medications  Current Facility-Administered Medications   Medication Dose Route Frequency Provider Last Rate Last Admin   • insulin regular (HumuLIN R,NovoLIN R) injection  1-6 Units Subcutaneous 4X/DAY HUMBERTO Colon M.D.   3 Units at 05/31/21 1237    And   • glucose 4 g chewable tablet 16 g  16 g Oral Q15 MIN PRN Rashad Colon M.D.        And   • dextrose 50% (D50W) injection 50 mL  50 mL Intravenous Q15 MIN PRN Rahsad Colon M.D.       • ALPRAZolam (XANAX) tablet 0.5 mg  0.5 mg Oral TID PRN Osvaldo Manrique DOksanaOOksana   0.5 mg at 05/30/21 2003   • DILTIAZem (CARDIZEM) injection 10 mg  10 mg Intravenous Q4HRS PRN Osvaldo Manrique D.O.       • benzocaine-menthol (CEPACOL) lozenge 1 Lozenge  1 Lozenge Mouth/Throat Q2HRS PRN Rashad Colon M.D.   1 Lozenge at 05/29/21 0543   • famotidine (PEPCID) tablet 20 mg  20 mg Oral BID Ivan Nunez M.D.   20 mg at 05/31/21 0628   • ferrous sulfate tablet 325 mg  325 mg Oral QDAY with Breakfast Ivan Nunez M.D.   325 mg at 05/31/21 0630   • apixaban (ELIQUIS) tablet 5 mg  5 mg Oral BID Cheryl Gallagher D.O.   5 mg at 05/31/21 0628   • ascorbic acid  (Vitamin C) tablet 1,000 mg  1,000 mg Oral DAILY CHARISMA Moran.O.   1,000 mg at 05/31/21 0600   • atorvastatin (LIPITOR) tablet 10 mg  10 mg Oral DAILY CHARISMA Moran.O.   10 mg at 05/31/21 0628   • benzonatate (TESSALON) capsule 100 mg  100 mg Oral TID PRN CHARISMA Moran.O.   100 mg at 05/26/21 1222   • DILTIAZem CD (CARDIZEM CD) capsule 180 mg  180 mg Oral BID Cheryl Gallagher D.O.   180 mg at 05/31/21 0627   • montelukast (SINGULAIR) tablet 10 mg  10 mg Oral Q EVENING CHARISMA Moran.O.   10 mg at 05/30/21 1739   • ondansetron (ZOFRAN) syringe/vial injection 4 mg  4 mg Intravenous Q6HRS PRN CHARISMA Moran.O.       • ondansetron (ZOFRAN ODT) dispertab 4 mg  4 mg Oral Q6HRS PRN Cheryl Gallagher D.O.       • senna-docusate (PERICOLACE or SENOKOT S) 8.6-50 MG per tablet 2 tablet  2 tablet Oral BID CHARISMA Moran.O.   2 tablet at 05/31/21 0627    And   • polyethylene glycol/lytes (MIRALAX) PACKET 1 Packet  1 Packet Oral QDAY PRN CHARISMA Moran.O.        And   • magnesium hydroxide (MILK OF MAGNESIA) suspension 30 mL  30 mL Oral QDAY PRN CHARISMA Moran.O.        And   • bisacodyl (DULCOLAX) suppository 10 mg  10 mg Rectal QDAY PRN CHARISMA Moran.O.       • acetaminophen (Tylenol) tablet 650 mg  650 mg Oral Q6HRS PRN CHARISMA Moran.O.   650 mg at 05/22/21 1008   • guaiFENesin ER (MUCINEX) tablet 600 mg  600 mg Oral BID PRN Cheryl Gallagher D.O.       • guaiFENesin dextromethorphan (ROBITUSSIN DM) 100-10 MG/5ML syrup 10 mL  10 mL Oral Q6HRS PRN Cheryl Gallagher D.O.       • budesonide-formoterol (SYMBICORT) 160-4.5 MCG/ACT inhaler 2 Puff  2 Puff Inhalation BID (RT) Cheryl Gallagher D.O.   2 Puff at 05/31/21 0723   • HYDROcodone-homatropine 5-1.5 mg/5 mL solution 5 mL  5 mL Oral Q4HRS PRN Cheryl Gallagher D.O.           Fluids    Intake/Output Summary (Last 24 hours) at 5/31/2021 1337  Last data filed at 5/31/2021 1000  Gross per 24 hour   Intake 600 ml   Output 2255 ml   Net -1655 ml       Laboratory           Recent Labs     05/29/21 0426 05/30/21 0148 05/31/21  0326   SODIUM 131* 134* 133*   POTASSIUM 4.9 5.5 5.4   CHLORIDE 93* 94* 95*   CO2 29 28 30   BUN 37* 43* 35*   CREATININE 0.80 0.86 0.59   CALCIUM 9.8 10.0 9.6     Recent Labs     05/29/21 0426 05/30/21 0148 05/31/21  0326   ALTSGPT 44 39 36   ASTSGOT 27 30 32   ALKPHOSPHAT 133* 155* 141*   TBILIRUBIN 0.6 0.7 0.8   GLUCOSE 169* 193* 176*     Recent Labs     05/29/21 0426 05/30/21 0148 05/31/21  0326 05/31/21  0502   WBC 29.2* 26.0*  --  20.1*   NEUTSPOLYS 93.80* 95.60*  --  93.50*   LYMPHOCYTES 1.00* 0.80*  --  1.90*   MONOCYTES 1.90 1.60  --  2.20   EOSINOPHILS 0.00 0.00  --  0.00   BASOPHILS 0.40 0.20  --  0.20   ASTSGOT 27 30 32  --    ALTSGPT 44 39 36  --    ALKPHOSPHAT 133* 155* 141*  --    TBILIRUBIN 0.6 0.7 0.8  --      Recent Labs     05/29/21 0426 05/30/21 0148 05/31/21  0502   RBC 6.11* 5.76* 5.72*   HEMOGLOBIN 16.5* 15.2 15.0   HEMATOCRIT 51.9* 47.3* 47.5*   PLATELETCT 157* 142* 98*     Imaging  5/28:reviewed, bilateral infiltrates consistent with COVID pneumonia, improved compared to 5/23 improved     Assessment/Plan  * Pneumonia due to COVID-19 virus- (present on admission)  Assessment & Plan  -- Confirmed SARS-CoV-2/COVID on 5/15  -- Risk factors -> age and HTN and not vaccinated  -- s/p decadron, tocilizumab, plasma  -- Encourage self prone as tolerated (ie, 2 hour supine and 2 hour prone)  -- Cont aggressive diuresis to seek net negative fluid balance  -- On strict contact, droplet/airbone, eye protection, and critical meticulous hand hygiene    Steroid-induced hyperglycemia  Assessment & Plan  Con ISS goal FSBG 140-180    Thrombocytopenia (HCC)  Assessment & Plan  COVID  No signs of bleeding  Trend CBC daily    Hyponatremia  Assessment & Plan  -- na loss from bumex and also may be from SIADH due to the COVID pna - improving and on salt tabs  -- TSH low but FT4 normal  -- cortisol sent 5/28 pending  -- Autodiuresing  -- on salt tabs so  once normal to stop    Abnormal echocardiogram  Assessment & Plan  --  Pulmonary hypertension  -- Consider etiology to be multifactorial due to left atrial hypertension and severe hypoxemia causing pulmonary vasoconstriction    -- off diuresis but 13 l negative and so hold off bumex - being held since 5/27  -- Need repeat echocardiogram in 2-3 months    -- Avoid hypoxemia, respiratory acidosis, and A fib RVR which can cause worsening pulmonary vasoconstriction leading to worsening PA pressure       Acute respiratory failure with hypoxia (HCC)- (present on admission)  Assessment & Plan  --  hypoxemia secondary to COVID pneumonia, goal saturations greater than 88% as long as she is asymptomatic and fluctuating oxygen needs  -- tocilizumab 5/21  -- convalescent plasma -- one dose 5/27  -- decadron completed 5/31   -- negative fluid balance pt is -13 liters - off diuretics since 5/27  -- Aggressive pulmonary toilet as able  -- Cont supportive care, monitoring closely for superimposed pneumonia, continue to closely monitor off antibiotics.  -- Encourage self proning as tolerated if possible 16 hrs  -- Goal SpO2 85-92%     Asthma, moderate persistent, well-controlled- (present on admission)  Assessment & Plan  -- Not in acute flare   -- Cont symbicort and singulair        Longstanding persistent atrial fibrillation (HCC)-Dr. Turner [place]- (present on admission)  Assessment & Plan  -- Currently rate controlled with diltiazem   -- On eliquis   -- High lytes goal    -- Goal HR < 110      VTE:  NOAC and Not Indicated  Ulcer: Not Indicated  Lines: cindy reassessed    I have performed a physical exam and reviewed and updated ROS and Plan today (5/31/2021). In review of yesterday's note (5/30/2021), there are no changes except as documented above.     Discussed patient condition and risk of morbidity and/or mortality with Family, Pharmacy, Charge nurse / hot rounds and Patient and family  The patient remains critically ill.   Critical care time = 35 minutes in directly providing and coordinating critical care and extensive data review.  No time overlap and excludes procedures.    This note was generated using voice recognition software which has a chance of producing errors of grammar and content.  I have made every reasonable attempt to find and correct any errors, but it should be expected that some may not be found prior to finalization of this note.  __________  Maikol Perez MD  Pulmonary and Critical Care Medicine  [facility]

## 2021-05-31 NOTE — FLOWSHEET NOTE
05/31/21 0723   Events/Summary/Plan   Events/Summary/Plan PEP, MDI and  ml   Vital Signs   Pulse (!) 54   Respiration (!) 28   Pulse Oximetry 95 %   $ Pulse Oximetry (Spot Check) Yes   Respiratory Assessment   Respiratory Pattern Within Normal Limits   Level of Consciousness Alert   Chest Exam   Work Of Breathing / Effort Mild;Increased Work of Breathing   Breath Sounds   RUL Breath Sounds Fine Crackles;Diminished   RML Breath Sounds Fine Crackles;Diminished   RLL Breath Sounds Diminished   JESSY Breath Sounds Diminished   LLL Breath Sounds Diminished   Secretions   Cough Dry;Non Productive;Strong   How Sputum Obtained Spontaneous   Oxygen   O2 (LPM) 15   O2 Delivery Device Non-Rebreather Mask

## 2021-05-31 NOTE — ASSESSMENT & PLAN NOTE
Con ISS goal FSBG 140-180  Boost plus glucose control  Cont 5 units novolin AC QHS    Cont lantus to 18 units QHS

## 2021-05-31 NOTE — FLOWSHEET NOTE
05/31/21 1545   Events/Summary/Plan   Events/Summary/Plan Patient placed back on aerosol with 15 lpm bleed in.  Nose appears to be bloody again.   Vital Signs   Pulse 95   Respiration (!) 26   Pulse Oximetry 88 %   $ Pulse Oximetry (Spot Check) Yes   Respiratory Assessment   Level of Consciousness Alert   Oxygen   FiO2% 100 %   O2 Delivery Device Aerosol Mask;Oxymask   Aerosols   $ Aerosol Delivery Device Cool Mist Aerosol

## 2021-06-01 PROBLEM — U07.1 ACUTE HYPOXEMIC RESPIRATORY FAILURE DUE TO SEVERE ACUTE RESPIRATORY SYNDROME CORONAVIRUS 2 (SARS-COV-2) DISEASE (HCC): Status: ACTIVE | Noted: 2021-05-20

## 2021-06-01 PROBLEM — R04.0 EPISTAXIS: Status: ACTIVE | Noted: 2021-06-01

## 2021-06-01 LAB
ANION GAP SERPL CALC-SCNC: 9 MMOL/L (ref 7–16)
BASOPHILS # BLD AUTO: 0.3 % (ref 0–1.8)
BASOPHILS # BLD: 0.08 K/UL (ref 0–0.12)
BUN SERPL-MCNC: 37 MG/DL (ref 8–22)
CALCIUM SERPL-MCNC: 9.9 MG/DL (ref 8.4–10.2)
CHLORIDE SERPL-SCNC: 97 MMOL/L (ref 96–112)
CO2 SERPL-SCNC: 29 MMOL/L (ref 20–33)
CREAT SERPL-MCNC: 0.64 MG/DL (ref 0.5–1.4)
EOSINOPHIL # BLD AUTO: 0 K/UL (ref 0–0.51)
EOSINOPHIL NFR BLD: 0 % (ref 0–6.9)
ERYTHROCYTE [DISTWIDTH] IN BLOOD BY AUTOMATED COUNT: 42.7 FL (ref 35.9–50)
GLUCOSE BLD-MCNC: 146 MG/DL (ref 65–99)
GLUCOSE BLD-MCNC: 182 MG/DL (ref 65–99)
GLUCOSE BLD-MCNC: 239 MG/DL (ref 65–99)
GLUCOSE BLD-MCNC: 244 MG/DL (ref 65–99)
GLUCOSE BLD-MCNC: 271 MG/DL (ref 65–99)
GLUCOSE BLD-MCNC: 358 MG/DL (ref 65–99)
GLUCOSE SERPL-MCNC: 163 MG/DL (ref 65–99)
HCT VFR BLD AUTO: 44.7 % (ref 37–47)
HGB BLD-MCNC: 14.4 G/DL (ref 12–16)
IMM GRANULOCYTES # BLD AUTO: 0.75 K/UL (ref 0–0.11)
IMM GRANULOCYTES NFR BLD AUTO: 3.1 % (ref 0–0.9)
LYMPHOCYTES # BLD AUTO: 0.44 K/UL (ref 1–4.8)
LYMPHOCYTES NFR BLD: 1.8 % (ref 22–41)
MAGNESIUM SERPL-MCNC: 2.2 MG/DL (ref 1.5–2.5)
MCH RBC QN AUTO: 27.1 PG (ref 27–33)
MCHC RBC AUTO-ENTMCNC: 32.2 G/DL (ref 33.6–35)
MCV RBC AUTO: 84 FL (ref 81.4–97.8)
MONOCYTES # BLD AUTO: 0.6 K/UL (ref 0–0.85)
MONOCYTES NFR BLD AUTO: 2.5 % (ref 0–13.4)
NEUTROPHILS # BLD AUTO: 21.97 K/UL (ref 2–7.15)
NEUTROPHILS NFR BLD: 92.3 % (ref 44–72)
NRBC # BLD AUTO: 0 K/UL
NRBC BLD-RTO: 0 /100 WBC
PHOSPHATE SERPL-MCNC: 3 MG/DL (ref 2.5–4.5)
PLATELET # BLD AUTO: 103 K/UL (ref 164–446)
PMV BLD AUTO: 10.7 FL (ref 9–12.9)
POTASSIUM SERPL-SCNC: 5.4 MMOL/L (ref 3.6–5.5)
RBC # BLD AUTO: 5.32 M/UL (ref 4.2–5.4)
SODIUM SERPL-SCNC: 135 MMOL/L (ref 135–145)
WBC # BLD AUTO: 23.8 K/UL (ref 4.8–10.8)

## 2021-06-01 PROCEDURE — 94640 AIRWAY INHALATION TREATMENT: CPT

## 2021-06-01 PROCEDURE — A9270 NON-COVERED ITEM OR SERVICE: HCPCS | Performed by: INTERNAL MEDICINE

## 2021-06-01 PROCEDURE — 94660 CPAP INITIATION&MGMT: CPT

## 2021-06-01 PROCEDURE — 700111 HCHG RX REV CODE 636 W/ 250 OVERRIDE (IP): Performed by: INTERNAL MEDICINE

## 2021-06-01 PROCEDURE — 700102 HCHG RX REV CODE 250 W/ 637 OVERRIDE(OP): Performed by: INTERNAL MEDICINE

## 2021-06-01 PROCEDURE — 83735 ASSAY OF MAGNESIUM: CPT

## 2021-06-01 PROCEDURE — 84100 ASSAY OF PHOSPHORUS: CPT

## 2021-06-01 PROCEDURE — 94669 MECHANICAL CHEST WALL OSCILL: CPT

## 2021-06-01 PROCEDURE — 5A0935A ASSISTANCE WITH RESPIRATORY VENTILATION, LESS THAN 24 CONSECUTIVE HOURS, HIGH NASAL FLOW/VELOCITY: ICD-10-PCS | Performed by: INTERNAL MEDICINE

## 2021-06-01 PROCEDURE — 82962 GLUCOSE BLOOD TEST: CPT | Mod: 91

## 2021-06-01 PROCEDURE — 85025 COMPLETE CBC W/AUTO DIFF WBC: CPT

## 2021-06-01 PROCEDURE — 80048 BASIC METABOLIC PNL TOTAL CA: CPT

## 2021-06-01 PROCEDURE — 94760 N-INVAS EAR/PLS OXIMETRY 1: CPT

## 2021-06-01 PROCEDURE — 99291 CRITICAL CARE FIRST HOUR: CPT | Performed by: INTERNAL MEDICINE

## 2021-06-01 PROCEDURE — A9270 NON-COVERED ITEM OR SERVICE: HCPCS | Performed by: HOSPITALIST

## 2021-06-01 PROCEDURE — 770022 HCHG ROOM/CARE - ICU (200)

## 2021-06-01 PROCEDURE — 700102 HCHG RX REV CODE 250 W/ 637 OVERRIDE(OP): Performed by: HOSPITALIST

## 2021-06-01 RX ORDER — ECHINACEA PURPUREA EXTRACT 125 MG
2 TABLET ORAL
Status: DISCONTINUED | OUTPATIENT
Start: 2021-06-01 | End: 2021-06-10 | Stop reason: HOSPADM

## 2021-06-01 RX ORDER — OXYMETAZOLINE HYDROCHLORIDE 0.05 G/100ML
2 SPRAY NASAL 2 TIMES DAILY
Status: DISPENSED | OUTPATIENT
Start: 2021-06-01 | End: 2021-06-04

## 2021-06-01 RX ORDER — FUROSEMIDE 10 MG/ML
20 INJECTION INTRAMUSCULAR; INTRAVENOUS
Status: DISCONTINUED | OUTPATIENT
Start: 2021-06-01 | End: 2021-06-09

## 2021-06-01 RX ADMIN — ATORVASTATIN CALCIUM 10 MG: 10 TABLET, FILM COATED ORAL at 05:14

## 2021-06-01 RX ADMIN — APIXABAN 5 MG: 5 TABLET, FILM COATED ORAL at 05:14

## 2021-06-01 RX ADMIN — APIXABAN 5 MG: 5 TABLET, FILM COATED ORAL at 17:38

## 2021-06-01 RX ADMIN — DOCUSATE SODIUM 50 MG AND SENNOSIDES 8.6 MG 2 TABLET: 8.6; 5 TABLET, FILM COATED ORAL at 05:14

## 2021-06-01 RX ADMIN — MONTELUKAST 10 MG: 10 TABLET, FILM COATED ORAL at 17:38

## 2021-06-01 RX ADMIN — BUDESONIDE AND FORMOTEROL FUMARATE DIHYDRATE 2 PUFF: 160; 4.5 AEROSOL RESPIRATORY (INHALATION) at 13:21

## 2021-06-01 RX ADMIN — FERROUS SULFATE TAB 325 MG (65 MG ELEMENTAL FE) 325 MG: 325 (65 FE) TAB at 07:28

## 2021-06-01 RX ADMIN — SALINE NASAL SPRAY 2 SPRAY: 1.5 SOLUTION NASAL at 15:35

## 2021-06-01 RX ADMIN — DILTIAZEM HYDROCHLORIDE 180 MG: 180 CAPSULE, COATED, EXTENDED RELEASE ORAL at 17:38

## 2021-06-01 RX ADMIN — FAMOTIDINE 20 MG: 20 TABLET ORAL at 05:14

## 2021-06-01 RX ADMIN — OXYMETAZOLINE HCL 2 SPRAY: 0.05 SPRAY NASAL at 17:39

## 2021-06-01 RX ADMIN — DILTIAZEM HYDROCHLORIDE 180 MG: 180 CAPSULE, COATED, EXTENDED RELEASE ORAL at 05:14

## 2021-06-01 RX ADMIN — ALPRAZOLAM 0.5 MG: 0.5 TABLET ORAL at 20:09

## 2021-06-01 RX ADMIN — INSULIN HUMAN 2 UNITS: 100 INJECTION, SOLUTION PARENTERAL at 10:48

## 2021-06-01 RX ADMIN — BUDESONIDE AND FORMOTEROL FUMARATE DIHYDRATE 2 PUFF: 160; 4.5 AEROSOL RESPIRATORY (INHALATION) at 20:11

## 2021-06-01 RX ADMIN — FUROSEMIDE 20 MG: 10 INJECTION, SOLUTION INTRAMUSCULAR; INTRAVENOUS at 15:36

## 2021-06-01 RX ADMIN — DOCUSATE SODIUM 50 MG AND SENNOSIDES 8.6 MG 2 TABLET: 8.6; 5 TABLET, FILM COATED ORAL at 17:38

## 2021-06-01 RX ADMIN — OXYCODONE HYDROCHLORIDE AND ACETAMINOPHEN 1000 MG: 500 TABLET ORAL at 05:14

## 2021-06-01 RX ADMIN — FUROSEMIDE 20 MG: 10 INJECTION, SOLUTION INTRAMUSCULAR; INTRAVENOUS at 10:42

## 2021-06-01 RX ADMIN — INSULIN HUMAN 5 UNITS: 100 INJECTION, SOLUTION PARENTERAL at 20:30

## 2021-06-01 RX ADMIN — INSULIN HUMAN 1 UNITS: 100 INJECTION, SOLUTION PARENTERAL at 17:45

## 2021-06-01 ASSESSMENT — ENCOUNTER SYMPTOMS
COUGH: 1
PSYCHIATRIC NEGATIVE: 1
CARDIOVASCULAR NEGATIVE: 1
GASTROINTESTINAL NEGATIVE: 1
SHORTNESS OF BREATH: 1
WEAKNESS: 1
EYES NEGATIVE: 1
MUSCULOSKELETAL NEGATIVE: 1

## 2021-06-01 ASSESSMENT — PULMONARY FUNCTION TESTS: EPAP_CMH2O: 10

## 2021-06-01 ASSESSMENT — PAIN DESCRIPTION - PAIN TYPE
TYPE: ACUTE PAIN

## 2021-06-01 NOTE — FLOWSHEET NOTE
05/31/21 2130   Events/Summary/Plan   Events/Summary/Plan BIPAP - Check   Vital Signs   Pulse 98   Respiration (!) 36   Pulse Oximetry (!) 85 %   $ Pulse Oximetry (Spot Check) Yes   O2 Alarms Set & Reviewed Yes   Respiratory Assessment   Respiratory Pattern Within Normal Limits   Level of Consciousness Alert   Chest Exam   Work Of Breathing / Effort Mild   Breath Sounds   RUL Breath Sounds Clear   RML Breath Sounds Diminished   RLL Breath Sounds Diminished   JESSY Breath Sounds Clear   LLL Breath Sounds Diminished   Secretions   Cough Dry;Non Productive   How Sputum Obtained Spontaneous   Sputum Amount Unable to Evaluate   Sputum Color Unable to Evaluate   Sputum Consistency Unable to Evaluate   Oxygen   FiO2% 100 %   O2 Delivery Device BIPAP   Resuscitation Device at Bedside Self Inflating Bag

## 2021-06-01 NOTE — FLOWSHEET NOTE
05/31/21 1830   Events/Summary/Plan   Events/Summary/Plan HHFNC   Skin Inspection Respiratory Device Intact   Ventilator Management Group   Intensivist Group Yes   Vent Settings   FiO2% 100 %

## 2021-06-01 NOTE — PROGRESS NOTES
Report received from Samantha AYON. Patient sitting up in bed, A/O x4, family present at bedside. Patient on aerosol mask O2 89%, afib on monitor in the 90s, denies pain. Safety precautions in place, call light and belongings within reach, all needs met at this time.

## 2021-06-01 NOTE — FLOWSHEET NOTE
06/01/21 0253   Events/Summary/Plan   Events/Summary/Plan BiPAP Check   Skin Inspection Respiratory Device Intact   Vital Signs   Pulse 73   Respiration (!) 47   Pulse Oximetry 96 %   $ Pulse Oximetry (Spot Check) Yes   O2 Alarms Set & Reviewed Yes   Respiratory Assessment   Respiratory Pattern Within Normal Limits   Oxygen   FiO2% 100 %   O2 Delivery Device BIPAP   Resuscitation Device at Bedside Self Inflating Bag   Non-Invasive Ventilation JAROCHO Group   $ System Evaluation Yes   Settings (If Known) 16/10   FiO2 or

## 2021-06-01 NOTE — FLOWSHEET NOTE
06/01/21 0253   Events/Summary/Plan   Events/Summary/Plan BiPAP Check   Skin Inspection Respiratory Device Intact   Ventilator Management Group   Intensivist Group Yes   Vent Settings   FiO2% 100 %   NIV for Respiratory Failure   $ NIV Charge Yes   Non-Invasive Vent Mode ST   IPAP (cmH2O) 16   EPAP (cm H2O) 10   FiO2 100   Alarms Set / Checked Yes   Respiratory Rate Patient 22   Spontaneous Vt (ml) 328   Spontaneous Ve (LPM) 7.9   NIV Interface Full Mask

## 2021-06-01 NOTE — CARE PLAN
The patient is Watcher - Medium risk of patient condition declining or worsening    Shift Goals  Clinical Goals: Decrease O2 demands, maintain SPO2 86-92%, tolerate BIPAP overnight while proning  Patient Goals: Rest, comfort while proning  Family Goals: Support    Progress made toward(s) clinical / shift goals:    Problem: Knowledge Deficit - Standard  Goal: Patient and family/care givers will demonstrate understanding of plan of care, disease process/condition, diagnostic tests and medications  Outcome: Progressing   Family involved in plan of care. Demonstrated understanding with teach back.  Problem: Respiratory  Goal: Patient will achieve/maintain optimum respiratory ventilation and gas exchange  Outcome: Progressing   Pt proned throughout night on Bipap. Switched to HFNC in morning. Tolerating well at this time.     Patient is not progressing towards the following goals:

## 2021-06-01 NOTE — CARE PLAN
The patient is Watcher - Medium risk of patient condition declining or worsening    Shift Goals  Clinical Goals: Decrease O2 demands, maintain SPO2 86-92%, tolerate BIPAP overnight while proning  Patient Goals: Rest, comfort while proning  Family Goals: Support    Progress made toward(s) clinical / shift goals:    Problem: Knowledge Deficit - Standard  Goal: Patient and family/care givers will demonstrate understanding of plan of care, disease process/condition, diagnostic tests and medications  Outcome: Progressing     Problem: Respiratory  Goal: Patient will achieve/maintain optimum respiratory ventilation and gas exchange  Outcome: Progressing

## 2021-06-01 NOTE — PROGRESS NOTES
"Critical Care Progress Note    Date of admission  5/20/2021    Chief Complaint  80 y.o. female admitted 5/20/2021 with covid pna    Hospital Course  \"80 y.o. female who presented 5/20/2021 with acute hypoxemia respiratory failure. Patient with significant history of atrial fibrillation on eliquis, asthma, and hypertension admitted for worsening hypoxemia secondary to COVID pneumonia. She was recently hospitalized 5/15-5/17 for COVID, discharged on 1 liters NC and presents back on 5/20 with worsening shortness of breath. On admission, she required 6 liters NC which quickly escalated to HFNC. ID consulted and patient was tocilizumab on 5/21. Her oxygenation worsen overnight as she was maxed out on HFNC and transferred to ICU for BiPAP 12/6 FiO2 100%. Not vaccinated.     In ICU, she was diuresed and documented I/O -> neg ~1 liter. Patient is breath comfortably on BiPAP and able to answer questions via BiPAP facemask. Subjectively, patient denies chest pain, cough, N/V, fever/chills, or abdominal pain. She endorse having shortness of breath, neck pain, and back pain. Discussed about the benefits of proning if she can tolerate at which she agrees to with RN assistance. Discussed about risks/benefits endotracheal intubation if she needs it in the upcoming hours to days which she agrees to.      Echocardiogram 5/16/21 personally reviewed -> preserved LV function, mild to moderate MR, dilated coronary sinus, eccentric TR jet with measure RVSP ~50 mmHG which is most likely underestimated, TAPSE ~0.9 cm -> reduced RV function, dilated RV and RA, TN -> 0.89 m/s (~18 mmHG) and abnormal septal bounce, dilated IVC with inspiratory collapse. \" From Dr. Tamayo's note    COVID meds: Received Tocilimuzab on 5/21; on dexamethasone restarted 5/20, 12 mg, plasma 5/27 5/24: LE doppler negative; FIo2 increased to 100% and 50L    5/25: FIo2 down to 40 l 80%.   Pt's son Kaiser Black requested an infectious disease doctor to call as he is " expressed that he was not happy with patient's care and progress. Main questions patients son asked about were  for Ivermectin, Hydroxychloroquine and Remdesivir.  ID on call DR. Powers will call son this pm.    5/26: Fio2 50 L 70%  Dr. Powers reviewed with son and is reviewing options of plasma    5/27: Plasma    5/29: pt worse with sats now 85% on 100% 60 l and oxymask -- changed to bipap. CXR 5/28 showed improvement. Family aware    5/30: proned all night. Son spent the night in the room    Interval Problem Update  Reviewed last 24 hour events:  Chart review from the past 24 hours includes imaging, laboratory studies, vital signs and notes available.  Pertinent data for today    No acute events overnight, remains on BIPAP overnight 100% FiO2, sats down to low 80s at times    Cardiac: Atrial fib, rate controlled, HD stable  Pulm: BIPAP 16/10 100% overnight, this AM on 15 L / 100%; proning; sustaining between 88-90%  Neuro:  intact  Heme: Reviewed, platelets plateaued  I/O:  -15.8 since admission, Off diuretics since 5/28  ID: tocilimuzab 5/21, 5/27 plasma, completed dexamethasone 5/31  WBC 23 persistently up, afebrile. Procal 0.20  GI/endo:  FSBG improving with [facility]  Labs/Imaging:  Hyponatremia normalizing  Lines:  Peripherals, Rucker  Mobility:  OOB to chair  Symptoms: fatigued, no dyspnea, overall feeling improved    Review of Systems  Review of Systems   Constitutional: Positive for malaise/fatigue.   HENT: Negative.    Eyes: Negative.    Respiratory: Positive for cough (dry) and shortness of breath.    Cardiovascular: Negative.    Gastrointestinal: Negative.    Genitourinary: Negative.    Musculoskeletal: Negative.    Skin: Negative.    Neurological: Positive for weakness.   Endo/Heme/Allergies: Negative.    Psychiatric/Behavioral: Negative.       Vital Signs for last 24 hours   Temp:  [36.3 °C (97.4 °F)-36.6 °C (97.9 °F)] 36.6 °C (97.8 °F)  Pulse:  [] 81  Resp:  [17-47] 44  BP: ()/()  124/57  SpO2:  [83 %-96 %] 93 %          Physical Exam   Physical Exam  Vitals and nursing note reviewed.   Constitutional:       Appearance: She is ill-appearing.      Comments: Proning   HENT:      Head: Normocephalic and atraumatic.      Mouth/Throat:      Mouth: Mucous membranes are moist.   Eyes:      Extraocular Movements: Extraocular movements intact.      Pupils: Pupils are equal, round, and reactive to light.   Cardiovascular:      Rate and Rhythm: Rhythm irregular.      Heart sounds: Murmur heard.     Pulmonary:      Effort: Pulmonary effort is normal.   Skin:     General: Skin is warm and dry.   Neurological:      General: No focal deficit present.      Mental Status: She is alert and oriented to person, place, and time. Mental status is at baseline.   Psychiatric:         Mood and Affect: Mood normal.         Behavior: Behavior normal.         Thought Content: Thought content normal.         Judgment: Judgment normal.       Medications  Current Facility-Administered Medications   Medication Dose Route Frequency Provider Last Rate Last Admin   • furosemide (LASIX) injection 20 mg  20 mg Intravenous BID DIURETIC Maikol Perez M.D.   20 mg at 06/01/21 1042   • oxymetazoline (AFRIN) 0.05 % nasal spray 2 Spray  2 [place] Nasal BID Maikol Perez M.D.       • sodium chloride (OCEAN) 0.65 % nasal spray 2 Spray  2 Spray Nasal Q2HRS PRN Maikol Perez M.D.       • insulin regular (HumuLIN R,NovoLIN R) injection  1-6 Units Subcutaneous 4X/DAY HUMBERTO Colon M.D.   2 Units at 06/01/21 1048    And   • glucose 4 g chewable tablet 16 g  16 g Oral Q15 MIN PRN Rashad Colon M.D.        And   • dextrose 50% (D50W) injection 50 mL  50 mL Intravenous Q15 MIN PRN Rashad Colon M.D.       • ALPRAZolam (XANAX) tablet 0.5 mg  0.5 mg Oral TID PRN LEN RíosOOksana   0.5 mg at 05/31/21 2040   • DILTIAZem (CARDIZEM) injection 10 mg  10 mg Intravenous Q4HRS PRN LEN RíosOOksana       •  benzocaine-menthol (CEPACOL) lozenge 1 Lozenge  1 Lozenge Mouth/Throat Q2HRS PRN Rashad Colon M.D.   1 Lozenge at 05/29/21 0543   • ferrous sulfate tablet 325 mg  325 mg Oral QDAY with Breakfast Ivan Nunez M.D.   325 mg at 06/01/21 0728   • apixaban (ELIQUIS) tablet 5 mg  5 mg Oral BID Cheryl Gallagher D.O.   5 mg at 06/01/21 0514   • ascorbic acid (Vitamin C) tablet 1,000 mg  1,000 mg Oral DAILY Cheryl Gallagher D.O.   1,000 mg at 06/01/21 0514   • atorvastatin (LIPITOR) tablet 10 mg  10 mg Oral DAILY Cheryl Gallagher D.O.   10 mg at 06/01/21 0514   • benzonatate (TESSALON) capsule 100 mg  100 mg Oral TID PRN CHARISMA Moran.O.   100 mg at 05/26/21 1222   • DILTIAZem CD (CARDIZEM CD) capsule 180 mg  180 mg Oral BID Cheryl Gallagher D.O.   180 mg at 06/01/21 0514   • montelukast (SINGULAIR) tablet 10 mg  10 mg Oral Q EVENING Cheryl Gallagher D.O.   10 mg at 05/31/21 1813   • ondansetron (ZOFRAN) syringe/vial injection 4 mg  4 mg Intravenous Q6HRS PRN CHARISMA Moran.O.       • ondansetron (ZOFRAN ODT) dispertab 4 mg  4 mg Oral Q6HRS PRN Cheryl Gallagher D.O.       • senna-docusate (PERICOLACE or SENOKOT S) 8.6-50 MG per tablet 2 tablet  2 tablet Oral BID CHARISMA Moran.O.   2 tablet at 06/01/21 0514    And   • polyethylene glycol/lytes (MIRALAX) PACKET 1 Packet  1 Packet Oral QDAY PRN CHARISMA Moran.O.        And   • magnesium hydroxide (MILK OF MAGNESIA) suspension 30 mL  30 mL Oral QDAY PRN CHARISMA Moran.O.        And   • bisacodyl (DULCOLAX) suppository 10 mg  10 mg Rectal QDAY PRN CHARISMA Moran.O.       • acetaminophen (Tylenol) tablet 650 mg  650 mg Oral Q6HRS PRN CHARISMA Moran.O.   650 mg at 05/22/21 1008   • guaiFENesin ER (MUCINEX) tablet 600 mg  600 mg Oral BID PRN CHARISMA Moran.O.       • guaiFENesin dextromethorphan (ROBITUSSIN DM) 100-10 MG/5ML syrup 10 mL  10 mL Oral Q6HRS PRN Cheryl Gallagher D.O.       • budesonide-formoterol (SYMBICORT) 160-4.5 MCG/ACT inhaler 2 Puff  2 Puff  Inhalation BID (RT) Cheryl Gallagher D.O.   2 Puff at 05/31/21 2111   • HYDROcodone-homatropine 5-1.5 mg/5 mL solution 5 mL  5 mL Oral Q4HRS PRN Cheryl Gallagher D.O.           Fluids    Intake/Output Summary (Last 24 hours) at 6/1/2021 1117  Last data filed at 6/1/2021 0800  Gross per 24 hour   Intake --   Output 1070 ml   Net -1070 ml       Laboratory          Recent Labs     05/30/21 0148 05/31/21 0326 06/01/21  0510   SODIUM 134* 133* 135   POTASSIUM 5.5 5.4 5.4   CHLORIDE 94* 95* 97   CO2 28 30 29   BUN 43* 35* 37*   CREATININE 0.86 0.59 0.64   MAGNESIUM  --   --  2.2   PHOSPHORUS  --   --  3.0   CALCIUM 10.0 9.6 9.9     Recent Labs     05/30/21 0148 05/31/21 0326 06/01/21  0510   ALTSGPT 39 36  --    ASTSGOT 30 32  --    ALKPHOSPHAT 155* 141*  --    TBILIRUBIN 0.7 0.8  --    GLUCOSE 193* 176* 163*     Recent Labs     05/30/21 0148 05/31/21 0326 05/31/21 0502 06/01/21  0510   WBC 26.0*  --  20.1* 23.8*   NEUTSPOLYS 95.60*  --  93.50* 92.30*   LYMPHOCYTES 0.80*  --  1.90* 1.80*   MONOCYTES 1.60  --  2.20 2.50   EOSINOPHILS 0.00  --  0.00 0.00   BASOPHILS 0.20  --  0.20 0.30   ASTSGOT 30 32  --   --    ALTSGPT 39 36  --   --    ALKPHOSPHAT 155* 141*  --   --    TBILIRUBIN 0.7 0.8  --   --      Recent Labs     05/30/21 0148 05/31/21 0502 06/01/21  0510   RBC 5.76* 5.72* 5.32   HEMOGLOBIN 15.2 15.0 14.4   HEMATOCRIT 47.3* 47.5* 44.7   PLATELETCT 142* 98* 103*     Imaging  5/28: reviewed, bilateral infiltrates consistent with COVID pneumonia, improved compared to 5/23 improved     Assessment/Plan    * Acute hypoxemic respiratory failure due to severe acute respiratory syndrome coronavirus 2 (SARS-CoV-2) disease (HCC)- (present on admission)  Assessment & Plan  --  hypoxemia secondary to COVID pneumonia, goal saturations greater than 88% as long as she is asymptomatic and fluctuating oxygen needs  -- tocilizumab 5/21  -- convalescent plasma -- one dose 5/27  -- decadron completed 5/31   -- negative fluid balance  pt is -13 liters - off diuretics since 5/27  -- Aggressive pulmonary toilet as able  -- Cont supportive care, monitoring closely for superimposed pneumonia, continue to closely monitor off antibiotics.  -- Encourage self proning as tolerated if possible 16 hrs  -- Goal SpO2 88-92%   -- trial off bipap today, repeat inflammatory markers tomorrow  -- PT/OT, OOB when not prone    Epistaxis  Assessment & Plan  Due to HFNC/BiPAP  Afrin BID  Saline BID  Humidification    Steroid-induced hyperglycemia  Assessment & Plan  Con ISS goal FSBG 140-180    Thrombocytopenia (HCC)  Assessment & Plan  COVID  No signs of bleeding  Trend CBC daily    Hyponatremia  Assessment & Plan  Improving  DC salt tabs  Resume diuretics  -- na loss from bumex and also may be from SIADH due to the COVID pna  -- TSH low but FT4 normal  -- cortisol sent 5/28  -- Autodiuresing    Abnormal echocardiogram  Assessment & Plan  --  Pulmonary hypertension  -- Consider etiology to be multifactorial due to left atrial hypertension and severe hypoxemia causing pulmonary vasoconstriction    -- resume diuresis  -- Need repeat echocardiogram in 2-3 months    -- Avoid hypoxemia, respiratory acidosis, and A fib RVR which can cause worsening pulmonary vasoconstriction leading to worsening PA pressure     Pneumonia due to COVID-19 virus- (present on admission)  Assessment & Plan  -- Confirmed SARS-CoV-2/COVID on 5/15  -- Risk factors -> age and HTN and not vaccinated  -- s/p decadron, tocilizumab, plasma  -- Encourage self prone as tolerated (ie, 2 hour supine and 2 hour prone)  -- Cont aggressive diuresis to seek net negative fluid balance  -- On strict contact, droplet/airbone, eye protection, and critical meticulous hand hygiene    Asthma, moderate persistent, well-controlled- (present on admission)  Assessment & Plan  -- Not in acute flare   -- Cont symbicort and singulair        Longstanding persistent atrial fibrillation (HCC)-Dr. Turner Velasquez- (present on  admission)  Assessment & Plan  -- Currently rate controlled with diltiazem   -- On eliquis   -- High lytes goal    -- Goal HR < 110      VTE:  NOAC  Ulcer: H2 Antagonist dc'ed now off steroids  Lines: cindy reassessed    I have performed a physical exam and reviewed and updated ROS and Plan today (6/1/2021). In review of yesterday's note (5/31/2021), there are no changes except as documented above.     Discussed patient condition and risk of morbidity and/or mortality with Family, Pharmacy, Charge nurse / hot rounds and Patient and family  The patient remains critically ill.  Critical care time = 41 minutes in directly providing and coordinating critical care and extensive data review.  No time overlap and excludes procedures.    This note was generated using voice recognition software which has a chance of producing errors of grammar and content.  I have made every reasonable attempt to find and correct any errors, but it should be expected that some may not be found prior to finalization of this note.  __________  Maikol Perez MD  Pulmonary and Critical Care Medicine  Formerly [facility], [facility]

## 2021-06-01 NOTE — PROGRESS NOTES
Assumed pt care. Pt is alert and oriented X4. Pt.denied any pain. Assessment completed. Pt placed on HFNC for breakfast. Tolerating well at this time.  Pt denied any needs. Safety precautions in place.

## 2021-06-01 NOTE — CARE PLAN
Pt using HHFNC during the day and BiPAP at NOC.  Remains compliant with ordered therapy and prones at [facility] as requested during BiPAP use.  Will continue to monitor.

## 2021-06-01 NOTE — FLOWSHEET NOTE
05/31/21 2130   Events/Summary/Plan   Events/Summary/Plan BIPAP - Check   Vent Settings   FiO2% 100 %   NIV for Respiratory Failure   $ NIV Charge Yes   Non-Invasive Vent Mode ST   IPAP (cmH2O) 16   EPAP (cm H2O) 10   FiO2 100   Alarms Set / Checked Yes   Respiratory Rate Patient 24   Spontaneous Vt (ml) 249   Spontaneous Ve (LPM) 11.2   NIV Interface Full Mask   VAP   Head of Bed Elevated Flat

## 2021-06-01 NOTE — FLOWSHEET NOTE
05/31/21 1830   Events/Summary/Plan   Events/Summary/Plan HHFNC   Skin Inspection Respiratory Device Intact   Vital Signs   Pulse (!) 112   Respiration (!) 28   Pulse Oximetry 89 %   $ Pulse Oximetry (Spot Check) Yes   O2 Alarms Set & Reviewed Yes   Respiratory Assessment   Respiratory Pattern Within Normal Limits   Level of Consciousness Alert   Chest Exam   Work Of Breathing / Effort Moderate   Breath Sounds   RUL Breath Sounds Clear   RML Breath Sounds Clear   RLL Breath Sounds Diminished   JESSY Breath Sounds Clear   LLL Breath Sounds Diminished   Secretions   Cough Non Productive   How Sputum Obtained Spontaneous   Sputum Amount Unable to Evaluate   Sputum Color Unable to Evaluate   Sputum Consistency Unable to Evaluate   Oxygen   O2 (LPM) 50   FiO2% 100 %   O2 Delivery Device Heated High Flow Nasal Cannula   Resuscitation Device at Bedside Self Inflating Bag

## 2021-06-01 NOTE — FLOWSHEET NOTE
05/31/21 2111   Events/Summary/Plan   Events/Summary/Plan MDI Tx Given   Vital Signs   Pulse 95   Respiration (!) 27   Pulse Oximetry (!) 87 %   $ Pulse Oximetry (Spot Check) Yes   O2 Alarms Set & Reviewed Yes   Respiratory Assessment   Respiratory Pattern Within Normal Limits   Level of Consciousness Alert   Chest Exam   Work Of Breathing / Effort Mild   Breath Sounds   RUL Breath Sounds Clear   RML Breath Sounds Diminished   RLL Breath Sounds Diminished   JESSY Breath Sounds Clear   LLL Breath Sounds Diminished   Secretions   Cough Dry   How Sputum Obtained Spontaneous   Sputum Amount Unable to Evaluate   Sputum Color Unable to Evaluate   Sputum Consistency Unable to Evaluate   Oxygen   O2 (LPM) 50   FiO2% 100 %   O2 Delivery Device Heated High Flow Nasal Cannula   Resuscitation Device at Bedside Self Inflating Bag

## 2021-06-02 ENCOUNTER — APPOINTMENT (OUTPATIENT)
Dept: RADIOLOGY | Facility: MEDICAL CENTER | Age: 81
DRG: 177 | End: 2021-06-02
Attending: INTERNAL MEDICINE
Payer: MEDICARE

## 2021-06-02 LAB
ANION GAP SERPL CALC-SCNC: 9 MMOL/L (ref 7–16)
BASOPHILS # BLD AUTO: 0.3 % (ref 0–1.8)
BASOPHILS # BLD: 0.07 K/UL (ref 0–0.12)
BUN SERPL-MCNC: 41 MG/DL (ref 8–22)
CALCIUM SERPL-MCNC: 9.5 MG/DL (ref 8.4–10.2)
CHLORIDE SERPL-SCNC: 93 MMOL/L (ref 96–112)
CO2 SERPL-SCNC: 32 MMOL/L (ref 20–33)
CREAT SERPL-MCNC: 0.69 MG/DL (ref 0.5–1.4)
CRP SERPL HS-MCNC: 0.66 MG/DL (ref 0–0.75)
EOSINOPHIL # BLD AUTO: 0.38 K/UL (ref 0–0.51)
EOSINOPHIL NFR BLD: 1.9 % (ref 0–6.9)
ERYTHROCYTE [DISTWIDTH] IN BLOOD BY AUTOMATED COUNT: 41.8 FL (ref 35.9–50)
GLUCOSE BLD-MCNC: 131 MG/DL (ref 65–99)
GLUCOSE BLD-MCNC: 164 MG/DL (ref 65–99)
GLUCOSE BLD-MCNC: 216 MG/DL (ref 65–99)
GLUCOSE SERPL-MCNC: 133 MG/DL (ref 65–99)
HCT VFR BLD AUTO: 44.6 % (ref 37–47)
HGB BLD-MCNC: 14.6 G/DL (ref 12–16)
IMM GRANULOCYTES # BLD AUTO: 0.82 K/UL (ref 0–0.11)
IMM GRANULOCYTES NFR BLD AUTO: 4.1 % (ref 0–0.9)
LYMPHOCYTES # BLD AUTO: 0.74 K/UL (ref 1–4.8)
LYMPHOCYTES NFR BLD: 3.7 % (ref 22–41)
MAGNESIUM SERPL-MCNC: 2 MG/DL (ref 1.5–2.5)
MCH RBC QN AUTO: 27.1 PG (ref 27–33)
MCHC RBC AUTO-ENTMCNC: 32.7 G/DL (ref 33.6–35)
MCV RBC AUTO: 82.9 FL (ref 81.4–97.8)
MONOCYTES # BLD AUTO: 0.39 K/UL (ref 0–0.85)
MONOCYTES NFR BLD AUTO: 1.9 % (ref 0–13.4)
NEUTROPHILS # BLD AUTO: 17.71 K/UL (ref 2–7.15)
NEUTROPHILS NFR BLD: 88.1 % (ref 44–72)
NRBC # BLD AUTO: 0 K/UL
NRBC BLD-RTO: 0 /100 WBC
NT-PROBNP SERPL IA-MCNC: 841 PG/ML (ref 0–125)
PHOSPHATE SERPL-MCNC: 3 MG/DL (ref 2.5–4.5)
PLATELET # BLD AUTO: 92 K/UL (ref 164–446)
PMV BLD AUTO: 11.3 FL (ref 9–12.9)
POTASSIUM SERPL-SCNC: 4 MMOL/L (ref 3.6–5.5)
PROCALCITONIN SERPL-MCNC: 0.04 NG/ML
RBC # BLD AUTO: 5.38 M/UL (ref 4.2–5.4)
SODIUM SERPL-SCNC: 134 MMOL/L (ref 135–145)
WBC # BLD AUTO: 20.1 K/UL (ref 4.8–10.8)

## 2021-06-02 PROCEDURE — 770022 HCHG ROOM/CARE - ICU (200)

## 2021-06-02 PROCEDURE — 5A09357 ASSISTANCE WITH RESPIRATORY VENTILATION, LESS THAN 24 CONSECUTIVE HOURS, CONTINUOUS POSITIVE AIRWAY PRESSURE: ICD-10-PCS | Performed by: HOSPITALIST

## 2021-06-02 PROCEDURE — 80048 BASIC METABOLIC PNL TOTAL CA: CPT

## 2021-06-02 PROCEDURE — 84145 PROCALCITONIN (PCT): CPT

## 2021-06-02 PROCEDURE — 85025 COMPLETE CBC W/AUTO DIFF WBC: CPT

## 2021-06-02 PROCEDURE — A9270 NON-COVERED ITEM OR SERVICE: HCPCS | Performed by: INTERNAL MEDICINE

## 2021-06-02 PROCEDURE — 83880 ASSAY OF NATRIURETIC PEPTIDE: CPT

## 2021-06-02 PROCEDURE — 94660 CPAP INITIATION&MGMT: CPT

## 2021-06-02 PROCEDURE — 99291 CRITICAL CARE FIRST HOUR: CPT | Performed by: INTERNAL MEDICINE

## 2021-06-02 PROCEDURE — 94760 N-INVAS EAR/PLS OXIMETRY 1: CPT

## 2021-06-02 PROCEDURE — 94640 AIRWAY INHALATION TREATMENT: CPT

## 2021-06-02 PROCEDURE — 700102 HCHG RX REV CODE 250 W/ 637 OVERRIDE(OP): Performed by: INTERNAL MEDICINE

## 2021-06-02 PROCEDURE — 700111 HCHG RX REV CODE 636 W/ 250 OVERRIDE (IP): Performed by: INTERNAL MEDICINE

## 2021-06-02 PROCEDURE — 71045 X-RAY EXAM CHEST 1 VIEW: CPT

## 2021-06-02 PROCEDURE — 86140 C-REACTIVE PROTEIN: CPT

## 2021-06-02 PROCEDURE — 82962 GLUCOSE BLOOD TEST: CPT

## 2021-06-02 PROCEDURE — 84100 ASSAY OF PHOSPHORUS: CPT

## 2021-06-02 PROCEDURE — 83735 ASSAY OF MAGNESIUM: CPT

## 2021-06-02 RX ADMIN — ATORVASTATIN CALCIUM 10 MG: 10 TABLET, FILM COATED ORAL at 05:42

## 2021-06-02 RX ADMIN — BUDESONIDE AND FORMOTEROL FUMARATE DIHYDRATE 2 PUFF: 160; 4.5 AEROSOL RESPIRATORY (INHALATION) at 20:53

## 2021-06-02 RX ADMIN — APIXABAN 5 MG: 5 TABLET, FILM COATED ORAL at 05:42

## 2021-06-02 RX ADMIN — MONTELUKAST 10 MG: 10 TABLET, FILM COATED ORAL at 17:37

## 2021-06-02 RX ADMIN — DILTIAZEM HYDROCHLORIDE 180 MG: 180 CAPSULE, COATED, EXTENDED RELEASE ORAL at 05:42

## 2021-06-02 RX ADMIN — OXYCODONE HYDROCHLORIDE AND ACETAMINOPHEN 1000 MG: 500 TABLET ORAL at 05:42

## 2021-06-02 RX ADMIN — APIXABAN 5 MG: 5 TABLET, FILM COATED ORAL at 17:37

## 2021-06-02 RX ADMIN — DOCUSATE SODIUM 50 MG AND SENNOSIDES 8.6 MG 2 TABLET: 8.6; 5 TABLET, FILM COATED ORAL at 05:43

## 2021-06-02 RX ADMIN — OXYMETAZOLINE HCL 2 SPRAY: 0.05 SPRAY NASAL at 06:10

## 2021-06-02 RX ADMIN — FUROSEMIDE 20 MG: 10 INJECTION, SOLUTION INTRAMUSCULAR; INTRAVENOUS at 16:06

## 2021-06-02 RX ADMIN — DILTIAZEM HYDROCHLORIDE 180 MG: 180 CAPSULE, COATED, EXTENDED RELEASE ORAL at 17:36

## 2021-06-02 RX ADMIN — FUROSEMIDE 20 MG: 10 INJECTION, SOLUTION INTRAMUSCULAR; INTRAVENOUS at 05:43

## 2021-06-02 RX ADMIN — INSULIN HUMAN 1 UNITS: 100 INJECTION, SOLUTION PARENTERAL at 17:44

## 2021-06-02 RX ADMIN — INSULIN HUMAN 2 UNITS: 100 INJECTION, SOLUTION PARENTERAL at 11:54

## 2021-06-02 RX ADMIN — BUDESONIDE AND FORMOTEROL FUMARATE DIHYDRATE 2 PUFF: 160; 4.5 AEROSOL RESPIRATORY (INHALATION) at 06:59

## 2021-06-02 ASSESSMENT — PULMONARY FUNCTION TESTS
EPAP_CMH2O: 10

## 2021-06-02 ASSESSMENT — PAIN DESCRIPTION - PAIN TYPE
TYPE: ACUTE PAIN

## 2021-06-02 NOTE — FLOWSHEET NOTE
06/02/21 0252   Events/Summary/Plan   Events/Summary/Plan HHFNC Check   Skin Inspection Respiratory Device Intact;Red   Location None   Vent Settings   FiO2% 100 %

## 2021-06-02 NOTE — PROGRESS NOTES
"Critical Care Progress Note    Date of admission  5/20/2021    Chief Complaint  80 y.o. female admitted 5/20/2021 with covid pna    Hospital Course  \"80 y.o. female who presented 5/20/2021 with acute hypoxemia respiratory failure. Patient with significant history of atrial fibrillation on eliquis, asthma, and hypertension admitted for worsening hypoxemia secondary to COVID pneumonia. She was recently hospitalized 5/15-5/17 for COVID, discharged on 1 liters NC and presents back on 5/20 with worsening shortness of breath. On admission, she required 6 liters NC which quickly escalated to HFNC. ID consulted and patient was tocilizumab on 5/21. Her oxygenation worsen overnight as she was maxed out on HFNC and transferred to ICU for BiPAP 12/6 FiO2 100%. Not vaccinated.     In ICU, she was diuresed and documented I/O -> neg ~1 liter. Patient is breath comfortably on BiPAP and able to answer questions via BiPAP facemask. Subjectively, patient denies chest pain, cough, N/V, fever/chills, or abdominal pain. She endorse having shortness of breath, neck pain, and back pain. Discussed about the benefits of proning if she can tolerate at which she agrees to with RN assistance. Discussed about risks/benefits endotracheal intubation if she needs it in the upcoming hours to days which she agrees to.      Echocardiogram 5/16/21 personally reviewed -> preserved LV function, mild to moderate MR, dilated coronary sinus, eccentric TR jet with measure RVSP ~50 mmHG which is most likely underestimated, TAPSE ~0.9 cm -> reduced RV function, dilated RV and RA, [place] -> 0.89 m/s (~18 mmHG) and abnormal septal bounce, dilated IVC with inspiratory collapse. \" From Dr. Tamayo's note    COVID meds: Received Tocilimuzab on 5/21; on dexamethasone restarted 5/20, 12 mg, plasma 5/27 5/24: LE doppler negative; FIo2 increased to 100% and 50L    5/25: FIo2 down to 40 l 80%.   Pt's son Kaiser Black requested an infectious disease doctor to call as he is " expressed that he was not happy with patient's care and progress. Main questions patients son asked about were  for Ivermectin, Hydroxychloroquine and Remdesivir.  ID on call DR. Powers will call son this pm.    5/26: Fio2 50 L 70%  Dr. Powers reviewed with son and is reviewing options of plasma    5/27: Plasma    5/29: pt worse with sats now 85% on 100% 60 l and oxymask -- changed to bipap. CXR 5/28 showed improvement. Family aware    5/30: proned all night. Son spent the night in the room    Interval Problem Update  Reviewed last 24 hour events:  Chart review from the past 24 hours includes imaging, laboratory studies, vital signs and notes available.  Pertinent data for today    Trialed off BiPAP, was able to remain on high flow nasal cannula and oxygen mask however this morning became increasingly tachypneic and fatigued, placed on this morning.    Cardiac: Atrial fib, rate controlled, HD stable  Pulm: BIPAP 16/10 100%, proned overnight on HFNC, sustaining between 88-90%  Neuro:  intact  Heme: Reviewed, platelets plateaued  I/O:  -18L since admission, resumed lasix 20mg IV BID on 6/1  ID: tocilimuzab 5/21, 5/27 plasma, completed dexamethasone 5/31  WBC persistently up, afebrile. Procal 0.20 . CRP < 1  GI/endo:  FSBG improving with ISSc  Labs/Imaging:  Hyponatremia normalizing  Lines:  Peripherals, Rucker  Mobility:  OOB to chair  Symptoms: fatigued today    Review of Systems  Review of Systems   Unable to perform ROS: Severe respiratory distress      Vital Signs for last 24 hours   Temp:  [36 °C (96.8 °F)-36.4 °C (97.6 °F)] 36 °C (96.8 °F)  Pulse:  [] 86  Resp:  [19-51] 34  BP: (106-142)/(52-78) 136/78  SpO2:  [86 %-98 %] 90 %      Physical Exam   Physical Exam  Vitals and nursing note reviewed.   Constitutional:       General: She is in acute distress.      Appearance: She is ill-appearing.      Comments: On BIPAP   HENT:      Head: Normocephalic and atraumatic.      Mouth/Throat:      Mouth: Mucous  membranes are moist.   Eyes:      Extraocular Movements: Extraocular movements intact.      Pupils: Pupils are equal, round, and reactive to light.   Cardiovascular:      Rate and Rhythm: Rhythm irregular.      Heart sounds: Murmur heard.     Pulmonary:      Effort: Respiratory distress present.      Breath sounds: No wheezing or rales.   Skin:     General: Skin is warm and dry.   Neurological:      General: No focal deficit present.      Mental Status: She is alert and oriented to person, place, and time. Mental status is at baseline.   Psychiatric:         Mood and Affect: Mood normal.         Behavior: Behavior normal.         Thought Content: Thought content normal.         Judgment: Judgment normal.       Medications  Current Facility-Administered Medications   Medication Dose Route Frequency Provider Last Rate Last Admin   • furosemide (LASIX) injection 20 mg  20 mg Intravenous BID DIURETIC Maikol Perez M.D.   20 mg at 06/02/21 0543   • oxymetazoline (AFRIN) 0.05 % nasal spray 2 Spray  2 [place] Nasal BID Maikol Perez M.D.   2 [place] at 06/02/21 0610   • sodium chloride (OCEAN) 0.65 % nasal spray 2 Spray  2 Spray Nasal Q2HRS PRN Maikol Perez M.D.   2 Spray at 06/01/21 1535   • insulin regular (HumuLIN R,NovoLIN R) injection  1-6 Units Subcutaneous 4X/DAY HUMBERTO Colon M.D.   5 Units at 06/01/21 2030    And   • glucose 4 g chewable tablet 16 g  16 g Oral Q15 MIN PRN Rashad Colon M.D.        And   • dextrose 50% (D50W) injection 50 mL  50 mL Intravenous Q15 MIN PRN Rashad Colon M.D.       • ALPRAZolam (XANAX) tablet 0.5 mg  0.5 mg Oral TID PRN LEN RíosOOksana   0.5 mg at 06/01/21 2009   • DILTIAZem (CARDIZEM) injection 10 mg  10 mg Intravenous Q4HRS PRN Osvaldo Manrique D.O.       • benzocaine-menthol (CEPACOL) lozenge 1 Lozenge  1 Lozenge Mouth/Throat Q2HRS PRN Rashad Colon M.D.   1 Lozenge at 05/29/21 0543   • ferrous sulfate tablet 325 mg  325 mg Oral  QDAY with Breakfast Ivan Nunez M.D.   325 mg at 06/01/21 0728   • apixaban (ELIQUIS) tablet 5 mg  5 mg Oral BID Cheryl Gallagher D.O.   5 mg at 06/02/21 0542   • ascorbic acid (Vitamin C) tablet 1,000 mg  1,000 mg Oral DAILY Cheryl Gallagher D.O.   1,000 mg at 06/02/21 0542   • atorvastatin (LIPITOR) tablet 10 mg  10 mg Oral DAILY Cheryl Gallagher D.O.   10 mg at 06/02/21 0542   • benzonatate (TESSALON) capsule 100 mg  100 mg Oral TID PRN CHARISMA Moran.O.   100 mg at 05/26/21 1222   • DILTIAZem CD (CARDIZEM CD) capsule 180 mg  180 mg Oral BID Cheryl Gallagher D.O.   180 mg at 06/02/21 0542   • montelukast (SINGULAIR) tablet 10 mg  10 mg Oral Q EVENING Cheryl Gallagher D.O.   10 mg at 06/01/21 1738   • ondansetron (ZOFRAN) syringe/vial injection 4 mg  4 mg Intravenous Q6HRS PRN CHARISMA Moran.O.       • ondansetron (ZOFRAN ODT) dispertab 4 mg  4 mg Oral Q6HRS PRN Cheryl Gallagher D.O.       • senna-docusate (PERICOLACE or SENOKOT S) 8.6-50 MG per tablet 2 tablet  2 tablet Oral BID CHARISMA Moran.O.   2 tablet at 06/02/21 0543    And   • polyethylene glycol/lytes (MIRALAX) PACKET 1 Packet  1 Packet Oral QDAY PRN Cheryl Gallagher D.O.        And   • magnesium hydroxide (MILK OF MAGNESIA) suspension 30 mL  30 mL Oral QDAY PRN CHARISMA Moran.O.        And   • bisacodyl (DULCOLAX) suppository 10 mg  10 mg Rectal QDAY PRN Cheryl Gallagher D.O.       • acetaminophen (Tylenol) tablet 650 mg  650 mg Oral Q6HRS PRN Cheryl Gallagher D.O.   650 mg at 05/22/21 1008   • guaiFENesin ER (MUCINEX) tablet 600 mg  600 mg Oral BID PRN Cheryl Gallagher D.O.       • guaiFENesin dextromethorphan (ROBITUSSIN DM) 100-10 MG/5ML syrup 10 mL  10 mL Oral Q6HRS PRN Cheryl Gallagher D.O.       • budesonide-formoterol (SYMBICORT) 160-4.5 MCG/ACT inhaler 2 Puff  2 Puff Inhalation BID (RT) Cheryl Gallagher D.O.   2 Puff at 06/02/21 0659   • HYDROcodone-homatropine 5-1.5 mg/5 mL solution 5 mL  5 mL Oral Q4HRS PRN Cheryl Gallagher D.O.            Fluids    Intake/Output Summary (Last 24 hours) at 6/2/2021 0903  Last data filed at 6/2/2021 0800  Gross per 24 hour   Intake 240 ml   Output 2835 ml   Net -2595 ml       Laboratory          Recent Labs     05/31/21 0326 06/01/21 0510 06/02/21  0604   SODIUM 133* 135 134*   POTASSIUM 5.4 5.4 4.0   CHLORIDE 95* 97 93*   CO2 30 29 32   BUN 35* 37* 41*   CREATININE 0.59 0.64 0.69   MAGNESIUM  --  2.2 2.0   PHOSPHORUS  --  3.0 3.0   CALCIUM 9.6 9.9 9.5     Recent Labs     05/31/21 0326 06/01/21 0510 06/02/21  0604   ALTSGPT 36  --   --    ASTSGOT 32  --   --    ALKPHOSPHAT 141*  --   --    TBILIRUBIN 0.8  --   --    GLUCOSE 176* 163* 133*     Recent Labs     05/31/21 0326 05/31/21 0502 06/01/21 0510 06/02/21  0604   WBC  --  20.1* 23.8* 20.1*   NEUTSPOLYS  --  93.50* 92.30* 88.10*   LYMPHOCYTES  --  1.90* 1.80* 3.70*   MONOCYTES  --  2.20 2.50 1.90   EOSINOPHILS  --  0.00 0.00 1.90   BASOPHILS  --  0.20 0.30 0.30   ASTSGOT 32  --   --   --    ALTSGPT 36  --   --   --    ALKPHOSPHAT 141*  --   --   --    TBILIRUBIN 0.8  --   --   --      Recent Labs     05/31/21 0502 06/01/21 0510 06/02/21 0604   RBC 5.72* 5.32 5.38   HEMOGLOBIN 15.0 14.4 14.6   HEMATOCRIT 47.5* 44.7 44.6   PLATELETCT 98* 103* 92*     Imaging  5/28: reviewed, bilateral infiltrates consistent with COVID pneumonia, improved compared to 5/23 improved   6/2/2021:    Assessment/Plan    * Acute hypoxemic respiratory failure due to severe acute respiratory syndrome coronavirus 2 (SARS-CoV-2) disease (HCC)- (present on admission)  Assessment & Plan  --  hypoxemia secondary to COVID pneumonia, goal saturations greater than 88% as long as she is asymptomatic and fluctuating oxygen needs  -- tocilizumab 5/21  -- convalescent plasma -- one dose 5/27  -- decadron completed 5/31   -- negative fluid balance pt is -13 liters - off diuretics since 5/27  -- Aggressive pulmonary toilet as able  -- Cont supportive care, monitoring closely for superimposed  pneumonia, continue to closely monitor off antibiotics.  -- Encourage self proning as tolerated if possible 16 hrs  -- Goal SpO2 88-92%   -- repeat inflammatory markers CRP normalized  -- check BNP, procalcitonin  -- Failed off BIPAP trial, CXR: worsening bilateral alveolar opacities, concern for post-viral fibrosis/ARDS  -- will discuss with patient and family prognosis and GOC    Epistaxis  Assessment & Plan  Due to HFNC/BiPAP  Afrin BID  Saline BID  Humidification    Steroid-induced hyperglycemia  Assessment & Plan  Con ISS goal FSBG 140-180    Thrombocytopenia (HCC)  Assessment & Plan  COVID  No signs of bleeding  Trend CBC daily    Hyponatremia  Assessment & Plan  Improving  DC salt tabs  Resume diuretics  -- na loss from bumex and also may be from SIADH due to the COVID pna  -- TSH low but FT4 normal  -- cortisol sent 5/28  -- Autodiuresing    Abnormal echocardiogram  Assessment & Plan  --  Pulmonary hypertension  -- Consider etiology to be multifactorial due to left atrial hypertension and severe hypoxemia causing pulmonary vasoconstriction    -- resume diuresis  -- Need repeat echocardiogram in 2-3 months    -- Avoid hypoxemia, respiratory acidosis, and A fib RVR which can cause worsening pulmonary vasoconstriction leading to worsening PA pressure     Pneumonia due to COVID-19 virus- (present on admission)  Assessment & Plan  -- Confirmed SARS-CoV-2/COVID on 5/15  -- Risk factors -> age and HTN and not vaccinated  -- s/p decadron, tocilizumab, plasma  -- Encourage self prone as tolerated (ie, 2 hour supine and 2 hour prone)  -- Cont aggressive diuresis to seek net negative fluid balance  -- On strict contact, droplet/airbone, eye protection, and critical meticulous hand hygiene    Asthma, moderate persistent, well-controlled- (present on admission)  Assessment & Plan  -- Not in acute flare   -- Cont symbicort and singulair        Longstanding persistent atrial fibrillation (HCC)-Dr. Turner Velasquez- (present  on admission)  Assessment & Plan  -- Currently rate controlled with diltiazem   -- On eliquis   -- High lytes goal    -- Goal HR < 110      VTE:  NOAC  Ulcer: H2 Antagonist dc'ed now off steroids  Lines: cindy reassessed    I have performed a physical exam and reviewed and updated ROS and Plan today (6/2/2021). In review of yesterday's note (6/1/2021), there are no changes except as documented above.     Discussed patient condition and risk of morbidity and/or mortality with Family, Pharmacy, Charge nurse / hot rounds and Patient     The patient remains critically ill.  Critical care time = 55 minutes in directly providing and coordinating critical care and extensive data review.  No time overlap and excludes procedures.    This note was generated using voice recognition software which has a chance of producing errors of grammar and content.  I have made every reasonable attempt to find and correct any errors, but it should be expected that some may not be found prior to finalization of this note.  __________  Maikol Perez MD  Pulmonary and Critical Care Medicine  Formerly [facility]

## 2021-06-02 NOTE — PROGRESS NOTES
Report received this morning. Pt's RR 30s, fatigued, o2 sat 87% on HHFNC and humidified oxygen mask at 100% oxygen bleed in. Pt  Chris Gordon notified, RN instructed to have RT start BiPAP at previous settings. Mask applied, pt resting comfortably with o2 sat improvement to 88%.

## 2021-06-02 NOTE — CARE PLAN
The patient is Watcher - Medium risk of patient condition declining or worsening    Shift Goals  Clinical Goals: Decrease O2 demands, maintain SPO2 >88%, prone overnight without BIPAP  Patient Goals: Rest, comfort while proning  Family Goals: Support    Progress made toward(s) clinical / shift goals:    Problem: Knowledge Deficit - Standard  Goal: Patient and family/care givers will demonstrate understanding of plan of care, disease process/condition, diagnostic tests and medications  Outcome: Progressing     Problem: Respiratory  Goal: Patient will achieve/maintain optimum respiratory ventilation and gas exchange  Outcome: Progressing

## 2021-06-02 NOTE — FLOWSHEET NOTE
06/01/21 5007   Events/Summary/Plan   Events/Summary/Plan HHFNC   Vital Signs   Pulse 100   Respiration (!) 40   Pulse Oximetry 91 %   $ Pulse Oximetry (Spot Check) Yes   O2 Alarms Set & Reviewed Yes   Respiratory Assessment   Respiratory Pattern Within Normal Limits   Chest Exam   Work Of Breathing / Effort Increased Work of Breathing   Oxygen   O2 (LPM) 50   FiO2% 100 %   O2 Delivery Device Heated High Flow Nasal Cannula   Resuscitation Device at Bedside Self Inflating Bag   Aerosols   $ Aerosol Delivery Device Cool Mist Aerosol

## 2021-06-02 NOTE — CARE PLAN
Problem: Respiratory  Goal: Patient will achieve/maintain optimum respiratory ventilation and gas exchange  Outcome: Not Progressing   The patient is Unstable - High likelihood or risk of patient condition declining or worsening    Shift Goals  Clinical Goals: Decrease O2 demands, maintain SPO2 >88%, prone overnight without BIPAP  Patient Goals: Rest, comfort while proning  Family Goals: Support    Progress made toward(s) clinical / shift goals:  BiPAP to be kept in place and patient to rest.     Patient is not progressing towards the following goals:      Problem: Respiratory  Goal: Patient will achieve/maintain optimum respiratory ventilation and gas exchange  Outcome: Not Progressing

## 2021-06-02 NOTE — FLOWSHEET NOTE
06/01/21 2011   Events/Summary/Plan   Events/Summary/Plan MDI Tx Given - Then Proned   Skin Inspection Respiratory Device Intact;Red;Skin Barrier Used   Location Nose   Vital Signs   Pulse (!) 106   Respiration (!) 35   Pulse Oximetry (!) 87 %   $ Pulse Oximetry (Spot Check) Yes   O2 Alarms Set & Reviewed Yes   Respiratory Assessment   Respiratory Pattern Within Normal Limits   Level of Consciousness Alert   Chest Exam   Work Of Breathing / Effort Increased Work of Breathing;Tachypnea   Breath Sounds   RUL Breath Sounds Clear   RML Breath Sounds Diminished   RLL Breath Sounds Diminished   JESSY Breath Sounds Clear   LLL Breath Sounds Diminished   Secretions   Cough Dry;Non Productive   How Sputum Obtained Spontaneous   Sputum Amount Unable to Evaluate   Sputum Color Unable to Evaluate   Sputum Consistency Unable to Evaluate   Oxygen   O2 (LPM) 40   FiO2% 100 %   O2 Delivery Device Heated High Flow Nasal Cannula   Resuscitation Device at Bedside Self Inflating Bag   Aerosols   $ Aerosol Delivery Device Cool Mist Aerosol

## 2021-06-02 NOTE — FLOWSHEET NOTE
06/01/21 1825   Events/Summary/Plan   Events/Summary/Plan HHFNC   Skin Inspection Respiratory Device Intact;Red   Location Nose   Vital Signs   Pulse (!) 102   Respiration (!) 51   Pulse Oximetry (!) 86 %   $ Pulse Oximetry (Spot Check) Yes   O2 Alarms Set & Reviewed Yes   Respiratory Assessment   Respiratory Pattern Within Normal Limits   Level of Consciousness Alert   Chest Exam   Work Of Breathing / Effort Increased Work of Breathing;Tachypnea   Breath Sounds   RUL Breath Sounds Clear   RML Breath Sounds Diminished   RLL Breath Sounds Diminished   JESSY Breath Sounds Clear   LLL Breath Sounds Diminished   Oxygen   O2 (LPM) 40   FiO2% 100 %   O2 Delivery Device Heated High Flow Nasal Cannula   Resuscitation Device at Bedside Self Inflating Bag   Aerosols   $ Aerosol Delivery Device Cool Mist Aerosol

## 2021-06-02 NOTE — FLOWSHEET NOTE
06/01/21 2011   Inhalation Therapy Treatment   $ MDI/DPI Given MDI/DPI x 1   Chest Physiotherapy Treatment   $ PEP/CPT Performed PEP / Flutter

## 2021-06-02 NOTE — THERAPY
Missed Therapy     Patient Name: Jessica Black  Age:  80 y.o., Sex:  female  Medical Record #: [id number]  Today's Date: 6/2/2021    Discussed missed therapy with PT. RN requesting hold therapy evals today d/t unstable vitals, pt currently on HFNC. Will reattempt OT eval as pt is able and appropriate.    Gabriela Elena, OT  [id number]

## 2021-06-02 NOTE — PROGRESS NOTES
Report received from Samantha Young RN. Patient A/O x4, sitting up in chair eating dinner, currently on HHFNC 60L 100%, SPO2 86% while eating. Patient denies pain at this time, POC reviewed, plan for the night is to try to prone without the BIPAP. Safety precautions in place, call light and belongings within reach, all needs met at this time.

## 2021-06-02 NOTE — FLOWSHEET NOTE
06/02/21 0252   Events/Summary/Plan   Events/Summary/Plan HHFNC Check   Skin Inspection Respiratory Device Intact;Red   Location None   Vital Signs   Pulse 79   Respiration (!) 41   Pulse Oximetry 94 %   $ Pulse Oximetry (Spot Check) Yes   O2 Alarms Set & Reviewed Yes   Respiratory Assessment   Respiratory Pattern Within Normal Limits   Chest Exam   Work Of Breathing / Effort Increased Work of Breathing   Breath Sounds   RUL Breath Sounds Clear   RML Breath Sounds Diminished   RLL Breath Sounds Diminished   JESSY Breath Sounds Clear   LLL Breath Sounds Diminished   Oxygen   O2 (LPM) 50   FiO2% 100 %   O2 Delivery Device Heated High Flow Nasal Cannula   Resuscitation Device at Bedside Self Inflating Bag   Aerosols   $ Aerosol Delivery Device Cool Mist Aerosol   Aerosol Temperature 30 °C (86 °F)        06/02/21 0252   Events/Summary/Plan   Events/Summary/Plan HHFNC Check   Skin Inspection Respiratory Device Intact;Red   Location None   Vital Signs   Pulse 79   Respiration (!) 41   Pulse Oximetry 94 %   $ Pulse Oximetry (Spot Check) Yes   O2 Alarms Set & Reviewed Yes   Respiratory Assessment   Respiratory Pattern Within Normal Limits   Chest Exam   Work Of Breathing / Effort Increased Work of Breathing   Breath Sounds   RUL Breath Sounds Clear   RML Breath Sounds Diminished   RLL Breath Sounds Diminished   JESSY Breath Sounds Clear   LLL Breath Sounds Diminished   Oxygen   O2 (LPM) 50   FiO2% 100 %   O2 Delivery Device Heated High Flow Nasal Cannula   Resuscitation Device at Bedside Self Inflating Bag   Aerosols   $ Aerosol Delivery Device Cool Mist Aerosol   Aerosol Temperature 30 °C (86 °F)

## 2021-06-02 NOTE — FLOWSHEET NOTE
06/01/21 5067   Events/Summary/Plan   Events/Summary/Plan HHFNC   Vital Signs   Pulse 100   Respiration (!) 40   Pulse Oximetry 91 %   $ Pulse Oximetry (Spot Check) Yes   O2 Alarms Set & Reviewed Yes   Respiratory Assessment   Respiratory Pattern Within Normal Limits   Chest Exam   Work Of Breathing / Effort Increased Work of Breathing   Oxygen   O2 (LPM) 50   FiO2% 100 %   O2 Delivery Device Heated High Flow Nasal Cannula   Resuscitation Device at Bedside Self Inflating Bag   Aerosols   $ Aerosol Delivery Device Cool Mist Aerosol

## 2021-06-03 LAB
ANION GAP SERPL CALC-SCNC: 12 MMOL/L (ref 7–16)
BASOPHILS # BLD AUTO: 0.3 % (ref 0–1.8)
BASOPHILS # BLD: 0.06 K/UL (ref 0–0.12)
BUN SERPL-MCNC: 45 MG/DL (ref 8–22)
CALCIUM SERPL-MCNC: 9.6 MG/DL (ref 8.4–10.2)
CHLORIDE SERPL-SCNC: 93 MMOL/L (ref 96–112)
CO2 SERPL-SCNC: 32 MMOL/L (ref 20–33)
CREAT SERPL-MCNC: 0.66 MG/DL (ref 0.5–1.4)
EOSINOPHIL # BLD AUTO: 0.46 K/UL (ref 0–0.51)
EOSINOPHIL NFR BLD: 2.5 % (ref 0–6.9)
ERYTHROCYTE [DISTWIDTH] IN BLOOD BY AUTOMATED COUNT: 41.8 FL (ref 35.9–50)
GLUCOSE BLD-MCNC: 142 MG/DL (ref 65–99)
GLUCOSE BLD-MCNC: 175 MG/DL (ref 65–99)
GLUCOSE BLD-MCNC: 254 MG/DL (ref 65–99)
GLUCOSE BLD-MCNC: 465 MG/DL (ref 65–99)
GLUCOSE SERPL-MCNC: 145 MG/DL (ref 65–99)
HCT VFR BLD AUTO: 43.1 % (ref 37–47)
HGB BLD-MCNC: 14.2 G/DL (ref 12–16)
IMM GRANULOCYTES # BLD AUTO: 0.48 K/UL (ref 0–0.11)
IMM GRANULOCYTES NFR BLD AUTO: 2.6 % (ref 0–0.9)
LYMPHOCYTES # BLD AUTO: 0.56 K/UL (ref 1–4.8)
LYMPHOCYTES NFR BLD: 3 % (ref 22–41)
MAGNESIUM SERPL-MCNC: 2.1 MG/DL (ref 1.5–2.5)
MCH RBC QN AUTO: 27.3 PG (ref 27–33)
MCHC RBC AUTO-ENTMCNC: 32.9 G/DL (ref 33.6–35)
MCV RBC AUTO: 82.7 FL (ref 81.4–97.8)
MONOCYTES # BLD AUTO: 0.43 K/UL (ref 0–0.85)
MONOCYTES NFR BLD AUTO: 2.3 % (ref 0–13.4)
NEUTROPHILS # BLD AUTO: 16.65 K/UL (ref 2–7.15)
NEUTROPHILS NFR BLD: 89.3 % (ref 44–72)
NRBC # BLD AUTO: 0 K/UL
NRBC BLD-RTO: 0 /100 WBC
PHOSPHATE SERPL-MCNC: 3.6 MG/DL (ref 2.5–4.5)
PLATELET # BLD AUTO: 100 K/UL (ref 164–446)
PMV BLD AUTO: 10.8 FL (ref 9–12.9)
POTASSIUM SERPL-SCNC: 3.7 MMOL/L (ref 3.6–5.5)
RBC # BLD AUTO: 5.21 M/UL (ref 4.2–5.4)
SODIUM SERPL-SCNC: 137 MMOL/L (ref 135–145)
WBC # BLD AUTO: 18.6 K/UL (ref 4.8–10.8)

## 2021-06-03 PROCEDURE — 85025 COMPLETE CBC W/AUTO DIFF WBC: CPT

## 2021-06-03 PROCEDURE — 5A0935A ASSISTANCE WITH RESPIRATORY VENTILATION, LESS THAN 24 CONSECUTIVE HOURS, HIGH NASAL FLOW/VELOCITY: ICD-10-PCS | Performed by: INTERNAL MEDICINE

## 2021-06-03 PROCEDURE — 700102 HCHG RX REV CODE 250 W/ 637 OVERRIDE(OP): Performed by: STUDENT IN AN ORGANIZED HEALTH CARE EDUCATION/TRAINING PROGRAM

## 2021-06-03 PROCEDURE — 700111 HCHG RX REV CODE 636 W/ 250 OVERRIDE (IP): Performed by: INTERNAL MEDICINE

## 2021-06-03 PROCEDURE — 770022 HCHG ROOM/CARE - ICU (200)

## 2021-06-03 PROCEDURE — 99291 CRITICAL CARE FIRST HOUR: CPT | Performed by: INTERNAL MEDICINE

## 2021-06-03 PROCEDURE — 5A09357 ASSISTANCE WITH RESPIRATORY VENTILATION, LESS THAN 24 CONSECUTIVE HOURS, CONTINUOUS POSITIVE AIRWAY PRESSURE: ICD-10-PCS | Performed by: HOSPITALIST

## 2021-06-03 PROCEDURE — 80048 BASIC METABOLIC PNL TOTAL CA: CPT

## 2021-06-03 PROCEDURE — 94640 AIRWAY INHALATION TREATMENT: CPT

## 2021-06-03 PROCEDURE — 82962 GLUCOSE BLOOD TEST: CPT

## 2021-06-03 PROCEDURE — C9113 INJ PANTOPRAZOLE SODIUM, VIA: HCPCS | Performed by: INTERNAL MEDICINE

## 2021-06-03 PROCEDURE — 700102 HCHG RX REV CODE 250 W/ 637 OVERRIDE(OP): Performed by: HOSPITALIST

## 2021-06-03 PROCEDURE — 83735 ASSAY OF MAGNESIUM: CPT

## 2021-06-03 PROCEDURE — A9270 NON-COVERED ITEM OR SERVICE: HCPCS | Performed by: INTERNAL MEDICINE

## 2021-06-03 PROCEDURE — 94760 N-INVAS EAR/PLS OXIMETRY 1: CPT

## 2021-06-03 PROCEDURE — 700102 HCHG RX REV CODE 250 W/ 637 OVERRIDE(OP): Performed by: INTERNAL MEDICINE

## 2021-06-03 PROCEDURE — 84100 ASSAY OF PHOSPHORUS: CPT

## 2021-06-03 PROCEDURE — A9270 NON-COVERED ITEM OR SERVICE: HCPCS | Performed by: HOSPITALIST

## 2021-06-03 PROCEDURE — 94660 CPAP INITIATION&MGMT: CPT

## 2021-06-03 RX ORDER — PANTOPRAZOLE SODIUM 40 MG/10ML
40 INJECTION, POWDER, LYOPHILIZED, FOR SOLUTION INTRAVENOUS DAILY
Status: DISCONTINUED | OUTPATIENT
Start: 2021-06-03 | End: 2021-06-05

## 2021-06-03 RX ORDER — DEXAMETHASONE SODIUM PHOSPHATE 4 MG/ML
12 INJECTION, SOLUTION INTRA-ARTICULAR; INTRALESIONAL; INTRAMUSCULAR; INTRAVENOUS; SOFT TISSUE DAILY
Status: DISCONTINUED | OUTPATIENT
Start: 2021-06-03 | End: 2021-06-10 | Stop reason: HOSPADM

## 2021-06-03 RX ORDER — DEXAMETHASONE SODIUM PHOSPHATE 10 MG/ML
12 INJECTION, SOLUTION INTRAMUSCULAR; INTRAVENOUS DAILY
Status: DISCONTINUED | OUTPATIENT
Start: 2021-06-03 | End: 2021-06-03

## 2021-06-03 RX ORDER — DEXTROSE MONOHYDRATE 25 G/50ML
50 INJECTION, SOLUTION INTRAVENOUS
Status: DISCONTINUED | OUTPATIENT
Start: 2021-06-03 | End: 2021-06-04

## 2021-06-03 RX ADMIN — MONTELUKAST 10 MG: 10 TABLET, FILM COATED ORAL at 18:02

## 2021-06-03 RX ADMIN — OXYMETAZOLINE HCL 2 SPRAY: 0.05 SPRAY NASAL at 18:03

## 2021-06-03 RX ADMIN — DILTIAZEM HYDROCHLORIDE 180 MG: 180 CAPSULE, COATED, EXTENDED RELEASE ORAL at 18:02

## 2021-06-03 RX ADMIN — FERROUS SULFATE TAB 325 MG (65 MG ELEMENTAL FE) 325 MG: 325 (65 FE) TAB at 07:33

## 2021-06-03 RX ADMIN — PANTOPRAZOLE SODIUM 40 MG: 40 INJECTION, POWDER, LYOPHILIZED, FOR SOLUTION INTRAVENOUS at 13:50

## 2021-06-03 RX ADMIN — OXYCODONE HYDROCHLORIDE AND ACETAMINOPHEN 1000 MG: 500 TABLET ORAL at 05:52

## 2021-06-03 RX ADMIN — DILTIAZEM HYDROCHLORIDE 180 MG: 180 CAPSULE, COATED, EXTENDED RELEASE ORAL at 05:53

## 2021-06-03 RX ADMIN — DOCUSATE SODIUM 50 MG AND SENNOSIDES 8.6 MG 2 TABLET: 8.6; 5 TABLET, FILM COATED ORAL at 05:54

## 2021-06-03 RX ADMIN — OXYMETAZOLINE HCL 2 SPRAY: 0.05 SPRAY NASAL at 05:55

## 2021-06-03 RX ADMIN — INSULIN HUMAN 1 UNITS: 100 INJECTION, SOLUTION PARENTERAL at 16:49

## 2021-06-03 RX ADMIN — INSULIN HUMAN 3 UNITS: 100 INJECTION, SOLUTION PARENTERAL at 11:13

## 2021-06-03 RX ADMIN — INSULIN HUMAN 6 UNITS: 100 INJECTION, SOLUTION PARENTERAL at 20:58

## 2021-06-03 RX ADMIN — ATORVASTATIN CALCIUM 10 MG: 10 TABLET, FILM COATED ORAL at 05:53

## 2021-06-03 RX ADMIN — APIXABAN 5 MG: 5 TABLET, FILM COATED ORAL at 18:02

## 2021-06-03 RX ADMIN — DEXAMETHASONE SODIUM PHOSPHATE 12 MG: 4 INJECTION, SOLUTION INTRA-ARTICULAR; INTRALESIONAL; INTRAMUSCULAR; INTRAVENOUS; SOFT TISSUE at 13:49

## 2021-06-03 RX ADMIN — APIXABAN 5 MG: 5 TABLET, FILM COATED ORAL at 05:53

## 2021-06-03 RX ADMIN — INSULIN GLARGINE 15 UNITS: 100 INJECTION, SOLUTION SUBCUTANEOUS at 22:53

## 2021-06-03 RX ADMIN — BUDESONIDE AND FORMOTEROL FUMARATE DIHYDRATE 2 PUFF: 160; 4.5 AEROSOL RESPIRATORY (INHALATION) at 10:17

## 2021-06-03 RX ADMIN — BUDESONIDE AND FORMOTEROL FUMARATE DIHYDRATE 2 PUFF: 160; 4.5 AEROSOL RESPIRATORY (INHALATION) at 20:39

## 2021-06-03 RX ADMIN — FUROSEMIDE 20 MG: 10 INJECTION, SOLUTION INTRAMUSCULAR; INTRAVENOUS at 05:54

## 2021-06-03 RX ADMIN — FUROSEMIDE 20 MG: 10 INJECTION, SOLUTION INTRAMUSCULAR; INTRAVENOUS at 16:36

## 2021-06-03 ASSESSMENT — ENCOUNTER SYMPTOMS
BLURRED VISION: 0
DIZZINESS: 0
SENSORY CHANGE: 0
SORE THROAT: 0
SHORTNESS OF BREATH: 1
SPEECH CHANGE: 0
HEADACHES: 0
FOCAL WEAKNESS: 0
DOUBLE VISION: 0

## 2021-06-03 ASSESSMENT — PULMONARY FUNCTION TESTS: EPAP_CMH2O: 10

## 2021-06-03 ASSESSMENT — PAIN DESCRIPTION - PAIN TYPE
TYPE: ACUTE PAIN

## 2021-06-03 ASSESSMENT — FIBROSIS 4 INDEX: FIB4 SCORE: 4.7

## 2021-06-03 NOTE — FLOWSHEET NOTE
06/02/21 2053   Events/Summary/Plan   Events/Summary/Plan MDI Tx Given   Skin Inspection Respiratory Device Intact;Skin Barrier Used   Location Nose   Protective Device Bipap Gels   Vent Settings   FiO2% 100 %   NIV for Respiratory Failure   $ NIV Charge Yes   Non-Invasive Vent Mode ST   IPAP (cmH2O) 16   EPAP (cm H2O) 10   FiO2 100   Alarms Set / Checked Yes   Respiratory Rate Patient 28   Spontaneous Vt (ml) 450   Spontaneous Ve (LPM) 12.7   NIV Interface Full Mask   VAP   Head of Bed Elevated Greater or equal to 30 degrees

## 2021-06-03 NOTE — FLOWSHEET NOTE
06/03/21 0220   Events/Summary/Plan   Events/Summary/Plan Bipap Check   Skin Inspection Respiratory Device Intact   Location Nose   Protective Device Bipap Gels   Vital Signs   Pulse 86   Respiration 20   Pulse Oximetry 96 %   $ Pulse Oximetry (Spot Check) Yes   O2 Alarms Set & Reviewed Yes   Respiratory Assessment   Respiratory Pattern Within Normal Limits   Level of Consciousness Responds to voice   Chest Exam   Work Of Breathing / Effort Increased Work of Breathing   Breath Sounds   RUL Breath Sounds Diminished   RML Breath Sounds Diminished   RLL Breath Sounds Diminished   JESSY Breath Sounds Diminished   LLL Breath Sounds Diminished   Oxygen   FiO2% 100 %   O2 Delivery Device BIPAP   Resuscitation Device at Bedside Self Inflating Bag

## 2021-06-03 NOTE — FLOWSHEET NOTE
06/02/21 2220   Events/Summary/Plan   Events/Summary/Plan BIPAP Check   Skin Inspection Respiratory Device Intact   Location nos   Protective Device Bipap Gels   Vital Signs   Pulse 82   Respiration (!) 23   Pulse Oximetry 94 %   $ Pulse Oximetry (Spot Check) Yes   O2 Alarms Set & Reviewed Yes   Respiratory Assessment   Respiratory Pattern Within Normal Limits   Level of Consciousness Responds to voice   Chest Exam   Work Of Breathing / Effort Increased Work of Breathing   Breath Sounds   RUL Breath Sounds Diminished   RML Breath Sounds Diminished   RLL Breath Sounds Diminished   JESSY Breath Sounds Diminished   LLL Breath Sounds Diminished   Secretions   Cough Non Productive   How Sputum Obtained Spontaneous   Sputum Amount Unable to Evaluate   Sputum Color Unable to Evaluate   Sputum Consistency Unable to Evaluate   Oxygen   FiO2% 100 %   O2 Delivery Device BIPAP   Resuscitation Device at Bedside Self Inflating Bag

## 2021-06-03 NOTE — FLOWSHEET NOTE
06/03/21 0220   Events/Summary/Plan   Events/Summary/Plan Bipap Check   Skin Inspection Respiratory Device Intact   Location Nose   Protective Device Bipap Gels   Ventilator Management Group   Intensivist Group Yes   Vent Settings   FiO2% 100 %   NIV for Respiratory Failure   $ NIV Charge Yes   Non-Invasive Vent Mode ST   IPAP (cmH2O) 16   EPAP (cm H2O) 10   FiO2 100   Alarms Set / Checked Yes   Respiratory Rate Patient 24   Spontaneous Vt (ml) 450   Spontaneous Ve (LPM) 11.1   NIV Interface Full Mask   VAP   Head of Bed Elevated Greater or equal to 30 degrees

## 2021-06-03 NOTE — FLOWSHEET NOTE
06/02/21 2053   Events/Summary/Plan   Events/Summary/Plan MDI Tx Given   Skin Inspection Respiratory Device Intact;Skin Barrier Used   Location Nose   Protective Device Bipap Gels   Vital Signs   Pulse 90   Respiration (!) 27   Pulse Oximetry 94 %   $ Pulse Oximetry (Spot Check) Yes   O2 Alarms Set & Reviewed Yes   Respiratory Assessment   Respiratory Pattern Within Normal Limits   Level of Consciousness Responds to voice   Chest Exam   Work Of Breathing / Effort Increased Work of Breathing   Breath Sounds   RUL Breath Sounds Diminished   RML Breath Sounds Diminished   RLL Breath Sounds Diminished   JESSY Breath Sounds Diminished   LLL Breath Sounds Diminished   Oxygen   FiO2% 100 %   O2 Delivery Device BIPAP   Resuscitation Device at Bedside Self Inflating Bag

## 2021-06-03 NOTE — DISCHARGE PLANNING
Chart review completed.   No SW/CM needs or post acute referrals.   Unknown dc needs at this time.

## 2021-06-03 NOTE — FLOWSHEET NOTE
06/02/21 1945   Events/Summary/Plan   Events/Summary/Plan BIPAP Check   Skin Inspection Respiratory Device Intact;Skin Barrier Used   Location Nose   Vital Signs   Pulse (!) 104   Respiration (!) 24   Pulse Oximetry 93 %   $ Pulse Oximetry (Spot Check) Yes   O2 Alarms Set & Reviewed Yes   CO2 Monitoring   EtCO2 Calibration Yes   Respiratory Assessment   Respiratory Pattern Within Normal Limits   Level of Consciousness Responds to voice   Chest Exam   Work Of Breathing / Effort Increased Work of Breathing   Breath Sounds   RUL Breath Sounds Diminished   RML Breath Sounds Diminished   RLL Breath Sounds Diminished   JESSY Breath Sounds Diminished   LLL Breath Sounds Diminished   Oxygen   FiO2% 100 %   O2 Delivery Device BIPAP   Resuscitation Device at Bedside Self Inflating Bag

## 2021-06-03 NOTE — PROGRESS NOTES
Resting up in bed with family at her side. Will not open eyes when I speak to her or verbalize answers. She will nod her head appropriately. She lets me know that she does not want to be placed prone tonight and that she is comfortable on her back. Denies any needs at the present time. Urine output is adequate of clear, light yellow urine.

## 2021-06-03 NOTE — CARE PLAN
Problem: Knowledge Deficit - Standard  Goal: Patient and family/care givers will demonstrate understanding of plan of care, disease process/condition, diagnostic tests and medications  Outcome: Not Progressing     Problem: Respiratory  Goal: Patient will achieve/maintain optimum respiratory ventilation and gas exchange  Outcome: Not Progressing     Problem: Skin Integrity  Goal: Skin integrity is maintained or improved  Outcome: Not Progressing   The patient is Unstable - High likelihood or risk of patient condition declining or worsening    Shift Goals  Clinical Goals: Decrease O2 needs. Maintain SPO2>88%.  Patient Goals: Rest, comfort while proning  Family Goals: Support    Progress made toward(s) clinical / shift goals:     Patient is not progressing towards the following goals:      Problem: Knowledge Deficit - Standard  Goal: Patient and family/care givers will demonstrate understanding of plan of care, disease process/condition, diagnostic tests and medications  Outcome: Not Progressing     Problem: Respiratory  Goal: Patient will achieve/maintain optimum respiratory ventilation and gas exchange  Outcome: Not Progressing     Problem: Skin Integrity  Goal: Skin integrity is maintained or improved  Outcome: Not Progressing

## 2021-06-03 NOTE — PROGRESS NOTES
"Critical Care Progress Note    Date of admission  5/20/2021    Chief Complaint  80 y.o. female admitted 5/20/2021 with covid pna    Hospital Course  \"80 y.o. female who presented 5/20/2021 with acute hypoxemia respiratory failure. Patient with significant history of atrial fibrillation on eliquis, asthma, and hypertension admitted for worsening hypoxemia secondary to COVID pneumonia. She was recently hospitalized 5/15-5/17 for COVID, discharged on 1 liters NC and presents back on 5/20 with worsening shortness of breath. On admission, she required 6 liters NC which quickly escalated to HFNC. ID consulted and patient was tocilizumab on 5/21. Her oxygenation worsen overnight as she was maxed out on HFNC and transferred to ICU for BiPAP 12/6 FiO2 100%. Not vaccinated.     In ICU, she was diuresed and documented I/O -> neg ~1 liter. Patient is breath comfortably on BiPAP and able to answer questions via BiPAP facemask. Subjectively, patient denies chest pain, cough, N/V, fever/chills, or abdominal pain. She endorse having shortness of breath, neck pain, and back pain. Discussed about the benefits of proning if she can tolerate at which she agrees to with RN assistance. Discussed about risks/benefits endotracheal intubation if she needs it in the upcoming hours to days which she agrees to.      Echocardiogram 5/16/21 personally reviewed -> preserved LV function, mild to moderate MR, dilated coronary sinus, eccentric TR jet with measure RVSP ~50 mmHG which is most likely underestimated, TAPSE ~0.9 cm -> reduced RV function, dilated RV and RA, LA -> 0.89 m/s (~18 mmHG) and abnormal septal bounce, dilated IVC with inspiratory collapse. \" From Dr. Tamayo's note    COVID meds: Received Tocilimuzab on 5/21; on dexamethasone restarted 5/20, 12 mg, plasma 5/27 5/24: LE doppler negative; FIo2 increased to 100% and 50L    5/25: FIo2 down to 40 l 80%.   Pt's son Kaiser Black requested an infectious disease doctor to call as he is " expressed that he was not happy with patient's care and progress. Main questions patients son asked about were  for Ivermectin, Hydroxychloroquine and Remdesivir.  ID on call DR. Powers will call son this pm.    5/26: Fio2 50 L 70%  Dr. Powers reviewed with son and is reviewing options of plasma    5/27: Plasma    5/29: pt worse with sats now 85% on 100% 60 l and oxymask -- changed to bipap. CXR 5/28 showed improvement. Family aware    5/30: proned all night. Son spent the night in the room    Interval Problem Update  Reviewed last 24 hour events:  Chart review from the past 24 hours includes imaging, laboratory studies, vital signs and notes available.  Pertinent data for today    Did better on BiPAP overnight with brief interruptions with sips/meds  Family meeting held yesterday, discussed prognosis ARDS, was able to remain on high flow nasal cannula and oxygen mask however this morning became increasingly tachypneic and fatigued, placed on this morning.    Cardiac: Atrial fib, rate controlled, HD stable  Pulm: BIPAP 16/10 100%, proned overnight on HFNC, sustaining between 88-90%  Neuro:  intact  Heme: Reviewed, platelets plateaued  I/O:  -18L since admission, resumed lasix 20mg IV BID on 6/1  ID: tocilimuzab 5/21, 5/27 plasma, completed dexamethasone 5/31  WBC persistently up, afebrile. Procal 0.20 . CRP < 1  GI/endo:  FSBG improving with ISSc  Labs/Imaging:  Hyponatremia normalizing  Lines:  Peripherals, Rucker  Mobility:  OOB to chair  Symptoms: fatigued today    Review of Systems  Review of Systems   Constitutional: Positive for malaise/fatigue.   HENT: Positive for nosebleeds. Negative for sore throat.    Eyes: Negative for blurred vision and double vision.   Respiratory: Positive for shortness of breath.    Cardiovascular: Negative for chest pain.   Neurological: Negative for dizziness, sensory change, speech change, focal weakness and headaches.   All other systems reviewed and are negative.     Vital  Signs for last 24 hours   Temp:  [35.9 °C (96.7 °F)-36.4 °C (97.5 °F)] 36.4 °C (97.5 °F)  Pulse:  [] 100  Resp:  [18-58] 26  BP: ()/(47-69) 134/62  SpO2:  [89 %-96 %] 95 %      Physical Exam   Physical Exam  Vitals and nursing note reviewed.   Constitutional:       General: She is in acute distress.      Appearance: She is ill-appearing.      Comments: On HFNC and nonrebreather mask   HENT:      Head: Normocephalic and atraumatic.      Mouth/Throat:      Mouth: Mucous membranes are moist.   Eyes:      Extraocular Movements: Extraocular movements intact.      Pupils: Pupils are equal, round, and reactive to light.   Cardiovascular:      Rate and Rhythm: Rhythm irregular.      Heart sounds: Murmur heard.     Pulmonary:      Effort: Respiratory distress present.      Breath sounds: No wheezing or rales.   Skin:     General: Skin is warm and dry.   Neurological:      General: No focal deficit present.      Mental Status: She is alert and oriented to person, place, and time. Mental status is at baseline.   Psychiatric:         Mood and Affect: Mood normal.         Behavior: Behavior normal.         Thought Content: Thought content normal.         Judgment: Judgment normal.       Medications  Current Facility-Administered Medications   Medication Dose Route Frequency Provider Last Rate Last Admin   • furosemide (LASIX) injection 20 mg  20 mg Intravenous BID DIURETIC Maikol Perez M.D.   20 mg at 06/03/21 0554   • oxymetazoline (AFRIN) 0.05 % nasal spray 2 Spray  2 [place] Nasal BID Maikol Perez M.D.   2 [place] at 06/03/21 0555   • sodium chloride (OCEAN) 0.65 % nasal spray 2 Spray  2 Spray Nasal Q2HRS PRN Maikol Perez M.D.   2 Spray at 06/01/21 1535   • insulin regular (HumuLIN R,NovoLIN R) injection  1-6 Units Subcutaneous 4X/DAY HUMBERTO Colon M.D.   1 Units at 06/02/21 1744    And   • glucose 4 g chewable tablet 16 g  16 g Oral Q15 MIN PRN Rashad Colon M.D.        And   •  dextrose 50% (D50W) injection 50 mL  50 mL Intravenous Q15 MIN PRN Rashad Colon M.D.       • ALPRAZolam (XANAX) tablet 0.5 mg  0.5 mg Oral TID PRN Osvaldo Manrique D.O.   0.5 mg at 06/01/21 2009   • DILTIAZem (CARDIZEM) injection 10 mg  10 mg Intravenous Q4HRS PRN Osvaldo Manrique D.O.       • benzocaine-menthol (CEPACOL) lozenge 1 Lozenge  1 Lozenge Mouth/Throat Q2HRS PRN Rashad Colon M.D.   1 Lozenge at 05/29/21 0543   • ferrous sulfate tablet 325 mg  325 mg Oral QDAY with Breakfast Ivan Nunez M.D.   325 mg at 06/03/21 0733   • apixaban (ELIQUIS) tablet 5 mg  5 mg Oral BID Cheryl Gallagher, D.O.   5 mg at 06/03/21 0553   • ascorbic acid (Vitamin C) tablet 1,000 mg  1,000 mg Oral DAILY Cheryl Gallagher, D.O.   1,000 mg at 06/03/21 0552   • atorvastatin (LIPITOR) tablet 10 mg  10 mg Oral DAILY Cheryl Gallagher, D.O.   10 mg at 06/03/21 0553   • benzonatate (TESSALON) capsule 100 mg  100 mg Oral TID PRN Cheryl Gallagher D.O.   100 mg at 05/26/21 1222   • DILTIAZem CD (CARDIZEM CD) capsule 180 mg  180 mg Oral BID Cheryl Gallagher D.O.   180 mg at 06/03/21 0553   • montelukast (SINGULAIR) tablet 10 mg  10 mg Oral Q EVENING Cheryl Gallagher D.O.   10 mg at 06/02/21 1737   • ondansetron (ZOFRAN) syringe/vial injection 4 mg  4 mg Intravenous Q6HRS PRN Cheryl Gallagher D.O.       • ondansetron (ZOFRAN ODT) dispertab 4 mg  4 mg Oral Q6HRS PRN Cheryl Gallagher D.O.       • senna-docusate (PERICOLACE or SENOKOT S) 8.6-50 MG per tablet 2 tablet  2 tablet Oral BID LEN MoranO.   2 tablet at 06/03/21 0554    And   • polyethylene glycol/lytes (MIRALAX) PACKET 1 Packet  1 Packet Oral QDAY PRN CHARISMA Moran.O.        And   • magnesium hydroxide (MILK OF MAGNESIA) suspension 30 mL  30 mL Oral QDAY PRN Cheryl Gallagher D.O.        And   • bisacodyl (DULCOLAX) suppository 10 mg  10 mg Rectal QDAY PRN CHARISMA Moran.O.       • acetaminophen (Tylenol) tablet 650 mg  650 mg Oral Q6HRS PRN LEN MoranOOksana   650 mg at  05/22/21 1008   • guaiFENesin ER (MUCINEX) tablet 600 mg  600 mg Oral BID PRN Cheryl Gallagher D.O.       • guaiFENesin dextromethorphan (ROBITUSSIN DM) 100-10 MG/5ML syrup 10 mL  10 mL Oral Q6HRS PRN Cheryl Gallagher D.O.       • budesonide-formoterol (SYMBICORT) 160-4.5 MCG/ACT inhaler 2 Puff  2 Puff Inhalation BID (RT) LEN MoranOOksana   2 Puff at 06/02/21 2053   • HYDROcodone-homatropine 5-1.5 mg/5 mL solution 5 mL  5 mL Oral Q4HRS PRN Cheryl Gallagher D.O.           Fluids    Intake/Output Summary (Last 24 hours) at 6/3/2021 0957  Last data filed at 6/3/2021 0800  Gross per 24 hour   Intake --   Output 1510 ml   Net -1510 ml       Laboratory          Recent Labs     06/01/21  0510 06/02/21  0604 06/03/21  0503   SODIUM 135 134* 137   POTASSIUM 5.4 4.0 3.7   CHLORIDE 97 93* 93*   CO2 29 32 32   BUN 37* 41* 45*   CREATININE 0.64 0.69 0.66   MAGNESIUM 2.2 2.0 2.1   PHOSPHORUS 3.0 3.0 3.6   CALCIUM 9.9 9.5 9.6     Recent Labs     06/01/21  0510 06/02/21  0604 06/03/21  0503   GLUCOSE 163* 133* 145*     Recent Labs     06/01/21  0510 06/02/21  0604 06/03/21  0503   WBC 23.8* 20.1* 18.6*   NEUTSPOLYS 92.30* 88.10* 89.30*   LYMPHOCYTES 1.80* 3.70* 3.00*   MONOCYTES 2.50 1.90 2.30   EOSINOPHILS 0.00 1.90 2.50   BASOPHILS 0.30 0.30 0.30     Recent Labs     06/01/21  0510 06/02/21  0604 06/03/21  0503   RBC 5.32 5.38 5.21   HEMOGLOBIN 14.4 14.6 14.2   HEMATOCRIT 44.7 44.6 43.1   PLATELETCT 103* 92* 100*     Imaging  5/28: reviewed, bilateral infiltrates consistent with COVID pneumonia, improved compared to 5/23 improved   6/2/2021:    Assessment/Plan    * Acute hypoxemic respiratory failure due to severe acute respiratory syndrome coronavirus 2 (SARS-CoV-2) disease (HCC)- (present on admission)  Assessment & Plan  --  hypoxemia secondary to COVID pneumonia, goal saturations greater than 88% as long as she is asymptomatic and fluctuating oxygen needs  -- tocilizumab 5/21  -- convalescent plasma -- one dose 5/27  -- decadron  completed 5/31   -- negative fluid balance pt is -13 liters - off diuretics since 5/27  -- Aggressive pulmonary toilet as able  -- Cont supportive care, monitoring closely for superimposed pneumonia, continue to closely monitor off antibiotics.  -- Encourage self proning as tolerated if possible 16 hrs  -- Goal SpO2 88-92%   -- CRP and procal normalized, BNP downtrending  -- Did better overnight on BIPAP, currently on HFNC and tolerating breakfast CXR: worsening bilateral alveolar opacities, concern for post-viral fibrosis/ARDS  -- discussed prognosis and GOC with patient and family, continue current measures  -- Will reattempt course of decadron in setting of ARDS  -- Will investigate further into use of eculizumab, although question efficacy/applicability this far out from acute illness    Epistaxis  Assessment & Plan  Due to HFNC/BiPAP  Afrin BID  Saline BID  Humidification    Steroid-induced hyperglycemia  Assessment & Plan  Con ISS goal FSBG 140-180    Thrombocytopenia (HCC)  Assessment & Plan  COVID  No signs of bleeding  Trend CBC daily    Hyponatremia  Assessment & Plan  resolved  DC salt tabs  Cont diuretics  -- na loss from bumex and also may be from SIADH due to the COVID pna  -- TSH low but FT4 normal  -- cortisol sent 5/28  -- Autodiuresing    Abnormal echocardiogram  Assessment & Plan  --  Pulmonary hypertension  -- Consider etiology to be multifactorial due to left atrial hypertension and severe hypoxemia causing pulmonary vasoconstriction    -- resume diuresis  -- Need repeat echocardiogram in 2-3 months    -- Avoid hypoxemia, respiratory acidosis, and A fib RVR which can cause worsening pulmonary vasoconstriction leading to worsening PA pressure     Pneumonia due to COVID-19 virus- (present on admission)  Assessment & Plan  -- Confirmed SARS-CoV-2/COVID on 5/15  -- Risk factors -> age and HTN and not vaccinated  -- s/p decadron, tocilizumab, plasma  -- Encourage self prone as tolerated (ie, 2 hour  supine and 2 hour prone)  -- Cont aggressive diuresis to seek net negative fluid balance  -- On strict contact, droplet/airbone, eye protection, and critical meticulous hand hygiene    Asthma, moderate persistent, well-controlled- (present on admission)  Assessment & Plan  -- Not in acute flare   -- Cont symbicort and singulair        Longstanding persistent atrial fibrillation (HCC)-Dr. Turner [place]- (present on admission)  Assessment & Plan  -- Currently rate controlled with diltiazem   -- On eliquis   -- High lytes goal    -- Goal HR < 110      VTE:  NOAC  Ulcer: PPI while on steroids and apixiban  Lines: Chaim reassessed    I have performed a physical exam and reviewed and updated ROS and Plan today (6/3/2021). In review of yesterday's note (6/2/2021), there are no changes except as documented above.     Discussed patient condition and risk of morbidity and/or mortality with Family, Pharmacy, Charge nurse / hot rounds and Patient     The patient remains critically ill.  Critical care time = 51 minutes in directly providing and coordinating critical care and extensive data review.  No time overlap and excludes procedures.    This note was generated using voice recognition software which has a chance of producing errors of grammar and content.  I have made every reasonable attempt to find and correct any errors, but it should be expected that some may not be found prior to finalization of this note.  __________  Maikol Perez MD  Pulmonary and Critical Care Medicine  [facility]

## 2021-06-03 NOTE — FLOWSHEET NOTE
06/02/21 1945   Events/Summary/Plan   Events/Summary/Plan BIPAP Check   Skin Inspection Respiratory Device Intact;Skin Barrier Used   Location Nose   Vent Settings   FiO2% 100 %   NIV for Respiratory Failure   $ NIV Charge Yes   Non-Invasive Vent Mode ST   IPAP (cmH2O) 16   EPAP (cm H2O) 10   FiO2 100   Alarms Set / Checked Yes   Respiratory Rate Patient 28   Spontaneous Vt (ml) 460   Spontaneous Ve (LPM) 12.6   NIV Interface Full Mask

## 2021-06-03 NOTE — PROGRESS NOTES
Report received and full nursing assessment completed, Hong Saab is at bedside, Pt given sips of water. Pt awake but drowsy, able to converse minimally. Pt on HHF 50LPM 100% Fio2, 02 sat 95%Tele Afib HR 86-90, Pt denies pain or discomfort at this time. Discussed POC, Encouraged use of call light for any needs/concerns. WCM

## 2021-06-03 NOTE — PROGRESS NOTES
Manager Apolonia has ok'd 4 grandkids (in their 20's) to come to see patient x 2 at a time to bedside for a short visit. Grandchildren educated on infection precautions. Gown, mask and eye protection worn in room with strict hand hygiene in and out of room.

## 2021-06-03 NOTE — THERAPY
Occupational Therapy  Contact Note    Patient Name: Jessica Black  Age:  80 y.o., Sex:  female  Medical Record #: [id number]  Today's Date: 6/3/2021    OT eval reattempted today, however, per RN, pt is still on HFO2 100%fiO2 and therefore is still not appropriate for therapy eval. OT eval order to be dc'd for now. Please feel free to reorder us back in once pt is more stable and appropriate. Thank you for considering our services.    Gabriela Elena, OT  [id number]

## 2021-06-03 NOTE — CARE PLAN
Problem: Respiratory  Goal: Patient will achieve/maintain optimum respiratory ventilation and gas exchange  Outcome: Not Progressing  Note: Pt is requiring High Flow Oxygen at 100 % Fi02 as well as a non-rebreather mask. Pt on BIPAP overnight. 02 sat 88-90%     Problem: Skin Integrity  Goal: Skin integrity is maintained or improved  Outcome: Progressing  Note: Skin to back and bottom remains intact, Pt assists with turning and prone positioning. Navarro score assessed daily Pressure ulcer prevention protocol followed.   The patient is Unstable - High likelihood or risk of patient condition declining or worsening    Shift Goals  Clinical Goals: Decrease O2 needs. Maintain SPO2>88%.  Patient Goals: Rest, comfort while proning  Family Goals: Support    Progress made toward(s) clinical / shift goals: Pt proning and resting on left side, tolerating well.     Patient is not progressing towards the following goals:Unable to decrease 02 needs      Problem: Respiratory  Goal: Patient will achieve/maintain optimum respiratory ventilation and gas exchange  Outcome: Not Progressing  Note: Pt is requiring High Flow Oxygen at 100 % Fi02 as well as a non-rebreather mask. Pt on BIPAP overnight. 02 sat 88-90%

## 2021-06-03 NOTE — PROGRESS NOTES
"Patient slept well sitting up high in the bed last night. She wore the bi-pap all night and consistently stayed in the 90\"s. No intake except water with meds. this morning. Output adequate. Urine is foul and murky. More talkative after resting. Continues alert and oriented. Son is at side.   "

## 2021-06-03 NOTE — FLOWSHEET NOTE
06/02/21 2220   Events/Summary/Plan   Events/Summary/Plan BIPAP Check   Skin Inspection Respiratory Device Intact   Location nos   Protective Device Bipap Gels   Ventilator Management Group   Intensivist Group Yes   Vent Settings   FiO2% 100 %   NIV for Respiratory Failure   $ NIV Charge Yes   Non-Invasive Vent Mode ST   IPAP (cmH2O) 16   EPAP (cm H2O) 10   FiO2 100   Alarms Set / Checked Yes   Respiratory Rate Patient 24   Spontaneous Vt (ml) 480   Spontaneous Ve (LPM) 11.4   NIV Interface Full Mask   VAP   Head of Bed Elevated Greater or equal to 30 degrees

## 2021-06-03 NOTE — PROGRESS NOTES
Pt agreed to prone position, however Pt prefers to lie on left side. Pt is tolerating is well with respirations even and unlabored. Pt is on HHF with NRB as well.02 sat 92%, Per Dr Perez's order Pt has restarted on IV Decadron, administered as ordered, see MAR. Family at bedside, Pt is resting calmly without distress.

## 2021-06-04 LAB
ANION GAP SERPL CALC-SCNC: 9 MMOL/L (ref 7–16)
BASOPHILS # BLD AUTO: 0.3 % (ref 0–1.8)
BASOPHILS # BLD: 0.06 K/UL (ref 0–0.12)
BUN SERPL-MCNC: 44 MG/DL (ref 8–22)
CALCIUM SERPL-MCNC: 10 MG/DL (ref 8.4–10.2)
CHLORIDE SERPL-SCNC: 93 MMOL/L (ref 96–112)
CO2 SERPL-SCNC: 34 MMOL/L (ref 20–33)
CREAT SERPL-MCNC: 0.7 MG/DL (ref 0.5–1.4)
EOSINOPHIL # BLD AUTO: 0.01 K/UL (ref 0–0.51)
EOSINOPHIL NFR BLD: 0.1 % (ref 0–6.9)
ERYTHROCYTE [DISTWIDTH] IN BLOOD BY AUTOMATED COUNT: 41 FL (ref 35.9–50)
GLUCOSE BLD-MCNC: 150 MG/DL (ref 65–99)
GLUCOSE BLD-MCNC: 160 MG/DL (ref 65–99)
GLUCOSE BLD-MCNC: 359 MG/DL (ref 65–99)
GLUCOSE SERPL-MCNC: 181 MG/DL (ref 65–99)
HCT VFR BLD AUTO: 40.3 % (ref 37–47)
HGB BLD-MCNC: 13.2 G/DL (ref 12–16)
IMM GRANULOCYTES # BLD AUTO: 0.48 K/UL (ref 0–0.11)
IMM GRANULOCYTES NFR BLD AUTO: 2.5 % (ref 0–0.9)
LYMPHOCYTES # BLD AUTO: 0.28 K/UL (ref 1–4.8)
LYMPHOCYTES NFR BLD: 1.5 % (ref 22–41)
MAGNESIUM SERPL-MCNC: 2.2 MG/DL (ref 1.5–2.5)
MCH RBC QN AUTO: 26.5 PG (ref 27–33)
MCHC RBC AUTO-ENTMCNC: 32.8 G/DL (ref 33.6–35)
MCV RBC AUTO: 80.9 FL (ref 81.4–97.8)
MONOCYTES # BLD AUTO: 0.31 K/UL (ref 0–0.85)
MONOCYTES NFR BLD AUTO: 1.6 % (ref 0–13.4)
NEUTROPHILS # BLD AUTO: 18.14 K/UL (ref 2–7.15)
NEUTROPHILS NFR BLD: 94 % (ref 44–72)
NRBC # BLD AUTO: 0 K/UL
NRBC BLD-RTO: 0 /100 WBC
PHOSPHATE SERPL-MCNC: 3 MG/DL (ref 2.5–4.5)
PLATELET # BLD AUTO: 120 K/UL (ref 164–446)
PMV BLD AUTO: 11.1 FL (ref 9–12.9)
POTASSIUM SERPL-SCNC: 4.2 MMOL/L (ref 3.6–5.5)
RBC # BLD AUTO: 4.98 M/UL (ref 4.2–5.4)
SODIUM SERPL-SCNC: 136 MMOL/L (ref 135–145)
WBC # BLD AUTO: 19.3 K/UL (ref 4.8–10.8)

## 2021-06-04 PROCEDURE — A9270 NON-COVERED ITEM OR SERVICE: HCPCS | Performed by: INTERNAL MEDICINE

## 2021-06-04 PROCEDURE — 84100 ASSAY OF PHOSPHORUS: CPT

## 2021-06-04 PROCEDURE — 94640 AIRWAY INHALATION TREATMENT: CPT

## 2021-06-04 PROCEDURE — 700111 HCHG RX REV CODE 636 W/ 250 OVERRIDE (IP): Performed by: INTERNAL MEDICINE

## 2021-06-04 PROCEDURE — 94660 CPAP INITIATION&MGMT: CPT

## 2021-06-04 PROCEDURE — A9270 NON-COVERED ITEM OR SERVICE: HCPCS | Performed by: HOSPITALIST

## 2021-06-04 PROCEDURE — 5A0935A ASSISTANCE WITH RESPIRATORY VENTILATION, LESS THAN 24 CONSECUTIVE HOURS, HIGH NASAL FLOW/VELOCITY: ICD-10-PCS | Performed by: INTERNAL MEDICINE

## 2021-06-04 PROCEDURE — C9113 INJ PANTOPRAZOLE SODIUM, VIA: HCPCS | Performed by: INTERNAL MEDICINE

## 2021-06-04 PROCEDURE — 83735 ASSAY OF MAGNESIUM: CPT

## 2021-06-04 PROCEDURE — 82962 GLUCOSE BLOOD TEST: CPT | Mod: 91

## 2021-06-04 PROCEDURE — 94760 N-INVAS EAR/PLS OXIMETRY 1: CPT

## 2021-06-04 PROCEDURE — 770022 HCHG ROOM/CARE - ICU (200)

## 2021-06-04 PROCEDURE — 85025 COMPLETE CBC W/AUTO DIFF WBC: CPT

## 2021-06-04 PROCEDURE — 99291 CRITICAL CARE FIRST HOUR: CPT | Performed by: INTERNAL MEDICINE

## 2021-06-04 PROCEDURE — 700102 HCHG RX REV CODE 250 W/ 637 OVERRIDE(OP): Performed by: INTERNAL MEDICINE

## 2021-06-04 PROCEDURE — 80048 BASIC METABOLIC PNL TOTAL CA: CPT

## 2021-06-04 PROCEDURE — 94669 MECHANICAL CHEST WALL OSCILL: CPT

## 2021-06-04 PROCEDURE — 5A09357 ASSISTANCE WITH RESPIRATORY VENTILATION, LESS THAN 24 CONSECUTIVE HOURS, CONTINUOUS POSITIVE AIRWAY PRESSURE: ICD-10-PCS | Performed by: HOSPITALIST

## 2021-06-04 PROCEDURE — 700102 HCHG RX REV CODE 250 W/ 637 OVERRIDE(OP): Performed by: HOSPITALIST

## 2021-06-04 RX ORDER — DEXTROSE MONOHYDRATE 25 G/50ML
50 INJECTION, SOLUTION INTRAVENOUS
Status: DISCONTINUED | OUTPATIENT
Start: 2021-06-04 | End: 2021-06-10 | Stop reason: HOSPADM

## 2021-06-04 RX ADMIN — PANTOPRAZOLE SODIUM 40 MG: 40 INJECTION, POWDER, LYOPHILIZED, FOR SOLUTION INTRAVENOUS at 06:10

## 2021-06-04 RX ADMIN — DILTIAZEM HYDROCHLORIDE 180 MG: 180 CAPSULE, COATED, EXTENDED RELEASE ORAL at 06:10

## 2021-06-04 RX ADMIN — APIXABAN 5 MG: 5 TABLET, FILM COATED ORAL at 18:12

## 2021-06-04 RX ADMIN — APIXABAN 5 MG: 5 TABLET, FILM COATED ORAL at 06:11

## 2021-06-04 RX ADMIN — DILTIAZEM HYDROCHLORIDE 180 MG: 180 CAPSULE, COATED, EXTENDED RELEASE ORAL at 18:12

## 2021-06-04 RX ADMIN — BUDESONIDE AND FORMOTEROL FUMARATE DIHYDRATE 2 PUFF: 160; 4.5 AEROSOL RESPIRATORY (INHALATION) at 08:50

## 2021-06-04 RX ADMIN — MONTELUKAST 10 MG: 10 TABLET, FILM COATED ORAL at 18:12

## 2021-06-04 RX ADMIN — SALINE NASAL SPRAY 2 SPRAY: 1.5 SOLUTION NASAL at 07:55

## 2021-06-04 RX ADMIN — OXYCODONE HYDROCHLORIDE AND ACETAMINOPHEN 1000 MG: 500 TABLET ORAL at 06:11

## 2021-06-04 RX ADMIN — ALPRAZOLAM 0.5 MG: 0.5 TABLET ORAL at 20:08

## 2021-06-04 RX ADMIN — DOCUSATE SODIUM 50 MG AND SENNOSIDES 8.6 MG 2 TABLET: 8.6; 5 TABLET, FILM COATED ORAL at 18:12

## 2021-06-04 RX ADMIN — ATORVASTATIN CALCIUM 10 MG: 10 TABLET, FILM COATED ORAL at 06:10

## 2021-06-04 RX ADMIN — FUROSEMIDE 20 MG: 10 INJECTION, SOLUTION INTRAMUSCULAR; INTRAVENOUS at 06:10

## 2021-06-04 RX ADMIN — FERROUS SULFATE TAB 325 MG (65 MG ELEMENTAL FE) 325 MG: 325 (65 FE) TAB at 07:54

## 2021-06-04 RX ADMIN — SALINE NASAL SPRAY 2 SPRAY: 1.5 SOLUTION NASAL at 18:19

## 2021-06-04 RX ADMIN — FUROSEMIDE 20 MG: 10 INJECTION, SOLUTION INTRAMUSCULAR; INTRAVENOUS at 15:59

## 2021-06-04 RX ADMIN — DEXAMETHASONE SODIUM PHOSPHATE 12 MG: 4 INJECTION, SOLUTION INTRA-ARTICULAR; INTRALESIONAL; INTRAMUSCULAR; INTRAVENOUS; SOFT TISSUE at 06:10

## 2021-06-04 RX ADMIN — BUDESONIDE AND FORMOTEROL FUMARATE DIHYDRATE 2 PUFF: 160; 4.5 AEROSOL RESPIRATORY (INHALATION) at 20:12

## 2021-06-04 ASSESSMENT — ENCOUNTER SYMPTOMS
SHORTNESS OF BREATH: 1
DIZZINESS: 0
SENSORY CHANGE: 0
BLURRED VISION: 0
SPEECH CHANGE: 0
DOUBLE VISION: 0
SORE THROAT: 0
FOCAL WEAKNESS: 0
HEADACHES: 0

## 2021-06-04 ASSESSMENT — PAIN DESCRIPTION - PAIN TYPE
TYPE: ACUTE PAIN

## 2021-06-04 ASSESSMENT — PULMONARY FUNCTION TESTS: EPAP_CMH2O: 10

## 2021-06-04 NOTE — FLOWSHEET NOTE
06/03/21 1800   Vital Signs   Pulse (!) 114   Respiration (!) 36   Pulse Oximetry 90 %   $ Pulse Oximetry (Spot Check) Yes   O2 Alarms Set & Reviewed Yes   Oxygen   O2 (LPM) 40   FiO2% 100 %   O2 Delivery Device High Flow Nasal Cannula   Aerosols   $ Aerosol Delivery Device Heated High Flow Nasal Cannula

## 2021-06-04 NOTE — PROGRESS NOTES
Patient switched from hi-flow to bi-pap. Tolerating well. She is also lying on her left side now. Small amount of liquid, green stool. Perineal area cleansed. Patient is preparing for sleep. Drank a little water prior to lying down.

## 2021-06-04 NOTE — PROGRESS NOTES
Assumed pt care. Pt is alert and oriented X4. Pt.denied any pain. Assessment completed. Patient on HHFNC 90% 50L. 02 in the low 80s with meals and movement. Pt denied any needs. Safety precautions in place. Hourly rounding in place.

## 2021-06-04 NOTE — PROGRESS NOTES
Patient is sitting up in bed conversing with her Son who is at her side. She appears very tired and lethargic. Son states that she had a better day today and was able to eat a little. Output has been adequate.

## 2021-06-04 NOTE — PROGRESS NOTES
"Critical Care Progress Note    Date of admission  5/20/2021    Chief Complaint  80 y.o. female admitted 5/20/2021 with covid pna    Hospital Course  \"80 y.o. female who presented 5/20/2021 with acute hypoxemia respiratory failure. Patient with significant history of atrial fibrillation on eliquis, asthma, and hypertension admitted for worsening hypoxemia secondary to COVID pneumonia. She was recently hospitalized 5/15-5/17 for COVID, discharged on 1 liters NC and presents back on 5/20 with worsening shortness of breath. On admission, she required 6 liters NC which quickly escalated to HFNC. ID consulted and patient was tocilizumab on 5/21. Her oxygenation worsen overnight as she was maxed out on HFNC and transferred to ICU for BiPAP 12/6 FiO2 100%. Not vaccinated.     In ICU, she was diuresed and documented I/O -> neg ~1 liter. Patient is breath comfortably on BiPAP and able to answer questions via BiPAP facemask. Subjectively, patient denies chest pain, cough, N/V, fever/chills, or abdominal pain. She endorse having shortness of breath, neck pain, and back pain. Discussed about the benefits of proning if she can tolerate at which she agrees to with RN assistance. Discussed about risks/benefits endotracheal intubation if she needs it in the upcoming hours to days which she agrees to.      Echocardiogram 5/16/21 personally reviewed -> preserved LV function, mild to moderate MR, dilated coronary sinus, eccentric TR jet with measure RVSP ~50 mmHG which is most likely underestimated, TAPSE ~0.9 cm -> reduced RV function, dilated RV and RA, WI -> 0.89 m/s (~18 mmHG) and abnormal septal bounce, dilated IVC with inspiratory collapse. \" From Dr. Tamayo's note    COVID meds: Received Tocilimuzab on 5/21; on dexamethasone restarted 5/20, 12 mg, plasma 5/27 5/24: LE doppler negative; FIo2 increased to 100% and 50L    5/25: FIo2 down to 40 l 80%.   Pt's son Kaiser Black requested an infectious disease doctor to call as he is " expressed that he was not happy with patient's care and progress. Main questions patients son asked about were  for Ivermectin, Hydroxychloroquine and Remdesivir.  ID on call DR. Powers will call son this pm.    5/26: Fio2 50 L 70%  Dr. Powers reviewed with son and is reviewing options of plasma    5/27: Plasma    5/29: pt worse with sats now 85% on 100% 60 l and oxymask -- changed to bipap. CXR 5/28 showed improvement. Family aware    5/30: proned all night. Son spent the night in the room    Interval Problem Update  Reviewed last 24 hour events:  Chart review from the past 24 hours includes imaging, laboratory studies, vital signs and notes available.  Pertinent data for today    BiPAP overnight again with brief interruptions with sips/meds  Self proning   Slightly more alert  FaceTiming with family    Cardiac: Atrial fib, rate controlled, HD stable  Pulm: BIPAP 16/10 100% overnight, proned overnight on HFNC, sustaining between 88-90%  Neuro:  intact  Heme: Reviewed, platelets plateaued  I/O:  -20L since admission, resumed lasix 20mg IV BID on 6/1  ID: tocilimuzab 5/21, 5/27 plasma, restarted dexamethasone 6/3  WBC persistently up, afebrile. Procal 0.20 . CRP < 1  GI/endo:  FSBG much higher due to seteroids, started on long acting last night and improved.  Labs/Imaging:  Hyponatremia normalizing  Lines:  Peripherals, Rucker  Mobility:  OOB to chair  Symptoms: slightly better today    Review of Systems  Review of Systems   Constitutional: Positive for malaise/fatigue.   HENT: Positive for nosebleeds. Negative for sore throat.    Eyes: Negative for blurred vision and double vision.   Respiratory: Positive for shortness of breath.    Cardiovascular: Negative for chest pain.   Neurological: Negative for dizziness, sensory change, speech change, focal weakness and headaches.   All other systems reviewed and are negative.     Vital Signs for last 24 hours   Temp:  [36.4 °C (97.5 °F)-36.6 °C (97.8 °F)] 36.6 °C (97.8  °F)  Pulse:  [] 97  Resp:  [22-66] 44  BP: (103-148)/(47-76) 135/73  SpO2:  [83 %-96 %] 88 %      Physical Exam   Physical Exam  Vitals and nursing note reviewed.   Constitutional:       General: She is in acute distress.      Appearance: She is ill-appearing.      Comments: On HFNC and nonrebreather mask   HENT:      Head: Normocephalic and atraumatic.      Mouth/Throat:      Mouth: Mucous membranes are moist.   Eyes:      Extraocular Movements: Extraocular movements intact.      Pupils: Pupils are equal, round, and reactive to light.   Cardiovascular:      Rate and Rhythm: Rhythm irregular.      Heart sounds: Murmur heard.     Pulmonary:      Effort: Respiratory distress present.      Breath sounds: No wheezing or rales.   Skin:     General: Skin is warm and dry.   Neurological:      General: No focal deficit present.      Mental Status: She is alert and oriented to person, place, and time. Mental status is at baseline.   Psychiatric:         Mood and Affect: Mood normal.         Behavior: Behavior normal.         Thought Content: Thought content normal.         Judgment: Judgment normal.       Medications  Current Facility-Administered Medications   Medication Dose Route Frequency Provider Last Rate Last Admin   • insulin glargine (Semglee) injection  10 Units Subcutaneous Q EVENING Maikol Perez M.D.       • pantoprazole (PROTONIX) injection 40 mg  40 mg Intravenous DAILY Maikol Perez M.D.   40 mg at 06/04/21 0610   • dexamethasone (DECADRON) injection 12 mg  12 mg Intravenous DAILY Maikol Perez M.D.   12 mg at 06/04/21 0610   • insulin regular (HumuLIN R,NovoLIN R) injection  1-12 Units Subcutaneous 4X/DAY LEN VelazquezOOksana   10 Units at 06/04/21 1202    And   • glucose 4 g chewable tablet 16 g  16 g Oral Q15 MIN PRN Alejo Vogt D.O.        And   • dextrose 50% (D50W) injection 50 mL  50 mL Intravenous Q15 MIN PRN Alejo Vogt D.O.       • furosemide (LASIX) injection 20 mg   20 mg Intravenous BID DIURETIC Maikol Perez M.D.   20 mg at 06/04/21 0610   • sodium chloride (OCEAN) 0.65 % nasal spray 2 Spray  2 Spray Nasal Q2HRS PRN Maikol Perez M.D.   2 [place] at 06/04/21 0755   • ALPRAZolam (XANAX) tablet 0.5 mg  0.5 mg Oral TID PRN LEN RíosOOksana   0.5 mg at 06/01/21 2009   • DILTIAZem (CARDIZEM) injection 10 mg  10 mg Intravenous Q4HRS PRN Osvaldo Manrique D.O.       • benzocaine-menthol (CEPACOL) lozenge 1 Lozenge  1 Lozenge Mouth/Throat Q2HRS PRN Rashad Colon M.D.   1 Lozenge at 05/29/21 0543   • ferrous sulfate tablet 325 mg  325 mg Oral QDAY with Breakfast Ivan Nunez M.D.   325 mg at 06/04/21 0754   • apixaban (ELIQUIS) tablet 5 mg  5 mg Oral BID Cheryl Gallagher D.O.   5 mg at 06/04/21 0611   • ascorbic acid (Vitamin C) tablet 1,000 mg  1,000 mg Oral DAILY Cheryl Gallagher D.O.   1,000 mg at 06/04/21 0611   • atorvastatin (LIPITOR) tablet 10 mg  10 mg Oral DAILY Cheryl Gallagher D.O.   10 mg at 06/04/21 0610   • benzonatate (TESSALON) capsule 100 mg  100 mg Oral TID PRN Cheryl Gallagher D.O.   100 mg at 05/26/21 1222   • DILTIAZem CD (CARDIZEM CD) capsule 180 mg  180 mg Oral BID Cheryl Gallagher, D.O.   180 mg at 06/04/21 0610   • montelukast (SINGULAIR) tablet 10 mg  10 mg Oral Q EVENING Cheryl Gallagher D.O.   10 mg at 06/03/21 1802   • ondansetron (ZOFRAN) syringe/vial injection 4 mg  4 mg Intravenous Q6HRS PRN Cheryl Gallagher D.O.       • ondansetron (ZOFRAN ODT) dispertab 4 mg  4 mg Oral Q6HRS PRN Cheryl Gallagher D.O.       • senna-docusate (PERICOLACE or SENOKOT S) 8.6-50 MG per tablet 2 tablet  2 tablet Oral BID LEN MoranOOksana   2 tablet at 06/03/21 0554    And   • polyethylene glycol/lytes (MIRALAX) PACKET 1 Packet  1 Packet Oral QDAY PRN Cheryl Gallagher D.O.        And   • magnesium hydroxide (MILK OF MAGNESIA) suspension 30 mL  30 mL Oral QDAY PRN Cheryl Gallagher D.O.        And   • bisacodyl (DULCOLAX) suppository 10 mg  10 mg Rectal QDAY PRN Cheryl KEITH  YAMILA Gallagher       • acetaminophen (Tylenol) tablet 650 mg  650 mg Oral Q6HRS PRN LEN MoranOOksana   650 mg at 05/22/21 1008   • guaiFENesin ER (MUCINEX) tablet 600 mg  600 mg Oral BID PRN Cheryl Gallagher D.O.       • guaiFENesin dextromethorphan (ROBITUSSIN DM) 100-10 MG/5ML syrup 10 mL  10 mL Oral Q6HRS PRN Cheryl Gallagher D.O.       • budesonide-formoterol (SYMBICORT) 160-4.5 MCG/ACT inhaler 2 Puff  2 Puff Inhalation BID (RT) Cheryl Gallagher D.O.   2 Puff at 06/04/21 0850   • HYDROcodone-homatropine 5-1.5 mg/5 mL solution 5 mL  5 mL Oral Q4HRS PRN Cheryl Gallagher D.O.           Fluids    Intake/Output Summary (Last 24 hours) at 6/4/2021 1210  Last data filed at 6/4/2021 0800  Gross per 24 hour   Intake 720 ml   Output 1975 ml   Net -1255 ml       Laboratory          Recent Labs     06/02/21  0604 06/03/21  0503 06/04/21  0356   SODIUM 134* 137 136   POTASSIUM 4.0 3.7 4.2   CHLORIDE 93* 93* 93*   CO2 32 32 34*   BUN 41* 45* 44*   CREATININE 0.69 0.66 0.70   MAGNESIUM 2.0 2.1 2.2   PHOSPHORUS 3.0 3.6 3.0   CALCIUM 9.5 9.6 10.0     Recent Labs     06/02/21  0604 06/03/21  0503 06/04/21  0356   GLUCOSE 133* 145* 181*     Recent Labs     06/02/21  0604 06/03/21  0503 06/04/21  0356   WBC 20.1* 18.6* 19.3*   NEUTSPOLYS 88.10* 89.30* 94.00*   LYMPHOCYTES 3.70* 3.00* 1.50*   MONOCYTES 1.90 2.30 1.60   EOSINOPHILS 1.90 2.50 0.10   BASOPHILS 0.30 0.30 0.30     Recent Labs     06/02/21  0604 06/03/21  0503 06/04/21  0356   RBC 5.38 5.21 4.98   HEMOGLOBIN 14.6 14.2 13.2   HEMATOCRIT 44.6 43.1 40.3   PLATELETCT 92* 100* 120*     Imaging  5/28: reviewed, bilateral infiltrates consistent with COVID pneumonia, improved compared to 5/23 improved   6/2/2021:    Assessment/Plan    * Acute hypoxemic respiratory failure due to severe acute respiratory syndrome coronavirus 2 (SARS-CoV-2) disease (HCC)- (present on admission)  Assessment & Plan  --  hypoxemia secondary to COVID pneumonia, goal saturations greater than 88% as long as she  is asymptomatic and fluctuating oxygen needs  -- tocilizumab 5/21  -- convalescent plasma -- one dose 5/27  -- decadron completed 5/31   -- negative fluid balance pt is -13 liters - off diuretics since 5/27  -- Aggressive pulmonary toilet as able  -- Cont supportive care, monitoring closely for superimposed pneumonia, continue to closely monitor off antibiotics.  -- Encourage self proning as tolerated if possible 16 hrs  -- Goal SpO2 88-92%   -- CRP and procal normalized, BNP downtrending  -- Stable on BIPAP QHS, HFNC during day, but slowly becoming more fatigued. Does not want comfort/hospice meaures yet. Deferential to son for goals of care. Clinical picture of post-viral fibrosis/ARDS  -- discussed prognosis and GOC with patient and family, continue current measures  -- Restarted decadron 6/3; discussed eculizumab use, not indicated    Epistaxis  Assessment & Plan  Due to HFNC/BiPAP  Afrin BID  Saline BID  Humidification    Steroid-induced hyperglycemia  Assessment & Plan  Con ISS goal FSBG 140-180  Start lantus QHS    Thrombocytopenia (HCC)  Assessment & Plan  COVID  No signs of bleeding  Trend CBC daily    Hyponatremia  Assessment & Plan  resolved  DC salt tabs  Cont diuretics  -- na loss from bumex and also may be from SIADH due to the COVID pna  -- TSH low but FT4 normal  -- cortisol sent 5/28  -- Autodiuresing    Abnormal echocardiogram  Assessment & Plan  --  Pulmonary hypertension  -- Consider etiology to be multifactorial due to left atrial hypertension and severe hypoxemia causing pulmonary vasoconstriction    -- resume diuresis  -- Need repeat echocardiogram in 2-3 months    -- Avoid hypoxemia, respiratory acidosis, and A fib RVR which can cause worsening pulmonary vasoconstriction leading to worsening PA pressure     Pneumonia due to COVID-19 virus- (present on admission)  Assessment & Plan  -- Confirmed SARS-CoV-2/COVID on 5/15  -- Risk factors -> age and HTN and not vaccinated  -- s/p decadron,  tocilizumab, plasma  -- Encourage self prone as tolerated (ie, 2 hour supine and 2 hour prone)  -- Cont aggressive diuresis to seek net negative fluid balance  -- On strict contact, droplet/airbone, eye protection, and critical meticulous hand hygiene    Asthma, moderate persistent, well-controlled- (present on admission)  Assessment & Plan  -- Not in acute flare   -- Cont symbicort and singulair        Longstanding persistent atrial fibrillation (HCC)-Dr. Turner [place]- (present on admission)  Assessment & Plan  -- Currently rate controlled with diltiazem   -- On eliquis   -- High lytes goal    -- Goal HR < 110      VTE:  NOAC  Ulcer: PPI while on steroids and apixiban  Lines: Chaim reassessed    I have performed a physical exam and reviewed and updated ROS and Plan today (6/4/2021). In review of yesterday's note (6/3/2021), there are no changes except as documented above.     Discussed patient condition and risk of morbidity and/or mortality with Family, Therapies, Pharmacy, Charge nurse / hot rounds and Patient     The patient remains critically ill.  Critical care time = 45 minutes in directly providing and coordinating critical care and extensive data review.  No time overlap and excludes procedures.    This note was generated using voice recognition software which has a chance of producing errors of grammar and content.  I have made every reasonable attempt to find and correct any errors, but it should be expected that some may not be found prior to finalization of this note.  __________  Maikol Perez MD  Pulmonary and Critical Care Medicine  [facility]

## 2021-06-04 NOTE — CARE PLAN
Problem: Knowledge Deficit - Standard  Goal: Patient and family/care givers will demonstrate understanding of plan of care, disease process/condition, diagnostic tests and medications  Outcome: Progressing     Problem: Respiratory  Goal: Patient will achieve/maintain optimum respiratory ventilation and gas exchange  Outcome: Not Progressing     Problem: Skin Integrity  Goal: Skin integrity is maintained or improved  Outcome: Progressing   The patient is Unstable - High likelihood or risk of patient condition declining or worsening    Shift Goals  Clinical Goals:  (Help patient rest comfortably. Keep O2 sats. > 88%.)  Patient Goals: Rest, comfort while proning  Family Goals: Support    Progress made toward(s) clinical / shift goals:      Patient is not progressing towards the following goals:      Problem: Respiratory  Goal: Patient will achieve/maintain optimum respiratory ventilation and gas exchange  Outcome: Not Progressing

## 2021-06-04 NOTE — CARE PLAN
"The patient is Unstable - High likelihood or risk of patient condition declining or worsening    Shift Goals  Clinical Goals: Maintain oxygenations above 88%, mobilization as tolerated  Patient Goals: prone  Family Goals: nutrition    Progress made toward(s) clinical / shift goals:    Problem: Knowledge Deficit - Standard  Goal: Patient and family/care givers will demonstrate understanding of plan of care, disease process/condition, diagnostic tests and medications  Outcome: Progressing  POC discussed. Patient and family agreed to continue with treatment plan.        Problem: Respiratory  Goal: Patient will achieve/maintain optimum respiratory ventilation and gas exchange  Outcome: Progressing   Pt on HHFNC 90% 50L with 02 in the low 80s with meals and activity.     Patient is not progressing towards the following goals: Pt refusal of Bipap with 02 in the low to mid 80s. Pt appears to be in no distress. Pt states that she \"doesn't feel short of breath or dizzy.\"       "

## 2021-06-04 NOTE — FLOWSHEET NOTE
06/04/21 0900   Vital Signs   $ Pulse Oximetry (Spot Check) Yes   Oxygen   O2 (LPM) 55   FiO2% 100 %   O2 Delivery Device Heated High Flow Nasal Cannula   Aerosols   $ Aerosol Delivery Device Heated High Flow Nasal Cannula   Aerosol Temperature 30 °C (86 °F)

## 2021-06-04 NOTE — FLOWSHEET NOTE
06/03/21 2103   Events/Summary/Plan   Events/Summary/Plan BIPAP started   Vital Signs   Pulse (!) 113   Respiration (!) 22   Pulse Oximetry (!) 83 %   $ Pulse Oximetry (Spot Check) Yes   O2 Alarms Set & Reviewed Yes   Oxygen   FiO2% 100 %   O2 Delivery Device BIPAP   Non-Invasive Ventilation [facility]   Nocturnal CPAP or BIPAP BIPAP - [facility]   $ System Evaluation Yes   Settings (If Known) 16/10   FiO2 or

## 2021-06-05 LAB
ANION GAP SERPL CALC-SCNC: 11 MMOL/L (ref 7–16)
BASOPHILS # BLD AUTO: 0.2 % (ref 0–1.8)
BASOPHILS # BLD: 0.04 K/UL (ref 0–0.12)
BUN SERPL-MCNC: 46 MG/DL (ref 8–22)
CALCIUM SERPL-MCNC: 9.9 MG/DL (ref 8.4–10.2)
CHLORIDE SERPL-SCNC: 93 MMOL/L (ref 96–112)
CO2 SERPL-SCNC: 33 MMOL/L (ref 20–33)
CREAT SERPL-MCNC: 0.65 MG/DL (ref 0.5–1.4)
EOSINOPHIL # BLD AUTO: 0.01 K/UL (ref 0–0.51)
EOSINOPHIL NFR BLD: 0 % (ref 0–6.9)
ERYTHROCYTE [DISTWIDTH] IN BLOOD BY AUTOMATED COUNT: 41 FL (ref 35.9–50)
GLUCOSE BLD-MCNC: 136 MG/DL (ref 65–99)
GLUCOSE BLD-MCNC: 294 MG/DL (ref 65–99)
GLUCOSE BLD-MCNC: 396 MG/DL (ref 65–99)
GLUCOSE BLD-MCNC: 462 MG/DL (ref 65–99)
GLUCOSE SERPL-MCNC: 132 MG/DL (ref 65–99)
HCT VFR BLD AUTO: 37.9 % (ref 37–47)
HGB BLD-MCNC: 12.3 G/DL (ref 12–16)
IMM GRANULOCYTES # BLD AUTO: 0.84 K/UL (ref 0–0.11)
IMM GRANULOCYTES NFR BLD AUTO: 4.1 % (ref 0–0.9)
LYMPHOCYTES # BLD AUTO: 0.52 K/UL (ref 1–4.8)
LYMPHOCYTES NFR BLD: 2.6 % (ref 22–41)
MAGNESIUM SERPL-MCNC: 2 MG/DL (ref 1.5–2.5)
MCH RBC QN AUTO: 26.5 PG (ref 27–33)
MCHC RBC AUTO-ENTMCNC: 32.5 G/DL (ref 33.6–35)
MCV RBC AUTO: 81.5 FL (ref 81.4–97.8)
MONOCYTES # BLD AUTO: 0.61 K/UL (ref 0–0.85)
MONOCYTES NFR BLD AUTO: 3 % (ref 0–13.4)
NEUTROPHILS # BLD AUTO: 18.28 K/UL (ref 2–7.15)
NEUTROPHILS NFR BLD: 90.1 % (ref 44–72)
NRBC # BLD AUTO: 0 K/UL
NRBC BLD-RTO: 0 /100 WBC
PHOSPHATE SERPL-MCNC: 2.8 MG/DL (ref 2.5–4.5)
PLATELET # BLD AUTO: 152 K/UL (ref 164–446)
PMV BLD AUTO: 11.1 FL (ref 9–12.9)
POTASSIUM SERPL-SCNC: 3.7 MMOL/L (ref 3.6–5.5)
RBC # BLD AUTO: 4.65 M/UL (ref 4.2–5.4)
SODIUM SERPL-SCNC: 137 MMOL/L (ref 135–145)
WBC # BLD AUTO: 20.3 K/UL (ref 4.8–10.8)

## 2021-06-05 PROCEDURE — 84100 ASSAY OF PHOSPHORUS: CPT

## 2021-06-05 PROCEDURE — 94640 AIRWAY INHALATION TREATMENT: CPT

## 2021-06-05 PROCEDURE — 770022 HCHG ROOM/CARE - ICU (200)

## 2021-06-05 PROCEDURE — 94669 MECHANICAL CHEST WALL OSCILL: CPT

## 2021-06-05 PROCEDURE — 80048 BASIC METABOLIC PNL TOTAL CA: CPT

## 2021-06-05 PROCEDURE — 5A09357 ASSISTANCE WITH RESPIRATORY VENTILATION, LESS THAN 24 CONSECUTIVE HOURS, CONTINUOUS POSITIVE AIRWAY PRESSURE: ICD-10-PCS | Performed by: HOSPITALIST

## 2021-06-05 PROCEDURE — 99291 CRITICAL CARE FIRST HOUR: CPT | Performed by: INTERNAL MEDICINE

## 2021-06-05 PROCEDURE — 94660 CPAP INITIATION&MGMT: CPT

## 2021-06-05 PROCEDURE — 82962 GLUCOSE BLOOD TEST: CPT

## 2021-06-05 PROCEDURE — C9113 INJ PANTOPRAZOLE SODIUM, VIA: HCPCS | Performed by: INTERNAL MEDICINE

## 2021-06-05 PROCEDURE — A9270 NON-COVERED ITEM OR SERVICE: HCPCS | Performed by: INTERNAL MEDICINE

## 2021-06-05 PROCEDURE — 94760 N-INVAS EAR/PLS OXIMETRY 1: CPT

## 2021-06-05 PROCEDURE — 700102 HCHG RX REV CODE 250 W/ 637 OVERRIDE(OP): Performed by: INTERNAL MEDICINE

## 2021-06-05 PROCEDURE — 700111 HCHG RX REV CODE 636 W/ 250 OVERRIDE (IP): Performed by: INTERNAL MEDICINE

## 2021-06-05 PROCEDURE — 83735 ASSAY OF MAGNESIUM: CPT

## 2021-06-05 PROCEDURE — A9270 NON-COVERED ITEM OR SERVICE: HCPCS | Performed by: HOSPITALIST

## 2021-06-05 PROCEDURE — 5A0935A ASSISTANCE WITH RESPIRATORY VENTILATION, LESS THAN 24 CONSECUTIVE HOURS, HIGH NASAL FLOW/VELOCITY: ICD-10-PCS | Performed by: INTERNAL MEDICINE

## 2021-06-05 PROCEDURE — 700102 HCHG RX REV CODE 250 W/ 637 OVERRIDE(OP): Performed by: HOSPITALIST

## 2021-06-05 PROCEDURE — 85025 COMPLETE CBC W/AUTO DIFF WBC: CPT

## 2021-06-05 RX ORDER — OMEPRAZOLE 20 MG/1
20 CAPSULE, DELAYED RELEASE ORAL DAILY
Status: DISCONTINUED | OUTPATIENT
Start: 2021-06-06 | End: 2021-06-10 | Stop reason: HOSPADM

## 2021-06-05 RX ADMIN — APIXABAN 5 MG: 5 TABLET, FILM COATED ORAL at 05:54

## 2021-06-05 RX ADMIN — MONTELUKAST 10 MG: 10 TABLET, FILM COATED ORAL at 17:08

## 2021-06-05 RX ADMIN — DILTIAZEM HYDROCHLORIDE 180 MG: 180 CAPSULE, COATED, EXTENDED RELEASE ORAL at 05:54

## 2021-06-05 RX ADMIN — BUDESONIDE AND FORMOTEROL FUMARATE DIHYDRATE 2 PUFF: 160; 4.5 AEROSOL RESPIRATORY (INHALATION) at 08:06

## 2021-06-05 RX ADMIN — ALPRAZOLAM 0.5 MG: 0.5 TABLET ORAL at 20:20

## 2021-06-05 RX ADMIN — FUROSEMIDE 20 MG: 10 INJECTION, SOLUTION INTRAMUSCULAR; INTRAVENOUS at 17:08

## 2021-06-05 RX ADMIN — PANTOPRAZOLE SODIUM 40 MG: 40 INJECTION, POWDER, LYOPHILIZED, FOR SOLUTION INTRAVENOUS at 05:54

## 2021-06-05 RX ADMIN — DEXAMETHASONE SODIUM PHOSPHATE 12 MG: 4 INJECTION, SOLUTION INTRA-ARTICULAR; INTRALESIONAL; INTRAMUSCULAR; INTRAVENOUS; SOFT TISSUE at 05:54

## 2021-06-05 RX ADMIN — APIXABAN 5 MG: 5 TABLET, FILM COATED ORAL at 17:08

## 2021-06-05 RX ADMIN — BUDESONIDE AND FORMOTEROL FUMARATE DIHYDRATE 2 PUFF: 160; 4.5 AEROSOL RESPIRATORY (INHALATION) at 20:20

## 2021-06-05 RX ADMIN — FUROSEMIDE 20 MG: 10 INJECTION, SOLUTION INTRAMUSCULAR; INTRAVENOUS at 05:54

## 2021-06-05 RX ADMIN — DILTIAZEM HYDROCHLORIDE 180 MG: 180 CAPSULE, COATED, EXTENDED RELEASE ORAL at 17:07

## 2021-06-05 RX ADMIN — DOCUSATE SODIUM 50 MG AND SENNOSIDES 8.6 MG 2 TABLET: 8.6; 5 TABLET, FILM COATED ORAL at 17:08

## 2021-06-05 RX ADMIN — OXYCODONE HYDROCHLORIDE AND ACETAMINOPHEN 1000 MG: 500 TABLET ORAL at 05:54

## 2021-06-05 RX ADMIN — ATORVASTATIN CALCIUM 10 MG: 10 TABLET, FILM COATED ORAL at 05:54

## 2021-06-05 RX ADMIN — FERROUS SULFATE TAB 325 MG (65 MG ELEMENTAL FE) 325 MG: 325 (65 FE) TAB at 08:35

## 2021-06-05 ASSESSMENT — ENCOUNTER SYMPTOMS
DIZZINESS: 0
SPEECH CHANGE: 0
SHORTNESS OF BREATH: 1
BLURRED VISION: 0
HEADACHES: 0
SENSORY CHANGE: 0
SORE THROAT: 0
FOCAL WEAKNESS: 0
DOUBLE VISION: 0

## 2021-06-05 ASSESSMENT — PAIN DESCRIPTION - PAIN TYPE
TYPE: ACUTE PAIN

## 2021-06-05 ASSESSMENT — PULMONARY FUNCTION TESTS
EPAP_CMH2O: 10

## 2021-06-05 NOTE — CARE PLAN
Problem: Oxygenation:  Goal: Maintain adequate oxygenation dependent on patient condition  Outcome: Not Progressing   HHFO at 55 lpm and .85 fiO2 She was on 1.0 fiO2 yesterday.  Using Bipap for at night and for naps fiO2 at .80

## 2021-06-05 NOTE — PROGRESS NOTES
"Critical Care Progress Note    Date of admission  5/20/2021    Chief Complaint  80 y.o. female admitted 5/20/2021 with covid pna    Hospital Course  \"80 y.o. female who presented 5/20/2021 with acute hypoxemia respiratory failure. Patient with significant history of atrial fibrillation on eliquis, asthma, and hypertension admitted for worsening hypoxemia secondary to COVID pneumonia. She was recently hospitalized 5/15-5/17 for COVID, discharged on 1 liters NC and presents back on 5/20 with worsening shortness of breath. On admission, she required 6 liters NC which quickly escalated to HFNC. ID consulted and patient was tocilizumab on 5/21. Her oxygenation worsen overnight as she was maxed out on HFNC and transferred to ICU for BiPAP 12/6 FiO2 100%. Not vaccinated.     In ICU, she was diuresed and documented I/O -> neg ~1 liter. Patient is breath comfortably on BiPAP and able to answer questions via BiPAP facemask. Subjectively, patient denies chest pain, cough, N/V, fever/chills, or abdominal pain. She endorse having shortness of breath, neck pain, and back pain. Discussed about the benefits of proning if she can tolerate at which she agrees to with RN assistance. Discussed about risks/benefits endotracheal intubation if she needs it in the upcoming hours to days which she agrees to.      Echocardiogram 5/16/21 personally reviewed -> preserved LV function, mild to moderate MR, dilated coronary sinus, eccentric TR jet with measure RVSP ~50 mmHG which is most likely underestimated, TAPSE ~0.9 cm -> reduced RV function, dilated RV and RA, TN -> 0.89 m/s (~18 mmHG) and abnormal septal bounce, dilated IVC with inspiratory collapse. \" From Dr. Tamayo's note    COVID meds: Received Tocilimuzab on 5/21; on dexamethasone restarted 5/20, 12 mg, plasma 5/27 5/24: LE doppler negative; FIo2 increased to 100% and 50L    5/25: FIo2 down to 40 l 80%.   Pt's son Kaiser Black requested an infectious disease doctor to call as he is " expressed that he was not happy with patient's care and progress. Main questions patients son asked about were  for Ivermectin, Hydroxychloroquine and Remdesivir.  ID on call DR. Powers will call son this pm.    5/26: Fio2 50 L 70%  Dr. Powers reviewed with son and is reviewing options of plasma    5/27: Plasma    5/29: pt worse with sats now 85% on 100% 60 l and oxymask -- changed to bipap. CXR 5/28 showed improvement. Family aware    5/30: proned all night. Son spent the night in the room    Interval Problem Update  Reviewed last 24 hour events:  Chart review from the past 24 hours includes imaging, laboratory studies, vital signs and notes available.  Pertinent data for today    BiPAP QHS/HFNC during day  FiO2 down slightly to 85-90%/55L  Otherwise no significant changes  Insulin regimen adjusted  Self proning   More alert today    Cardiac: Atrial fib, rate controlled, HD stable  Pulm: BIPAP 16/10 100% overnight, proned overnight on HFNC, sustaining low 90%s  Neuro:  intact  Heme: Reviewed, platelets recovering  I/O:  -22L since admission, cont lasix 20mg IV BID on 6/1  ID: tocilimuzab 5/21, 5/27 plasma, restarted dexamethasone 6/3  WBC persistently up, afebrile. Procal 0.04 . CRP < 1  GI/endo:  FSBG much higher due to seteroids, started on long acting, improved.  Labs/Imaging:  Hyponatremia normalizing  Lines:  Peripherals, Rucker  Mobility:  OOB to chair  Symptoms: stable from yesterday, still getting fatigued in afternoon when off BIPAP for too long.    Review of Systems  Review of Systems   Constitutional: Positive for malaise/fatigue.   HENT: Positive for nosebleeds ( improving). Negative for sore throat.    Eyes: Negative for blurred vision and double vision.   Respiratory: Positive for shortness of breath.    Cardiovascular: Negative for chest pain.   Neurological: Negative for dizziness, sensory change, speech change, focal weakness and headaches.   All other systems reviewed and are negative.      Vital Signs for last 24 hours   Temp:  [36.1 °C (97 °F)-37.4 °C (99.3 °F)] 36.2 °C (97.2 °F)  Pulse:  [] 86  Resp:  [20-44] 23  BP: (104-131)/(44-70) 123/53  SpO2:  [82 %-97 %] 95 %      Physical Exam   Physical Exam  Vitals and nursing note reviewed.   Constitutional:       General: She is in acute distress.      Appearance: She is ill-appearing.      Comments: On HFNC and nonrebreather mask   HENT:      Head: Normocephalic and atraumatic.      Mouth/Throat:      Mouth: Mucous membranes are moist.   Eyes:      Extraocular Movements: Extraocular movements intact.      Pupils: Pupils are equal, round, and reactive to light.   Cardiovascular:      Rate and Rhythm: Rhythm irregular.      Heart sounds: Murmur heard.     Pulmonary:      Effort: Respiratory distress present.      Breath sounds: No wheezing or rales.   Skin:     General: Skin is warm and dry.   Neurological:      General: No focal deficit present.      Mental Status: She is alert and oriented to person, place, and time. Mental status is at baseline.   Psychiatric:         Mood and Affect: Mood normal.         Behavior: Behavior normal.         Thought Content: Thought content normal.         Judgment: Judgment normal.       Medications  Current Facility-Administered Medications   Medication Dose Route Frequency Provider Last Rate Last Admin   • [START ON 6/6/2021] omeprazole (PRILOSEC) capsule 20 mg  20 mg Oral DAILY Maikol Perez M.D.       • insulin glargine (Semglee) injection  10 Units Subcutaneous Q EVENING Maikol Perez M.D.   10 Units at 06/04/21 1809   • insulin regular (HumuLIN R,NovoLIN R) injection  3-14 Units Subcutaneous 4X/DAY [facility] Maikol Perez M.D.   14 Units at 06/04/21 2018    And   • glucose 4 g chewable tablet 16 g  16 g Oral Q15 MIN PRN Maikol Perez M.D.        And   • dextrose 50% (D50W) injection 50 mL  50 mL Intravenous Q15 MIN PRN Maikol Perez M.D.       • dexamethasone (DECADRON) injection 12 mg  12  mg Intravenous DAILY Maikol Perez M.D.   12 mg at 06/05/21 0554   • furosemide (LASIX) injection 20 mg  20 mg Intravenous BID DIURETIC Maikol Perez M.D.   20 mg at 06/05/21 0554   • sodium chloride (OCEAN) 0.65 % nasal spray 2 Spray  2 Spray Nasal Q2HRS PRN Maikol Perez M.D.   2 [place] at 06/04/21 1819   • ALPRAZolam (XANAX) tablet 0.5 mg  0.5 mg Oral TID PRN CHARISMA Ríos.O.   0.5 mg at 06/04/21 2008   • DILTIAZem (CARDIZEM) injection 10 mg  10 mg Intravenous Q4HRS PRN Osvaldo Manrique D.O.       • benzocaine-menthol (CEPACOL) lozenge 1 Lozenge  1 Lozenge Mouth/Throat Q2HRS PRN Rashad Colon M.D.   1 Lozenge at 05/29/21 0543   • ferrous sulfate tablet 325 mg  325 mg Oral QDAY with Breakfast Ivan Nunez M.D.   325 mg at 06/05/21 0835   • apixaban (ELIQUIS) tablet 5 mg  5 mg Oral BID Cheryl Gallagher D.O.   5 mg at 06/05/21 0554   • ascorbic acid (Vitamin C) tablet 1,000 mg  1,000 mg Oral DAILY Cheryl Gallagher, D.O.   1,000 mg at 06/05/21 0554   • atorvastatin (LIPITOR) tablet 10 mg  10 mg Oral DAILY Cheryl Gallagher, D.O.   10 mg at 06/05/21 0554   • benzonatate (TESSALON) capsule 100 mg  100 mg Oral TID PRN Cheryl Gallagher D.O.   100 mg at 05/26/21 1222   • DILTIAZem CD (CARDIZEM CD) capsule 180 mg  180 mg Oral BID Cheryl Gallagher, D.O.   180 mg at 06/05/21 0554   • montelukast (SINGULAIR) tablet 10 mg  10 mg Oral Q EVENING Cheryl Gallagher D.O.   10 mg at 06/04/21 1812   • ondansetron (ZOFRAN) syringe/vial injection 4 mg  4 mg Intravenous Q6HRS PRN Cheryl Gallagher D.O.       • ondansetron (ZOFRAN ODT) dispertab 4 mg  4 mg Oral Q6HRS PRN Cheryl Gallagher D.O.       • senna-docusate (PERICOLACE or SENOKOT S) 8.6-50 MG per tablet 2 tablet  2 tablet Oral BID Cheryl Gallagher D.O.   2 tablet at 06/04/21 1812    And   • polyethylene glycol/lytes (MIRALAX) PACKET 1 Packet  1 Packet Oral QDAY PRN Cheryl Gallagher D.O.        And   • magnesium hydroxide (MILK OF MAGNESIA) suspension 30 mL  30 mL Oral QDAY PRN  Cheryl Gallagher D.O.        And   • bisacodyl (DULCOLAX) suppository 10 mg  10 mg Rectal QDAY PRN Cheryl Gallagher D.O.       • acetaminophen (Tylenol) tablet 650 mg  650 mg Oral Q6HRS PRN LEN MoranOOksana   650 mg at 05/22/21 1008   • guaiFENesin ER (MUCINEX) tablet 600 mg  600 mg Oral BID PRN LEN MoranO.       • guaiFENesin dextromethorphan (ROBITUSSIN DM) 100-10 MG/5ML syrup 10 mL  10 mL Oral Q6HRS PRN LEN MoranO.       • budesonide-formoterol (SYMBICORT) 160-4.5 MCG/ACT inhaler 2 Puff  2 Puff Inhalation BID (RT) LEN MoranOOksana   2 Puff at 06/05/21 0806   • HYDROcodone-homatropine 5-1.5 mg/5 mL solution 5 mL  5 mL Oral Q4HRS PRN Cheryl Gallagher D.O.         Fluids    Intake/Output Summary (Last 24 hours) at 6/5/2021 1044  Last data filed at 6/5/2021 0800  Gross per 24 hour   Intake 170 ml   Output 2295 ml   Net -2125 ml     Laboratory          Recent Labs     06/03/21  0503 06/04/21  0356 06/05/21  0533   SODIUM 137 136 137   POTASSIUM 3.7 4.2 3.7   CHLORIDE 93* 93* 93*   CO2 32 34* 33   BUN 45* 44* 46*   CREATININE 0.66 0.70 0.65   MAGNESIUM 2.1 2.2 2.0   PHOSPHORUS 3.6 3.0 2.8   CALCIUM 9.6 10.0 9.9     Recent Labs     06/03/21  0503 06/04/21  0356 06/05/21  0533   GLUCOSE 145* 181* 132*     Recent Labs     06/03/21  0503 06/04/21  0356 06/05/21  0533   WBC 18.6* 19.3* 20.3*   NEUTSPOLYS 89.30* 94.00* 90.10*   LYMPHOCYTES 3.00* 1.50* 2.60*   MONOCYTES 2.30 1.60 3.00   EOSINOPHILS 2.50 0.10 0.00   BASOPHILS 0.30 0.30 0.20     Recent Labs     06/03/21  0503 06/04/21  0356 06/05/21  0533   RBC 5.21 4.98 4.65   HEMOGLOBIN 14.2 13.2 12.3   HEMATOCRIT 43.1 40.3 37.9   PLATELETCT 100* 120* 152*     Imaging  5/28: reviewed, bilateral infiltrates consistent with COVID pneumonia, improved compared to 5/23 improved   6/2/2021: bilateral alveolar infiltrates consistent with ARDS.    Assessment/Plan    * Acute hypoxemic respiratory failure due to severe acute respiratory syndrome coronavirus 2  (SARS-CoV-2) disease (HCC)- (present on admission)  Assessment & Plan  --  hypoxemia secondary to COVID pneumonia, goal saturations greater than 88% as long as she is asymptomatic and fluctuating oxygen needs  -- tocilizumab 5/21  -- convalescent plasma -- one dose 5/27  -- decadron completed 5/31   -- negative fluid balance pt is -13 liters - off diuretics since 5/27  -- Aggressive pulmonary toilet as able  -- Cont supportive care, monitoring closely for superimposed pneumonia, continue to closely monitor off antibiotics.  -- Encourage self proning as tolerated if possible 16 hrs  -- Goal SpO2 88-92%   -- CRP and procal normalized, BNP downtrending  -- Stable on BIPAP QHS, HFNC during day, consisently fatigued in afternoons though when off BIPAP for too long. Does not want comfort/hospice meaures yet. Deferential to son for goals of care. Clinical picture of post-viral fibrosis/ARDS.  -- Restarted decadron 6/3; discussed eculizumab use, not indicated.   -- Cont diuresis, proning as tolerated  -- change labs to QOD  -- OOB with meals, BIPAP when napping    Epistaxis  Assessment & Plan  Due to HFNC/BiPAP  Afrin BID  Saline BID  Humidification    Steroid-induced hyperglycemia  Assessment & Plan  Con ISS goal FSBG 140-180  Boost plus glucose control  Start lantus QHS, insulin SS adjusted 6/4    Thrombocytopenia (HCC)  Assessment & Plan  COVID  Slowly recovering    Hyponatremia  Assessment & Plan  resolved  DC salt tabs  Cont diuretics  -- na loss from bumex and also may be from SIADH due to the COVID pna  -- TSH low but FT4 normal  -- cortisol sent 5/28  -- Autodiuresing    Abnormal echocardiogram  Assessment & Plan  --  Pulmonary hypertension  -- Consider etiology to be multifactorial due to left atrial hypertension and severe hypoxemia causing pulmonary vasoconstriction    -- resume diuresis  -- Need repeat echocardiogram in 2-3 months    -- Avoid hypoxemia, respiratory acidosis, and A fib RVR which can cause  worsening pulmonary vasoconstriction leading to worsening PA pressure     Pneumonia due to COVID-19 virus- (present on admission)  Assessment & Plan  -- Confirmed SARS-CoV-2/COVID on 5/15  -- Risk factors -> age and HTN and not vaccinated  -- s/p decadron, tocilizumab, plasma  -- Encourage self prone as tolerated (ie, 2 hour supine and 2 hour prone)  -- Cont aggressive diuresis to seek net negative fluid balance  -- On strict contact, droplet/airbone, eye protection, and critical meticulous hand hygiene    Asthma, moderate persistent, well-controlled- (present on admission)  Assessment & Plan  -- Not in acute flare   -- Cont symbicort and singulair        Longstanding persistent atrial fibrillation (HCC)-Dr. Turner [place]- (present on admission)  Assessment & Plan  -- Currently rate controlled with diltiazem   -- On eliquis   -- High lytes goal    -- Goal HR < 110      VTE:  NOAC  Ulcer: PPI while on steroids and apixiban  Lines: Chaim reassessed    I have performed a physical exam and reviewed and updated ROS and Plan today (6/5/2021). In review of yesterday's note (6/4/2021), there are no changes except as documented above.     Discussed patient condition and risk of morbidity and/or mortality with Family, Therapies, Pharmacy, Charge nurse / hot rounds and Patient     The patient remains critically ill.  Critical care time = 40 minutes in directly providing and coordinating critical care and extensive data review.  No time overlap and excludes procedures.    This note was generated using voice recognition software which has a chance of producing errors of grammar and content.  I have made every reasonable attempt to find and correct any errors, but it should be expected that some may not be found prior to finalization of this note.  __________  Maikol Perez MD  Pulmonary and Critical Care Medicine  [facility]

## 2021-06-05 NOTE — PROGRESS NOTES
Received patient, alert and oriented, generalized weakness, on HFNC with non-rebreather sats 88-90%, family at bedside, denies any pain at this time, Discussed POC, safety precautions in place, call light within reach.

## 2021-06-05 NOTE — FLOWSHEET NOTE
06/05/21 1000   Events/Summary/Plan   Events/Summary/Plan fiO2 to .85   Vital Signs   Pulse 86   Respiration (!) 23   Pulse Oximetry 95 %   $ Pulse Oximetry (Spot Check) Yes   Oxygen   O2 (LPM) 55   FiO2% 85 %   O2 Delivery Device Heated High Flow Nasal Cannula   Aerosols   $ Aerosol Delivery Device Heated High Flow Nasal Cannula   Aerosol Temperature 30 °C (86 °F)

## 2021-06-05 NOTE — FLOWSHEET NOTE
06/05/21 1410   Events/Summary/Plan   Events/Summary/Plan bipap for a nap. fiO2 to .80   Skin Inspection Respiratory Device Skin Barrier Used;Open;Red   Ventilator Management Group   Intensivist Group Yes   Vent Settings   FiO2% 80 %   NIV for Respiratory Failure   $ NIV Charge Yes   Non-Invasive Vent Mode ST   IPAP (cmH2O) 16   EPAP (cm H2O) 10   FiO2 80   Alarms Set / Checked Yes   Respiratory Rate Patient 24   Spontaneous Vt (ml) 550   Spontaneous Ve (LPM) 13.7   NIV Interface Full Mask

## 2021-06-05 NOTE — FLOWSHEET NOTE
06/05/21 0800   Events/Summary/Plan   Skin Inspection Respiratory Device Skin Barrier Used   Vital Signs   $ Pulse Oximetry (Spot Check) Yes   Breath Sounds   RUL Breath Sounds Clear;Diminished   RML Breath Sounds Diminished   RLL Breath Sounds Diminished   JESSY Breath Sounds Clear   LLL Breath Sounds Diminished   Oxygen   O2 (LPM) 55   FiO2% 90 %   O2 Delivery Device Heated High Flow Nasal Cannula   Aerosols   $ Aerosol Delivery Device Heated High Flow Nasal Cannula   Aerosol Temperature 30 °C (86 °F)   Equipment Change Date 06/22/21   Non-Invasive Ventilation JAROCHO Group   Nocturnal CPAP or BIPAP   (off)

## 2021-06-05 NOTE — CARE PLAN
Pt tolerating Bipap well t/o night while laying on L side. Pt educated on importance of using heated humidity with Bipap and HFNC. Pt states that she's feeling quite well, placed onto HFNC shortly after 0500.

## 2021-06-05 NOTE — CARE PLAN
The patient is Unstable - High likelihood or risk of patient condition declining or worsening    Shift Goals  Clinical Goals: Maintain oxygenations above 88%, mobilization as tolerated  Patient Goals: prone  Family Goals: nutrition    Progress made toward(s) clinical / shift goals:    Problem: Knowledge Deficit - Standard  Goal: Patient and family/care givers will demonstrate understanding of plan of care, disease process/condition, diagnostic tests and medications  Outcome: Progressing     Problem: Respiratory  Goal: Patient will achieve/maintain optimum respiratory ventilation and gas exchange  Outcome: Progressing     Problem: Skin Integrity  Goal: Skin integrity is maintained or improved  Outcome: Progressing

## 2021-06-06 LAB
GLUCOSE BLD-MCNC: 152 MG/DL (ref 65–99)
GLUCOSE BLD-MCNC: 272 MG/DL (ref 65–99)
GLUCOSE BLD-MCNC: 284 MG/DL (ref 65–99)
GLUCOSE BLD-MCNC: 289 MG/DL (ref 65–99)

## 2021-06-06 PROCEDURE — 5A0935A ASSISTANCE WITH RESPIRATORY VENTILATION, LESS THAN 24 CONSECUTIVE HOURS, HIGH NASAL FLOW/VELOCITY: ICD-10-PCS | Performed by: INTERNAL MEDICINE

## 2021-06-06 PROCEDURE — 700102 HCHG RX REV CODE 250 W/ 637 OVERRIDE(OP): Performed by: INTERNAL MEDICINE

## 2021-06-06 PROCEDURE — A9270 NON-COVERED ITEM OR SERVICE: HCPCS | Performed by: INTERNAL MEDICINE

## 2021-06-06 PROCEDURE — A9270 NON-COVERED ITEM OR SERVICE: HCPCS | Performed by: HOSPITALIST

## 2021-06-06 PROCEDURE — 700111 HCHG RX REV CODE 636 W/ 250 OVERRIDE (IP): Performed by: INTERNAL MEDICINE

## 2021-06-06 PROCEDURE — 700102 HCHG RX REV CODE 250 W/ 637 OVERRIDE(OP): Performed by: HOSPITALIST

## 2021-06-06 PROCEDURE — 5A09357 ASSISTANCE WITH RESPIRATORY VENTILATION, LESS THAN 24 CONSECUTIVE HOURS, CONTINUOUS POSITIVE AIRWAY PRESSURE: ICD-10-PCS | Performed by: HOSPITALIST

## 2021-06-06 PROCEDURE — 94640 AIRWAY INHALATION TREATMENT: CPT

## 2021-06-06 PROCEDURE — 94660 CPAP INITIATION&MGMT: CPT

## 2021-06-06 PROCEDURE — 99291 CRITICAL CARE FIRST HOUR: CPT | Performed by: INTERNAL MEDICINE

## 2021-06-06 PROCEDURE — 82962 GLUCOSE BLOOD TEST: CPT

## 2021-06-06 PROCEDURE — 94669 MECHANICAL CHEST WALL OSCILL: CPT

## 2021-06-06 PROCEDURE — 770022 HCHG ROOM/CARE - ICU (200)

## 2021-06-06 PROCEDURE — 94760 N-INVAS EAR/PLS OXIMETRY 1: CPT

## 2021-06-06 RX ADMIN — FUROSEMIDE 20 MG: 10 INJECTION, SOLUTION INTRAMUSCULAR; INTRAVENOUS at 06:10

## 2021-06-06 RX ADMIN — ATORVASTATIN CALCIUM 10 MG: 10 TABLET, FILM COATED ORAL at 06:10

## 2021-06-06 RX ADMIN — APIXABAN 5 MG: 5 TABLET, FILM COATED ORAL at 17:06

## 2021-06-06 RX ADMIN — BUDESONIDE AND FORMOTEROL FUMARATE DIHYDRATE 2 PUFF: 160; 4.5 AEROSOL RESPIRATORY (INHALATION) at 20:04

## 2021-06-06 RX ADMIN — FERROUS SULFATE TAB 325 MG (65 MG ELEMENTAL FE) 325 MG: 325 (65 FE) TAB at 08:18

## 2021-06-06 RX ADMIN — OMEPRAZOLE 20 MG: 20 CAPSULE, DELAYED RELEASE ORAL at 06:10

## 2021-06-06 RX ADMIN — APIXABAN 5 MG: 5 TABLET, FILM COATED ORAL at 06:10

## 2021-06-06 RX ADMIN — BUDESONIDE AND FORMOTEROL FUMARATE DIHYDRATE 2 PUFF: 160; 4.5 AEROSOL RESPIRATORY (INHALATION) at 07:04

## 2021-06-06 RX ADMIN — DOCUSATE SODIUM 50 MG AND SENNOSIDES 8.6 MG 2 TABLET: 8.6; 5 TABLET, FILM COATED ORAL at 06:10

## 2021-06-06 RX ADMIN — DOCUSATE SODIUM 50 MG AND SENNOSIDES 8.6 MG 2 TABLET: 8.6; 5 TABLET, FILM COATED ORAL at 17:05

## 2021-06-06 RX ADMIN — MONTELUKAST 10 MG: 10 TABLET, FILM COATED ORAL at 17:05

## 2021-06-06 RX ADMIN — DILTIAZEM HYDROCHLORIDE 180 MG: 180 CAPSULE, COATED, EXTENDED RELEASE ORAL at 17:06

## 2021-06-06 RX ADMIN — FUROSEMIDE 20 MG: 10 INJECTION, SOLUTION INTRAMUSCULAR; INTRAVENOUS at 17:05

## 2021-06-06 RX ADMIN — DEXAMETHASONE SODIUM PHOSPHATE 12 MG: 4 INJECTION, SOLUTION INTRA-ARTICULAR; INTRALESIONAL; INTRAMUSCULAR; INTRAVENOUS; SOFT TISSUE at 06:10

## 2021-06-06 RX ADMIN — OXYCODONE HYDROCHLORIDE AND ACETAMINOPHEN 1000 MG: 500 TABLET ORAL at 06:10

## 2021-06-06 RX ADMIN — DILTIAZEM HYDROCHLORIDE 180 MG: 180 CAPSULE, COATED, EXTENDED RELEASE ORAL at 06:10

## 2021-06-06 RX ADMIN — ALPRAZOLAM 0.5 MG: 0.5 TABLET ORAL at 20:05

## 2021-06-06 ASSESSMENT — ENCOUNTER SYMPTOMS
DIZZINESS: 0
SORE THROAT: 0
SHORTNESS OF BREATH: 1
SENSORY CHANGE: 0
FOCAL WEAKNESS: 0
BLURRED VISION: 0
HEADACHES: 0
SPEECH CHANGE: 0
DOUBLE VISION: 0

## 2021-06-06 ASSESSMENT — PAIN DESCRIPTION - PAIN TYPE
TYPE: ACUTE PAIN
TYPE: ACUTE PAIN

## 2021-06-06 ASSESSMENT — PULMONARY FUNCTION TESTS
EPAP_CMH2O: 10
EPAP_CMH2O: 10

## 2021-06-06 NOTE — CARE PLAN
The patient is Watcher - Medium risk of patient condition declining or worsening    Shift Goals  Clinical Goals: Maintain O2 sat >88%, wean supplemental O2, mobilize to chair  Patient Goals: Breathe better  Family Goals: Nutrition    Progress made toward(s) clinical / shift goals:    Problem: Knowledge Deficit - Standard  Goal: Patient and family/care givers will demonstrate understanding of plan of care, disease process/condition, diagnostic tests and medications  Outcome: Progressing     Problem: Respiratory  Goal: Patient will achieve/maintain optimum respiratory ventilation and gas exchange  Outcome: Progressing     Problem: Skin Integrity  Goal: Skin integrity is maintained or improved  Outcome: Progressing

## 2021-06-06 NOTE — CARE PLAN
The patient is Watcher - Medium risk of patient condition declining or worsening    Shift Goals  Clinical Goals: Maintain O2 sat >88%, wean supplemental O2, mobilize to chair  Patient Goals: Breathe better  Family Goals: Nutrition    Progress made toward(s) clinical / shift goals:  Supplemental O2 weaned down. Pt maintaining O2 sats mid 90's. Denies dyspnea. Up to chair for lunch.       Problem: Knowledge Deficit - Standard  Goal: Patient and family/care givers will demonstrate understanding of plan of care, disease process/condition, diagnostic tests and medications  Outcome: Progressing     Problem: Respiratory  Goal: Patient will achieve/maintain optimum respiratory ventilation and gas exchange  Outcome: Progressing     Problem: Skin Integrity  Goal: Skin integrity is maintained or improved  Outcome: Progressing

## 2021-06-06 NOTE — CARE PLAN
Problem: Oxygenation:  Goal: Maintain adequate oxygenation dependent on patient condition  Outcome: Progressing   Monitor oxygenation for SpO2 >90% Oxygen is currently with a HHF nasal cannula with fiO2 at .65  for SpO2 >90%     Problem: Hyperinflation:  Goal: Prevent or improve atelectasis  Outcome: Progressing  Patient will be encouraged to use IS and PEP therapy.

## 2021-06-06 NOTE — PROGRESS NOTES
"Critical Care Progress Note    Date of admission  5/20/2021    Chief Complaint  80 y.o. female admitted 5/20/2021 with covid pna    Hospital Course  \"80 y.o. female who presented 5/20/2021 with acute hypoxemia respiratory failure. Patient with significant history of atrial fibrillation on eliquis, asthma, and hypertension admitted for worsening hypoxemia secondary to COVID pneumonia. She was recently hospitalized 5/15-5/17 for COVID, discharged on 1 liters NC and presents back on 5/20 with worsening shortness of breath. On admission, she required 6 liters NC which quickly escalated to HFNC. ID consulted and patient was tocilizumab on 5/21. Her oxygenation worsen overnight as she was maxed out on HFNC and transferred to ICU for BiPAP 12/6 FiO2 100%. Not vaccinated.     In ICU, she was diuresed and documented I/O -> neg ~1 liter. Patient is breath comfortably on BiPAP and able to answer questions via BiPAP facemask. Subjectively, patient denies chest pain, cough, N/V, fever/chills, or abdominal pain. She endorse having shortness of breath, neck pain, and back pain. Discussed about the benefits of proning if she can tolerate at which she agrees to with RN assistance. Discussed about risks/benefits endotracheal intubation if she needs it in the upcoming hours to days which she agrees to.      Echocardiogram 5/16/21 personally reviewed -> preserved LV function, mild to moderate MR, dilated coronary sinus, eccentric TR jet with measure RVSP ~50 mmHG which is most likely underestimated, TAPSE ~0.9 cm -> reduced RV function, dilated RV and RA, GA -> 0.89 m/s (~18 mmHG) and abnormal septal bounce, dilated IVC with inspiratory collapse. \" From Dr. Tamayo's note    COVID meds: Received Tocilimuzab on 5/21; on dexamethasone restarted 5/20, 12 mg, plasma 5/27 5/24: LE doppler negative; FIo2 increased to 100% and 50L    5/25: FIo2 down to 40 l 80%.   Pt's son Kaiser Black requested an infectious disease doctor to call as he is " expressed that he was not happy with patient's care and progress. Main questions patients son asked about were  for Ivermectin, Hydroxychloroquine and Remdesivir.  ID on call DR. Powers will call son this pm.    5/26: Fio2 50 L 70%  Dr. Powers reviewed with son and is reviewing options of plasma    5/27: Plasma    5/29: pt worse with sats now 85% on 100% 60 l and oxymask -- changed to bipap. CXR 5/28 showed improvement. Family aware    5/30: proned all night. Son spent the night in the room    Interval Problem Update  Reviewed last 24 hour events:  Chart review from the past 24 hours includes imaging, laboratory studies, vital signs and notes available.  Pertinent data for today    BiPAP QHS/HFNC during day; couldn't tolerate BIPAP throughout night last night, switched to HFNC ~2AM  FiO2 slowly continuing to improve, down to 70%/55L  Otherwise no significant changes  Insulin regimen adjusted again  Self proning   Continues to be alert in the mornings, interactive with staff    Cardiac: Atrial fib, rate controlled, HD stable. Brief run of VT overnight.  Pulm: BIPAP 16/10 100% QHS, sustaining SpO2 low 90s on HFNC 65%/55L  Neuro:  intact  Heme: Lab holiday, checking Q48hrs  I/O:  -23.5L since admission, likely inaccurate PO recording. tolerating diuretics well, cont lasix 20mg IV BID  ID: tocilimuzab 5/21, 5/27 plasma, restarted dexamethasone 6/3  WBC persistently up, afebrile. Procal 0.04 . CRP < 1  GI/endo:  FSBG improved with lantus  Labs/Imaging:  Labs stable previously, checking now Q48hrs  Lines:  Peripherals, Ruckre  Mobility:  OOB to chair  Symptoms: stable from yesterday    Review of Systems  Review of Systems   Constitutional: Positive for malaise/fatigue.   HENT: Positive for nosebleeds ( improving). Negative for sore throat.    Eyes: Negative for blurred vision and double vision.   Respiratory: Positive for shortness of breath.    Cardiovascular: Negative for chest pain.   Neurological: Negative for  dizziness, sensory change, speech change, focal weakness and headaches.   All other systems reviewed and are negative.     Vital Signs for last 24 hours   Temp:  [36.1 °C (97 °F)-36.7 °C (98 °F)] 36.1 °C (97 °F)  Pulse:  [] 88  Resp:  [14-53] 22  BP: ()/(49-74) 113/49  SpO2:  [86 %-96 %] 96 %      Physical Exam   Physical Exam  Vitals and nursing note reviewed.   Constitutional:       General: She is in acute distress.      Appearance: She is ill-appearing.      Comments: On HFNC and nonrebreather mask   HENT:      Head: Normocephalic and atraumatic.      Mouth/Throat:      Mouth: Mucous membranes are moist.   Eyes:      Extraocular Movements: Extraocular movements intact.      Pupils: Pupils are equal, round, and reactive to light.   Cardiovascular:      Rate and Rhythm: Rhythm irregular.      Heart sounds: Murmur heard.     Pulmonary:      Effort: Respiratory distress present.      Breath sounds: No wheezing or rales.   Skin:     General: Skin is warm and dry.   Neurological:      General: No focal deficit present.      Mental Status: She is alert and oriented to person, place, and time. Mental status is at baseline.   Psychiatric:         Mood and Affect: Mood normal.         Behavior: Behavior normal.         Thought Content: Thought content normal.         Judgment: Judgment normal.       Medications  Current Facility-Administered Medications   Medication Dose Route Frequency Provider Last Rate Last Admin   • omeprazole (PRILOSEC) capsule 20 mg  20 mg Oral DAILY Maikol Perez M.D.   20 mg at 06/06/21 0610   • insulin glargine (Semglee) injection  15 Units Subcutaneous Q EVENING Maikol Perez M.D.       • insulin regular (HumuLIN R,NovoLIN R) injection  3-14 Units Subcutaneous 4X/DAY MARIBELS Maikol Perez M.D.   3 Units at 06/06/21 0614    And   • glucose 4 g chewable tablet 16 g  16 g Oral Q15 MIN PRN Maikol Perez M.D.        And   • dextrose 50% (D50W) injection 50 mL  50 mL  Intravenous Q15 MIN PRN Maikol Perez M.D.       • dexamethasone (DECADRON) injection 12 mg  12 mg Intravenous DAILY Maikol Perez M.D.   12 mg at 06/06/21 0610   • furosemide (LASIX) injection 20 mg  20 mg Intravenous BID DIURETIC Maikol Perez M.D.   20 mg at 06/06/21 0610   • sodium chloride (OCEAN) 0.65 % nasal spray 2 Spray  2 Spray Nasal Q2HRS PRN Maikol Perez M.D.   2 [place] at 06/04/21 1819   • ALPRAZolam (XANAX) tablet 0.5 mg  0.5 mg Oral TID PRN CHARISMA Ríos.O.   0.5 mg at 06/05/21 2020   • DILTIAZem (CARDIZEM) injection 10 mg  10 mg Intravenous Q4HRS PRN Osvaldo Manrique D.O.       • benzocaine-menthol (CEPACOL) lozenge 1 Lozenge  1 Lozenge Mouth/Throat Q2HRS PRN Rashad Colon M.D.   1 Lozenge at 05/29/21 0543   • ferrous sulfate tablet 325 mg  325 mg Oral QDAY with Breakfast Ivan Nunez M.D.   325 mg at 06/05/21 0835   • apixaban (ELIQUIS) tablet 5 mg  5 mg Oral BID Cheryl Gallagher, D.O.   5 mg at 06/06/21 0610   • ascorbic acid (Vitamin C) tablet 1,000 mg  1,000 mg Oral DAILY Cheryl Gallagher D.O.   1,000 mg at 06/06/21 0610   • atorvastatin (LIPITOR) tablet 10 mg  10 mg Oral DAILY Cheryl Gallagher, D.O.   10 mg at 06/06/21 0610   • benzonatate (TESSALON) capsule 100 mg  100 mg Oral TID PRN Cheryl Gallagher D.O.   100 mg at 05/26/21 1222   • DILTIAZem CD (CARDIZEM CD) capsule 180 mg  180 mg Oral BID Cheryl Gallagher D.O.   180 mg at 06/06/21 0610   • montelukast (SINGULAIR) tablet 10 mg  10 mg Oral Q EVENING Cheryl Gallagher D.O.   10 mg at 06/05/21 1708   • ondansetron (ZOFRAN) syringe/vial injection 4 mg  4 mg Intravenous Q6HRS PRN Cheryl Gallagher D.O.       • ondansetron (ZOFRAN ODT) dispertab 4 mg  4 mg Oral Q6HRS PRN Cheryl Gallagher D.O.       • senna-docusate (PERICOLACE or SENOKOT S) 8.6-50 MG per tablet 2 tablet  2 tablet Oral BID LEN MoranOOksana   2 tablet at 06/06/21 0610    And   • polyethylene glycol/lytes (MIRALAX) PACKET 1 Packet  1 Packet Oral QDAY PRN Cheryl KEITH  YAMILA Gallagher        And   • magnesium hydroxide (MILK OF MAGNESIA) suspension 30 mL  30 mL Oral QDAY PRN Cheryl Gallagher D.O.        And   • bisacodyl (DULCOLAX) suppository 10 mg  10 mg Rectal QDAY PRN LEN MoranO.       • acetaminophen (Tylenol) tablet 650 mg  650 mg Oral Q6HRS PRN LEN MoranOOksana   650 mg at 05/22/21 1008   • guaiFENesin ER (MUCINEX) tablet 600 mg  600 mg Oral BID PRN LEN MoranO.       • guaiFENesin dextromethorphan (ROBITUSSIN DM) 100-10 MG/5ML syrup 10 mL  10 mL Oral Q6HRS PRN LEN MoranO.       • budesonide-formoterol (SYMBICORT) 160-4.5 MCG/ACT inhaler 2 Puff  2 Puff Inhalation BID (RT) LEN MoranOOksana   2 Puff at 06/06/21 0704   • HYDROcodone-homatropine 5-1.5 mg/5 mL solution 5 mL  5 mL Oral Q4HRS PRN LEN MoranOOksana         Fluids    Intake/Output Summary (Last 24 hours) at 6/6/2021 0813  Last data filed at 6/6/2021 0600  Gross per 24 hour   Intake 720 ml   Output 1780 ml   Net -1060 ml     Laboratory          Recent Labs     06/04/21  0356 06/05/21  0533   SODIUM 136 137   POTASSIUM 4.2 3.7   CHLORIDE 93* 93*   CO2 34* 33   BUN 44* 46*   CREATININE 0.70 0.65   MAGNESIUM 2.2 2.0   PHOSPHORUS 3.0 2.8   CALCIUM 10.0 9.9     Recent Labs     06/04/21  0356 06/05/21  0533   GLUCOSE 181* 132*     Recent Labs     06/04/21  0356 06/05/21  0533   WBC 19.3* 20.3*   NEUTSPOLYS 94.00* 90.10*   LYMPHOCYTES 1.50* 2.60*   MONOCYTES 1.60 3.00   EOSINOPHILS 0.10 0.00   BASOPHILS 0.30 0.20     Recent Labs     06/04/21  0356 06/05/21  0533   RBC 4.98 4.65   HEMOGLOBIN 13.2 12.3   HEMATOCRIT 40.3 37.9   PLATELETCT 120* 152*     Imaging  5/28: reviewed, bilateral infiltrates consistent with COVID pneumonia, improved compared to 5/23 improved   6/2/2021: bilateral alveolar infiltrates consistent with ARDS.    Assessment/Plan    * Acute hypoxemic respiratory failure due to severe acute respiratory syndrome coronavirus 2 (SARS-CoV-2) disease (HCC)- (present on admission)  Assessment  & Plan  --  hypoxemia secondary to COVID pneumonia, goal saturations greater than 88% as long as she is asymptomatic and fluctuating oxygen needs  -- tocilizumab 5/21  -- convalescent plasma -- one dose 5/27  -- decadron completed 5/31   -- negative fluid balance pt is -13 liters - off diuretics since 5/27  -- Aggressive pulmonary toilet as able  -- Cont supportive care, monitoring closely for superimposed pneumonia, continue to closely monitor off antibiotics.  -- Encourage self proning as tolerated if possible 16 hrs  -- Goal SpO2 88-92%   -- CRP and procal normalized, BNP downtrending  -- Stable on BIPAP QHS, HFNC during day, consisently fatigued in afternoons though when off BIPAP for too long. Does not want comfort/hospice meaures yet. Deferential to son for goals of care. Clinical picture of post-viral fibrosis/ARDS.  -- Restarted decadron 6/3; discussed eculizumab use with family, not indicated.   -- Cont diuresis, proning as tolerated, DC cindy  -- change labs to QOD  -- OOB with meals, BIPAP when napping    Epistaxis  Assessment & Plan  Due to HFNC/BiPAP  Afrin BID  Saline BID  Humidification    Steroid-induced hyperglycemia  Assessment & Plan  Con ISS goal FSBG 140-180  Boost plus glucose control  Cont lantus QHS, insulin SS adjusted 6/4    Thrombocytopenia (HCC)  Assessment & Plan  COVID  Slowly recovering    Hyponatremia  Assessment & Plan  resolved  DC salt tabs  Cont diuretics  -- na loss from bumex and also may be from SIADH due to the COVID pna  -- TSH low but FT4 normal  -- cortisol sent 5/28  -- Autodiuresing    Abnormal echocardiogram  Assessment & Plan  --  Pulmonary hypertension  -- Consider etiology to be multifactorial due to left atrial hypertension and severe hypoxemia causing pulmonary vasoconstriction    -- resume diuresis  -- Need repeat echocardiogram in 2-3 months    -- Avoid hypoxemia, respiratory acidosis, and A fib RVR which can cause worsening pulmonary vasoconstriction leading to  worsening PA pressure     Pneumonia due to COVID-19 virus- (present on admission)  Assessment & Plan  -- Confirmed SARS-CoV-2/COVID on 5/15  -- Risk factors -> age and HTN and not vaccinated  -- s/p decadron, tocilizumab, plasma  -- Encourage self prone as tolerated (ie, 2 hour supine and 2 hour prone)  -- Cont aggressive diuresis to seek net negative fluid balance  -- On strict contact, droplet/airbone, eye protection, and critical meticulous hand hygiene    Asthma, moderate persistent, well-controlled- (present on admission)  Assessment & Plan  -- Not in acute flare   -- Cont symbicort and singulair        Longstanding persistent atrial fibrillation (HCC)-Dr. Turner [place]- (present on admission)  Assessment & Plan  -- Currently rate controlled with diltiazem   -- On eliquis   -- High lytes goal    -- Goal HR < 110      VTE:  NOAC  Ulcer: PPI while on steroids and apixiban  Lines: Rucker ordered for removal, place purewick    I have performed a physical exam and reviewed and updated ROS and Plan today (6/6/2021). In review of yesterday's note (6/5/2021), there are no changes except as documented above.     Discussed patient condition and risk of morbidity and/or mortality with RN, RT, Therapies, Pharmacy, Charge nurse / hot rounds and Patient     The patient remains critically ill.  Critical care time = 33 minutes in directly providing and coordinating critical care and extensive data review.  No time overlap and excludes procedures.    This note was generated using voice recognition software which has a chance of producing errors of grammar and content.  I have made every reasonable attempt to find and correct any errors, but it should be expected that some may not be found prior to finalization of this note.  __________  Maikol Perez MD  Pulmonary and Critical Care Medicine  [facility]

## 2021-06-06 NOTE — CARE PLAN
Pt remains on same Fi02 settings from dayshift - requesting to go back to [facility] at approx 0200 this am. Pt educated on using Bipap while she napped during the day. Will continue to monitor.

## 2021-06-06 NOTE — FLOWSHEET NOTE
06/06/21 0700   Events/Summary/Plan   Events/Summary/Plan HHFO to 70%, mdi, PEP   Vital Signs   $ Pulse Oximetry (Spot Check) Yes   Oxygen   O2 (LPM) 55   FiO2% 70 %   O2 Delivery Device Heated High Flow Nasal Cannula   Aerosols   $ Aerosol Delivery Device Heated High Flow Nasal Cannula   Aerosol Temperature 30 °C (86 °F)

## 2021-06-07 PROBLEM — D69.6 THROMBOCYTOPENIA (HCC): Status: RESOLVED | Noted: 2021-05-29 | Resolved: 2021-06-07

## 2021-06-07 PROBLEM — E87.1 HYPONATREMIA: Status: RESOLVED | Noted: 2021-05-27 | Resolved: 2021-06-07

## 2021-06-07 PROBLEM — R04.0 EPISTAXIS: Status: RESOLVED | Noted: 2021-06-01 | Resolved: 2021-06-07

## 2021-06-07 LAB
ALBUMIN SERPL BCP-MCNC: 3.2 G/DL (ref 3.2–4.9)
ALBUMIN/GLOB SERPL: 1.4 G/DL
ALP SERPL-CCNC: 114 U/L (ref 30–99)
ALT SERPL-CCNC: 31 U/L (ref 2–50)
ANION GAP SERPL CALC-SCNC: 11 MMOL/L (ref 7–16)
AST SERPL-CCNC: 19 U/L (ref 12–45)
BASOPHILS # BLD AUTO: 0.5 % (ref 0–1.8)
BASOPHILS # BLD: 0.08 K/UL (ref 0–0.12)
BILIRUB SERPL-MCNC: 0.6 MG/DL (ref 0.1–1.5)
BUN SERPL-MCNC: 60 MG/DL (ref 8–22)
CALCIUM SERPL-MCNC: 10.3 MG/DL (ref 8.4–10.2)
CHLORIDE SERPL-SCNC: 91 MMOL/L (ref 96–112)
CO2 SERPL-SCNC: 34 MMOL/L (ref 20–33)
CREAT SERPL-MCNC: 0.72 MG/DL (ref 0.5–1.4)
CRP SERPL HS-MCNC: 0.7 MG/DL (ref 0–0.75)
EOSINOPHIL # BLD AUTO: 0 K/UL (ref 0–0.51)
EOSINOPHIL NFR BLD: 0 % (ref 0–6.9)
ERYTHROCYTE [DISTWIDTH] IN BLOOD BY AUTOMATED COUNT: 41.1 FL (ref 35.9–50)
GLOBULIN SER CALC-MCNC: 2.3 G/DL (ref 1.9–3.5)
GLUCOSE BLD-MCNC: 245 MG/DL (ref 65–99)
GLUCOSE BLD-MCNC: 305 MG/DL (ref 65–99)
GLUCOSE BLD-MCNC: 448 MG/DL (ref 65–99)
GLUCOSE SERPL-MCNC: 186 MG/DL (ref 65–99)
HCT VFR BLD AUTO: 39.7 % (ref 37–47)
HGB BLD-MCNC: 13.2 G/DL (ref 12–16)
IMM GRANULOCYTES # BLD AUTO: 1.07 K/UL (ref 0–0.11)
IMM GRANULOCYTES NFR BLD AUTO: 6.2 % (ref 0–0.9)
LYMPHOCYTES # BLD AUTO: 0.4 K/UL (ref 1–4.8)
LYMPHOCYTES NFR BLD: 2.3 % (ref 22–41)
MAGNESIUM SERPL-MCNC: 2.1 MG/DL (ref 1.5–2.5)
MCH RBC QN AUTO: 27.3 PG (ref 27–33)
MCHC RBC AUTO-ENTMCNC: 33.2 G/DL (ref 33.6–35)
MCV RBC AUTO: 82 FL (ref 81.4–97.8)
MONOCYTES # BLD AUTO: 0.69 K/UL (ref 0–0.85)
MONOCYTES NFR BLD AUTO: 4 % (ref 0–13.4)
NEUTROPHILS # BLD AUTO: 14.92 K/UL (ref 2–7.15)
NEUTROPHILS NFR BLD: 87 % (ref 44–72)
NRBC # BLD AUTO: 0.03 K/UL
NRBC BLD-RTO: 0.2 /100 WBC
PHOSPHATE SERPL-MCNC: 2.8 MG/DL (ref 2.5–4.5)
PLATELET # BLD AUTO: 150 K/UL (ref 164–446)
PMV BLD AUTO: 10.7 FL (ref 9–12.9)
POTASSIUM SERPL-SCNC: 3.5 MMOL/L (ref 3.6–5.5)
PROCALCITONIN SERPL-MCNC: 0.07 NG/ML
PROT SERPL-MCNC: 5.5 G/DL (ref 6–8.2)
RBC # BLD AUTO: 4.84 M/UL (ref 4.2–5.4)
SODIUM SERPL-SCNC: 136 MMOL/L (ref 135–145)
WBC # BLD AUTO: 17.2 K/UL (ref 4.8–10.8)

## 2021-06-07 PROCEDURE — 700111 HCHG RX REV CODE 636 W/ 250 OVERRIDE (IP): Performed by: INTERNAL MEDICINE

## 2021-06-07 PROCEDURE — 5A0935A ASSISTANCE WITH RESPIRATORY VENTILATION, LESS THAN 24 CONSECUTIVE HOURS, HIGH NASAL FLOW/VELOCITY: ICD-10-PCS | Performed by: INTERNAL MEDICINE

## 2021-06-07 PROCEDURE — 82962 GLUCOSE BLOOD TEST: CPT | Mod: 91

## 2021-06-07 PROCEDURE — 94640 AIRWAY INHALATION TREATMENT: CPT

## 2021-06-07 PROCEDURE — 94660 CPAP INITIATION&MGMT: CPT

## 2021-06-07 PROCEDURE — 80053 COMPREHEN METABOLIC PANEL: CPT

## 2021-06-07 PROCEDURE — A9270 NON-COVERED ITEM OR SERVICE: HCPCS | Performed by: HOSPITALIST

## 2021-06-07 PROCEDURE — 99291 CRITICAL CARE FIRST HOUR: CPT | Performed by: INTERNAL MEDICINE

## 2021-06-07 PROCEDURE — A9270 NON-COVERED ITEM OR SERVICE: HCPCS | Performed by: INTERNAL MEDICINE

## 2021-06-07 PROCEDURE — 770022 HCHG ROOM/CARE - ICU (200)

## 2021-06-07 PROCEDURE — 700102 HCHG RX REV CODE 250 W/ 637 OVERRIDE(OP): Performed by: INTERNAL MEDICINE

## 2021-06-07 PROCEDURE — 84145 PROCALCITONIN (PCT): CPT

## 2021-06-07 PROCEDURE — 83735 ASSAY OF MAGNESIUM: CPT

## 2021-06-07 PROCEDURE — 86140 C-REACTIVE PROTEIN: CPT

## 2021-06-07 PROCEDURE — 84100 ASSAY OF PHOSPHORUS: CPT

## 2021-06-07 PROCEDURE — 700102 HCHG RX REV CODE 250 W/ 637 OVERRIDE(OP): Performed by: HOSPITALIST

## 2021-06-07 PROCEDURE — 5A09357 ASSISTANCE WITH RESPIRATORY VENTILATION, LESS THAN 24 CONSECUTIVE HOURS, CONTINUOUS POSITIVE AIRWAY PRESSURE: ICD-10-PCS | Performed by: HOSPITALIST

## 2021-06-07 PROCEDURE — 85025 COMPLETE CBC W/AUTO DIFF WBC: CPT

## 2021-06-07 PROCEDURE — 94760 N-INVAS EAR/PLS OXIMETRY 1: CPT

## 2021-06-07 PROCEDURE — 97163 PT EVAL HIGH COMPLEX 45 MIN: CPT

## 2021-06-07 RX ADMIN — APIXABAN 5 MG: 5 TABLET, FILM COATED ORAL at 17:18

## 2021-06-07 RX ADMIN — FERROUS SULFATE TAB 325 MG (65 MG ELEMENTAL FE) 325 MG: 325 (65 FE) TAB at 06:34

## 2021-06-07 RX ADMIN — MONTELUKAST 10 MG: 10 TABLET, FILM COATED ORAL at 17:18

## 2021-06-07 RX ADMIN — BUDESONIDE AND FORMOTEROL FUMARATE DIHYDRATE 2 PUFF: 160; 4.5 AEROSOL RESPIRATORY (INHALATION) at 20:10

## 2021-06-07 RX ADMIN — DEXAMETHASONE SODIUM PHOSPHATE 12 MG: 4 INJECTION, SOLUTION INTRA-ARTICULAR; INTRALESIONAL; INTRAMUSCULAR; INTRAVENOUS; SOFT TISSUE at 05:51

## 2021-06-07 RX ADMIN — APIXABAN 5 MG: 5 TABLET, FILM COATED ORAL at 05:51

## 2021-06-07 RX ADMIN — ATORVASTATIN CALCIUM 10 MG: 10 TABLET, FILM COATED ORAL at 05:52

## 2021-06-07 RX ADMIN — FUROSEMIDE 20 MG: 10 INJECTION, SOLUTION INTRAMUSCULAR; INTRAVENOUS at 05:52

## 2021-06-07 RX ADMIN — DILTIAZEM HYDROCHLORIDE 180 MG: 180 CAPSULE, COATED, EXTENDED RELEASE ORAL at 17:18

## 2021-06-07 RX ADMIN — ALPRAZOLAM 0.5 MG: 0.5 TABLET ORAL at 20:14

## 2021-06-07 RX ADMIN — DOCUSATE SODIUM 50 MG AND SENNOSIDES 8.6 MG 2 TABLET: 8.6; 5 TABLET, FILM COATED ORAL at 05:51

## 2021-06-07 RX ADMIN — DILTIAZEM HYDROCHLORIDE 180 MG: 180 CAPSULE, COATED, EXTENDED RELEASE ORAL at 05:51

## 2021-06-07 RX ADMIN — BUDESONIDE AND FORMOTEROL FUMARATE DIHYDRATE 2 PUFF: 160; 4.5 AEROSOL RESPIRATORY (INHALATION) at 07:16

## 2021-06-07 RX ADMIN — OXYCODONE HYDROCHLORIDE AND ACETAMINOPHEN 1000 MG: 500 TABLET ORAL at 05:51

## 2021-06-07 RX ADMIN — OMEPRAZOLE 20 MG: 20 CAPSULE, DELAYED RELEASE ORAL at 05:51

## 2021-06-07 RX ADMIN — FUROSEMIDE 20 MG: 10 INJECTION, SOLUTION INTRAMUSCULAR; INTRAVENOUS at 17:18

## 2021-06-07 ASSESSMENT — COGNITIVE AND FUNCTIONAL STATUS - GENERAL
DRESSING REGULAR LOWER BODY CLOTHING: TOTAL
WALKING IN HOSPITAL ROOM: TOTAL
STANDING UP FROM CHAIR USING ARMS: A LITTLE
PERSONAL GROOMING: A LITTLE
CLIMB 3 TO 5 STEPS WITH RAILING: TOTAL
SUGGESTED CMS G CODE MODIFIER DAILY ACTIVITY: CK
HELP NEEDED FOR BATHING: A LOT
MOVING TO AND FROM BED TO CHAIR: A LITTLE
TOILETING: A LOT
MOVING FROM LYING ON BACK TO SITTING ON SIDE OF FLAT BED: A LITTLE
MOBILITY SCORE: 14
CLIMB 3 TO 5 STEPS WITH RAILING: TOTAL
SUGGESTED CMS G CODE MODIFIER MOBILITY: CM
WALKING IN HOSPITAL ROOM: TOTAL
TURNING FROM BACK TO SIDE WHILE IN FLAT BAD: A LITTLE
DRESSING REGULAR UPPER BODY CLOTHING: A LOT
SUGGESTED CMS G CODE MODIFIER MOBILITY: CL
MOBILITY SCORE: 8
MOVING FROM LYING ON BACK TO SITTING ON SIDE OF FLAT BED: UNABLE
TURNING FROM BACK TO SIDE WHILE IN FLAT BAD: A LITTLE
STANDING UP FROM CHAIR USING ARMS: TOTAL
DAILY ACTIVITIY SCORE: 14
MOVING TO AND FROM BED TO CHAIR: UNABLE

## 2021-06-07 ASSESSMENT — GAIT ASSESSMENTS
DEVIATION: STEP TO;DECREASED BASE OF SUPPORT;BRADYKINETIC;SHUFFLED GAIT;DECREASED HEEL STRIKE;DECREASED TOE OFF
DISTANCE (FEET): 2
GAIT LEVEL OF ASSIST: MODERATE ASSIST
ASSISTIVE DEVICE: FRONT WHEEL WALKER

## 2021-06-07 ASSESSMENT — ENCOUNTER SYMPTOMS
FOCAL WEAKNESS: 0
DOUBLE VISION: 0
SORE THROAT: 0
SENSORY CHANGE: 0
HEADACHES: 0
SHORTNESS OF BREATH: 1
SPEECH CHANGE: 0
BLURRED VISION: 0
DIZZINESS: 0

## 2021-06-07 ASSESSMENT — PAIN DESCRIPTION - PAIN TYPE: TYPE: ACUTE PAIN

## 2021-06-07 ASSESSMENT — PULMONARY FUNCTION TESTS: EPAP_CMH2O: 10

## 2021-06-07 NOTE — THERAPY
Physical Therapy   Initial Evaluation     Patient Name: Jessica Black  Age:  80 y.o., Sex:  female  Medical Record #: [id number]  Today's Date: 6/7/2021     Precautions: (P) Fall Risk    Assessment  Patient is 80 y.o. female who was recently admitted for acute respiratory failure secondary to COVID. Pt presented to PT with impaired balance, impaired gait, weakness, impaired coordination, dec general locomotion, and poor activity tolerance. Pt was primarily limited by weakness and SOB with functional activities. Pt required frequent cues for deep breathing and rest breaks. Pt was able to demonstrate Min to Mod A for all functional mobility at this time. Pt provided with seated therapeutic exercises, diaphragmatic breathing, and upper chest mobilization to expand lungs. Pt will continue to benefit from skilled PT while in house, with recommendation for post acute therapy prior to d/c home given current objective findings. May benefit from LTACH given oxygen requirements. Will continue to follow while in house.     Plan    Recommend Physical Therapy 3 times per week until therapy goals are met for the following treatments:  Bed Mobility, Community Re-integration, Equipment, Gait Training, Manual Therapy, Neuro Re-Education / Balance, Self Care/Home Evaluation, Stair Training, Therapeutic Activities and Therapeutic Exercises    DC Equipment Recommendations: (P) Unable to determine at this time  Discharge Recommendations: (P) Recommend post-acute placement for additional physical therapy services prior to discharge home (LTACH)     Objective       06/07/21 1030   Initial Contact Note    Initial Contact Note Order Received and Verified, Physical Therapy Evaluation in Progress with Full Report to Follow.   Precautions   Precautions Fall Risk   Comments desaturates easily    Pain 0 - 10 Group   Therapist Pain Assessment Nurse Notified;0   Prior Living Situation   Prior Services None   Housing / Facility 2 Story House   Steps  Into Home 1   Steps In Home 14   Rail Left Rail (Steps in Home)   Bathroom Set up Bathtub / Shower Combination   Equipment Owned None   Lives with - Patient's Self Care Capacity Alone and Able to Care For Self   Comments pt states she was primarily I with mobility    Prior Level of Functional Mobility   Bed Mobility Independent   Transfer Status Independent   Ambulation Independent   Distance Ambulation (Feet)   (community )   Assistive Devices Used None   Stairs Independent   History of Falls   History of Falls No   Cognition    Cognition / Consciousness WDL   Level of Consciousness Alert   Comments pleasant/cooperative; motivated    Passive ROM Lower Body   Passive ROM Lower Body WDL   Active ROM Lower Body    Active ROM Lower Body  X   Comments limited in BLE due to weakness; otherwise WNL   Strength Lower Body   Lower Body Strength  X   Gross Strength Generalized Weakness, Equal Bilaterally   Comments presents with gross strength of 3/5 in BLE    Sensation Lower Body   Lower Extremity Sensation   WDL   Lower Body Muscle Tone   Lower Body Muscle Tone  WDL   Strength Upper Body   Upper Body Strength  X   Gross Strength Generalized Weakness, Equal Bilaterally.    Upper Body Muscle Tone   Upper Body Muscle Tone  WDL   Neurological Concerns   Neurological Concerns No   Coordination Lower Body    Coordination Lower Body  X   Comments presents with narrow SURINDER and poor foot placement during transfer to chair   Balance Assessment   Sitting Balance (Static) Fair   Sitting Balance (Dynamic) Fair -   Standing Balance (Static) Poor +   Standing Balance (Dynamic) Poor   Weight Shift Sitting Fair   Weight Shift Standing Poor   Comments w/fww use    Gait Analysis   Gait Level Of Assist Moderate Assist   Assistive Device Front Wheel Walker   Distance (Feet) 2   # of Times Distance was Traveled 1   Deviation Step To;Decreased Base Of Support;Bradykinetic;Shuffled Gait;Decreased Heel Strike;Decreased Toe Off   Weight Bearing  Status fwb   Comments functional for transfers only at this time    Bed Mobility    Supine to Sit Minimal Assist   Scooting Minimal Assist   Comments HOB elevated and rails up    Functional Mobility   Sit to Stand Moderate Assist   Bed, Chair, Wheelchair Transfer Moderate Assist   Transfer Method Stand Step   Mobility EOB, sit<>stand x 2, transfer to chair    Comments cues for handplacement and manual faciliation to lean forward onto FWW   How much difficulty does the patient currently have...   Turning over in bed (including adjusting bedclothes, sheets and blankets)? 3   Sitting down on and standing up from a chair with arms (e.g., wheelchair, bedside commode, etc.) 1   Moving from lying on back to sitting on the side of the bed? 1   How much help from another person does the patient currently need...   Moving to and from a bed to a chair (including a wheelchair)? 1   Need to walk in a hospital room? 1   Climbing 3-5 steps with a railing? 1   6 clicks Mobility Score 8   Activity Tolerance   Sitting in Chair 10 mins   Sitting Edge of Bed 8 mins   Standing 20 seconds x 2   Comments limited due to fatigue, SOB, weakness, and drop in SpO2    Edema / Skin Assessment   Edema / Skin  Not Assessed   Patient / Family Goals    Patient / Family Goal #1 to go back to PLOF    Short Term Goals    Short Term Goal # 1 pt will go supine<>sit w/hob flat and rails down w/Min A in 6tx to progress functional independence   Short Term Goal # 2 pt will go sit<>stand w/fww w/Min A in 6tx to progress func independence    Short Term Goal # 3 pt will transfer bed<>chair w/fww w/Min A in 6tx for meals   Short Term Goal # 4 pt will ambulate for 50ft w/fww w/Min A in 6tx for increased functional independence    Education Group   Education Provided Role of Physical Therapist   Role of Physical Therapist Patient Response Patient;Acceptance;Explanation;Demonstration;Family;Verbal Demonstration;Action Demonstration   Problem List    Problems  Impaired Bed Mobility;Impaired Transfers;Impaired Ambulation;Functional Strength Deficit;Impaired Balance;Impaired Coordination;Decreased Activity Tolerance   Anticipated Discharge Equipment and Recommendations   DC Equipment Recommendations Unable to determine at this time   Discharge Recommendations Recommend post-acute placement for additional physical therapy services prior to discharge home  (LTACH)   Interdisciplinary Plan of Care Collaboration   IDT Collaboration with  Nursing   Patient Position at End of Therapy Seated;Call Light within Reach;Tray Table within Reach;Phone within Reach;Family / Friend in Room   Collaboration Comments aware of visit and recs    Session Information   Date / Session Number  6/7-1 (1/3, 6/13)

## 2021-06-07 NOTE — CARE PLAN
The patient is Watcher - Medium risk of patient condition declining or worsening    Shift Goals  Clinical Goals: Maintain O2 sats >88%, wean supplemental O2, up to chair  Patient Goals: Breathe better and increase strength  Family Goals: Nutrition    Progress made toward(s) clinical / shift goals:  O2 sats stable on 55L/65-80% HFNC. Pt self motivated to perform deep breathing, IS, and aerobeka. Pt up to chair for lunch and performing strength exercises in bed. Mepilex to nose and gel cushion to prevent further breakdown on nose. Interdry to groin and under breasts.       Problem: Knowledge Deficit - Standard  Goal: Patient and family/care givers will demonstrate understanding of plan of care, disease process/condition, diagnostic tests and medications  Outcome: Progressing     Problem: Respiratory  Goal: Patient will achieve/maintain optimum respiratory ventilation and gas exchange  Outcome: Progressing     Problem: Skin Integrity  Goal: Skin integrity is maintained or improved  Outcome: Progressing

## 2021-06-07 NOTE — FLOWSHEET NOTE
06/07/21 1602   Events/Summary/Plan   Events/Summary/Plan Taken off BiPAP and placed on HFNC with NRM.   Vital Signs   Pulse (!) 104   Respiration 18   Pulse Oximetry 89 %   $ Pulse Oximetry (Spot Check) Yes   Respiratory Assessment   Level of Consciousness Alert   Oxygen   O2 (LPM) 55   FiO2% 80 %   O2 Delivery Device Heated High Flow Nasal Cannula;Non-Rebreather Mask   Aerosols   $ Aerosol Delivery Device Heated High Flow Nasal Cannula   Aerosol Temperature 30 °C (86 °F)

## 2021-06-07 NOTE — PROGRESS NOTES
"Critical Care Progress Note    Date of admission  5/20/2021    Chief Complaint  80 y.o. female admitted 5/20/2021 with covid pna    Hospital Course  \"80 y.o. female who presented 5/20/2021 with acute hypoxemia respiratory failure. Patient with significant history of atrial fibrillation on eliquis, asthma, and hypertension admitted for worsening hypoxemia secondary to COVID pneumonia. She was recently hospitalized 5/15-5/17 for COVID, discharged on 1 liters NC and presents back on 5/20 with worsening shortness of breath. On admission, she required 6 liters NC which quickly escalated to HFNC. ID consulted and patient was tocilizumab on 5/21. Her oxygenation worsen overnight as she was maxed out on HFNC and transferred to ICU for BiPAP 12/6 FiO2 100%. Not vaccinated.     In ICU, she was diuresed and documented I/O -> neg ~1 liter. Patient is breath comfortably on BiPAP and able to answer questions via BiPAP facemask. Subjectively, patient denies chest pain, cough, N/V, fever/chills, or abdominal pain. She endorse having shortness of breath, neck pain, and back pain. Discussed about the benefits of proning if she can tolerate at which she agrees to with RN assistance. Discussed about risks/benefits endotracheal intubation if she needs it in the upcoming hours to days which she agrees to.      Echocardiogram 5/16/21 personally reviewed -> preserved LV function, mild to moderate MR, dilated coronary sinus, eccentric TR jet with measure RVSP ~50 mmHG which is most likely underestimated, TAPSE ~0.9 cm -> reduced RV function, dilated RV and RA, MO -> 0.89 m/s (~18 mmHG) and abnormal septal bounce, dilated IVC with inspiratory collapse. \" From Dr. Tamayo's note    COVID meds: Received Tocilimuzab on 5/21; on dexamethasone restarted 5/20, 12 mg, plasma 5/27 5/24: LE doppler negative; FIo2 increased to 100% and 50L    5/25: FIo2 down to 40 l 80%.   Pt's son Kaiser Black requested an infectious disease doctor to call as he is " expressed that he was not happy with patient's care and progress. Main questions patients son asked about were  for Ivermectin, Hydroxychloroquine and Remdesivir.  ID on call DR. Powers will call son this pm.    5/26: Fio2 50 L 70%  Dr. Powers reviewed with son and is reviewing options of plasma    5/27: Plasma    5/29: pt worse with sats now 85% on 100% 60 l and oxymask -- changed to bipap. CXR 5/28 showed improvement. Family aware    5/30: proned all night. Son spent the night in the room    6/7: On HFNC 70/75% with alternating BiPAP at night. Net negative 25 liters. Remains in good spirit.     Interval Problem Update  Reviewed last 24 hour events:  Chart review from the past 24 hours includes imaging, laboratory studies, vital signs and notes available.  Pertinent data for today    Tolerated self proning X 5 hours. On HFNC 70/75%    Cardiac: Atrial fib, rate controlled, on cadiazem PO.   Pulm: On HFNC 70/75% with SpO2 ~92%.   Neuro:  intact  Heme: Lab holiday, checking Q48hrs  I/O:  -25 liters since admission. On lasix 20 mg IV BID.   ID: tocilimuzab 5/21, 5/27 plasma, restarted dexamethasone 6/3; WBC persistently up, afebrile. Procal 0.04 . CRP < 1.   GI/endo:  FSBG ~152-289. Increase lantus to 18 units.   Labs/Imaging:  Labs stable previously, checking now Q48hrs  Lines:  Peripherals, Rucker  Mobility:  OOB to chair  Symptoms: No change     Review of Systems  Review of Systems   Constitutional: Positive for malaise/fatigue.   HENT: Negative for nosebleeds ( improving) and sore throat.    Eyes: Negative for blurred vision and double vision.   Respiratory: Positive for shortness of breath.    Cardiovascular: Negative for chest pain.   Neurological: Negative for dizziness, sensory change, speech change, focal weakness and headaches.   All other systems reviewed and are negative.     Vital Signs for last 24 hours   Temp:  [36.1 °C (96.9 °F)-36.8 °C (98.2 °F)] 36.3 °C (97.3 °F)  Pulse:  [] 99  Resp:   [18-51] 32  BP: ()/(42-77) 116/70  SpO2:  [85 %-96 %] 89 %      Physical Exam   Physical Exam  Vitals and nursing note reviewed.   Constitutional:       General: She is in acute distress.      Appearance: She is ill-appearing.      Comments: On HFNC    HENT:      Head: Normocephalic and atraumatic.      Mouth/Throat:      Mouth: Mucous membranes are moist.   Eyes:      Extraocular Movements: Extraocular movements intact.      Pupils: Pupils are equal, round, and reactive to light.   Cardiovascular:      Rate and Rhythm: Rhythm irregular.      Heart sounds: Murmur heard.     Pulmonary:      Effort: No respiratory distress.      Breath sounds: No wheezing or rales.      Comments: Dry inspiratory crackles      Skin:     General: Skin is warm and dry.   Neurological:      General: No focal deficit present.      Mental Status: She is alert and oriented to person, place, and time. Mental status is at baseline.   Psychiatric:         Mood and Affect: Mood normal.         Behavior: Behavior normal.         Thought Content: Thought content normal.         Judgment: Judgment normal.       Medications  Current Facility-Administered Medications   Medication Dose Route Frequency Provider Last Rate Last Admin   • omeprazole (PRILOSEC) capsule 20 mg  20 mg Oral DAILY Maikol Perez M.D.   20 mg at 06/07/21 0551   • insulin glargine (Semglee) injection  15 Units Subcutaneous Q EVENING Maikol Perez M.D.   15 Units at 06/06/21 1737   • insulin regular (HumuLIN R,NovoLIN R) injection  3-14 Units Subcutaneous 4X/DAY ACHINNA Perez M.D.   3 Units at 06/07/21 0620    And   • glucose 4 g chewable tablet 16 g  16 g Oral Q15 MIN PRN Maikol Perez M.D.        And   • dextrose 50% (D50W) injection 50 mL  50 mL Intravenous Q15 MIN PRN Maikol Perez M.D.       • dexamethasone (DECADRON) injection 12 mg  12 mg Intravenous DAILY Maikol Perez M.D.   12 mg at 06/07/21 0551   • furosemide (LASIX) injection 20 mg   20 mg Intravenous BID DIURETIC Maikol Perez M.D.   20 mg at 06/07/21 0552   • sodium chloride (OCEAN) 0.65 % nasal spray 2 Spray  2 Spray Nasal Q2HRS PRN Maikol Perez M.D.   2 [place] at 06/04/21 1819   • ALPRAZolam (XANAX) tablet 0.5 mg  0.5 mg Oral TID PRN LEN RíosOOksana   0.5 mg at 06/06/21 2005   • DILTIAZem (CARDIZEM) injection 10 mg  10 mg Intravenous Q4HRS PRN Osvaldo Manrique D.O.       • benzocaine-menthol (CEPACOL) lozenge 1 Lozenge  1 Lozenge Mouth/Throat Q2HRS PRN Rashad Colon M.D.   1 Lozenge at 05/29/21 0543   • ferrous sulfate tablet 325 mg  325 mg Oral QDAY with Breakfast Ivan Nunez M.D.   325 mg at 06/07/21 0634   • apixaban (ELIQUIS) tablet 5 mg  5 mg Oral BID Cheryl Gallagher D.O.   5 mg at 06/07/21 0551   • ascorbic acid (Vitamin C) tablet 1,000 mg  1,000 mg Oral DAILY Cheryl Gallagher D.O.   1,000 mg at 06/07/21 0551   • atorvastatin (LIPITOR) tablet 10 mg  10 mg Oral DAILY Cheyrl Gallagher D.O.   10 mg at 06/07/21 0552   • benzonatate (TESSALON) capsule 100 mg  100 mg Oral TID PRN Cheryl Gallagher D.O.   100 mg at 05/26/21 1222   • DILTIAZem CD (CARDIZEM CD) capsule 180 mg  180 mg Oral BID Cheryl Gallagher, D.O.   180 mg at 06/07/21 0551   • montelukast (SINGULAIR) tablet 10 mg  10 mg Oral Q EVENING Cheryl Gallagher D.O.   10 mg at 06/06/21 1705   • ondansetron (ZOFRAN) syringe/vial injection 4 mg  4 mg Intravenous Q6HRS PRN Cheryl Gallagher D.O.       • ondansetron (ZOFRAN ODT) dispertab 4 mg  4 mg Oral Q6HRS PRN Cheryl Gallagher D.O.       • senna-docusate (PERICOLACE or SENOKOT S) 8.6-50 MG per tablet 2 tablet  2 tablet Oral BID LEN MoranOOksana   2 tablet at 06/07/21 0551    And   • polyethylene glycol/lytes (MIRALAX) PACKET 1 Packet  1 Packet Oral QDAY PRN Cheryl Gallagher D.O.        And   • magnesium hydroxide (MILK OF MAGNESIA) suspension 30 mL  30 mL Oral QDAY PRN Cheryl Gallagher D.O.        And   • bisacodyl (DULCOLAX) suppository 10 mg  10 mg Rectal QDAY PRN Cheryl KEITH  YAMILA Gallagher       • acetaminophen (Tylenol) tablet 650 mg  650 mg Oral Q6HRS PRN LEN MoranOOksana   650 mg at 05/22/21 1008   • guaiFENesin ER (MUCINEX) tablet 600 mg  600 mg Oral BID PRN Cheryl Gallagher D.O.       • guaiFENesin dextromethorphan (ROBITUSSIN DM) 100-10 MG/5ML syrup 10 mL  10 mL Oral Q6HRS PRN Cheryl Gallagher D.O.       • budesonide-formoterol (SYMBICORT) 160-4.5 MCG/ACT inhaler 2 Puff  2 Puff Inhalation BID (RT) Cheryl Gallagher D.O.   2 Puff at 06/07/21 0716   • HYDROcodone-homatropine 5-1.5 mg/5 mL solution 5 mL  5 mL Oral Q4HRS PRN Cheryl Gallaghre D.O.         Fluids    Intake/Output Summary (Last 24 hours) at 6/7/2021 0749  Last data filed at 6/7/2021 0600  Gross per 24 hour   Intake 990 ml   Output 1375 ml   Net -385 ml     Laboratory          Recent Labs     06/05/21  0533 06/07/21  0524   SODIUM 137 136   POTASSIUM 3.7 3.5*   CHLORIDE 93* 91*   CO2 33 34*   BUN 46* 60*   CREATININE 0.65 0.72   MAGNESIUM 2.0 2.1   PHOSPHORUS 2.8 2.8   CALCIUM 9.9 10.3*     Recent Labs     06/05/21  0533 06/07/21  0524   ALTSGPT  --  31   ASTSGOT  --  19   ALKPHOSPHAT  --  114*   TBILIRUBIN  --  0.6   GLUCOSE 132* 186*     Recent Labs     06/05/21  0533 06/07/21  0524   WBC 20.3* 17.2*   NEUTSPOLYS 90.10* 87.00*   LYMPHOCYTES 2.60* 2.30*   MONOCYTES 3.00 4.00   EOSINOPHILS 0.00 0.00   BASOPHILS 0.20 0.50   ASTSGOT  --  19   ALTSGPT  --  31   ALKPHOSPHAT  --  114*   TBILIRUBIN  --  0.6     Recent Labs     06/05/21  0533 06/07/21  0524   RBC 4.65 4.84   HEMOGLOBIN 12.3 13.2   HEMATOCRIT 37.9 39.7   PLATELETCT 152* 150*     Imaging  5/28: reviewed, bilateral infiltrates consistent with COVID pneumonia, improved compared to 5/23 improved   6/2/2021: bilateral alveolar infiltrates consistent with ARDS.    Assessment/Plan    * Acute hypoxemic respiratory failure due to severe acute respiratory syndrome coronavirus 2 (SARS-CoV-2) disease (HCC)- (present on admission)  Assessment & Plan  --  hypoxemia secondary to COVID  pneumonia, goal saturations greater than 88% as long as she is asymptomatic and fluctuating oxygen needs  -- tocilizumab 5/21  -- convalescent plasma -- one dose 5/27  -- decadron completed 5/31   -- negative fluid balance pt is -13 liters - off diuretics since 5/27  -- Aggressive pulmonary toilet as able  -- Cont supportive care, monitoring closely for superimposed pneumonia, continue to closely monitor off antibiotics.  -- Encourage self proning as tolerated if possible 16 hrs  -- Goal SpO2 88-92%   -- CRP and procal normalized, BNP downtrending  -- Cont gentle diuresis to avoid net positive fluid balance    -- Encourage OOB and IS as tolerated   -- Cont HFNC with alternating BiPAP       Steroid-induced hyperglycemia  Assessment & Plan  Con ISS goal FSBG 140-180  Boost plus glucose control  Increase lantus to 18 units QHS       Abnormal echocardiogram  Assessment & Plan  --  Pulmonary hypertension  -- Consider etiology to be multifactorial due to left atrial hypertension and severe hypoxemia causing pulmonary vasoconstriction    -- Cont diuresis   -- Need repeat echocardiogram in 2-3 months    -- Avoid hypoxemia, respiratory acidosis, and A fib RVR which can cause worsening pulmonary vasoconstriction leading to worsening PA pressure     Pneumonia due to COVID-19 virus- (present on admission)  Assessment & Plan  -- Confirmed SARS-CoV-2/COVID on 5/15  -- Risk factors -> age and HTN and not vaccinated  -- s/p decadron, tocilizumab, plasma  -- Encourage self prone as tolerated (ie, 2 hour supine and 2 hour prone)  -- Cont aggressive diuresis to seek net negative fluid balance  -- Patient is considered COVID recover since last tested positive on 5/15.. Will dc precautions after discussion with infection control.          Asthma, moderate persistent, well-controlled- (present on admission)  Assessment & Plan  -- Not in acute flare   -- Cont symbicort and singulair        Longstanding persistent atrial fibrillation  ([facility]Dr. Turner [place]- (present on admission)  Assessment & Plan  -- Currently rate controlled with diltiazem   -- On eliquis   -- High lytes goal    -- Goal HR < 110      Referral to LTACH placement     VTE:  NOAC  Ulcer: PPI while on steroids and apixiban  Lines: Rucker ordered for removal, place purewick    I have performed a physical exam and reviewed and updated ROS and Plan today (6/7/2021). In review of yesterday's note (6/6/2021), there are no changes except as documented above.     Discussed patient condition and risk of morbidity and/or mortality with RN, RT, Therapies, Pharmacy, Charge nurse / hot rounds and Patient     The patient remains critically ill.  Critical care time = 39 minutes in directly providing and coordinating critical care and extensive data review.  No time overlap and excludes procedures.

## 2021-06-07 NOTE — CARE PLAN
Pt tolerating Bipap until requested to be taken off and put onto HFNC at 0200. Pt has been proning, and laying on her sides t/o the night. Will continue to monitor, and titrate fi02 as able.

## 2021-06-07 NOTE — PROGRESS NOTES
Report received. Assumed care of patient. Patient taken off bedpan, cleaned and repositioned. HFNC in place, tolerating well. Son remains at bedside. All needs are currently met. All safety precautions in place. Will continue to monitor.

## 2021-06-07 NOTE — FLOWSHEET NOTE
06/07/21 0716   Events/Summary/Plan   Events/Summary/Plan MDI given   Skin Inspection Respiratory Device Open;Red   Location nose   Vital Signs   Pulse 99   Respiration (!) 32   Pulse Oximetry 89 %   $ Pulse Oximetry (Spot Check) Yes   Respiratory Assessment   Level of Consciousness Alert   Breath Sounds   RUL Breath Sounds Clear;Diminished   RML Breath Sounds Diminished   RLL Breath Sounds Diminished   JESSY Breath Sounds Clear;Diminished   LLL Breath Sounds Diminished   Oxygen   O2 (LPM) 55   FiO2% 80 %   O2 Delivery Device Heated High Flow Nasal Cannula   Aerosols   $ Aerosol Delivery Device Heated High Flow Nasal Cannula   Aerosol Temperature 31 °C (87.8 °F)

## 2021-06-07 NOTE — PROGRESS NOTES
RN spoke with infection prevention, patient is cleared from COVID precautions. Infection prevention to place orders.

## 2021-06-07 NOTE — DISCHARGE PLANNING
Anticipated Discharge Disposition:   LTACH     Action:   Chart review complete.     Per chart review of orders, LTACH referral in place and patient is on BiPap. RN CM to collect LTACH choice.     1545: RN CM met with patient at bedside to collect LTACH choice. Patient is agreeable and gave choice for 1- [facility], 2- [facility]     1553: choice form faxed to [facility]     Barriers to Discharge:   LTACH acceptance   Medical Clearance    Plan:   Hospital care management will continue to assist with discharge planning needs.

## 2021-06-07 NOTE — FLOWSHEET NOTE
06/07/21 1011   Vital Signs   Pulse 78   Respiration (!) 42   Pulse Oximetry 88 %   $ Pulse Oximetry (Spot Check) Yes   Respiratory Assessment   Level of Consciousness Alert   Oxygen   O2 (LPM) 55   FiO2% 80 %   Aerosols   $ Aerosol Delivery Device Heated High Flow Nasal Cannula   Aerosol Temperature 30 °C (86 °F)

## 2021-06-07 NOTE — PROGRESS NOTES
"Son came out and requested bipap off because \"its supposed to be off at 2 a.m. so just go ahead and page respiratory. Thanks\"      0158 Rt at bedside to place pt on hi jessica  "

## 2021-06-07 NOTE — INFECTION CONTROL
This patient is considered COVID RECOVERED.    Patient initially tested positive for COVID on 5/15/2021. Patient is greater than or equal to 21 days from symptom onset and/or positive test, with symptom improvement.  Per the CDC guidance, this patient no longer requires transmission based precautions.  Patient may be placed on any unit per the bed assignment policy, including placement in a semi-private room with a roommate.

## 2021-06-08 LAB
GLUCOSE BLD-MCNC: 106 MG/DL (ref 65–99)
GLUCOSE BLD-MCNC: 223 MG/DL (ref 65–99)
GLUCOSE BLD-MCNC: 273 MG/DL (ref 65–99)
GLUCOSE BLD-MCNC: 386 MG/DL (ref 65–99)
GLUCOSE BLD-MCNC: 441 MG/DL (ref 65–99)

## 2021-06-08 PROCEDURE — A9270 NON-COVERED ITEM OR SERVICE: HCPCS | Performed by: INTERNAL MEDICINE

## 2021-06-08 PROCEDURE — A9270 NON-COVERED ITEM OR SERVICE: HCPCS | Performed by: HOSPITALIST

## 2021-06-08 PROCEDURE — 700102 HCHG RX REV CODE 250 W/ 637 OVERRIDE(OP): Performed by: HOSPITALIST

## 2021-06-08 PROCEDURE — 5A09357 ASSISTANCE WITH RESPIRATORY VENTILATION, LESS THAN 24 CONSECUTIVE HOURS, CONTINUOUS POSITIVE AIRWAY PRESSURE: ICD-10-PCS | Performed by: HOSPITALIST

## 2021-06-08 PROCEDURE — 770022 HCHG ROOM/CARE - ICU (200)

## 2021-06-08 PROCEDURE — 94640 AIRWAY INHALATION TREATMENT: CPT

## 2021-06-08 PROCEDURE — 5A0935A ASSISTANCE WITH RESPIRATORY VENTILATION, LESS THAN 24 CONSECUTIVE HOURS, HIGH NASAL FLOW/VELOCITY: ICD-10-PCS | Performed by: INTERNAL MEDICINE

## 2021-06-08 PROCEDURE — 94760 N-INVAS EAR/PLS OXIMETRY 1: CPT

## 2021-06-08 PROCEDURE — 700111 HCHG RX REV CODE 636 W/ 250 OVERRIDE (IP): Performed by: INTERNAL MEDICINE

## 2021-06-08 PROCEDURE — 94660 CPAP INITIATION&MGMT: CPT

## 2021-06-08 PROCEDURE — 99291 CRITICAL CARE FIRST HOUR: CPT | Performed by: INTERNAL MEDICINE

## 2021-06-08 PROCEDURE — 700102 HCHG RX REV CODE 250 W/ 637 OVERRIDE(OP): Performed by: INTERNAL MEDICINE

## 2021-06-08 PROCEDURE — 82962 GLUCOSE BLOOD TEST: CPT | Mod: 91

## 2021-06-08 RX ADMIN — DILTIAZEM HYDROCHLORIDE 180 MG: 180 CAPSULE, COATED, EXTENDED RELEASE ORAL at 05:20

## 2021-06-08 RX ADMIN — FUROSEMIDE 20 MG: 10 INJECTION, SOLUTION INTRAMUSCULAR; INTRAVENOUS at 05:20

## 2021-06-08 RX ADMIN — FUROSEMIDE 20 MG: 10 INJECTION, SOLUTION INTRAMUSCULAR; INTRAVENOUS at 15:48

## 2021-06-08 RX ADMIN — APIXABAN 5 MG: 5 TABLET, FILM COATED ORAL at 17:08

## 2021-06-08 RX ADMIN — ALPRAZOLAM 0.5 MG: 0.5 TABLET ORAL at 20:03

## 2021-06-08 RX ADMIN — DEXAMETHASONE SODIUM PHOSPHATE 12 MG: 4 INJECTION, SOLUTION INTRA-ARTICULAR; INTRALESIONAL; INTRAMUSCULAR; INTRAVENOUS; SOFT TISSUE at 05:20

## 2021-06-08 RX ADMIN — OMEPRAZOLE 20 MG: 20 CAPSULE, DELAYED RELEASE ORAL at 05:20

## 2021-06-08 RX ADMIN — OXYCODONE HYDROCHLORIDE AND ACETAMINOPHEN 1000 MG: 500 TABLET ORAL at 05:20

## 2021-06-08 RX ADMIN — DILTIAZEM HYDROCHLORIDE 180 MG: 180 CAPSULE, COATED, EXTENDED RELEASE ORAL at 17:08

## 2021-06-08 RX ADMIN — BUDESONIDE AND FORMOTEROL FUMARATE DIHYDRATE 2 PUFF: 160; 4.5 AEROSOL RESPIRATORY (INHALATION) at 07:05

## 2021-06-08 RX ADMIN — MONTELUKAST 10 MG: 10 TABLET, FILM COATED ORAL at 17:08

## 2021-06-08 RX ADMIN — APIXABAN 5 MG: 5 TABLET, FILM COATED ORAL at 05:20

## 2021-06-08 RX ADMIN — FERROUS SULFATE TAB 325 MG (65 MG ELEMENTAL FE) 325 MG: 325 (65 FE) TAB at 08:08

## 2021-06-08 RX ADMIN — ATORVASTATIN CALCIUM 10 MG: 10 TABLET, FILM COATED ORAL at 05:20

## 2021-06-08 RX ADMIN — BUDESONIDE AND FORMOTEROL FUMARATE DIHYDRATE 2 PUFF: 160; 4.5 AEROSOL RESPIRATORY (INHALATION) at 20:03

## 2021-06-08 ASSESSMENT — ENCOUNTER SYMPTOMS
SORE THROAT: 0
DOUBLE VISION: 0
SPEECH CHANGE: 0
SHORTNESS OF BREATH: 1
FOCAL WEAKNESS: 0
SENSORY CHANGE: 0
BLURRED VISION: 0
DIZZINESS: 0
HEADACHES: 0

## 2021-06-08 ASSESSMENT — PAIN DESCRIPTION - PAIN TYPE
TYPE: ACUTE PAIN

## 2021-06-08 ASSESSMENT — PULMONARY FUNCTION TESTS: EPAP_CMH2O: 8

## 2021-06-08 NOTE — CARE PLAN
The patient is Watcher - Medium risk of patient condition declining or worsening    Shift Goals  Clinical Goals: Maintain O2 sat> 88%, wean down on HFNC settings  Patient Goals: remain comfortable  Family Goals: Support and encourage patient    Progress made toward(s) clinical / shift goals:    Problem: Knowledge Deficit - Standard  Goal: Patient and family/care givers will demonstrate understanding of plan of care, disease process/condition, diagnostic tests and medications  Outcome: Progressing     Problem: Respiratory  Goal: Patient will achieve/maintain optimum respiratory ventilation and gas exchange  Outcome: Progressing     Problem: Skin Integrity  Goal: Skin integrity is maintained or improved  Outcome: Progressing

## 2021-06-08 NOTE — CARE PLAN
The patient is Watcher - Medium risk of patient condition declining or worsening    Shift Goals  Clinical Goals: Maintain O2 sat >88%, tolerate BIPAP and proning overnight  Patient Goals: Anxiety reduction with BIPAP and proning, comfort  Family Goals: Support    Progress made toward(s) clinical / shift goals:    Problem: Knowledge Deficit - Standard  Goal: Patient and family/care givers will demonstrate understanding of plan of care, disease process/condition, diagnostic tests and medications  Outcome: Progressing     Problem: Respiratory  Goal: Patient will achieve/maintain optimum respiratory ventilation and gas exchange  Outcome: Progressing     Problem: Skin Integrity  Goal: Skin integrity is maintained or improved  Outcome: Progressing

## 2021-06-08 NOTE — DISCHARGE PLANNING
Agency/Facility Name: [facility]  Spoke To: [facility]  Outcome: Sarai left a vmail for this [facility] stating pt is accepted and needs to verify if pt is medically cleared. Also Sarai stated they do not have any beds available. They also need insurance auth.

## 2021-06-08 NOTE — DISCHARGE PLANNING
Anticipated Discharge Disposition:   LTACH     Action:   Chart review complete.     0830: RN CM re-faxed choice form to [facility] due to last minute sending yesterday.     0955: RN CM received a call from Sarai with [facility]. Per Sarai, she feels that the patient will quality; however, they do not have any beds available at this time. Sarai will begin processing referral.     Barriers to Discharge:   LTACH bed availability   Medical Clearance    Plan:   Follow up with Sarai from [facility]   Hospital care management will continue to assist with discharge planning needs.

## 2021-06-08 NOTE — FLOWSHEET NOTE
06/07/21 2110   Events/Summary/Plan   Events/Summary/Plan BIPAP on   Skin Inspection Respiratory Device Red;Skin Barrier Used   Location nose   Protective Device Bipap Gels   Vital Signs   Pulse 77   Respiration (!) 22   Pulse Oximetry 93 %   Non-Invasive Ventilation [facility]   Nocturnal CPAP or BIPAP BIPAP - [facility]   Settings (If Known) 16/10   FiO2 or LPM 80

## 2021-06-08 NOTE — FLOWSHEET NOTE
06/07/21 1813   Vital Signs   Pulse (!) 104   Respiration (!) 34   Pulse Oximetry 92 %   $ Pulse Oximetry (Spot Check) Yes   Respiratory Assessment   Level of Consciousness Alert   Oxygen   O2 (LPM) 55   FiO2% 100 %  (increased due to patient eating)   Non-Invasive Ventilation [facility]   $ System Evaluation Yes

## 2021-06-08 NOTE — DISCHARGE PLANNING
Received Choice form at 5783 on 06/07/21  Agency/Facility Name: [facility]   Referral sent per Choice form @ [phone]

## 2021-06-08 NOTE — PROGRESS NOTES
Report received from Stefany AYON. Patient A/O x4, Haley, daughter in law, at bedside. HHFNC in place with NRB over, O2 sat 92%. Denies pain at this time, call light and belongings within reach, all needs met at this time.

## 2021-06-08 NOTE — PROGRESS NOTES
"Critical Care Progress Note    Date of admission  5/20/2021    Chief Complaint  80 y.o. female admitted 5/20/2021 with covid pna    Hospital Course  \"80 y.o. female who presented 5/20/2021 with acute hypoxemia respiratory failure. Patient with significant history of atrial fibrillation on eliquis, asthma, and hypertension admitted for worsening hypoxemia secondary to COVID pneumonia. She was recently hospitalized 5/15-5/17 for COVID, discharged on 1 liters NC and presents back on 5/20 with worsening shortness of breath. On admission, she required 6 liters NC which quickly escalated to HFNC. ID consulted and patient was tocilizumab on 5/21. Her oxygenation worsen overnight as she was maxed out on HFNC and transferred to ICU for BiPAP 12/6 FiO2 100%. Not vaccinated.     In ICU, she was diuresed and documented I/O -> neg ~1 liter. Patient is breath comfortably on BiPAP and able to answer questions via BiPAP facemask. Subjectively, patient denies chest pain, cough, N/V, fever/chills, or abdominal pain. She endorse having shortness of breath, neck pain, and back pain. Discussed about the benefits of proning if she can tolerate at which she agrees to with RN assistance. Discussed about risks/benefits endotracheal intubation if she needs it in the upcoming hours to days which she agrees to.      Echocardiogram 5/16/21 personally reviewed -> preserved LV function, mild to moderate MR, dilated coronary sinus, eccentric TR jet with measure RVSP ~50 mmHG which is most likely underestimated, TAPSE ~0.9 cm -> reduced RV function, dilated RV and RA, TX -> 0.89 m/s (~18 mmHG) and abnormal septal bounce, dilated IVC with inspiratory collapse. \" From Dr. Tamayo's note    COVID meds: Received Tocilimuzab on 5/21; on dexamethasone restarted 5/20, 12 mg, plasma 5/27 5/24: LE doppler negative; FIo2 increased to 100% and 50L    5/25: FIo2 down to 40 l 80%.   Pt's son Kaiser Black requested an infectious disease doctor to call as he is " expressed that he was not happy with patient's care and progress. Main questions patients son asked about were  for Ivermectin, Hydroxychloroquine and Remdesivir.  ID on call DR. Powers will call son this pm.    5/26: Fio2 50 L 70%  Dr. Powers reviewed with son and is reviewing options of plasma    5/27: Plasma    5/29: pt worse with sats now 85% on 100% 60 l and oxymask -- changed to bipap. CXR 5/28 showed improvement. Family aware    5/30: proned all night. Son spent the night in the room    6/7: On HFNC 70/75% with alternating BiPAP at night. Net negative 25 liters. Remains in good spirit.     6/8: No acute issues. On HFNC 50/80%. Off isolation. Pending LTACH evaluation.     Interval Problem Update  Reviewed last 24 hour events:  Chart review from the past 24 hours includes imaging, laboratory studies, vital signs and notes available.  Pertinent data for today    No acute issues overnight     Cardiac: Atrial fib, rate controlled, on cadiazem PO.   Pulm: On HFNC 50/80% with SpO2 ~92%.   Neuro:  intact  I/O:  -25 liters since admission. On lasix 20 mg IV BID to seek even to negative fluid balance.   ID: tocilimuzab 5/21, 5/27 plasma, restarted dexamethasone 6/3; WBC persistently up, afebrile. Procal 0.04 . CRP < 1.   GI/endo:  FSBG ~106-448. On lantus to 18 units.   Labs/Imaging:  Labs stable previously, checking now Q48hrs  Lines:  Peripherals, Rucker  Mobility:  OOB to chair  Symptoms: No change     Review of Systems  Review of Systems   Constitutional: Positive for malaise/fatigue.   HENT: Negative for nosebleeds ( improving) and sore throat.    Eyes: Negative for blurred vision and double vision.   Respiratory: Positive for shortness of breath.    Cardiovascular: Negative for chest pain.   Neurological: Negative for dizziness, sensory change, speech change, focal weakness and headaches.   All other systems reviewed and are negative.     Vital Signs for last 24 hours   Temp:  [35.9 °C (96.6 °F)-36.4 °C (97.5  °F)] 36.1 °C (96.9 °F)  Pulse:  [] 65  Resp:  [17-42] 19  BP: ()/(50-82) 99/58  SpO2:  [84 %-94 %] 94 %      Physical Exam   Physical Exam  Vitals and nursing note reviewed.   Constitutional:       General: She is in acute distress.      Appearance: She is ill-appearing.      Comments: On HFNC    HENT:      Head: Normocephalic and atraumatic.      Mouth/Throat:      Mouth: Mucous membranes are moist.   Eyes:      Extraocular Movements: Extraocular movements intact.      Pupils: Pupils are equal, round, and reactive to light.   Cardiovascular:      Rate and Rhythm: Rhythm irregular.      Heart sounds: Murmur heard.     Pulmonary:      Effort: No respiratory distress.      Breath sounds: No wheezing or rales.      Comments: Dry inspiratory crackles      Skin:     General: Skin is warm and dry.   Neurological:      General: No focal deficit present.      Mental Status: She is alert and oriented to person, place, and time. Mental status is at baseline.   Psychiatric:         Mood and Affect: Mood normal.         Behavior: Behavior normal.         Thought Content: Thought content normal.         Judgment: Judgment normal.       Medications  Current Facility-Administered Medications   Medication Dose Route Frequency Provider Last Rate Last Admin   • insulin glargine (Semglee) injection  18 Units Subcutaneous Q EVENING Dale Tamayo M.D.   18 Units at 06/07/21 1716   • omeprazole (PRILOSEC) capsule 20 mg  20 mg Oral DAILY Maikol Perez M.D.   20 mg at 06/08/21 0520   • insulin regular (HumuLIN R,NovoLIN R) injection  3-14 Units Subcutaneous 4X/DAY HUMBERTO Perez M.D.   7 Units at 06/07/21 2016    And   • glucose 4 g chewable tablet 16 g  16 g Oral Q15 MIN PRN Maikol Perez M.D.        And   • dextrose 50% (D50W) injection 50 mL  50 mL Intravenous Q15 MIN PRN Maikol Perez M.D.       • dexamethasone (DECADRON) injection 12 mg  12 mg Intravenous DAILY Maikol Perez M.D.   12 mg at 06/08/21  0520   • furosemide (LASIX) injection 20 mg  20 mg Intravenous BID DIURETIC Maikol Perez M.D.   20 mg at 06/08/21 0520   • sodium chloride (OCEAN) 0.65 % nasal spray 2 Spray  2 Spray Nasal Q2HRS PRN Maikol Perez M.D.   2 [place] at 06/04/21 1819   • ALPRAZolam (XANAX) tablet 0.5 mg  0.5 mg Oral TID PRN Osvaldo Manrique D.OOksana   0.5 mg at 06/07/21 2014   • DILTIAZem (CARDIZEM) injection 10 mg  10 mg Intravenous Q4HRS PRN Osvaldo Manrique D.O.       • benzocaine-menthol (CEPACOL) lozenge 1 Lozenge  1 Lozenge Mouth/Throat Q2HRS PRN Rashad Colon M.D.   1 Lozenge at 05/29/21 0543   • ferrous sulfate tablet 325 mg  325 mg Oral QDAY with Breakfast Ivan uNnez M.D.   325 mg at 06/08/21 0808   • apixaban (ELIQUIS) tablet 5 mg  5 mg Oral BID Cheryl Gallagher, D.O.   5 mg at 06/08/21 0520   • ascorbic acid (Vitamin C) tablet 1,000 mg  1,000 mg Oral DAILY Cheryl Gallagher, D.O.   1,000 mg at 06/08/21 0520   • atorvastatin (LIPITOR) tablet 10 mg  10 mg Oral DAILY Cheryl Gallagher, D.O.   10 mg at 06/08/21 0520   • benzonatate (TESSALON) capsule 100 mg  100 mg Oral TID PRN Cheryl Gallagher D.O.   100 mg at 05/26/21 1222   • DILTIAZem CD (CARDIZEM CD) capsule 180 mg  180 mg Oral BID Cheryl Gallagher, D.O.   180 mg at 06/08/21 0520   • montelukast (SINGULAIR) tablet 10 mg  10 mg Oral Q EVENING Cheryl Gallagher, D.O.   10 mg at 06/07/21 1718   • ondansetron (ZOFRAN) syringe/vial injection 4 mg  4 mg Intravenous Q6HRS PRN Cheryl Gallagher D.O.       • ondansetron (ZOFRAN ODT) dispertab 4 mg  4 mg Oral Q6HRS PRN Cheryl Gallagher D.O.       • senna-docusate (PERICOLACE or SENOKOT S) 8.6-50 MG per tablet 2 tablet  2 tablet Oral BID LEN MoranO.   2 tablet at 06/07/21 0551    And   • polyethylene glycol/lytes (MIRALAX) PACKET 1 Packet  1 Packet Oral QDAY PRN Cheryl Gallagher D.O.        And   • magnesium hydroxide (MILK OF MAGNESIA) suspension 30 mL  30 mL Oral QDAY PRN LEN MoranO.        And   • bisacodyl (DULCOLAX)  suppository 10 mg  10 mg Rectal QDAY PRN LEN MoranO.       • acetaminophen (Tylenol) tablet 650 mg  650 mg Oral Q6HRS PRN LEN MoranO.   650 mg at 05/22/21 1008   • guaiFENesin ER (MUCINEX) tablet 600 mg  600 mg Oral BID PRN LEN MoranO.       • guaiFENesin dextromethorphan (ROBITUSSIN DM) 100-10 MG/5ML syrup 10 mL  10 mL Oral Q6HRS PRN CHARISMA Moran.O.       • budesonide-formoterol (SYMBICORT) 160-4.5 MCG/ACT inhaler 2 Puff  2 Puff Inhalation BID (RT) LEN MroanOOksana   2 Puff at 06/08/21 0705   • HYDROcodone-homatropine 5-1.5 mg/5 mL solution 5 mL  5 mL Oral Q4HRS PRN Cheryl Gallagher D.O.         Fluids    Intake/Output Summary (Last 24 hours) at 6/8/2021 0844  Last data filed at 6/8/2021 0800  Gross per 24 hour   Intake 480 ml   Output 1300 ml   Net -820 ml     Laboratory          Recent Labs     06/07/21  0524   SODIUM 136   POTASSIUM 3.5*   CHLORIDE 91*   CO2 34*   BUN 60*   CREATININE 0.72   MAGNESIUM 2.1   PHOSPHORUS 2.8   CALCIUM 10.3*     Recent Labs     06/07/21  0524   ALTSGPT 31   ASTSGOT 19   ALKPHOSPHAT 114*   TBILIRUBIN 0.6   GLUCOSE 186*     Recent Labs     06/07/21  0524   WBC 17.2*   NEUTSPOLYS 87.00*   LYMPHOCYTES 2.30*   MONOCYTES 4.00   EOSINOPHILS 0.00   BASOPHILS 0.50   ASTSGOT 19   ALTSGPT 31   ALKPHOSPHAT 114*   TBILIRUBIN 0.6     Recent Labs     06/07/21  0524   RBC 4.84   HEMOGLOBIN 13.2   HEMATOCRIT 39.7   PLATELETCT 150*     Imaging  5/28: reviewed, bilateral infiltrates consistent with COVID pneumonia, improved compared to 5/23 improved   6/2/2021: bilateral alveolar infiltrates consistent with ARDS.    Assessment/Plan    * Acute hypoxemic respiratory failure due to severe acute respiratory syndrome coronavirus 2 (SARS-CoV-2) disease (HCC)- (present on admission)  Assessment & Plan  -- Improved clinically with post COVID fibroproliferative ARDS state   -- tocilizumab 5/21  -- convalescent plasma -- one dose 5/27  -- decadron completed 5/31   -- Aggressive  pulmonary toilet as able  -- Cont supportive care, monitoring closely for superimposed pneumonia, continue to closely monitor off antibiotics.  -- Encourage self proning as tolerated if possible 16 hrs  -- Goal SpO2 88-92%   -- CRP and procal normalized, BNP downtrending  -- Cont gentle diuresis to avoid net positive fluid balance    -- Encourage OOB and IS as tolerated   -- Cont HFNC with alternating BiPAP       Steroid-induced hyperglycemia  Assessment & Plan  Con ISS goal FSBG 140-180  Boost plus glucose control  Cont lantus to 18 units QHS       Abnormal echocardiogram  Assessment & Plan  --  Pulmonary hypertension  -- Consider etiology to be multifactorial due to left atrial hypertension and severe hypoxemia causing pulmonary vasoconstriction    -- Cont diuresis   -- Need repeat echocardiogram in 2-3 months    -- Avoid hypoxemia, respiratory acidosis, and A fib RVR which can cause worsening pulmonary vasoconstriction leading to worsening PA pressure     Pneumonia due to COVID-19 virus- (present on admission)  Assessment & Plan  -- Confirmed SARS-CoV-2/COVID on 5/15  -- Risk factors -> age and HTN and not vaccinated  -- s/p decadron, tocilizumab, plasma  -- Encourage self prone as tolerated (ie, 2 hour supine and 2 hour prone)  -- Cont aggressive diuresis to seek net negative fluid balance  -- Off precautions after discussion with infection control      Asthma, moderate persistent, well-controlled- (present on admission)  Assessment & Plan  -- Not in acute flare   -- Cont symbicort and singulair        Longstanding persistent atrial fibrillation (HCC)-Dr. Turner [place]- (present on admission)  Assessment & Plan  -- Currently rate controlled with diltiazem   -- On eliquis   -- High lytes goal    -- Goal HR < 110      Pending referral to LTACH placement     VTE:  NOAC  Ulcer: PPI while on steroids and apixiban  Lines: Rucker ordered for removal, place purewick    I have performed a physical exam and reviewed and  updated ROS and Plan today (6/8/2021). In review of yesterday's note (6/7/2021), there are no changes except as documented above.     Discussed patient condition and risk of morbidity and/or mortality with RN, RT, Therapies, Pharmacy, Charge nurse / hot rounds and Patient     The patient remains critically ill.  Critical care time = 34 minutes in directly providing and coordinating critical care and extensive data review.  No time overlap and excludes procedures.

## 2021-06-08 NOTE — THERAPY
06/08/21 1256   Treatment Variance   Reason For Missed Therapy Non-Medical - Patient Refused   Total Time Spent   Total Time Spent (Mins) 5   Interdisciplinary Plan of Care Collaboration   IDT Collaboration with  Nursing   Patient Position at End of Therapy In Bed   Collaboration Comments Nsg called to inform pt requesting no therapy today. Pt had visitors jose and may be fatiqued. Will reattmept tommorrow. n   Session Information   Date / Session Number  6/8 refused

## 2021-06-09 PROBLEM — R79.89 AZOTEMIA: Status: ACTIVE | Noted: 2021-06-09

## 2021-06-09 PROBLEM — D64.9 ANEMIA: Status: ACTIVE | Noted: 2021-06-09

## 2021-06-09 LAB
ABO GROUP BLD: NORMAL
ALBUMIN SERPL BCP-MCNC: 3 G/DL (ref 3.2–4.9)
ALBUMIN/GLOB SERPL: 1.5 G/DL
ALP SERPL-CCNC: 86 U/L (ref 30–99)
ALT SERPL-CCNC: 30 U/L (ref 2–50)
ANION GAP SERPL CALC-SCNC: 6 MMOL/L (ref 7–16)
AST SERPL-CCNC: 17 U/L (ref 12–45)
BASOPHILS # BLD AUTO: 0 % (ref 0–1.8)
BASOPHILS # BLD: 0 K/UL (ref 0–0.12)
BILIRUB SERPL-MCNC: 0.7 MG/DL (ref 0.1–1.5)
BLD GP AB SCN SERPL QL: NORMAL
BUN SERPL-MCNC: 60 MG/DL (ref 8–22)
CALCIUM SERPL-MCNC: 9.5 MG/DL (ref 8.4–10.2)
CHLORIDE SERPL-SCNC: 95 MMOL/L (ref 96–112)
CO2 SERPL-SCNC: 38 MMOL/L (ref 20–33)
CREAT SERPL-MCNC: 0.58 MG/DL (ref 0.5–1.4)
EOSINOPHIL # BLD AUTO: 0 K/UL (ref 0–0.51)
EOSINOPHIL NFR BLD: 0 % (ref 0–6.9)
ERYTHROCYTE [DISTWIDTH] IN BLOOD BY AUTOMATED COUNT: 42 FL (ref 35.9–50)
FERRITIN SERPL-MCNC: 826 NG/ML (ref 10–291)
GLOBULIN SER CALC-MCNC: 2 G/DL (ref 1.9–3.5)
GLUCOSE BLD-MCNC: 109 MG/DL (ref 65–99)
GLUCOSE BLD-MCNC: 120 MG/DL (ref 65–99)
GLUCOSE BLD-MCNC: 246 MG/DL (ref 65–99)
GLUCOSE BLD-MCNC: 325 MG/DL (ref 65–99)
GLUCOSE BLD-MCNC: 352 MG/DL (ref 65–99)
GLUCOSE SERPL-MCNC: 122 MG/DL (ref 65–99)
HCT VFR BLD AUTO: 35.5 % (ref 37–47)
HGB BLD-MCNC: 11.7 G/DL (ref 12–16)
HYPOCHROMIA BLD QL SMEAR: ABNORMAL
IRON SATN MFR SERPL: 41 % (ref 15–55)
IRON SERPL-MCNC: 112 UG/DL (ref 40–170)
LYMPHOCYTES # BLD AUTO: 0.41 K/UL (ref 1–4.8)
LYMPHOCYTES NFR BLD: 2 % (ref 22–41)
MAGNESIUM SERPL-MCNC: 2.1 MG/DL (ref 1.5–2.5)
MANUAL DIFF BLD: NORMAL
MCH RBC QN AUTO: 27.1 PG (ref 27–33)
MCHC RBC AUTO-ENTMCNC: 33 G/DL (ref 33.6–35)
MCV RBC AUTO: 82.4 FL (ref 81.4–97.8)
MONOCYTES # BLD AUTO: 0.21 K/UL (ref 0–0.85)
MONOCYTES NFR BLD AUTO: 1 % (ref 0–13.4)
NEUTROPHILS # BLD AUTO: 19.98 K/UL (ref 2–7.15)
NEUTROPHILS NFR BLD: 97 % (ref 44–72)
NRBC # BLD AUTO: 0.05 K/UL
NRBC BLD-RTO: 0.2 /100 WBC
PHOSPHATE SERPL-MCNC: 2.8 MG/DL (ref 2.5–4.5)
PLATELET # BLD AUTO: 137 K/UL (ref 164–446)
PLATELET BLD QL SMEAR: NORMAL
PMV BLD AUTO: 10.3 FL (ref 9–12.9)
POLYCHROMASIA BLD QL SMEAR: NORMAL
POTASSIUM SERPL-SCNC: 3.5 MMOL/L (ref 3.6–5.5)
PROT SERPL-MCNC: 5 G/DL (ref 6–8.2)
RBC # BLD AUTO: 4.31 M/UL (ref 4.2–5.4)
RBC BLD AUTO: PRESENT
RH BLD: NORMAL
SODIUM SERPL-SCNC: 139 MMOL/L (ref 135–145)
TIBC SERPL-MCNC: 273 UG/DL (ref 250–450)
UIBC SERPL-MCNC: 161 UG/DL (ref 110–370)
WBC # BLD AUTO: 20.6 K/UL (ref 4.8–10.8)

## 2021-06-09 PROCEDURE — 83540 ASSAY OF IRON: CPT

## 2021-06-09 PROCEDURE — 86850 RBC ANTIBODY SCREEN: CPT

## 2021-06-09 PROCEDURE — 5A09357 ASSISTANCE WITH RESPIRATORY VENTILATION, LESS THAN 24 CONSECUTIVE HOURS, CONTINUOUS POSITIVE AIRWAY PRESSURE: ICD-10-PCS | Performed by: HOSPITALIST

## 2021-06-09 PROCEDURE — 700102 HCHG RX REV CODE 250 W/ 637 OVERRIDE(OP): Performed by: INTERNAL MEDICINE

## 2021-06-09 PROCEDURE — 83550 IRON BINDING TEST: CPT

## 2021-06-09 PROCEDURE — A9270 NON-COVERED ITEM OR SERVICE: HCPCS | Performed by: INTERNAL MEDICINE

## 2021-06-09 PROCEDURE — 86900 BLOOD TYPING SEROLOGIC ABO: CPT

## 2021-06-09 PROCEDURE — 99291 CRITICAL CARE FIRST HOUR: CPT | Performed by: INTERNAL MEDICINE

## 2021-06-09 PROCEDURE — 5A0935A ASSISTANCE WITH RESPIRATORY VENTILATION, LESS THAN 24 CONSECUTIVE HOURS, HIGH NASAL FLOW/VELOCITY: ICD-10-PCS | Performed by: INTERNAL MEDICINE

## 2021-06-09 PROCEDURE — 83735 ASSAY OF MAGNESIUM: CPT

## 2021-06-09 PROCEDURE — 85007 BL SMEAR W/DIFF WBC COUNT: CPT

## 2021-06-09 PROCEDURE — 80053 COMPREHEN METABOLIC PANEL: CPT

## 2021-06-09 PROCEDURE — 82728 ASSAY OF FERRITIN: CPT

## 2021-06-09 PROCEDURE — 94760 N-INVAS EAR/PLS OXIMETRY 1: CPT

## 2021-06-09 PROCEDURE — 94660 CPAP INITIATION&MGMT: CPT

## 2021-06-09 PROCEDURE — 85027 COMPLETE CBC AUTOMATED: CPT

## 2021-06-09 PROCEDURE — 84100 ASSAY OF PHOSPHORUS: CPT

## 2021-06-09 PROCEDURE — 770022 HCHG ROOM/CARE - ICU (200)

## 2021-06-09 PROCEDURE — 700111 HCHG RX REV CODE 636 W/ 250 OVERRIDE (IP): Performed by: INTERNAL MEDICINE

## 2021-06-09 PROCEDURE — 94640 AIRWAY INHALATION TREATMENT: CPT

## 2021-06-09 PROCEDURE — 86901 BLOOD TYPING SEROLOGIC RH(D): CPT

## 2021-06-09 PROCEDURE — 700102 HCHG RX REV CODE 250 W/ 637 OVERRIDE(OP): Performed by: HOSPITALIST

## 2021-06-09 PROCEDURE — A9270 NON-COVERED ITEM OR SERVICE: HCPCS | Performed by: HOSPITALIST

## 2021-06-09 PROCEDURE — 82962 GLUCOSE BLOOD TEST: CPT | Mod: 91

## 2021-06-09 RX ORDER — FUROSEMIDE 10 MG/ML
20 INJECTION INTRAMUSCULAR; INTRAVENOUS
Status: DISCONTINUED | OUTPATIENT
Start: 2021-06-10 | End: 2021-06-10 | Stop reason: HOSPADM

## 2021-06-09 RX ORDER — POTASSIUM CHLORIDE 20 MEQ/1
60 TABLET, EXTENDED RELEASE ORAL ONCE
Status: COMPLETED | OUTPATIENT
Start: 2021-06-09 | End: 2021-06-09

## 2021-06-09 RX ADMIN — ALPRAZOLAM 0.5 MG: 0.5 TABLET ORAL at 20:19

## 2021-06-09 RX ADMIN — BUDESONIDE AND FORMOTEROL FUMARATE DIHYDRATE 2 PUFF: 160; 4.5 AEROSOL RESPIRATORY (INHALATION) at 07:00

## 2021-06-09 RX ADMIN — DOCUSATE SODIUM 50 MG AND SENNOSIDES 8.6 MG 2 TABLET: 8.6; 5 TABLET, FILM COATED ORAL at 17:26

## 2021-06-09 RX ADMIN — FUROSEMIDE 20 MG: 10 INJECTION, SOLUTION INTRAMUSCULAR; INTRAVENOUS at 05:39

## 2021-06-09 RX ADMIN — DILTIAZEM HYDROCHLORIDE 180 MG: 180 CAPSULE, COATED, EXTENDED RELEASE ORAL at 17:26

## 2021-06-09 RX ADMIN — ATORVASTATIN CALCIUM 10 MG: 10 TABLET, FILM COATED ORAL at 05:40

## 2021-06-09 RX ADMIN — OXYCODONE HYDROCHLORIDE AND ACETAMINOPHEN 1000 MG: 500 TABLET ORAL at 05:42

## 2021-06-09 RX ADMIN — DOCUSATE SODIUM 50 MG AND SENNOSIDES 8.6 MG 2 TABLET: 8.6; 5 TABLET, FILM COATED ORAL at 05:43

## 2021-06-09 RX ADMIN — POTASSIUM CHLORIDE 60 MEQ: 1500 TABLET, EXTENDED RELEASE ORAL at 11:32

## 2021-06-09 RX ADMIN — FERROUS SULFATE TAB 325 MG (65 MG ELEMENTAL FE) 325 MG: 325 (65 FE) TAB at 07:43

## 2021-06-09 RX ADMIN — APIXABAN 5 MG: 5 TABLET, FILM COATED ORAL at 05:40

## 2021-06-09 RX ADMIN — DEXAMETHASONE SODIUM PHOSPHATE 12 MG: 4 INJECTION, SOLUTION INTRA-ARTICULAR; INTRALESIONAL; INTRAMUSCULAR; INTRAVENOUS; SOFT TISSUE at 05:39

## 2021-06-09 RX ADMIN — OMEPRAZOLE 20 MG: 20 CAPSULE, DELAYED RELEASE ORAL at 05:39

## 2021-06-09 RX ADMIN — MONTELUKAST 10 MG: 10 TABLET, FILM COATED ORAL at 17:26

## 2021-06-09 RX ADMIN — APIXABAN 5 MG: 5 TABLET, FILM COATED ORAL at 17:26

## 2021-06-09 RX ADMIN — DILTIAZEM HYDROCHLORIDE 180 MG: 180 CAPSULE, COATED, EXTENDED RELEASE ORAL at 05:40

## 2021-06-09 ASSESSMENT — ENCOUNTER SYMPTOMS
HEADACHES: 0
DIZZINESS: 0
SORE THROAT: 0
SPEECH CHANGE: 0
SHORTNESS OF BREATH: 1
DOUBLE VISION: 0
BLURRED VISION: 0
FOCAL WEAKNESS: 0
SENSORY CHANGE: 0

## 2021-06-09 ASSESSMENT — PULMONARY FUNCTION TESTS: EPAP_CMH2O: 8

## 2021-06-09 ASSESSMENT — PAIN DESCRIPTION - PAIN TYPE: TYPE: ACUTE PAIN

## 2021-06-09 ASSESSMENT — FIBROSIS 4 INDEX: FIB4 SCORE: 2.04

## 2021-06-09 NOTE — ASSESSMENT & PLAN NOTE
-- Secondary to increased catabolism, steroids, and possible hypovolemia component   -- BUN improving   -- On lasix to 20 mg daily   -- Daily BMP

## 2021-06-09 NOTE — FLOWSHEET NOTE
06/08/21 2041   Events/Summary/Plan   Events/Summary/Plan BIPAP on   Skin Inspection Respiratory Device Red   Location Nose   Protective Device Bipap Gels;Foam Pads   Vital Signs   Pulse (!) 118   Respiration (!) 34   Pulse Oximetry (!) 87 %   $ Pulse Oximetry (Spot Check) Yes   O2 Alarms Set & Reviewed Yes   Aerosols   $ Aerosol Delivery Device Heated High Flow Nasal Cannula   Non-Invasive Ventilation [facility]   Nocturnal CPAP or BIPAP BIPAP - [facility]   $ System Evaluation Yes   Settings (If Known) 16/10   FiO2 or LPM 95

## 2021-06-09 NOTE — CARE PLAN
Problem: Knowledge Deficit - Standard  Goal: Patient and family/care givers will demonstrate understanding of plan of care, disease process/condition, diagnostic tests and medications  Outcome: Progressing     Problem: Respiratory  Goal: Patient will achieve/maintain optimum respiratory ventilation and gas exchange  Outcome: Progressing     Problem: Skin Integrity  Goal: Skin integrity is maintained or improved  Outcome: Progressing   The patient is Watcher - Medium risk of patient condition declining or worsening    Shift Goals  Clinical Goals: Maintain O2 levels >88%. Comfort.  Patient Goals: Keep comfortable.  Family Goals: Support and comfort patient.    Progress made toward(s) clinical / shift goals:      Patient is not progressing towards the following goals:

## 2021-06-09 NOTE — DISCHARGE PLANNING
Anticipated Discharge Disposition:   [facility], [facility]     Action:   Chart review complete.     1122: GABO ELIAS was informed by Laisha DAY that Sarai from [facility] stated that a bed may become available for this patient tomorrow.     1130: GABO ELIAS called Sarai with [facility]. Per Sarai, they are hopeful that a bed will become available for patient tomorrow. Sarai to come to bedside tomorrow to meet patient. Bedside RN and MD notified.    Barriers to Discharge:   [facility] bed availability    Plan:   Hospital care management will continue to assist with discharge planning needs.

## 2021-06-09 NOTE — PROGRESS NOTES
"Critical Care Progress Note    Date of admission  5/20/2021    Chief Complaint  80 y.o. female admitted 5/20/2021 with covid pna    Hospital Course  \"80 y.o. female who presented 5/20/2021 with acute hypoxemia respiratory failure. Patient with significant history of atrial fibrillation on eliquis, asthma, and hypertension admitted for worsening hypoxemia secondary to COVID pneumonia. She was recently hospitalized 5/15-5/17 for COVID, discharged on 1 liters NC and presents back on 5/20 with worsening shortness of breath. On admission, she required 6 liters NC which quickly escalated to HFNC. ID consulted and patient was tocilizumab on 5/21. Her oxygenation worsen overnight as she was maxed out on HFNC and transferred to ICU for BiPAP 12/6 FiO2 100%. Not vaccinated.     In ICU, she was diuresed and documented I/O -> neg ~1 liter. Patient is breath comfortably on BiPAP and able to answer questions via BiPAP facemask. Subjectively, patient denies chest pain, cough, N/V, fever/chills, or abdominal pain. She endorse having shortness of breath, neck pain, and back pain. Discussed about the benefits of proning if she can tolerate at which she agrees to with RN assistance. Discussed about risks/benefits endotracheal intubation if she needs it in the upcoming hours to days which she agrees to.      Echocardiogram 5/16/21 personally reviewed -> preserved LV function, mild to moderate MR, dilated coronary sinus, eccentric TR jet with measure RVSP ~50 mmHG which is most likely underestimated, TAPSE ~0.9 cm -> reduced RV function, dilated RV and RA, DE -> 0.89 m/s (~18 mmHG) and abnormal septal bounce, dilated IVC with inspiratory collapse. \" From Dr. Tamayo's note    COVID meds: Received Tocilimuzab on 5/21; on dexamethasone restarted 5/20, 12 mg, plasma 5/27 5/24: LE doppler negative; FIo2 increased to 100% and 50L    5/25: FIo2 down to 40 l 80%.   Pt's son Kaiser Black requested an infectious disease doctor to call as he is " expressed that he was not happy with patient's care and progress. Main questions patients son asked about were  for Ivermectin, Hydroxychloroquine and Remdesivir.  ID on call DR. Powers will call son this pm.    5/26: Fio2 50 L 70%  Dr. Powers reviewed with son and is reviewing options of plasma    5/27: Plasma    5/29: pt worse with sats now 85% on 100% 60 l and oxymask -- changed to bipap. CXR 5/28 showed improvement. Family aware    5/30: proned all night. Son spent the night in the room    6/7: On HFNC 70/75% with alternating BiPAP at night. Net negative 25 liters. Remains in good spirit.     6/8: No acute issues. On HFNC 50/80%. Off isolation. Pending LTACH evaluation.     6/9: No acute issues. On HFNC 50/80% and tolerated BiPAP for 8 hours overnight. Remains in good spirit and motivated to work with PT today. Denies chest pain, N/V, fever/chills, or abdominal pain. Net negative 25.8 liters. BUN up to 60 and creatinine downtrending. Hb 11.7 from 13.2. No signs of active bleed noted. Will repeat CBC daily.      Interval Problem Update  Reviewed last 24 hour events:  As above     Review of Systems  Review of Systems   Constitutional: Positive for malaise/fatigue.   HENT: Negative for nosebleeds ( improving) and sore throat.    Eyes: Negative for blurred vision and double vision.   Respiratory: Positive for shortness of breath.    Cardiovascular: Negative for chest pain.   Neurological: Negative for dizziness, sensory change, speech change, focal weakness and headaches.   All other systems reviewed and are negative.     Vital Signs for last 24 hours   Temp:  [36 °C (96.8 °F)-36.4 °C (97.6 °F)] 36.2 °C (97.2 °F)  Pulse:  [] 102  Resp:  [10-51] 16  BP: ()/(48-83) 118/70  SpO2:  [80 %-94 %] 89 %      Physical Exam   Physical Exam  Vitals and nursing note reviewed.   Constitutional:       General: She is in acute distress.      Appearance: She is ill-appearing.      Comments: On HFNC    HENT:       Head: Normocephalic and atraumatic.      Mouth/Throat:      Mouth: Mucous membranes are moist.   Eyes:      Extraocular Movements: Extraocular movements intact.      Pupils: Pupils are equal, round, and reactive to light.   Cardiovascular:      Rate and Rhythm: Rhythm irregular.      Heart sounds: Murmur heard.     Pulmonary:      Effort: No respiratory distress.      Breath sounds: No wheezing or rales.      Comments: Dry inspiratory crackles      Skin:     General: Skin is warm and dry.   Neurological:      General: No focal deficit present.      Mental Status: She is alert and oriented to person, place, and time. Mental status is at baseline.   Psychiatric:         Mood and Affect: Mood normal.         Behavior: Behavior normal.         Thought Content: Thought content normal.         Judgment: Judgment normal.       Medications  Current Facility-Administered Medications   Medication Dose Route Frequency Provider Last Rate Last Admin   • insulin regular (HumuLIN R,NovoLIN R) injection  5 Units Subcutaneous TID AC Dlae Tamayo M.D.   5 Units at 06/09/21 0739   • insulin glargine (Semglee) injection  18 Units Subcutaneous Q EVENING Dale Tamayo M.D.   18 Units at 06/08/21 1711   • omeprazole (PRILOSEC) capsule 20 mg  20 mg Oral DAILY Maikol Perez M.D.   20 mg at 06/09/21 0539   • insulin regular (HumuLIN R,NovoLIN R) injection  3-14 Units Subcutaneous 4X/DAY HUMBERTO Perez M.D.   12 Units at 06/08/21 2204    And   • glucose 4 g chewable tablet 16 g  16 g Oral Q15 MIN PRN Maikol Perez M.D.        And   • dextrose 50% (D50W) injection 50 mL  50 mL Intravenous Q15 MIN PRN Maikol Perez M.D.       • dexamethasone (DECADRON) injection 12 mg  12 mg Intravenous DAILY Maikol Perez M.D.   12 mg at 06/09/21 0539   • furosemide (LASIX) injection 20 mg  20 mg Intravenous BID DIURETIC Maikol Perez M.D.   20 mg at 06/09/21 0539   • sodium chloride (OCEAN) 0.65 % nasal spray 2 Spray  2 Spray Nasal  Q2HRS PRN Maikol Perez M.D.   2 [place] at 06/04/21 1819   • ALPRAZolam (XANAX) tablet 0.5 mg  0.5 mg Oral TID PRN CHARISMA Ríos.O.   0.5 mg at 06/08/21 2003   • DILTIAZem (CARDIZEM) injection 10 mg  10 mg Intravenous Q4HRS PRN CHARISMA Ríos.O.       • benzocaine-menthol (CEPACOL) lozenge 1 Lozenge  1 Lozenge Mouth/Throat Q2HRS PRN Rashad Colon M.D.   1 Lozenge at 05/29/21 0543   • ferrous sulfate tablet 325 mg  325 mg Oral QDAY with Breakfast Ivan Nunez M.D.   325 mg at 06/09/21 0743   • apixaban (ELIQUIS) tablet 5 mg  5 mg Oral BID Cheryl Gallagher, D.O.   5 mg at 06/09/21 0540   • ascorbic acid (Vitamin C) tablet 1,000 mg  1,000 mg Oral DAILY Cheryl Gallagher, D.O.   1,000 mg at 06/09/21 0542   • atorvastatin (LIPITOR) tablet 10 mg  10 mg Oral DAILY Cheryl Gallagher D.O.   10 mg at 06/09/21 0540   • benzonatate (TESSALON) capsule 100 mg  100 mg Oral TID PRN Cheryl Gallagher D.O.   100 mg at 05/26/21 1222   • DILTIAZem CD (CARDIZEM CD) capsule 180 mg  180 mg Oral BID Cheryl Gallagher D.O.   180 mg at 06/09/21 0540   • montelukast (SINGULAIR) tablet 10 mg  10 mg Oral Q EVENING Cheryl Gallagher D.O.   10 mg at 06/08/21 1708   • ondansetron (ZOFRAN) syringe/vial injection 4 mg  4 mg Intravenous Q6HRS PRN Cheryl Gallagher D.O.       • ondansetron (ZOFRAN ODT) dispertab 4 mg  4 mg Oral Q6HRS PRN Cheryl Gallagher D.O.       • senna-docusate (PERICOLACE or SENOKOT S) 8.6-50 MG per tablet 2 tablet  2 tablet Oral BID LEN MoranOOksana   2 tablet at 06/09/21 0543    And   • polyethylene glycol/lytes (MIRALAX) PACKET 1 Packet  1 Packet Oral QDAY PRN Cheryl Gallagher D.O.        And   • magnesium hydroxide (MILK OF MAGNESIA) suspension 30 mL  30 mL Oral QDAY PRN Cheryl Gallagher D.O.        And   • bisacodyl (DULCOLAX) suppository 10 mg  10 mg Rectal QDAY PRN Cheryl Gallagher D.O.       • acetaminophen (Tylenol) tablet 650 mg  650 mg Oral Q6HRS PRN LEN MoranOOksana   650 mg at 05/22/21 1008   • guaiFENesin ER  (MUCINEX) tablet 600 mg  600 mg Oral BID PRN Cheryl Gallagher D.O.       • guaiFENesin dextromethorphan (ROBITUSSIN DM) 100-10 MG/5ML syrup 10 mL  10 mL Oral Q6HRS PRN Cheryl Gallagher D.O.       • budesonide-formoterol (SYMBICORT) 160-4.5 MCG/ACT inhaler 2 Puff  2 Puff Inhalation BID (RT) Cheryl Gallagher D.O.   2 Puff at 06/09/21 0700   • HYDROcodone-homatropine 5-1.5 mg/5 mL solution 5 mL  5 mL Oral Q4HRS PRN Cheryl Gallagher D.O.         Fluids    Intake/Output Summary (Last 24 hours) at 6/9/2021 0843  Last data filed at 6/9/2021 0600  Gross per 24 hour   Intake --   Output 500 ml   Net -500 ml     Laboratory          Recent Labs     06/07/21  0524 06/09/21  0450   SODIUM 136 139   POTASSIUM 3.5* 3.5*   CHLORIDE 91* 95*   CO2 34* 38*   BUN 60* 60*   CREATININE 0.72 0.58   MAGNESIUM 2.1 2.1   PHOSPHORUS 2.8 2.8   CALCIUM 10.3* 9.5     Recent Labs     06/07/21  0524 06/09/21  0450   ALTSGPT 31 30   ASTSGOT 19 17   ALKPHOSPHAT 114* 86   TBILIRUBIN 0.6 0.7   GLUCOSE 186* 122*     Recent Labs     06/07/21  0524 06/09/21  0450   WBC 17.2* 20.6*   NEUTSPOLYS 87.00* 97.00*   LYMPHOCYTES 2.30* 2.00*   MONOCYTES 4.00 1.00   EOSINOPHILS 0.00 0.00   BASOPHILS 0.50 0.00   ASTSGOT 19 17   ALTSGPT 31 30   ALKPHOSPHAT 114* 86   TBILIRUBIN 0.6 0.7     Recent Labs     06/07/21  0524 06/09/21  0450   RBC 4.84 4.31   HEMOGLOBIN 13.2 11.7*   HEMATOCRIT 39.7 35.5*   PLATELETCT 150* 137*     Imaging  5/28: reviewed, bilateral infiltrates consistent with COVID pneumonia, improved compared to 5/23 improved   6/2/2021: bilateral alveolar infiltrates consistent with ARDS.    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  -- Encourage self proning as tolerated if possible 16 hrs  -- Goal SpO2 88-92%   -- CRP and procal normalized, BNP downtrending  -- Cont gentle diuresis to avoid net positive fluid balance    -- Encourage OOB and IS as tolerated   -- Cont HFNC with alternating BiPAP       Azotemia  Assessment & Plan  -- Secondary to increased catabolism, steroids, and possible hypovolemia component; other ddx includes upper GI bleed.   -- Will decrease lasix to 20 mg daily   -- Monitor closely for GI bleed    -- Daily BMP       Anemia  Assessment & Plan  -- Hb down to 11.7   -- Possible related to acute sickness causing BM suppression. Other ddx includes upper GI bleed.    -- No reported melanotic BM since admission   -- Check iron studies   -- Monitor daily CBC   -- Type and screen   -- Goal Hb > 7      Steroid-induced hyperglycemia  Assessment & Plan  Con ISS goal FSBG 140-180  Boost plus glucose control  Added 5 units novolin AC QHS    Cont lantus to 18 units QHS       Abnormal echocardiogram  Assessment & Plan  --  Pulmonary hypertension  -- Consider etiology to be multifactorial due to left atrial hypertension and severe hypoxemia causing pulmonary vasoconstriction    -- Cont diuresis   -- Need repeat echocardiogram in 2-3 months    -- Avoid hypoxemia, respiratory acidosis, and A fib RVR which can cause worsening pulmonary vasoconstriction leading to worsening PA pressure     Pneumonia due to COVID-19 virus- (present on admission)  Assessment & Plan  -- Confirmed SARS-CoV-2/COVID on 5/15  -- Risk factors -> age and HTN and not vaccinated  -- s/p decadron, tocilizumab, plasma  -- Encourage self prone as tolerated (ie, 2 hour supine and 2 hour prone)  -- Cont aggressive diuresis to seek net negative fluid balance  -- Off precautions after discussion with infection control      Asthma, moderate persistent,  well-controlled- (present on admission)  Assessment & Plan  -- Not in acute flare   -- Cont symbicort and singulair        Longstanding persistent atrial fibrillation (HCC)-Dr. Turner [place]- (present on admission)  Assessment & Plan  -- Currently rate controlled with diltiazem   -- On eliquis   -- High lytes goal    -- Goal HR < 110      Pending referral to LTACH placement     VTE:  NOAC  Ulcer: PPI while on steroids and apixiban  Lines: Rucker ordered for removal, place purewick    I have performed a physical exam and reviewed and updated ROS and Plan today (6/9/2021). In review of yesterday's note (6/8/2021), there are no changes except as documented above.     Discussed patient condition and risk of morbidity and/or mortality with RN, RT, Therapies, Pharmacy, Charge nurse / hot rounds and Patient     The patient remains critically ill.  Critical care time = 32 minutes in directly providing and coordinating critical care and extensive data review.  No time overlap and excludes procedures.

## 2021-06-09 NOTE — PROGRESS NOTES
US-guided for insertion of PIV procedure explained to patient. Vein visualized for access. Aseptic technique was used for cannulation of 20 gauge PIV catheter placed in anterior RFA. Patency observed with blood return and flushed with NS without difficulty. PIV dressing applied. Patient tolerated procedure.

## 2021-06-09 NOTE — PROGRESS NOTES
Patient is resting quietly in bed with her family at her side. She is smiling and appears to be in good spirits. Her family helped her celebrate her birthday today. She is fatigued because I see her occasionally closing her eyes and napping. Intake and output are good.

## 2021-06-09 NOTE — ASSESSMENT & PLAN NOTE
-- Hb stable today    -- Possible related to acute sickness causing BM suppression   -- Normal iron studies   -- Monitor daily CBC   -- Goal Hb > 7

## 2021-06-09 NOTE — CARE PLAN
The patient is Watcher - Medium risk of patient condition declining or worsening    Shift Goals  Clinical Goals: Wean supplemental O2. Increased strength and mobility.  Patient Goals: Increase strength.  Family Goals: Support and comfort    Progress made toward(s) clinical / shift goals:  Maintaining O2 sats 88-95% and weaning supplemental O2 as tolerated. Pt up to chair for lunch and performing strength exercises in bed.     Problem: Knowledge Deficit - Standard  Goal: Patient and family/care givers will demonstrate understanding of plan of care, disease process/condition, diagnostic tests and medications  Outcome: Progressing     Problem: Respiratory  Goal: Patient will achieve/maintain optimum respiratory ventilation and gas exchange  Outcome: Progressing     Problem: Skin Integrity  Goal: Skin integrity is maintained or improved  Outcome: Progressing

## 2021-06-09 NOTE — FLOWSHEET NOTE
06/09/21 1336   Events/Summary/Plan   Events/Summary/Plan returned to bipap for a nap   Location nose   Vital Signs   Pulse 96   Respiration (!) 44   Pulse Oximetry 92 %   $ Pulse Oximetry (Spot Check) Yes   Oxygen   FiO2% 90 %   Non-Invasive Ventilation [facility]   Nocturnal CPAP or BIPAP BIPAP - [facility]   $ System Evaluation Yes

## 2021-06-09 NOTE — PROGRESS NOTES
Patient switched from high flow to Bi-Pap. Xanax given as requested. Preparing for sleep. Her son is at her side.

## 2021-06-09 NOTE — PROGRESS NOTES
Slept well during the night. Woke up about 3:30 a.m. wanting bi-pap off. She was able to keep it on until 5:00 a.m. before she was switched over to hi-flow. Urine is very malodorous, but it is yellow. Very pleasant lady. Her son is at her side.

## 2021-06-10 VITALS
HEIGHT: 67 IN | BODY MASS INDEX: 29.07 KG/M2 | DIASTOLIC BLOOD PRESSURE: 58 MMHG | RESPIRATION RATE: 37 BRPM | OXYGEN SATURATION: 100 % | WEIGHT: 185.19 LBS | HEART RATE: 92 BPM | SYSTOLIC BLOOD PRESSURE: 123 MMHG | TEMPERATURE: 98.1 F

## 2021-06-10 LAB
ALBUMIN SERPL BCP-MCNC: 3.2 G/DL (ref 3.2–4.9)
ALBUMIN/GLOB SERPL: 1.5 G/DL
ALP SERPL-CCNC: 88 U/L (ref 30–99)
ALT SERPL-CCNC: 32 U/L (ref 2–50)
ANION GAP SERPL CALC-SCNC: 6 MMOL/L (ref 7–16)
AST SERPL-CCNC: 17 U/L (ref 12–45)
BASOPHILS # BLD AUTO: 0 % (ref 0–1.8)
BASOPHILS # BLD: 0 K/UL (ref 0–0.12)
BILIRUB SERPL-MCNC: 0.8 MG/DL (ref 0.1–1.5)
BUN SERPL-MCNC: 54 MG/DL (ref 8–22)
CA-I SERPL-SCNC: 1.21 MMOL/L (ref 1.1–1.3)
CALCIUM SERPL-MCNC: 9.8 MG/DL (ref 8.4–10.2)
CHLORIDE SERPL-SCNC: 96 MMOL/L (ref 96–112)
CO2 SERPL-SCNC: 37 MMOL/L (ref 20–33)
CREAT SERPL-MCNC: 0.57 MG/DL (ref 0.5–1.4)
EOSINOPHIL # BLD AUTO: 0 K/UL (ref 0–0.51)
EOSINOPHIL NFR BLD: 0 % (ref 0–6.9)
ERYTHROCYTE [DISTWIDTH] IN BLOOD BY AUTOMATED COUNT: 43.8 FL (ref 35.9–50)
GLOBULIN SER CALC-MCNC: 2.2 G/DL (ref 1.9–3.5)
GLUCOSE BLD-MCNC: 152 MG/DL (ref 65–99)
GLUCOSE BLD-MCNC: 386 MG/DL (ref 65–99)
GLUCOSE SERPL-MCNC: 141 MG/DL (ref 65–99)
HCT VFR BLD AUTO: 36.8 % (ref 37–47)
HGB BLD-MCNC: 12 G/DL (ref 12–16)
LYMPHOCYTES # BLD AUTO: 0.78 K/UL (ref 1–4.8)
LYMPHOCYTES NFR BLD: 4 % (ref 22–41)
MAGNESIUM SERPL-MCNC: 2.2 MG/DL (ref 1.5–2.5)
MANUAL DIFF BLD: NORMAL
MCH RBC QN AUTO: 27.1 PG (ref 27–33)
MCHC RBC AUTO-ENTMCNC: 32.6 G/DL (ref 33.6–35)
MCV RBC AUTO: 83.3 FL (ref 81.4–97.8)
MONOCYTES # BLD AUTO: 0.2 K/UL (ref 0–0.85)
MONOCYTES NFR BLD AUTO: 1 % (ref 0–13.4)
NEUTROPHILS # BLD AUTO: 18.62 K/UL (ref 2–7.15)
NEUTROPHILS NFR BLD: 88 % (ref 44–72)
NEUTS BAND NFR BLD MANUAL: 7 % (ref 0–10)
NRBC # BLD AUTO: 0.04 K/UL
NRBC BLD-RTO: 0.2 /100 WBC
PHOSPHATE SERPL-MCNC: 2.4 MG/DL (ref 2.5–4.5)
PLATELET # BLD AUTO: 144 K/UL (ref 164–446)
PLATELET BLD QL SMEAR: NORMAL
PMV BLD AUTO: 10.6 FL (ref 9–12.9)
POTASSIUM SERPL-SCNC: 4.5 MMOL/L (ref 3.6–5.5)
PROT SERPL-MCNC: 5.4 G/DL (ref 6–8.2)
RBC # BLD AUTO: 4.42 M/UL (ref 4.2–5.4)
RBC BLD AUTO: PRESENT
SODIUM SERPL-SCNC: 139 MMOL/L (ref 135–145)
WBC # BLD AUTO: 19.6 K/UL (ref 4.8–10.8)

## 2021-06-10 PROCEDURE — 700111 HCHG RX REV CODE 636 W/ 250 OVERRIDE (IP): Performed by: INTERNAL MEDICINE

## 2021-06-10 PROCEDURE — 94640 AIRWAY INHALATION TREATMENT: CPT

## 2021-06-10 PROCEDURE — 84100 ASSAY OF PHOSPHORUS: CPT

## 2021-06-10 PROCEDURE — 83735 ASSAY OF MAGNESIUM: CPT

## 2021-06-10 PROCEDURE — 80053 COMPREHEN METABOLIC PANEL: CPT

## 2021-06-10 PROCEDURE — 5A0935A ASSISTANCE WITH RESPIRATORY VENTILATION, LESS THAN 24 CONSECUTIVE HOURS, HIGH NASAL FLOW/VELOCITY: ICD-10-PCS | Performed by: INTERNAL MEDICINE

## 2021-06-10 PROCEDURE — 700101 HCHG RX REV CODE 250: Performed by: INTERNAL MEDICINE

## 2021-06-10 PROCEDURE — 99291 CRITICAL CARE FIRST HOUR: CPT | Performed by: INTERNAL MEDICINE

## 2021-06-10 PROCEDURE — A9270 NON-COVERED ITEM OR SERVICE: HCPCS | Performed by: HOSPITALIST

## 2021-06-10 PROCEDURE — A9270 NON-COVERED ITEM OR SERVICE: HCPCS | Performed by: INTERNAL MEDICINE

## 2021-06-10 PROCEDURE — 700102 HCHG RX REV CODE 250 W/ 637 OVERRIDE(OP): Performed by: INTERNAL MEDICINE

## 2021-06-10 PROCEDURE — 82962 GLUCOSE BLOOD TEST: CPT | Mod: 91

## 2021-06-10 PROCEDURE — 82330 ASSAY OF CALCIUM: CPT

## 2021-06-10 PROCEDURE — 85027 COMPLETE CBC AUTOMATED: CPT

## 2021-06-10 PROCEDURE — 85007 BL SMEAR W/DIFF WBC COUNT: CPT

## 2021-06-10 PROCEDURE — 94660 CPAP INITIATION&MGMT: CPT

## 2021-06-10 PROCEDURE — 700105 HCHG RX REV CODE 258: Performed by: INTERNAL MEDICINE

## 2021-06-10 PROCEDURE — 700102 HCHG RX REV CODE 250 W/ 637 OVERRIDE(OP): Performed by: HOSPITALIST

## 2021-06-10 PROCEDURE — 94760 N-INVAS EAR/PLS OXIMETRY 1: CPT

## 2021-06-10 RX ORDER — DEXAMETHASONE SODIUM PHOSPHATE 4 MG/ML
12 INJECTION, SOLUTION INTRA-ARTICULAR; INTRALESIONAL; INTRAMUSCULAR; INTRAVENOUS; SOFT TISSUE DAILY
Refills: 0 | Status: SHIPPED
Start: 2021-06-11 | End: 2023-07-07 | Stop reason: CLARIF

## 2021-06-10 RX ORDER — OMEPRAZOLE 20 MG/1
20 CAPSULE, DELAYED RELEASE ORAL DAILY
Qty: 30 CAPSULE | Status: SHIPPED
Start: 2021-06-11 | End: 2023-07-07 | Stop reason: CLARIF

## 2021-06-10 RX ADMIN — DEXAMETHASONE SODIUM PHOSPHATE 12 MG: 4 INJECTION, SOLUTION INTRA-ARTICULAR; INTRALESIONAL; INTRAMUSCULAR; INTRAVENOUS; SOFT TISSUE at 05:51

## 2021-06-10 RX ADMIN — FUROSEMIDE 20 MG: 10 INJECTION, SOLUTION INTRAMUSCULAR; INTRAVENOUS at 05:50

## 2021-06-10 RX ADMIN — BUDESONIDE AND FORMOTEROL FUMARATE DIHYDRATE 2 PUFF: 160; 4.5 AEROSOL RESPIRATORY (INHALATION) at 07:10

## 2021-06-10 RX ADMIN — OMEPRAZOLE 20 MG: 20 CAPSULE, DELAYED RELEASE ORAL at 05:48

## 2021-06-10 RX ADMIN — FERROUS SULFATE TAB 325 MG (65 MG ELEMENTAL FE) 325 MG: 325 (65 FE) TAB at 07:57

## 2021-06-10 RX ADMIN — DILTIAZEM HYDROCHLORIDE 180 MG: 180 CAPSULE, COATED, EXTENDED RELEASE ORAL at 05:48

## 2021-06-10 RX ADMIN — APIXABAN 5 MG: 5 TABLET, FILM COATED ORAL at 05:48

## 2021-06-10 RX ADMIN — OXYCODONE HYDROCHLORIDE AND ACETAMINOPHEN 1000 MG: 500 TABLET ORAL at 05:48

## 2021-06-10 RX ADMIN — ATORVASTATIN CALCIUM 10 MG: 10 TABLET, FILM COATED ORAL at 05:48

## 2021-06-10 RX ADMIN — DEXTROSE MONOHYDRATE 15 MMOL: 50 INJECTION, SOLUTION INTRAVENOUS at 07:55

## 2021-06-10 ASSESSMENT — PAIN DESCRIPTION - PAIN TYPE
TYPE: ACUTE PAIN

## 2021-06-10 ASSESSMENT — ENCOUNTER SYMPTOMS
SPEECH CHANGE: 0
DOUBLE VISION: 0
SHORTNESS OF BREATH: 1
SORE THROAT: 0
FOCAL WEAKNESS: 0
BLURRED VISION: 0
HEADACHES: 0
DIZZINESS: 0
SENSORY CHANGE: 0

## 2021-06-10 NOTE — DISCHARGE PLANNING
Anticipated Discharge Disposition: LTAC    Action: Notified by [facility] that pt is accepted and they have a bed available today. She requested transport be arranged for 1400.     [facility] form completed and faxed    Met with pt's son at bedside and provided update    Barriers to Discharge: None    Plan: D/C to [facility]      COBRA completed and placed in pt's chart. Pt is in agreement with transfer

## 2021-06-10 NOTE — PROGRESS NOTES
Report given to Von AOYN at [facility], reviewed assessment, medications, POC, vital signs, questions addressed.

## 2021-06-10 NOTE — PROGRESS NOTES
"Critical Care Progress Note    Date of admission  5/20/2021    Chief Complaint  80 y.o. female admitted 5/20/2021 with covid pna    Hospital Course  \"80 y.o. female who presented 5/20/2021 with acute hypoxemia respiratory failure. Patient with significant history of atrial fibrillation on eliquis, asthma, and hypertension admitted for worsening hypoxemia secondary to COVID pneumonia. She was recently hospitalized 5/15-5/17 for COVID, discharged on 1 liters NC and presents back on 5/20 with worsening shortness of breath. On admission, she required 6 liters NC which quickly escalated to HFNC. ID consulted and patient was tocilizumab on 5/21. Her oxygenation worsen overnight as she was maxed out on HFNC and transferred to ICU for BiPAP 12/6 FiO2 100%. Not vaccinated.     In ICU, she was diuresed and documented I/O -> neg ~1 liter. Patient is breath comfortably on BiPAP and able to answer questions via BiPAP facemask. Subjectively, patient denies chest pain, cough, N/V, fever/chills, or abdominal pain. She endorse having shortness of breath, neck pain, and back pain. Discussed about the benefits of proning if she can tolerate at which she agrees to with RN assistance. Discussed about risks/benefits endotracheal intubation if she needs it in the upcoming hours to days which she agrees to.      Echocardiogram 5/16/21 personally reviewed -> preserved LV function, mild to moderate MR, dilated coronary sinus, eccentric TR jet with measure RVSP ~50 mmHG which is most likely underestimated, TAPSE ~0.9 cm -> reduced RV function, dilated RV and RA, ND -> 0.89 m/s (~18 mmHG) and abnormal septal bounce, dilated IVC with inspiratory collapse. \" From Dr. Tamayo's note    COVID meds: Received Tocilimuzab on 5/21; on dexamethasone restarted 5/20, 12 mg, plasma 5/27 5/24: LE doppler negative; FIo2 increased to 100% and 50L    5/25: FIo2 down to 40 l 80%.   Pt's son Kaiser Black requested an infectious disease doctor to call as he is " expressed that he was not happy with patient's care and progress. Main questions patients son asked about were  for Ivermectin, Hydroxychloroquine and Remdesivir.  ID on call DR. Powers will call son this pm.    5/26: Fio2 50 L 70%  Dr. Powers reviewed with son and is reviewing options of plasma    5/27: Plasma    5/29: pt worse with sats now 85% on 100% 60 l and oxymask -- changed to bipap. CXR 5/28 showed improvement. Family aware    5/30: proned all night. Son spent the night in the room    6/7: On HFNC 70/75% with alternating BiPAP at night. Net negative 25 liters. Remains in good spirit.     6/8: No acute issues. On HFNC 50/80%. Off isolation. Pending LTACH evaluation.     6/9: No acute issues. On HFNC 50/80% and tolerated BiPAP for 8 hours overnight. Remains in good spirit and motivated to work with PT today. Denies chest pain, N/V, fever/chills, or abdominal pain. Net negative 25.8 liters. BUN up to 60 and creatinine downtrending. Hb 11.7 from 13.2. No signs of active bleed noted. Will repeat CBC daily.      6/10: No acute issues. H/H stable and BUN improving. On HFNC 50/85% with SpO2 ~95%. Feels better overall. Anxious to work with PT/OT with plan to get OOB today. Pending LTACH placement.     Interval Problem Update  Reviewed last 24 hour events:  As above     Review of Systems  Review of Systems   Constitutional: Positive for malaise/fatigue.   HENT: Negative for nosebleeds ( improving) and sore throat.    Eyes: Negative for blurred vision and double vision.   Respiratory: Positive for shortness of breath.    Cardiovascular: Negative for chest pain.   Neurological: Negative for dizziness, sensory change, speech change, focal weakness and headaches.   All other systems reviewed and are negative.     Vital Signs for last 24 hours   Temp:  [36.1 °C (97 °F)-36.7 °C (98 °F)] 36.3 °C (97.3 °F)  Pulse:  [] 58  Resp:  [20-52] 26  BP: ()/(47-90) 128/72  SpO2:  [87 %-97 %] 96 %      Physical Exam    Physical Exam  Vitals and nursing note reviewed.   Constitutional:       Appearance: She is ill-appearing.      Comments: On HFNC    HENT:      Head: Normocephalic and atraumatic.      Mouth/Throat:      Mouth: Mucous membranes are moist.   Eyes:      Extraocular Movements: Extraocular movements intact.      Pupils: Pupils are equal, round, and reactive to light.   Cardiovascular:      Rate and Rhythm: Rhythm irregular.      Heart sounds: Murmur heard.     Pulmonary:      Effort: No respiratory distress.      Breath sounds: No wheezing or rales.      Comments: Decrease BS     Skin:     General: Skin is warm and dry.   Neurological:      General: No focal deficit present.      Mental Status: She is alert and oriented to person, place, and time. Mental status is at baseline.   Psychiatric:         Mood and Affect: Mood normal.         Behavior: Behavior normal.         Thought Content: Thought content normal.         Judgment: Judgment normal.       Medications  Current Facility-Administered Medications   Medication Dose Route Frequency Provider Last Rate Last Admin   • sodium phosphate 15 mmol in dextrose 5% 250 mL ivpb  15 mmol Intravenous Once Dale Tamayo M.D. 62.5 mL/hr at 06/10/21 0755 15 mmol at 06/10/21 0755   • furosemide (LASIX) injection 20 mg  20 mg Intravenous Q DAY Dale Tamayo M.D.   20 mg at 06/10/21 0550   • MD Alert...ICU Electrolyte Replacement per Pharmacy   Other PHARMACY TO DOSE Dale Tamayo M.D.       • insulin regular (HumuLIN R,NovoLIN R) injection  5 Units Subcutaneous TID AC Dale Tamayo M.D.   5 Units at 06/10/21 0622   • insulin glargine (Semglee) injection  18 Units Subcutaneous Q EVENING Dale Tamayo M.D.   18 Units at 06/09/21 1727   • omeprazole (PRILOSEC) capsule 20 mg  20 mg Oral DAILY Maikol Perez M.D.   20 mg at 06/10/21 0548   • insulin regular (HumuLIN R,NovoLIN R) injection  3-14 [facility]   3 Units at 06/10/21 0619    And   •  glucose 4 g chewable tablet 16 g  16 g Oral Q15 MIN PRN Maikol Perez M.D.        And   • dextrose 50% (D50W) injection 50 mL  50 mL Intravenous Q15 MIN PRN Maikol Perez M.D.       • dexamethasone (DECADRON) injection 12 mg  12 mg Intravenous DAILY Maikol Perez M.D.   12 mg at 06/10/21 0551   • sodium chloride (OCEAN) 0.65 % nasal spray 2 Spray  2 Spray Nasal Q2HRS PRN Maikol Perez M.D.   2 [place] at 06/04/21 1819   • ALPRAZolam (XANAX) tablet 0.5 mg  0.5 mg Oral TID PRN LEN RíosOOksana   0.5 mg at 06/09/21 2019   • DILTIAZem (CARDIZEM) injection 10 mg  10 mg Intravenous Q4HRS PRN Osvaldo Manrique D.O.       • benzocaine-menthol (CEPACOL) lozenge 1 Lozenge  1 Lozenge Mouth/Throat Q2HRS PRN Rashad Colon M.D.   1 Lozenge at 05/29/21 0543   • ferrous sulfate tablet 325 mg  325 mg Oral QDAY with Breakfast Ivan Nunez M.D.   325 mg at 06/10/21 0757   • apixaban (ELIQUIS) tablet 5 mg  5 mg Oral BID Cheryl Gallagher, D.O.   5 mg at 06/10/21 0548   • ascorbic acid (Vitamin C) tablet 1,000 mg  1,000 mg Oral DAILY Cheryl Gallagher D.O.   1,000 mg at 06/10/21 0548   • atorvastatin (LIPITOR) tablet 10 mg  10 mg Oral DAILY Cheryl Gallagher D.O.   10 mg at 06/10/21 0548   • benzonatate (TESSALON) capsule 100 mg  100 mg Oral TID PRN Cheryl Gallagher D.O.   100 mg at 05/26/21 1222   • DILTIAZem CD (CARDIZEM CD) capsule 180 mg  180 mg Oral BID Cheryl Gallagher D.O.   180 mg at 06/10/21 0548   • montelukast (SINGULAIR) tablet 10 mg  10 mg Oral Q EVENING Cheryl S Elliott, D.O.   10 mg at 06/09/21 1726   • ondansetron (ZOFRAN) syringe/vial injection 4 mg  4 mg Intravenous Q6HRS PRN Cheryl Gallagher D.O.       • ondansetron (ZOFRAN ODT) dispertab 4 mg  4 mg Oral Q6HRS PRN Cheryl Gallagher D.O.       • senna-docusate (PERICOLACE or SENOKOT S) 8.6-50 MG per tablet 2 tablet  2 tablet Oral BID CHARISMA [email or web address]   2 tablet at 06/09/21 1726    And   • polyethylene glycol/lytes (MIRALAX) PACKET 1 Packet  1 Packet Oral  QDAY PRN Cheryl Gallagher D.O.        And   • magnesium hydroxide (MILK OF MAGNESIA) suspension 30 mL  30 mL Oral QDAY PRN Cheryl Gallagher D.O.        And   • bisacodyl (DULCOLAX) suppository 10 mg  10 mg Rectal QDAY PRN CHARISMA Moran.O.       • acetaminophen (Tylenol) tablet 650 mg  650 mg Oral Q6HRS PRN LEN MoranO.   650 mg at 05/22/21 1008   • guaiFENesin ER (MUCINEX) tablet 600 mg  600 mg Oral BID PRN CHARISMA Moran.O.       • guaiFENesin dextromethorphan (ROBITUSSIN DM) 100-10 MG/5ML syrup 10 mL  10 mL Oral Q6HRS PRN CHARISMA Moran.O.       • budesonide-formoterol (SYMBICORT) 160-4.5 MCG/ACT inhaler 2 Puff  2 Puff Inhalation BID (RT) LEN MoranO.   2 Puff at 06/10/21 0710   • HYDROcodone-homatropine 5-1.5 mg/5 mL solution 5 mL  5 mL Oral Q4HRS PRN CHARISMA [email or web address]         Fluids    Intake/Output Summary (Last 24 hours) at 6/10/2021 0837  Last data filed at 6/10/2021 0800  Gross per 24 hour   Intake 1565.21 ml   Output 1900 ml   Net -334.79 ml     Laboratory          Recent Labs     06/09/21  0450 06/10/21  0545   SODIUM 139 139   POTASSIUM 3.5* 4.5   CHLORIDE 95* 96   CO2 38* 37*   BUN 60* 54*   CREATININE 0.58 0.57   MAGNESIUM 2.1 2.2   PHOSPHORUS 2.8 2.4*   CALCIUM 9.5 9.8     Recent Labs     06/09/21  0450 06/10/21  0545   ALTSGPT 30 32   ASTSGOT 17 17   ALKPHOSPHAT 86 88   TBILIRUBIN 0.7 0.8   GLUCOSE 122* 141*     Recent Labs     06/09/21  0450 06/10/21  0545   WBC 20.6* 19.6*   NEUTSPOLYS 97.00* 88.00*   LYMPHOCYTES 2.00* 4.00*   MONOCYTES 1.00 1.00   EOSINOPHILS 0.00 0.00   BASOPHILS 0.00 0.00   ASTSGOT 17 17   ALTSGPT 30 32   ALKPHOSPHAT 86 88   TBILIRUBIN 0.7 0.8     Recent Labs     06/09/21  0450 06/10/21  0545   RBC 4.31 4.42   HEMOGLOBIN 11.7* 12.0   HEMATOCRIT 35.5* 36.8*   PLATELETCT 137* 144*   IRON 112  --    FERRITIN 826.0*  --    TOTIRONBC 273  --      Imaging  5/28: reviewed, bilateral infiltrates consistent with COVID pneumonia, improved compared to 5/23 improved    6/2/2021: bilateral alveolar infiltrates consistent with ARDS.    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  -- Encourage self proning as tolerated if possible 16 hrs  -- Goal SpO2 88-92%   -- CRP and procal normalized, BNP downtrending  -- Cont gentle diuresis to avoid net positive fluid balance    -- Encourage OOB and IS as tolerated   -- Will use BiPAP prn nightly and continue HFNC to maintain SpO2 > 90%         Azotemia  Assessment & Plan  -- Secondary to increased catabolism, steroids, and possible hypovolemia component   -- BUN improving   -- On lasix to 20 mg daily   -- Daily BMP       Anemia  Assessment & Plan  -- Hb stable today    -- Possible related to acute sickness causing BM suppression   -- Normal iron studies   -- Monitor daily CBC   -- Goal Hb > 7      Steroid-induced hyperglycemia  Assessment & Plan  Con ISS goal FSBG 140-180  Boost plus glucose control  Cont 5 units novolin AC QHS    Cont lantus to 18 units QHS       Abnormal echocardiogram  Assessment & Plan  --  Pulmonary hypertension  -- Consider etiology to be multifactorial due to left atrial hypertension and severe hypoxemia causing pulmonary vasoconstriction    -- Cont diuresis   -- Need repeat echocardiogram in 2-3 months    -- Avoid hypoxemia, respiratory acidosis, and A fib RVR which can cause worsening pulmonary vasoconstriction leading to worsening PA pressure     Pneumonia due to COVID-19 virus- (present on admission)  Assessment & Plan  -- Confirmed SARS-CoV-2/COVID on 5/15  --  Risk factors -> age and HTN and not vaccinated  -- s/p decadron, tocilizumab, plasma  -- Encourage self prone as tolerated (ie, 2 hour supine and 2 hour prone)  -- Cont aggressive diuresis to seek net negative fluid balance  -- Off precautions after discussion with infection control      Asthma, moderate persistent, well-controlled- (present on admission)  Assessment & Plan  -- Not in acute flare   -- Cont symbicort and singulair        Longstanding persistent atrial fibrillation (HCC)-Dr. Turner [place]- (present on admission)  Assessment & Plan  -- Currently rate controlled with diltiazem   -- On eliquis   -- High lytes goal    -- Goal HR < 110      Awaiting for LTACH bed.     VTE:  NOAC  Ulcer: PPI while on steroids and apixiban  Lines: Rucker ordered for removal, place purewick    I have performed a physical exam and reviewed and updated ROS and Plan today (6/10/2021). In review of yesterday's note (6/9/2021), there are no changes except as documented above.     Discussed patient condition and risk of morbidity and/or mortality with RN, RT, Therapies, Pharmacy, Charge nurse / hot rounds and Patient     The patient remains critically ill.  Critical care time = 34 minutes in directly providing and coordinating critical care and extensive data review.  No time overlap and excludes procedures.

## 2021-06-10 NOTE — DISCHARGE INSTRUCTIONS
Discharge Instructions    Discharged to other by ambulance with relative. Discharged via ambulance, hospital escort: Yes.  Special equipment needed: Oxygen    Be sure to schedule a follow-up appointment with your primary care doctor or any specialists as instructed.     Discharge Plan:   Diet Plan: Discussed  Activity Level: Discussed  Confirmed Follow up Appointment: No (Comments) (transfering to [facility])  Confirmed Symptoms Management: Discussed  Medication Reconciliation Updated: Yes    I understand that a diet low in cholesterol, fat, and sodium is recommended for good health. Unless I have been given specific instructions below for another diet, I accept this instruction as my diet prescription.   Other diet:      Special Instructions: None    · Is patient discharged on Warfarin / Coumadin?   No     Depression / Suicide Risk    As [facility] are [facility], it is important to learn how to keep safe from harming yourself.    Recognize the warning signs:  · Abrupt changes in personality, positive or negative- including increase in energy   · Giving away possessions  · Change in eating patterns- significant weight changes-  positive or negative  · Change in sleeping patterns- unable to sleep or sleeping all the time   · Unwillingness or inability to communicate  · Depression  · Unusual sadness, discouragement and loneliness  · Talk of wanting to die  · Neglect of personal appearance   · Rebelliousness- reckless behavior  · Withdrawal from people/activities they love  · Confusion- inability to concentrate     If you or a loved one observes any of these behaviors or has concerns about self-harm, here's what you can do:  · Talk about it- your feelings and reasons for harming yourself  · Remove any means that you might use to hurt yourself (examples: pills, rope, extension cords, firearm)  · Get professional help from the community (Mental Health, Substance Abuse, psychological counseling)  · Do  not be alone:Call your Safe Contact- someone whom you trust who will be there for you.  · Call your local CRISIS HOTLINE 128-4170 or 840-675-3653  · Call your local Children's Mobile Crisis Response Team [place] (182) 101-7023 or www.[employer]  · Call the toll free National Suicide Prevention Hotlines   · National Suicide Prevention Lifeline 579-349-QJNE (4007)  · National [place] Network 800-SUICIDE (654-0319)

## 2021-06-10 NOTE — DISCHARGE SUMMARY
"Discharge Summary    CHIEF COMPLAINT ON ADMISSION  Chief Complaint   Patient presents with   • Shortness of Breath     Reports +COVID result Saturday. Wearing 4L NC, son stating \"she's been going to 84% on 4L\". Reports hemoptysis as well. Pt. O2 titrated to 6L on triage for 85% on 4L. Denies CP or fevers.       Reason for Admission  Shortness of Breath, Coughing Blood     CODE STATUS  DNAR/DNI    HPI & HOSPITAL COURSE  This is a 81 y.o. female who presented 5/20/2021 with acute hypoxemia respiratory failure. Patient with significant history of atrial fibrillation on eliquis, asthma, and hypertension admitted for worsening hypoxemia secondary to COVID pneumonia. She was recently hospitalized 5/15-5/17 for COVID, discharged on 1 liters NC and presents back on 5/20 with worsening shortness of breath. On admission, she required 6 liters NC which quickly escalated to HFNC. ID consulted and patient was tocilizumab on 5/21. Her oxygenation worsen overnight as she was maxed out on HFNC and transferred to ICU for BiPAP 12/6 FiO2 100%. Patient has not been vaccinated.      In ICU, she was aggressively diuresed. ID reconsulted for worsening hypoxemia and started on convalescent plasma. Hospital course complicated with ongoing hypoxemia concerning for post COVID fibroproliferative state. She was diuresed and net negative ~25 liters. Restarted back on decadron 12 mg for 10 days.     COVID meds: Received Tocilimuzab on 5/21; on dexamethasone restarted 5/20, 12 mg, convalescent plasma 5/27 5/24: LE doppler negative; FIo2 increased to 100% and 50L      5/26: Fio2 50 L 70%  Dr. Powers reviewed with son and is reviewing options of plasma     5/27: Plasma     5/29: pt worse with sats now 85% on 100% 60 l and oxymask -- changed to bipap. CXR 5/28 showed improvement. Family aware     5/30: proned all night.      6/7: On HFNC 70/75% with alternating BiPAP at night. Net negative 25 liters. Remains in good spirit.      6/8: No acute " issues. On HFNC 50/80%. Off isolation. Pending LTACH evaluation.      6/9: No acute issues. On HFNC 50/80% and tolerated BiPAP for 8 hours overnight. Remains in good spirit and motivated to work with PT today. Denies chest pain, N/V, fever/chills, or abdominal pain. Net negative 25.8 liters. BUN up to 60 and creatinine downtrending. Hb 11.7 from 13.2. No signs of active bleed noted. Will repeat CBC daily.       6/10: No acute issues. H/H stable and BUN improving. On HFNC 50/85% with SpO2 ~95%. Feels better overall. Anxious to work with PT/OT with plan to get OOB today. Pending LTACH placement.       Therefore, she is discharged in fair and stable condition to a long-term acute care hospital.    The patient met 2-midnight criteria for an inpatient stay at the time of discharge.      FOLLOW UP ITEMS POST DISCHARGE  None     DISCHARGE DIAGNOSES  Principal Problem:    Acute hypoxemic respiratory failure due to severe acute respiratory syndrome coronavirus 2 (SARS-CoV-2) disease (HCC) POA: Yes  Active Problems:    Longstanding persistent atrial fibrillation (HCC)-Dr. Tompkins [place] POA: Yes    Asthma, moderate persistent, well-controlled POA: Yes    Anticoagulant long-term use- dr eunice collado, [place]; for a fib POA: Yes    Pneumonia due to COVID-19 virus POA: Yes    Hypertension POA: Yes    Iron deficiency POA: Yes    Panlobular emphysema (HCC) POA: Yes    Abnormal echocardiogram POA: Unknown    Steroid-induced hyperglycemia POA: Unknown    Anemia POA: Unknown    Azotemia POA: Unknown  Resolved Problems:    Transaminitis POA: Unknown    Hyponatremia POA: Unknown    Hyperkalemia POA: Unknown    Thrombocytopenia (HCC) POA: Unknown    Epistaxis POA: Unknown      FOLLOW UP  Future Appointments   Date Time Provider Department Center   8/19/2021  9:00 AM PFT-RM3 [facility] None   8/19/2021 10:00 AM SPIROMETRY/6MW [facility] None   8/23/2021  9:50 AM Cheryl James M.D. [facility] None     Avery Cruz M.D.  [address]  [place]  [id number]  [phone]            MEDICATIONS ON DISCHARGE     Medication List      START taking these medications      Instructions   dexamethasone 4 MG/ML Soln  Start taking on: June 11, 2021  Commonly known as: DECADRON   Infuse 3 mL into a venous catheter every day.  Dose: 12 mg     insulin glargine 100 UNIT/ML Soln  Commonly known as: Semglee   Inject 18 Units under the skin every evening.  Dose: 18 Units     insulin regular 100 Unit/mL Soln  Commonly known as: HumuLIN R   Inject 5 Units under the skin 3 times a day before meals.  Dose: 5 Units     omeprazole 20 MG delayed-release capsule  Start taking on: June 11, 2021  Commonly known as: PRILOSEC   Take 1 capsule by mouth every day.  Dose: 20 mg        CHANGE how you take these medications      Instructions   albuterol 108 (90 Base) MCG/ACT Aers inhalation aerosol  What changed:   · how to take this  · when to take this  · reasons to take this  · additional instructions   INHALE TWO PUFFS BY MOUTH EVERY SIX HOURS AS NEEDED FOR SHORTNESS OF BREATH     atorvastatin 10 MG Tabs  What changed: when to take this  Commonly known as: LIPITOR   TAKE ONE TABLET BY MOUTH ONE TIME DAILY        CONTINUE taking these medications      Instructions   C 1000 1000 MG tablet  Generic drug: ascorbic acid   Take 1,000 mg by mouth every day.  Dose: 1,000 mg     DILTIAZem  MG Cp24  Commonly known as: dilTIAZem CD   Take 1 Cap by mouth 2 Times a Day.  Dose: 180 mg     E-Z Spacer Saige   Doctor's comments: For use with albuterol inhaler rxd  Use as directed     Eliquis 5mg Tabs  Generic drug: apixaban   Take 5 mg by mouth 2 Times a Day.  Dose: 5 mg     ferrous sulfate 325 (65 Fe) MG EC tablet   Take 1 tablet by mouth 3 times a day with meals.  Dose: 325 mg     fluticasone-salmeterol 500-50 MCG/DOSE Aepb  Commonly known as: Advair Diskus   Doctor's comments: 90 day supply  Inhale 1 Puff by mouth 2 times a day. Rinse mouth after each use.  Dose: 1 Puff     furosemide 20 MG  Tabs  Commonly known as: LASIX   Take 1 tablet by mouth every day.  Dose: 20 mg     montelukast 10 MG Tabs  Commonly known as: SINGULAIR   Take 1 Tab by mouth every evening.  Dose: 10 mg        STOP taking these medications    azithromycin 250 MG Tabs  Commonly known as: ZITHROMAX     benzonatate 100 MG Caps  Commonly known as: Tessalon Perles     dexamethasone 6 MG Tabs  Commonly known as: DECADRON     multivitamin Tabs     potassium chloride SA 10 MEQ Tbcr  Commonly known as: K-DUR     PROBIOTIC DAILY PO     vitamin D 1000 Unit Tabs  Commonly known as: Cholecalciferol            Allergies  No Known Allergies    DIET  Orders Placed This Encounter   Procedures   • Diet Order Diet: Regular     Standing Status:   Standing     Number of Occurrences:   1     Order Specific Question:   Diet:     Answer:   Regular [1]       ACTIVITY  As tolerated.  Weight bearing as tolerated    LINES, DRAINS, AND WOUNDS  This is an automated list. Peripheral IVs will be removed prior to discharge.  Peripheral IV 06/10/21 20 G Anterior;Left Forearm (Active)   Site Assessment Clean;Dry;Intact 06/10/21 0800   Dressing Type Transparent 06/10/21 0800   Line Status Scrubbed the hub prior to access;Saline locked;Blood return noted;Flushed 06/10/21 0800   Dressing Status Clean;Dry;Intact 06/10/21 0800   Dressing Intervention N/A 06/10/21 0800   Infiltration Grading ([facility]) 0 06/10/21 0800   Phlebitis Scale ([facility] Only) 0 06/10/21 0800     External Urinary Catheter ([facility]) (Active)   Collection Container Suction container 06/10/21 0800   Output (mL) 100 mL 06/10/21 1000         Peripheral IV 06/10/21 20 G Anterior;Left Forearm (Active)   Site Assessment Clean;Dry;Intact 06/10/21 0800   Dressing Type Transparent 06/10/21 0800   Line Status Scrubbed the hub prior to access;Saline locked;Blood return noted;Flushed 06/10/21 0800   Dressing Status Clean;Dry;Intact 06/10/21 0800   Dressing Intervention N/A 06/10/21 0800   Infiltration  Grading [place] 0 06/10/21 0800   Phlebitis Scale ([facility] Only) 0 06/10/21 0800               MENTAL STATUS ON TRANSFER             CONSULTATIONS  Infectious disease     PROCEDURES  None     LABORATORY  Lab Results   Component Value Date    SODIUM 139 06/10/2021    POTASSIUM 4.5 06/10/2021    CHLORIDE 96 06/10/2021    CO2 37 (H) 06/10/2021    GLUCOSE 141 (H) 06/10/2021    BUN 54 (H) 06/10/2021    CREATININE 0.57 06/10/2021    CREATININE 0.94 03/23/2011    GLOMRATE 59 (L) 03/23/2011        Lab Results   Component Value Date    WBC 19.6 (H) 06/10/2021    WBC 11.5 (H) 03/23/2011    HEMOGLOBIN 12.0 06/10/2021    HEMATOCRIT 36.8 (L) 06/10/2021    PLATELETCT 144 (L) 06/10/2021        Total time of the discharge process exceeds 35 minutes.

## 2021-06-10 NOTE — CARE PLAN
Problem: Knowledge Deficit - Standard  Goal: Patient and family/care givers will demonstrate understanding of plan of care, disease process/condition, diagnostic tests and medications  Outcome: Progressing     Problem: Respiratory  Goal: Patient will achieve/maintain optimum respiratory ventilation and gas exchange  Outcome: Progressing     Problem: Skin Integrity  Goal: Skin integrity is maintained or improved  Outcome: Progressing   The patient is Watcher - Medium risk of patient condition declining or worsening    Shift Goals  Clinical Goals: Wean supplemental O2, prone with BIPAP overnight, increased strength and mobility  Patient Goals: Comfort/rest with proning and BIPAP overnight  Family Goals: Support, self care    Progress made toward(s) clinical / shift goals:       Patient is not progressing towards the following goals:

## 2021-06-10 NOTE — PROGRESS NOTES
Pt aox4, RASS 0, moves all four extremities, follows commands. Pt calm, sitting up in bed eating breakfast. Pt tolerating HFNC 85% 55L, resps 26/minute. Specifically denies SOB, chest pain. POC reviewed, questions addressed with pt and pt's son, [employer]. SCDs in place, discussed importance of ROM for DVT prophylaxis, strength exercises for mobility, and q2 turns to prevented skin breakdown. Pt verbalizes understanding. Bed locked and in low, call light within reach, universal safety precautions in place.

## 2021-06-10 NOTE — PROGRESS NOTES
"Report received from Allison AYON. Patient A/O x4, on HHFNC 55L 85% O2 sat 95%, denies any pain, afib 80-90s on the monitor. Patient states that she is \"feeling good\" and happy she had a bowel movement. Safety precautions in place, call light and belongings within reach. All needs met at this time.  " [Normal Appearance] : normal appearance [Well Groomed] : well groomed [General Appearance - In No Acute Distress] : no acute distress [Respiration, Rhythm And Depth] : normal respiratory rhythm and effort [Auscultation Breath Sounds / Voice Sounds] : lungs were clear to auscultation bilaterally [Heart Rate And Rhythm] : heart rate and rhythm were normal [Heart Sounds] : normal S1 and S2 [Edema] : no peripheral edema present [Abnormal Walk] : normal gait [Nail Clubbing] : no clubbing of the fingernails [Cyanosis, Localized] : no localized cyanosis [] : no rash [Oriented To Time, Place, And Person] : oriented to person, place, and time [Impaired Insight] : insight and judgment were intact [Affect] : the affect was normal [Mood] : the mood was normal [FreeTextEntry1] : Wearing a face mask

## 2021-06-10 NOTE — PROGRESS NOTES
Discharge paperwork reviewed with pt and pt's daughter in law (Haley). Reviewed general d/c information including, medications, follow up appointments, plan for transfer to [facility] with critical care transport, s/s to seek medical treatment. Questions addressed. Paperwork signed and placed in chart.

## 2021-06-10 NOTE — FLOWSHEET NOTE
06/09/21 2210   Vital Signs   Pulse 81   Respiration (!) 22   Pulse Oximetry 91 %   $ Pulse Oximetry (Spot Check) Yes   Oxygen   FiO2% 70 %   O2 Delivery Device BIPAP   Non-Invasive Ventilation [facility]   Nocturnal CPAP or BIPAP BIPAP - [facility]   $ System Evaluation Yes   Settings (If Known) 16/8   FiO2 or LPM 70

## 2021-06-10 NOTE — DISCHARGE PLANNING
Received Transport Form @ [id number]  Spoke to Joie @ [facility], Joie provided auth # [id number].  Spoke to Margarita @ [facility].    Transport is scheduled for 6/10 @1400 going to [facility].    SW and BS RN notified of scheduled transport via Voalte @[id number].

## 2021-06-10 NOTE — CARE PLAN
The patient is Watcher - Medium risk of patient condition declining or worsening    Shift Goals  Clinical Goals: Wean supplemental O2, prone with BIPAP overnight, increased strength and mobility  Patient Goals: Comfort/rest with proning and BIPAP overnight  Family Goals: Support, self care    Progress made toward(s) clinical / shift goals:    Problem: Knowledge Deficit - Standard  Goal: Patient and family/care givers will demonstrate understanding of plan of care, disease process/condition, diagnostic tests and medications  Outcome: Progressing     Problem: Respiratory  Goal: Patient will achieve/maintain optimum respiratory ventilation and gas exchange  Outcome: Progressing     Problem: Skin Integrity  Goal: Skin integrity is maintained or improved  Outcome: Progressing

## 2021-06-10 NOTE — FLOWSHEET NOTE
06/09/21 2057   Events/Summary/Plan   Events/Summary/Plan BIPAP started   Skin Inspection Respiratory Device Red   Location Nose   Protective Device Bipap Gels;Foam Pads   Vital Signs   Pulse 94   Respiration (!) 28   Pulse Oximetry 91 %   $ Pulse Oximetry (Spot Check) Yes   Oxygen   FiO2% 70 %   O2 Delivery Device BIPAP   Non-Invasive Ventilation [facility]   Nocturnal CPAP or BIPAP BIPAP - [facility]   $ System Evaluation Yes   Settings (If Known) 16/10   FiO2 or LPM 70

## 2021-06-10 NOTE — PROGRESS NOTES
Care flight team at bedside for critical care transport. Pt aox4, calm, moves all four extremities, denies pain, tolerating HFNC 55L 80% fio2.  VSS. Belongings sent with family. Report given to Care flight team. Pt transferred in stable condition. Care released to care flight team.

## 2021-07-12 ENCOUNTER — TELEPHONE (OUTPATIENT)
Dept: OTHER | Facility: MEDICAL CENTER | Age: 81
End: 2021-07-12

## 2021-08-19 ENCOUNTER — APPOINTMENT (OUTPATIENT)
Dept: SLEEP MEDICINE | Facility: MEDICAL CENTER | Age: 81
End: 2021-08-19
Payer: MEDICARE

## 2021-08-23 ENCOUNTER — APPOINTMENT (OUTPATIENT)
Dept: SLEEP MEDICINE | Facility: MEDICAL CENTER | Age: 81
End: 2021-08-23
Payer: MEDICARE

## 2021-12-27 ENCOUNTER — TELEPHONE (OUTPATIENT)
Dept: PHYSICAL THERAPY | Facility: REHABILITATION | Age: 81
End: 2021-12-27

## 2021-12-27 NOTE — OP THERAPY DISCHARGE SUMMARY
Outpatient Physical Therapy  DISCHARGE SUMMARY NOTE      [facility]  [address], [address]  [place] [address]  Phone:  [phone]  Fax:  [phone]    Date of Visit: 12/27/2021    Patient: Jessica Black  YOB: 1940  MRN: [id number]     Referring Provider: Avery Cruz M.D.   Referring Diagnosis Risk for falls [Z91.81]     Your patient is being discharged from Physical Therapy with the following comments:   · Patient has failed to schedule or reschedule follow-up visits    Comments:  Jessica Black has been discharged due to a lapse in care greater than 30 days. Thank you for the opportunity to assist you and your patient.     Limitations Remaining:  unknown    Recommendations:  F/u with PCP as needed    Pratik Horner, PT, DPT    Date: 12/27/2021